# Patient Record
Sex: FEMALE | Race: WHITE | Employment: UNEMPLOYED | URBAN - NONMETROPOLITAN AREA
[De-identification: names, ages, dates, MRNs, and addresses within clinical notes are randomized per-mention and may not be internally consistent; named-entity substitution may affect disease eponyms.]

---

## 2017-09-07 ENCOUNTER — NURSE TRIAGE (OUTPATIENT)
Dept: ADMINISTRATIVE | Age: 37
End: 2017-09-07

## 2018-09-21 ENCOUNTER — HOSPITAL ENCOUNTER (EMERGENCY)
Age: 38
Discharge: HOME | End: 2018-09-21
Payer: MEDICARE

## 2018-09-21 VITALS
TEMPERATURE: 98.4 F | SYSTOLIC BLOOD PRESSURE: 140 MMHG | BODY MASS INDEX: 23.9 KG/M2 | OXYGEN SATURATION: 96 % | HEIGHT: 64 IN | RESPIRATION RATE: 18 BRPM | WEIGHT: 140 LBS | HEART RATE: 73 BPM | DIASTOLIC BLOOD PRESSURE: 86 MMHG

## 2018-09-21 DIAGNOSIS — B96.89 BACTERIAL VAGINOSIS: Primary | ICD-10-CM

## 2018-09-21 DIAGNOSIS — R63.4 UNINTENTIONAL WEIGHT LOSS: ICD-10-CM

## 2018-09-21 DIAGNOSIS — R53.1 GENERALIZED WEAKNESS: ICD-10-CM

## 2018-09-21 DIAGNOSIS — N76.0 BACTERIAL VAGINOSIS: Primary | ICD-10-CM

## 2018-09-21 DIAGNOSIS — G47.00 INSOMNIA, UNSPECIFIED TYPE: ICD-10-CM

## 2018-09-21 LAB
CHLAMYDIA TRACHOMATIS BY RT-PCR: NOT DETECTED
CT/NG SOURCE: NORMAL
NEISSERIA GONORRHOEAE BY RT-PCR: NOT DETECTED
TRICHOMONAS PREP: NEGATIVE

## 2018-09-21 PROCEDURE — 87491 CHLMYD TRACH DNA AMP PROBE: CPT

## 2018-09-21 PROCEDURE — 99205 OFFICE O/P NEW HI 60 MIN: CPT

## 2018-09-21 PROCEDURE — 87591 N.GONORRHOEAE DNA AMP PROB: CPT

## 2018-09-21 PROCEDURE — 99213 OFFICE O/P EST LOW 20 MIN: CPT | Performed by: NURSE PRACTITIONER

## 2018-09-21 PROCEDURE — 87070 CULTURE OTHR SPECIMN AEROBIC: CPT

## 2018-09-21 PROCEDURE — 87205 SMEAR GRAM STAIN: CPT

## 2018-09-21 PROCEDURE — 87808 TRICHOMONAS ASSAY W/OPTIC: CPT

## 2018-09-21 RX ORDER — METRONIDAZOLE 500 MG/1
500 TABLET ORAL 3 TIMES DAILY
Qty: 21 TABLET | Refills: 0 | Status: SHIPPED | OUTPATIENT
Start: 2018-09-21 | End: 2018-09-28

## 2018-09-21 RX ORDER — M-VIT,TX,IRON,MINS/CALC/FOLIC 27MG-0.4MG
1 TABLET ORAL DAILY
COMMUNITY
End: 2018-09-29

## 2018-09-21 RX ORDER — CRANBERRY CONC/C/BACILL COAG 250-30-15
TABLET ORAL
COMMUNITY
End: 2018-09-29

## 2018-09-21 ASSESSMENT — ENCOUNTER SYMPTOMS
SINUS PRESSURE: 0
NAUSEA: 1
VOMITING: 0
CHEST TIGHTNESS: 0
DIARRHEA: 0
COUGH: 0
SHORTNESS OF BREATH: 0
SORE THROAT: 0

## 2018-09-21 NOTE — ED PROVIDER NOTES
(Thin, creamy; fishy odor). Negative for dysuria, frequency, urgency, vaginal bleeding and vaginal pain. Neurological: Positive for dizziness (Occasional) and weakness. Negative for syncope and headaches. Psychiatric/Behavioral: The patient is nervous/anxious. PAST MEDICAL HISTORY         Diagnosis Date    Back pain     Colitis     Depression     Fibromyalgia     HPV (human papilloma virus) anogenital infection     OCD (obsessive compulsive disorder)        SURGICAL HISTORY     Patient  has a past surgical history that includes Cholecystectomy and Inner ear surgery. CURRENT MEDICATIONS       Discharge Medication List as of 9/21/2018  1:47 PM      CONTINUE these medications which have NOT CHANGED    Details   Multiple Vitamins-Minerals (THERAPEUTIC MULTIVITAMIN-MINERALS) tablet Take 1 tablet by mouth dailyHistorical Med      Cranberry-Vitamin C-Probiotic (AZO CRANBERRY) 250-30 MG TABS Take by mouthHistorical Med             ALLERGIES     Patient is has No Known Allergies. Patients   There is no immunization history on file for this patient. FAMILY HISTORY     Patient's family history includes Cancer in her paternal aunt; Diabetes in her father and mother; Heart Disease in her father and mother. SOCIAL HISTORY     Patient  reports that she has quit smoking. Her smoking use included Cigarettes. She smoked 0.50 packs per day. She has never used smokeless tobacco. She reports that she uses drugs, including Marijuana and Methamphetamines. She reports that she does not drink alcohol. PHYSICAL EXAM     ED TRIAGE VITALS  BP: (!) 140/86, Temp: 98.4 °F (36.9 °C), Pulse: 73, Resp: 18, SpO2: 96 %,Estimated body mass index is 24.41 kg/m² as calculated from the following:    Height as of this encounter: 5' 3.5\" (1.613 m). Weight as of this encounter: 140 lb (63.5 kg). ,Patient's last menstrual period was 08/28/2018. Physical Exam   Constitutional: She is oriented to person, place, and time.  She appears well-developed and well-nourished. She is cooperative. HENT:   Head: Normocephalic and atraumatic. Right Ear: Hearing, tympanic membrane, external ear and ear canal normal.   Left Ear: Hearing, tympanic membrane, external ear and ear canal normal.   Nose: Nose normal.   Mouth/Throat: Uvula is midline. Neck: Neck supple. Cardiovascular: Normal rate, regular rhythm, S1 normal, S2 normal and normal heart sounds. Pulmonary/Chest: Effort normal and breath sounds normal. No respiratory distress. She has no wheezes. Abdominal: Soft. Normal appearance and bowel sounds are normal. She exhibits no distension. There is no tenderness. Musculoskeletal:   Strength 5/5 to all 4 extremities   Lymphadenopathy:        Head (right side): No submental, no submandibular, no tonsillar, no preauricular, no posterior auricular and no occipital adenopathy present. Head (left side): No submental, no submandibular, no tonsillar, no preauricular, no posterior auricular and no occipital adenopathy present. She has no cervical adenopathy. Neurological: She is alert and oriented to person, place, and time. Skin: Skin is warm and dry. Psychiatric: She has a normal mood and affect. Her speech is normal and behavior is normal.   Nursing note and vitals reviewed. DIAGNOSTIC RESULTS     Labs:No results found for this visit on 09/21/18. IMAGING:    No orders to display         URGENT CARE COURSE:     Vitals:    09/21/18 1303   BP: (!) 140/86   Pulse: 73   Resp: 18   Temp: 98.4 °F (36.9 °C)   TempSrc: Temporal   SpO2: 96%   Weight: 140 lb (63.5 kg)   Height: 5' 3.5\" (1.613 m)       Medications - No data to display         PROCEDURES:  None    FINAL IMPRESSION      1. Bacterial vaginosis    2. Generalized weakness    3. Insomnia, unspecified type    4. Unintentional weight loss          DISPOSITION/PLAN     Take antibiotic as prescribed until gone. Do not stop taking even if you are feeling better.   Do not CNP  09/21/18 135

## 2018-09-21 NOTE — ED NOTES
Patient walked to room 5 for bilateral hand weakness onset approx 2 months ago, anxious and has had a hard time sleeping at night, she recently moved away from her significant other 2 months ago, and moved back into [place]. She states she was using drugs meth and marijuana, but hs been clean of them for a couple months. Patient was treated for anxiety and depression, denies any self harm or harm to other while here in the Urgent Care. Patient also states that she has had a vaginal odor with clear discharge she wants to get looked at. No other needs.       Jocy Ayala, PETTY  09/21/18 4481

## 2018-09-21 NOTE — ED NOTES
Patient discharge instructions given to pt and pt verbalized understanding, px given, no other needs at this time, and pt left in stable condition.      Tonny Villagran RN  09/21/18 0838

## 2018-09-24 LAB
GENITAL CULTURE, ROUTINE: NORMAL
GRAM STAIN RESULT: NORMAL

## 2018-09-29 ENCOUNTER — HOSPITAL ENCOUNTER (EMERGENCY)
Age: 38
Discharge: HOME | End: 2018-09-29
Payer: MEDICARE

## 2018-09-29 VITALS
OXYGEN SATURATION: 97 % | WEIGHT: 150 LBS | BODY MASS INDEX: 26.15 KG/M2 | TEMPERATURE: 98.2 F | HEART RATE: 94 BPM | DIASTOLIC BLOOD PRESSURE: 96 MMHG | RESPIRATION RATE: 18 BRPM | SYSTOLIC BLOOD PRESSURE: 129 MMHG

## 2018-09-29 DIAGNOSIS — F43.12 CHRONIC POST-TRAUMATIC STRESS DISORDER (PTSD): ICD-10-CM

## 2018-09-29 DIAGNOSIS — F41.9 ANXIETY AND DEPRESSION: Primary | ICD-10-CM

## 2018-09-29 DIAGNOSIS — F32.A ANXIETY AND DEPRESSION: Primary | ICD-10-CM

## 2018-09-29 LAB
AMPHETAMINE+METHAMPHETAMINE URINE SCREEN: NEGATIVE
BARBITURATE QUANTITATIVE URINE: NEGATIVE
BENZODIAZEPINE QUANTITATIVE URINE: NEGATIVE
CANNABINOID QUANTITATIVE URINE: NEGATIVE
COCAINE METABOLITE QUANTITATIVE URINE: NEGATIVE
OPIATES, URINE: NEGATIVE
OXYCODONE: NEGATIVE
PHENCYCLIDINE QUANTITATIVE URINE: NEGATIVE
PREGNANCY, URINE: NEGATIVE

## 2018-09-29 PROCEDURE — 99284 EMERGENCY DEPT VISIT MOD MDM: CPT

## 2018-09-29 PROCEDURE — 80307 DRUG TEST PRSMV CHEM ANLYZR: CPT

## 2018-09-29 PROCEDURE — 81025 URINE PREGNANCY TEST: CPT

## 2018-09-29 ASSESSMENT — ENCOUNTER SYMPTOMS
WHEEZING: 0
EYE DISCHARGE: 0
BACK PAIN: 0
SHORTNESS OF BREATH: 0
VOMITING: 0
COUGH: 0
ABDOMINAL PAIN: 0
DIARRHEA: 0
NAUSEA: 0
SORE THROAT: 0
EYE PAIN: 0
RHINORRHEA: 0

## 2018-09-29 ASSESSMENT — SLEEP AND FATIGUE QUESTIONNAIRES
DIFFICULTY ARISING: NO
AVERAGE NUMBER OF SLEEP HOURS: 4
DIFFICULTY STAYING ASLEEP: YES
SLEEP PATTERN: DIFFICULTY FALLING ASLEEP;DISTURBED/INTERRUPTED SLEEP
DO YOU HAVE DIFFICULTY SLEEPING: YES
DIFFICULTY FALLING ASLEEP: YES
DO YOU USE A SLEEP AID: NO
RESTFUL SLEEP: NO

## 2018-09-29 ASSESSMENT — LIFESTYLE VARIABLES: HISTORY_ALCOHOL_USE: YES

## 2018-09-29 ASSESSMENT — PATIENT HEALTH QUESTIONNAIRE - PHQ9: SUM OF ALL RESPONSES TO PHQ QUESTIONS 1-9: 14

## 2018-09-29 NOTE — ED PROVIDER NOTES
cardiologist.  None     RADIOLOGY: non-plain film images(s) such as CT, Ultrasound and MRI are read by the radiologist.  Plain radiographic images are visualized and preliminarily interpreted by the emergency physician unless otherwise stated below. No orders to display       LABS:   [address], URINE       EMERGENCY DEPARTMENT COURSE:   Vitals:    Vitals:    09/29/18 1140 09/29/18 1142 09/29/18 1401   BP: (!) 129/96     Pulse: 107  94   Resp: 20  18   Temp:  98.2 °F (36.8 °C)    TempSrc:  Oral    SpO2: 97%     Weight:   150 lb (68 kg)     6:24 PM: I spoke with Isabel from [facility] who stated that she spoke with Dr. Braden Soto (psychiatry)  who suggested IOT program for patient. Patient declined IOT and any other resources at this time. She told Isabel that she has an appointment with her PCP on Monday and that she is comfortable following up with him then. The patient was seen and evaluated within the ED today for the evaluation of increased frequency of panic attacks. The patient presented in no acute distress. Physical exam was benign from a medical standpoint. Patient was crying upon exam, depressed, and anxious. Pregnancy test and drug test both negative. I observed the patient's condition to remain stable during the duration of her stay. The patient wanted to go home after speaking with Isabel from [facility] and denied any resources or help at this time. Therefore, patient is discharged home in stable condition. I discussed return precautions and she verbalized understanding. I recommended that the patient f/u with Dr. Heather Harris (pcp) as scheduled for further evaluation and work up if their symptoms do not improve. Contact information for the intensive outpatient treatment program was given to the patient should she change her mind. All questions and concerns were addressed.  I have given the patient strict written and verbal instructions about care at home, follow-up, and signs and symptoms of worsening of condition and they did verbalize understanding. CRITICAL CARE:   None    CONSULTS:  Isabel ([facility])     PROCEDURES:  None    FINAL IMPRESSION      1. Anxiety and depression    2. Chronic post-traumatic stress disorder (PTSD)          DISPOSITION/PLAN     1. Anxiety and depression    2. Chronic post-traumatic stress disorder (PTSD)        PATIENT REFERRED TO:  Jessica Hassan MD  [address] [facility]  [phone]      as scheduled on Monday    [address] [address] [phone]  Schedule an appointment as soon as possible for a visit   if you would like intensive outpatient treatment      DISCHARGE MEDICATIONS:  Discharge Medication List as of 9/29/2018  1:40 PM          (Please note that portions of this note were completed with a voice recognition program.  Efforts were made to edit the dictations but occasionally words are mis-transcribed.)    Scribe: Dre Guevara 9/29/18 12:15 PM Scribing for and in the presence of Beverly Bilberry, Ellsworth Fothergill. Signed by: Ashok Morrissey, 09/29/18 6:24 PM    Provider:  I personally performed the services described in the documentation, reviewed and edited the documentation which was dictated to the scribe in my presence, and it accurately records my words and actions.     Judy Knight PA-C 09/29/18 6:24 PM    Beverly Bilberry, PA-C Beverly Bilberry, Ellsworth Fothergill  09/29/18 0067
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
## 2018-10-01 ENCOUNTER — OFFICE VISIT (OUTPATIENT)
Dept: FAMILY MEDICINE CLINIC | Age: 38
End: 2018-10-01
Payer: MEDICARE

## 2018-10-01 VITALS
DIASTOLIC BLOOD PRESSURE: 82 MMHG | SYSTOLIC BLOOD PRESSURE: 134 MMHG | HEART RATE: 88 BPM | BODY MASS INDEX: 25.92 KG/M2 | HEIGHT: 64 IN | WEIGHT: 151.8 LBS

## 2018-10-01 DIAGNOSIS — Z13.1 DIABETES MELLITUS SCREENING: ICD-10-CM

## 2018-10-01 DIAGNOSIS — Z13.220 SCREENING CHOLESTEROL LEVEL: ICD-10-CM

## 2018-10-01 DIAGNOSIS — F33.1 MODERATE EPISODE OF RECURRENT MAJOR DEPRESSIVE DISORDER (HCC): Primary | ICD-10-CM

## 2018-10-01 PROBLEM — K22.70 BARRETT'S ESOPHAGUS WITHOUT DYSPLASIA: Status: ACTIVE | Noted: 2018-10-01

## 2018-10-01 PROCEDURE — 99213 OFFICE O/P EST LOW 20 MIN: CPT | Performed by: FAMILY MEDICINE

## 2018-10-01 PROCEDURE — G8427 DOCREV CUR MEDS BY ELIG CLIN: HCPCS | Performed by: FAMILY MEDICINE

## 2018-10-01 PROCEDURE — G8419 CALC BMI OUT NRM PARAM NOF/U: HCPCS | Performed by: FAMILY MEDICINE

## 2018-10-01 PROCEDURE — G8484 FLU IMMUNIZE NO ADMIN: HCPCS | Performed by: FAMILY MEDICINE

## 2018-10-01 PROCEDURE — 1036F TOBACCO NON-USER: CPT | Performed by: FAMILY MEDICINE

## 2018-10-01 RX ORDER — FLUOXETINE HYDROCHLORIDE 20 MG/1
20 CAPSULE ORAL DAILY
Qty: 30 CAPSULE | Refills: 0 | Status: ON HOLD | OUTPATIENT
Start: 2018-10-01 | End: 2019-04-23 | Stop reason: ALTCHOICE

## 2018-10-01 ASSESSMENT — ENCOUNTER SYMPTOMS
SHORTNESS OF BREATH: 0
WHEEZING: 0

## 2018-10-02 ENCOUNTER — HOSPITAL ENCOUNTER (OUTPATIENT)
Age: 38
Discharge: HOME | End: 2018-10-02
Payer: MEDICARE

## 2018-10-02 DIAGNOSIS — Z13.1 DIABETES MELLITUS SCREENING: ICD-10-CM

## 2018-10-02 DIAGNOSIS — Z13.220 SCREENING CHOLESTEROL LEVEL: ICD-10-CM

## 2018-10-02 LAB
CHOLESTEROL, TOTAL: 112 MG/DL (ref 100–199)
GLUCOSE FASTING: 102 MG/DL (ref 70–108)
HDLC SERPL-MCNC: 68 MG/DL
LDL CHOLESTEROL CALCULATED: 34 MG/DL
TRIGL SERPL-MCNC: 52 MG/DL (ref 0–199)

## 2018-10-02 PROCEDURE — 36415 COLL VENOUS BLD VENIPUNCTURE: CPT

## 2018-10-02 PROCEDURE — 80061 LIPID PANEL: CPT

## 2018-10-02 PROCEDURE — 82947 ASSAY GLUCOSE BLOOD QUANT: CPT

## 2018-10-22 ENCOUNTER — TELEPHONE (OUTPATIENT)
Dept: FAMILY MEDICINE CLINIC | Age: 38
End: 2018-10-22

## 2018-11-03 ENCOUNTER — HOSPITAL ENCOUNTER (EMERGENCY)
Age: 38
Discharge: HOME | End: 2018-11-03
Payer: MEDICARE

## 2018-11-03 VITALS
HEART RATE: 92 BPM | SYSTOLIC BLOOD PRESSURE: 97 MMHG | WEIGHT: 151 LBS | DIASTOLIC BLOOD PRESSURE: 59 MMHG | OXYGEN SATURATION: 98 % | BODY MASS INDEX: 25.78 KG/M2 | RESPIRATION RATE: 16 BRPM | HEIGHT: 64 IN | TEMPERATURE: 98 F

## 2018-11-03 DIAGNOSIS — F19.10 POLYSUBSTANCE ABUSE (HCC): ICD-10-CM

## 2018-11-03 DIAGNOSIS — N30.00 ACUTE CYSTITIS WITHOUT HEMATURIA: Primary | ICD-10-CM

## 2018-11-03 LAB
ACETAMINOPHEN LEVEL: < 5 UG/ML (ref 0–20)
ALBUMIN SERPL-MCNC: 3.7 G/DL (ref 3.5–5.1)
ALP BLD-CCNC: 57 U/L (ref 38–126)
ALT SERPL-CCNC: 36 U/L (ref 11–66)
AMPHETAMINE+METHAMPHETAMINE URINE SCREEN: POSITIVE
ANION GAP SERPL CALCULATED.3IONS-SCNC: 13 MEQ/L (ref 8–16)
AST SERPL-CCNC: 72 U/L (ref 5–40)
BACTERIA: ABNORMAL /HPF
BARBITURATE QUANTITATIVE URINE: NEGATIVE
BASOPHILS # BLD: 0.7 %
BASOPHILS ABSOLUTE: 0.1 THOU/MM3 (ref 0–0.1)
BENZODIAZEPINE QUANTITATIVE URINE: NEGATIVE
BILIRUB SERPL-MCNC: 1.8 MG/DL (ref 0.3–1.2)
BILIRUBIN DIRECT: 0.4 MG/DL (ref 0–0.3)
BILIRUBIN URINE: ABNORMAL
BLOOD, URINE: ABNORMAL
BUN BLDV-MCNC: 10 MG/DL (ref 7–22)
CALCIUM SERPL-MCNC: 8.5 MG/DL (ref 8.5–10.5)
CANNABINOID QUANTITATIVE URINE: POSITIVE
CASTS 2: PRESENT /LPF
CASTS UA: ABNORMAL /LPF
CHARACTER, URINE: ABNORMAL
CHLORIDE BLD-SCNC: 103 MEQ/L (ref 98–111)
CO2: 21 MEQ/L (ref 23–33)
COCAINE METABOLITE QUANTITATIVE URINE: NEGATIVE
COLOR: ABNORMAL
CREAT SERPL-MCNC: 0.6 MG/DL (ref 0.4–1.2)
CRYSTALS, UA: ABNORMAL
EOSINOPHIL # BLD: 3.9 %
EOSINOPHILS ABSOLUTE: 0.3 THOU/MM3 (ref 0–0.4)
EPITHELIAL CELLS, UA: ABNORMAL /HPF
ERYTHROCYTE [DISTWIDTH] IN BLOOD BY AUTOMATED COUNT: 12.7 % (ref 11.5–14.5)
ERYTHROCYTE [DISTWIDTH] IN BLOOD BY AUTOMATED COUNT: 42.5 FL (ref 35–45)
ETHYL ALCOHOL, SERUM: < 0.01 %
GFR SERPL CREATININE-BSD FRML MDRD: > 90 ML/MIN/1.73M2
GLUCOSE BLD-MCNC: 88 MG/DL (ref 70–108)
GLUCOSE URINE: NEGATIVE MG/DL
HCT VFR BLD CALC: 38.3 % (ref 37–47)
HEMOGLOBIN: 13.5 GM/DL (ref 12–16)
ICTOTEST: POSITIVE
IMMATURE GRANS (ABS): 0.01 THOU/MM3 (ref 0–0.07)
IMMATURE GRANULOCYTES: 0.1 %
KETONES, URINE: 15
LEUKOCYTE ESTERASE, URINE: ABNORMAL
LIPASE: 179.4 U/L (ref 5.6–51.3)
LYMPHOCYTES # BLD: 34.5 %
LYMPHOCYTES ABSOLUTE: 2.5 THOU/MM3 (ref 1–4.8)
MAGNESIUM: 1.9 MG/DL (ref 1.6–2.4)
MCH RBC QN AUTO: 32.6 PG (ref 26–33)
MCHC RBC AUTO-ENTMCNC: 35.2 GM/DL (ref 32.2–35.5)
MCV RBC AUTO: 92.5 FL (ref 81–99)
MISCELLANEOUS 2: ABNORMAL
MONOCYTES # BLD: 10 %
MONOCYTES ABSOLUTE: 0.7 THOU/MM3 (ref 0.4–1.3)
NITRITE, URINE: NEGATIVE
NUCLEATED RED BLOOD CELLS: 0 /100 WBC
OPIATES, URINE: NEGATIVE
OSMOLALITY CALCULATION: 272.3 MOSMOL/KG (ref 275–300)
OXYCODONE: NEGATIVE
PH UA: 6
PHENCYCLIDINE QUANTITATIVE URINE: NEGATIVE
PLATELET # BLD: 217 THOU/MM3 (ref 130–400)
PMV BLD AUTO: 9.1 FL (ref 9.4–12.4)
POTASSIUM SERPL-SCNC: 3.4 MEQ/L (ref 3.5–5.2)
PREGNANCY, SERUM: NEGATIVE
PROTEIN UA: ABNORMAL
RBC # BLD: 4.14 MILL/MM3 (ref 4.2–5.4)
RBC URINE: ABNORMAL /HPF
RENAL EPITHELIAL, UA: ABNORMAL
SALICYLATE, SERUM: < 0.3 MG/DL (ref 2–10)
SEG NEUTROPHILS: 50.8 %
SEGMENTED NEUTROPHILS ABSOLUTE COUNT: 3.7 THOU/MM3 (ref 1.8–7.7)
SODIUM BLD-SCNC: 137 MEQ/L (ref 135–145)
SPECIFIC GRAVITY, URINE: 1.03 (ref 1–1.03)
TOTAL PROTEIN: 6.2 G/DL (ref 6.1–8)
TSH SERPL DL<=0.05 MIU/L-ACNC: 2.11 UIU/ML (ref 0.4–4.2)
UROBILINOGEN, URINE: 4 EU/DL
WBC # BLD: 7.3 THOU/MM3 (ref 4.8–10.8)
WBC UA: ABNORMAL /HPF
YEAST: ABNORMAL

## 2018-11-03 PROCEDURE — 6370000000 HC RX 637 (ALT 250 FOR IP): Performed by: PHYSICIAN ASSISTANT

## 2018-11-03 PROCEDURE — 81001 URINALYSIS AUTO W/SCOPE: CPT

## 2018-11-03 PROCEDURE — 83735 ASSAY OF MAGNESIUM: CPT

## 2018-11-03 PROCEDURE — 6360000002 HC RX W HCPCS

## 2018-11-03 PROCEDURE — 87077 CULTURE AEROBIC IDENTIFY: CPT

## 2018-11-03 PROCEDURE — 87184 SC STD DISK METHOD PER PLATE: CPT

## 2018-11-03 PROCEDURE — 83690 ASSAY OF LIPASE: CPT

## 2018-11-03 PROCEDURE — 80307 DRUG TEST PRSMV CHEM ANLYZR: CPT

## 2018-11-03 PROCEDURE — 82248 BILIRUBIN DIRECT: CPT

## 2018-11-03 PROCEDURE — 85025 COMPLETE CBC W/AUTO DIFF WBC: CPT

## 2018-11-03 PROCEDURE — 87086 URINE CULTURE/COLONY COUNT: CPT

## 2018-11-03 PROCEDURE — G0480 DRUG TEST DEF 1-7 CLASSES: HCPCS

## 2018-11-03 PROCEDURE — 36415 COLL VENOUS BLD VENIPUNCTURE: CPT

## 2018-11-03 PROCEDURE — 84443 ASSAY THYROID STIM HORMONE: CPT

## 2018-11-03 PROCEDURE — 99285 EMERGENCY DEPT VISIT HI MDM: CPT

## 2018-11-03 PROCEDURE — 96372 THER/PROPH/DIAG INJ SC/IM: CPT

## 2018-11-03 PROCEDURE — 84703 CHORIONIC GONADOTROPIN ASSAY: CPT

## 2018-11-03 PROCEDURE — 80053 COMPREHEN METABOLIC PANEL: CPT

## 2018-11-03 PROCEDURE — 87186 SC STD MICRODIL/AGAR DIL: CPT

## 2018-11-03 RX ORDER — DIPHENHYDRAMINE HYDROCHLORIDE 50 MG/ML
INJECTION INTRAMUSCULAR; INTRAVENOUS
Status: COMPLETED
Start: 2018-11-03 | End: 2018-11-03

## 2018-11-03 RX ORDER — SULFAMETHOXAZOLE AND TRIMETHOPRIM 800; 160 MG/1; MG/1
1 TABLET ORAL 2 TIMES DAILY
Qty: 14 TABLET | Refills: 0 | Status: SHIPPED | OUTPATIENT
Start: 2018-11-03 | End: 2018-11-10

## 2018-11-03 RX ORDER — SULFAMETHOXAZOLE AND TRIMETHOPRIM 800; 160 MG/1; MG/1
1 TABLET ORAL ONCE
Status: COMPLETED | OUTPATIENT
Start: 2018-11-03 | End: 2018-11-03

## 2018-11-03 RX ORDER — DIPHENHYDRAMINE HYDROCHLORIDE 50 MG/ML
25 INJECTION INTRAMUSCULAR; INTRAVENOUS ONCE
Status: DISCONTINUED | OUTPATIENT
Start: 2018-11-03 | End: 2018-11-03

## 2018-11-03 RX ORDER — LORAZEPAM 2 MG/ML
INJECTION INTRAMUSCULAR
Status: COMPLETED
Start: 2018-11-03 | End: 2018-11-03

## 2018-11-03 RX ORDER — DIPHENHYDRAMINE HYDROCHLORIDE 50 MG/ML
50 INJECTION INTRAMUSCULAR; INTRAVENOUS ONCE
Status: COMPLETED | OUTPATIENT
Start: 2018-11-03 | End: 2018-11-03

## 2018-11-03 RX ORDER — LORAZEPAM 2 MG/ML
2 INJECTION INTRAMUSCULAR ONCE
Status: COMPLETED | OUTPATIENT
Start: 2018-11-03 | End: 2018-11-03

## 2018-11-03 RX ADMIN — SULFAMETHOXAZOLE AND TRIMETHOPRIM 1 TABLET: 800; 160 TABLET ORAL at 12:29

## 2018-11-03 RX ADMIN — LORAZEPAM 2 MG: 2 INJECTION INTRAMUSCULAR at 04:00

## 2018-11-03 RX ADMIN — DIPHENHYDRAMINE HYDROCHLORIDE 50 MG: 50 INJECTION, SOLUTION INTRAMUSCULAR; INTRAVENOUS at 04:00

## 2018-11-03 RX ADMIN — DIPHENHYDRAMINE HYDROCHLORIDE 50 MG: 50 INJECTION INTRAMUSCULAR; INTRAVENOUS at 04:00

## 2018-11-03 RX ADMIN — LORAZEPAM 2 MG: 2 INJECTION INTRAMUSCULAR; INTRAVENOUS at 04:00

## 2018-11-03 ASSESSMENT — ENCOUNTER SYMPTOMS
RHINORRHEA: 0
COUGH: 0
NAUSEA: 0
SORE THROAT: 0
VOMITING: 0
SHORTNESS OF BREATH: 0
DIARRHEA: 0
BACK PAIN: 0
ABDOMINAL PAIN: 0

## 2018-11-03 ASSESSMENT — SLEEP AND FATIGUE QUESTIONNAIRES
DO YOU HAVE DIFFICULTY SLEEPING: NO
DO YOU USE A SLEEP AID: NO
AVERAGE NUMBER OF SLEEP HOURS: 3

## 2018-11-03 ASSESSMENT — LIFESTYLE VARIABLES: HISTORY_ALCOHOL_USE: NO

## 2018-11-03 NOTE — PROGRESS NOTES
8:41am Pt is resting comfortably in her bed. Nurse attempted to arouse pt but she continued to sleep. 9:30am Pt is resting in her bed. 10:19am Pt continues to rest in her bed    11:15am Clinician helped nicole Aguayo change out bed and then clinician attempted assessment. 12:17pm Clinician made a phone call to [facility]. CSU staff is willing to accept pt back.      12:45pm Pt discharged

## 2018-11-03 NOTE — ED NOTES
Woke pt up to eat something. Pt sitting up on cart w/ Gatorade and cold pack meal. Pt states still feeling very sleepy from medications. Updated that she needs to stay up long enough to be assessed so that she can be d/c back to [facility].  Pt verbalized understanding     Jeanna Polanco RN  11/03/18 0179

## 2018-11-03 NOTE — ED NOTES
Pt medicated per order- updated on discharge planning, pt verbalized understanding. Pt given clothing to get dressed. [employer] Police to take pt back to [facility] [employer]. Attempting to call report back to facility but not getting an answer- left message for them to call us back.       Radha Canada RN  11/03/18 0281

## 2018-11-03 NOTE — ED NOTES
Pt resting in bed with eyes closed. No distress noted. Respires even and unlabored. [employer] police monitoring pt from [facility] viewing room.      Althea Antonio RN  11/03/18 2748

## 2018-11-03 NOTE — ED PROVIDER NOTES
Transfer of Care Note:   I have personally performed a face to face diagnostic evaluation on this patient. I have personally performed a face to face diagnostic evaluation on this patient. The patient's initial evaluation and plan have been discussed with the prior provider who initially evaluated the patient. Nursing Notes, Past Medical Hx, Past Surgical Hx, Social Hx, Allergies, and Family Hx were all reviewed. (Please note that portions of this note were completed with a voice recognition program.  Efforts were made to edit the dictations but occasionally words are mis-transcribed.)    8:24 AM: The patient was evaluated. Kaylie Hunt is a 45 y.o. female who presents to the Emergency Department from [facility] for the evaluation of altered mental status. Patient states that she \"feels like she lost her daughter\" and there's a \"chamberlain in her own head\". She states she did a line of meth and was reading the Bible. She denies suicidal or homicidal ideations. She also denies chest pain, shortness of breath and abdominal pain. Patient denies any other drug or alcohol use. She reports previously using meth for a year. Patient denies any further complaints at initial encounter. The patient was turned over to me by Amy Resendiz NP pending resolution of sedation. Apparently in the ER patient had become  Agitated and hysterical and had to be medicated. Exam: Physical Exam   Constitutional: She is oriented to person, place, and time. Vital signs are normal. She appears well-developed and well-nourished. Non-toxic appearance. No distress. HENT:   Head: Normocephalic and atraumatic. Right Ear: Hearing normal.   Left Ear: Hearing normal.   Nose: Nose normal. No rhinorrhea. Mouth/Throat: Uvula is midline, oropharynx is clear and moist and mucous membranes are normal. No oropharyngeal exudate. Eyes: Pupils are equal, round, and reactive to light.  Conjunctivae, EOM and lids are normal. No scleral

## 2018-11-05 LAB
ORGANISM: ABNORMAL
URINE CULTURE REFLEX: ABNORMAL

## 2019-04-23 ENCOUNTER — HOSPITAL ENCOUNTER (INPATIENT)
Age: 39
LOS: 6 days | Discharge: OTHER INSTITUTION | DRG: 885 | End: 2019-04-29
Attending: PSYCHIATRY & NEUROLOGY | Admitting: PSYCHIATRY & NEUROLOGY
Payer: MEDICARE

## 2019-04-23 PROBLEM — F31.9 BIPOLAR 1 DISORDER (HCC): Status: ACTIVE | Noted: 2019-04-23

## 2019-04-23 LAB — PREGNANCY, URINE: NEGATIVE

## 2019-04-23 PROCEDURE — 1240000000 HC EMOTIONAL WELLNESS R&B

## 2019-04-23 PROCEDURE — 81025 URINE PREGNANCY TEST: CPT

## 2019-04-23 PROCEDURE — 6370000000 HC RX 637 (ALT 250 FOR IP): Performed by: PSYCHIATRY & NEUROLOGY

## 2019-04-23 RX ORDER — NICOTINE 21 MG/24HR
1 PATCH, TRANSDERMAL 24 HOURS TRANSDERMAL DAILY
Status: DISCONTINUED | OUTPATIENT
Start: 2019-04-23 | End: 2019-04-29 | Stop reason: HOSPADM

## 2019-04-23 RX ORDER — LAMOTRIGINE 100 MG/1
100 TABLET ORAL DAILY
Status: DISCONTINUED | OUTPATIENT
Start: 2019-04-24 | End: 2019-04-29 | Stop reason: HOSPADM

## 2019-04-23 RX ORDER — MAGNESIUM HYDROXIDE/ALUMINUM HYDROXICE/SIMETHICONE 120; 1200; 1200 MG/30ML; MG/30ML; MG/30ML
30 SUSPENSION ORAL EVERY 6 HOURS PRN
Status: DISCONTINUED | OUTPATIENT
Start: 2019-04-23 | End: 2019-04-29 | Stop reason: HOSPADM

## 2019-04-23 RX ORDER — ACETAMINOPHEN 325 MG/1
650 TABLET ORAL EVERY 4 HOURS PRN
Status: DISCONTINUED | OUTPATIENT
Start: 2019-04-23 | End: 2019-04-29 | Stop reason: HOSPADM

## 2019-04-23 RX ORDER — DULOXETIN HYDROCHLORIDE 60 MG/1
60 CAPSULE, DELAYED RELEASE ORAL EVERY MORNING
Status: DISCONTINUED | OUTPATIENT
Start: 2019-04-24 | End: 2019-04-29 | Stop reason: HOSPADM

## 2019-04-23 RX ORDER — HYDROXYZINE HYDROCHLORIDE 25 MG/1
25 TABLET, FILM COATED ORAL 3 TIMES DAILY PRN
Status: DISCONTINUED | OUTPATIENT
Start: 2019-04-23 | End: 2019-04-28

## 2019-04-23 RX ORDER — HYDROXYZINE PAMOATE 25 MG/1
25 CAPSULE ORAL 3 TIMES DAILY PRN
Status: ON HOLD | COMMUNITY
End: 2019-04-29 | Stop reason: HOSPADM

## 2019-04-23 RX ORDER — DULOXETIN HYDROCHLORIDE 60 MG/1
60 CAPSULE, DELAYED RELEASE ORAL EVERY MORNING
Status: ON HOLD | COMMUNITY
End: 2019-04-29 | Stop reason: HOSPADM

## 2019-04-23 RX ORDER — TRAZODONE HYDROCHLORIDE 50 MG/1
50 TABLET ORAL NIGHTLY PRN
Status: DISCONTINUED | OUTPATIENT
Start: 2019-04-23 | End: 2019-04-29 | Stop reason: HOSPADM

## 2019-04-23 RX ORDER — LAMOTRIGINE 100 MG/1
100 TABLET ORAL DAILY
Status: ON HOLD | COMMUNITY
End: 2019-04-29 | Stop reason: SDUPTHER

## 2019-04-23 RX ADMIN — HYDROXYZINE HYDROCHLORIDE 25 MG: 25 TABLET, FILM COATED ORAL at 21:16

## 2019-04-23 RX ADMIN — TRAZODONE HYDROCHLORIDE 50 MG: 50 TABLET ORAL at 21:16

## 2019-04-23 ASSESSMENT — SLEEP AND FATIGUE QUESTIONNAIRES
RESTFUL SLEEP: YES
SLEEP PATTERN: DIFFICULTY FALLING ASLEEP
DIFFICULTY STAYING ASLEEP: YES
DO YOU USE A SLEEP AID: YES
AVERAGE NUMBER OF SLEEP HOURS: 8
DIFFICULTY ARISING: NO
DO YOU HAVE DIFFICULTY SLEEPING: NO
DIFFICULTY FALLING ASLEEP: YES

## 2019-04-23 ASSESSMENT — LIFESTYLE VARIABLES: HISTORY_ALCOHOL_USE: NO

## 2019-04-23 ASSESSMENT — PAIN SCALES - GENERAL: PAINLEVEL_OUTOF10: 0

## 2019-04-24 PROCEDURE — APPSS60 APP SPLIT SHARED TIME 46-60 MINUTES: Performed by: NURSE PRACTITIONER

## 2019-04-24 PROCEDURE — 90792 PSYCH DIAG EVAL W/MED SRVCS: CPT | Performed by: PSYCHIATRY & NEUROLOGY

## 2019-04-24 PROCEDURE — 6370000000 HC RX 637 (ALT 250 FOR IP): Performed by: PSYCHIATRY & NEUROLOGY

## 2019-04-24 PROCEDURE — 1240000000 HC EMOTIONAL WELLNESS R&B

## 2019-04-24 RX ORDER — QUETIAPINE FUMARATE 100 MG/1
100 TABLET, FILM COATED ORAL 2 TIMES DAILY
Status: DISCONTINUED | OUTPATIENT
Start: 2019-04-24 | End: 2019-04-25

## 2019-04-24 RX ORDER — LORAZEPAM 1 MG/1
1 TABLET ORAL ONCE
Status: COMPLETED | OUTPATIENT
Start: 2019-04-24 | End: 2019-04-25

## 2019-04-24 RX ADMIN — QUETIAPINE FUMARATE 100 MG: 100 TABLET ORAL at 09:58

## 2019-04-24 RX ADMIN — HYDROXYZINE HYDROCHLORIDE 25 MG: 25 TABLET, FILM COATED ORAL at 07:56

## 2019-04-24 RX ADMIN — HYDROXYZINE HYDROCHLORIDE 25 MG: 25 TABLET, FILM COATED ORAL at 20:36

## 2019-04-24 RX ADMIN — TRAZODONE HYDROCHLORIDE 50 MG: 50 TABLET ORAL at 20:36

## 2019-04-24 RX ADMIN — DULOXETINE HYDROCHLORIDE 60 MG: 60 CAPSULE, DELAYED RELEASE ORAL at 07:55

## 2019-04-24 RX ADMIN — HYDROXYZINE HYDROCHLORIDE 25 MG: 25 TABLET, FILM COATED ORAL at 14:24

## 2019-04-24 RX ADMIN — LAMOTRIGINE 100 MG: 100 TABLET ORAL at 07:56

## 2019-04-24 ASSESSMENT — PAIN DESCRIPTION - DIRECTION: RADIATING_TOWARDS: BILATERAL ARMS

## 2019-04-24 ASSESSMENT — SLEEP AND FATIGUE QUESTIONNAIRES
RESTFUL SLEEP: YES
AVERAGE NUMBER OF SLEEP HOURS: 8
DO YOU HAVE DIFFICULTY SLEEPING: NO
DIFFICULTY FALLING ASLEEP: YES
DIFFICULTY STAYING ASLEEP: YES
SLEEP PATTERN: DIFFICULTY FALLING ASLEEP
DIFFICULTY ARISING: NO
DO YOU USE A SLEEP AID: YES

## 2019-04-24 ASSESSMENT — PAIN SCALES - GENERAL
PAINLEVEL_OUTOF10: 0
PAINLEVEL_OUTOF10: 6

## 2019-04-24 ASSESSMENT — PAIN - FUNCTIONAL ASSESSMENT: PAIN_FUNCTIONAL_ASSESSMENT: ACTIVITIES ARE NOT PREVENTED

## 2019-04-24 ASSESSMENT — LIFESTYLE VARIABLES: HISTORY_ALCOHOL_USE: NO

## 2019-04-24 ASSESSMENT — PAIN DESCRIPTION - PAIN TYPE: TYPE: CHRONIC PAIN

## 2019-04-24 ASSESSMENT — PAIN DESCRIPTION - PROGRESSION: CLINICAL_PROGRESSION: NOT CHANGED

## 2019-04-24 ASSESSMENT — PAIN DESCRIPTION - FREQUENCY: FREQUENCY: INTERMITTENT

## 2019-04-24 ASSESSMENT — PAIN DESCRIPTION - ONSET: ONSET: ON-GOING

## 2019-04-24 ASSESSMENT — PAIN DESCRIPTION - LOCATION: LOCATION: BACK

## 2019-04-24 ASSESSMENT — PAIN DESCRIPTION - ORIENTATION: ORIENTATION: LOWER;MID;UPPER

## 2019-04-24 NOTE — PROGRESS NOTES
Nutrition Assessment    Type and Reason for Visit: Initial, Consult(pt parinoid and not eating, not eating meat, asking for Ensure)    Nutrition Recommendations:   Continue current diet. ONS initiated, Ensure Enlive TID. Will provide non-meat protein extras on meal tray as well (hard boiled eggs, yogurt). Would recommend consider a multivitamin. Nutrition Assessment:   Pt. nutritionally compromised AEB report of poor oral intake in the last few days due to poor appetite and intermittent nausea. At risk for further nutrition compromise r/t psychological status (staff reports paranoid at present), continued poor appetite, and need for nutrition support. Will provide Ensure Enlive TID and non-meat high protein extras with meals. Encouraged oral intake. Will recommend consider a multivitamin. Malnutrition Assessment:  · Malnutrition Status: At risk for malnutrition  · Context: Acute illness or injury    Nutrition Risk Level: Moderate    Nutrient Needs:  · Estimated Daily Total Kcal: 7044-2569 kcals (20-25 kcals/kg/day based on 66 kg)  · Estimated Daily Protein (g): 86 grams or more (1.3 grams/kg/day based on 66 kg)    Nutrition Diagnosis:   · Problem: Inadequate oral intake  · Etiology: related to Psychological cause/life stress, Nausea, Insufficient energy/nutrient consumption     Signs and symptoms:  as evidenced by Diet history of poor intake    Objective Information:  · Nutrition-Focused Physical Findings: Parinoid per staff, she feels her hair is falling out. Can't eat warm meat as the smell makes her nauseated. Intermittent nausea from anxiety. History of meth use, not recently per pt. Recent marijuana use.   · Wound Type: None  · Current Nutrition Therapies:  · Oral Diet Orders: General   · Oral Diet intake: 1-25%  · Oral Nutrition Supplement (ONS) Orders: Standard High Calorie Oral Supplement(Ensure Enlive, 2 hard boiled eggs, yogurt with each meal)  · ONS intake: Unable to

## 2019-04-24 NOTE — GROUP NOTE
Group Therapy Note    Group Start Time: 1630  Group End Time: 5644  Group Topic: Healthy Living/Wellness    Attendees: 6    Notes:  Patient did not attend group. Handout given:  Mother Heladio Eugene" and Emotions worksheet    Discipline Responsible: Licensed Practical Nurse    Signature:  Lilo Feldman LPN

## 2019-04-24 NOTE — PROGRESS NOTES
leave the hospital?  - TBD, potentially with friends.   6. What is a phone # we may contact you at?  - [phone]      GOALS UPDATE:   Time frame for Short-Term Goals: Daily    GABRIELE Martinez

## 2019-04-24 NOTE — BH NOTE
`[facility] [place]  Admission Note     Admission Type:   Admission Type:  Involuntary    Reason for admission:  Reason for Admission: bipolar    PATIENT STRENGTHS:  Strengths: Communication, Medication Compliance, Connection to output provider    Patient Strengths and Limitations:  Limitations: Difficult relationships / poor social skills, General negative or hopeless attitude about future/recovery, Difficulty problem solving/relies on others to help solve problems    Addictive Behavior:   Addictive Behavior  In the past 3 months, have you felt or has someone told you that you have a problem with:  : None  Do you have a history of Chemical Use?: No  Do you have a history of Alcohol Use?: No  Do you have a history of Street Drug Abuse?: Yes  Histroy of Prescripton Drug Abuse?: No    Medical Problems:   Past Medical History:   Diagnosis Date    Anxiety     Back pain     Colitis     Depression     Fibromyalgia     HPV (human papilloma virus) anogenital infection     OCD (obsessive compulsive disorder)        Status EXAM:  Status and Exam  Normal: Yes  Facial Expression: Flat, Brightened  Affect: Appropriate  Level of Consciousness: Alert  Mood:Normal: No  Mood: Depressed, Anxious  Motor Activity:Normal: Yes  Interview Behavior: Cooperative  Preception: [place] to Person, [place] to Time, [place] to Place, [place] to Situation  Attention:Normal: Yes  Thought Processes: Circumstantial  Thought Content:Normal: No  Hallucinations: None  Delusions: No  Memory:Normal: No  Memory: Poor Recent  Insight and Judgment: No  Insight and Judgment: Poor Judgment, Poor Insight  Present Suicidal Ideation: No  Present Homicidal Ideation: No    Tobacco Screening:  Practical Counseling, on admission, saurav X, if applicable and completed (first 3 are required if patient doesn't refuse):            (x)  Recognizing danger situations (included triggers and roadblocks)                    ( x)  Coping skills (new ways to manage voices or delusions.  Is calm and cooperative                   Mj Maya RN

## 2019-04-24 NOTE — PROGRESS NOTES
[facility] Biopsychosocial Assessment    Current Level of Psychosocial Functioning     Independent XXX  Dependent    Minimal Assist     Comments:      Psychosocial High Risk Factors (check all that apply)    Unable to obtain meds   Chronic illness/pain    Substance abuse XXX  Lack of Family Support   Financial stress  Isolation XXX  Inadequate Community Resources   Suicide attempt(s)   Not taking medications   Victim of crime   Developmental Delay  Unable to manage personal needs    Age 72 or older   Homeless  No transportation   Readmission within 30 days  Unemployment  Traumatic Event    Comments:   Sexual Orientation:  Heterosexual     Patient Strengths: Communication, Outpatient Provider    Patient Barriers: Poor Coping Skills, Paranoia, Difficulty Problem Solving    Plan of Care   Medication management, group/individual therapies, family meetings, psycho -education, treatment team meetings to assist with stabilization    Initial Discharge Plan:  Patient reports that she is currently homeless. Patient is currently linked with [facility]. Clinical Summary: This is a 45year old, female who is admitted to  for increased paranoia and suicidal ideation. Patient reports that she is currently homeless. Patient reports increased tension between her and her sister as she recently found out that  Patient states that she is receiving services from [facility] in [facility], sees Campos Trujillo for medication management. It is reported that patient was to start taking Rex[place]i but stopped taking it because did not like the way it made her feel. Provider wanted her to start on [place] however patient declined this due to expense. Patient is currently taking Cymbalta 60 MG Daily, Lamictal 100 MG HS, Vistaril 50 MG TID, PRN. Patient is also linked with Danette Manriquez for individual counseling, last appointment 1/18/19. VANITA will continue to discharge plan.      GABRIELE Hernandez

## 2019-04-24 NOTE — BH NOTE
INPATIENT RECREATIONAL THERAPY  ADULT BEHAVIORAL SERVICES  EVALUATION    REFERRING PHYSICIAN:   Dr. Juan Diego Cook  DIAGNOSIS:    Bipolar 1 Disorder  PRECAUTIONS:   Suicide precautions    HISTORY OF PRESENT ILLNESS/INJURY:   Patient was admitted to the unit due to suicidal ideation and paranoia. Patient stated that she has been having crying spells. Patient has relationship stress with her sister. Patient reported that she found out that her sister is going to  her ex-boyfriend whom patient reported that she is still in love with. Patient was cooperative but irritable and tearful at times. Patient stated that she has been compliant with her medications. Patient likes to be called, \"Carla. \"    PMH:  Please see medical chart for prior medical history, allergies, and medication    HISTORY OF PSYCHIATRIC TREATMENT:  IP: [address] - years ago  OP: Ryland Seller:   [birth year]  GENDER:   female  MARITAL STATUS:    EMPLOYMENT STATUS:  Disability    LIVING SITUATION/SUPPORT: Patient reported that she is homeless but was living with her ex-boyfriend prior to admission. EDUCATIONAL LEVEL:   GED    MEDICATION/DRUG USE:  History of substance abuse. Meth/cocaine. Noncompliance with medications. Marijuana use. LEISURE INTERESTS:  activities with friends, spiritual activities OK, coloring and other arts/crafts, listening to music  ACTIVITY PREFERENCE:  Small group or 1:1  ACTIVITY TYPES:   Passive. Active. Indoor. Outdoor. COGNITION:  A&Ox3    COPING:   poor  ATTENTION:  poor  RELAXATION:  Patient reported poor sleep and nightmares. Patient also appeared anxious and paranoid. SELF-ESTEEM:  poor  MOTIVATION:   poor    SOCIAL SKILLS:    Poor   FRUSTRATION TOLERANCE:   Poor frustration tolerance at this time. Patient is irritable and appeared to be easily agitated. ATTENTION SEEKING:   Patient is attention-seeking. Patient appeared to have multiple somatic complaints.   COOPERATION:  Cooperative but anxious and irritable  AFFECT:  labile  APPEARANCE: appropriate    HEARING:   No problems noted  VISION:   Corrected with glasses   VERBAL COMMUNICATION:     No problems noted  WRITTEN COMMUNICATION:   No problems noted    COORDINATION:  No problems noted  MOBILITY:  Ambulates independently   GOALS:    Increase socialization by attending groups on the unit daily.

## 2019-04-24 NOTE — GROUP NOTE
Group Therapy Note    Date: April 24    Group Start Time: 0900  Group End Time: 0930  Group Topic: [employer] Adult Psych 4E    [place]             Patient's Goal:   To feel better. To go somewhere where they are going to accept me.      Notes:   Progressing to goal    Status After Intervention:  Decompensated    Participation Level: Interactive    Participation Quality: Sharing      Speech:  normal      Thought Process/Content:  Guarded/delusional      Affective Functioning:  labile      Mood: anxious and depressed      Level of consciousness:  Preoccupied and Inattentive      Response to Learning: Able to change behavior      Endings: None Reported    Modes of Intervention: Support, Socialization and Activity      Discipline Responsible: Psychoeducational Specialist      Signature:  [place]

## 2019-04-24 NOTE — PLAN OF CARE
Patient has attended some of the groups today and has also been out of her room for social interaction with others so she is working toward her socialization goal for this shift.

## 2019-04-24 NOTE — H&P
Marijuana        Family History:       Problem Relation Age of Onset    Diabetes Mother     Heart Disease Mother     Diabetes Father     Heart Disease Father     Cancer Paternal [de-identified]        Psychiatric Family History  Mom with paranoid Schizophrenia. Dad with depression. Aunts and uncles with mental health illness      PHYSICAL EXAM:  Vitals:  /64   Pulse 75   Temp 97.9 °F (36.6 °C) (Oral)   Resp 16   Ht 5' 2\" (1.575 m)   Wt 146 lb (66.2 kg)   LMP 03/19/2019   SpO2 97%   Breastfeeding? No   BMI 26.70 kg/m²     Medical Review of Systems:      Constitutional: Negative for appetite change, diaphoresis, fatigue and fever. HENT: Negative for congestion, sore throat and tinnitus.    Eyes: Negative for visual disturbance. Respiratory: Negative for cough, shortness of breath and wheezing.    Cardiovascular: Negative for chest pain and leg swelling. Gastrointestinal: Negative for nausea, vomiting, diarrhea. Negative for abdominal pain. Genitourinary: Negative for frequency. Musculoskeletal: Negative for arthralgias, myalgias and neck stiffness. Skin: Negative for puritis. Neurological: Negative for dizziness, weakness and headaches. All other systems reviewed and are negative.        Mental Status Examination:  Level of consciousness:  within normal limits  Appearance:  well-appearing, hospital attire, in chair, fair grooming and fair hygiene  Behavior/Motor:  no abnormalities noted  Attitude toward examiner:  cooperative, attentive and good eye contact  Speech:  normal rate, normal volume and well articulated  Mood: \"okay\"  Affect:  reactive  Thought processes:  goal directed and coherent  Thought content:  denies homicidal ideations  Suicidal Ideation:  denies suicidal ideation  Delusions:  no evidence of delusions  Perceptual Disturbance:  denies any perceptual disturbance  Cognition:  oriented to person, place, and time  Concentration succeeded  Memory intact   Insight :  Good as evidenced by patient's ability to appreciate the nature and degree of her mental illness and need for treatment  Judgment:Poor    DSM-5 Diagnosis  Bipolar 1 disorder, MRE Depressed  Polysubstance use disorder--Alcohol, Meth in Partial REM  PTSD    Past Medical History:   Diagnosis Date    Anxiety     Back pain     Colitis     Depression     Fibromyalgia     HPV (human papilloma virus) anogenital infection     OCD (obsessive compulsive disorder)         TREATMENT PLAN    Risk Management:  close watch per standard protocol      Psychotherapy:  participation in milieu and group and individual sessions with Attending Physician,  and Physician Assistant/CNP      Estimated length of stay:  2-14 days      GENERAL PATIENT/FAMILY EDUCATION  Patient will understand basic signs and symptoms, Patient will understand benefits/risks and potential side effects from proposed meds and Patient will understand their role in recovery. Family is  active in patient's care. Patient assets that may be helpful during treatment include: Intent to participate and engage in treatment, sufficient fund of knowledge and intellect to understand and utilize treatments. Goals:    Resumed all home medications  Encourage groups with interactions  Will continue to follow with Dr Clarissa Elizabeth at [facility] Certification     Admission Day 1  I certify that this patient's inpatient psychiatric hospital admission is medically necessary for:    x (1) treatment which could reasonably be expected to improve this patient's condition, or    x (2) diagnostic study or its equivalent. Time Spent: 50 minutes     Physicians Signature:  Electronically signed by Patti Camejo DNP on 4/24/2019 at 1:45 PM                                    Psychiatry Attending Attestation     I assessed this patient and reviewed the case and plan of care with Ashish No CNP.   I have reviewed the above documentation and I agree with the findings and treatment plan with the following updates. Patient is a 27-year-old single  female with history of polysubstance abuse and bipolar disorder presented to the emergency department from [facility] professional services for worsening of paranoia and labile affect. Patient was very labile this morning at times crying out very loud on the unit today. Patient is very paranoid that [facility] Day is trying to conspire against her to place her in [facility]. [address] from [facility] mentioned that patient was paranoid that people are out there trying to get her. She was also very worried and preoccupied with going to [facility] because she tested positive for marijuana. Patient was seen actively responding to internal stimuli here on the unit. She has very poor attention and concentration. Patient reports having trouble falling asleep and staying asleep. She reports that her mind is racing today. She was very irritable and hostile during the interview today. Assessment and Plan:  Schizoaffective disorder bipolar type  rule out substance-induced psychotic disorder  we will resume her home medications and add 100 mg of Seroquel twice daily. Patient understood and agreed to the plan. Will obtain more collateral information from [facility]. Patient encouraged to participate in groups. Case discussed with staff in the treatment team this morning. It might take more than 2 mid nights to stabilize her mood and titrate medications to effect. Electronically signed by Jp Gaston MD on 4/24/19 at 3:02 PM    **This report has been created using voice recognition software. It may contain minor errors which are inherent in voice recognition technology. **

## 2019-04-24 NOTE — GROUP NOTE
Group Therapy Note    Date: April 24    Group Start Time: 9365  Group End Time: 9539  Group Topic: Recreational    [facility] Adult Psych 4E    Ilana Wallace             Patient's Goal:   Increase socialization and concentration. Notes:  Social at times. Poor concentration.     Status After Intervention:  Decompensated    Participation Level: Minimal    Participation Quality: Resistant      Speech:  hesitant      Thought Process/Content: Delusional      Affective Functioning: Blunted      Mood: anxious and depressed      Level of consciousness:  Inattentive      Response to Learning: Resistant      Endings: None Reported    Modes of Intervention: Socialization and Activity      Discipline Responsible: Psychoeducational Specialist      Signature:  Ilana Wallace

## 2019-04-24 NOTE — PLAN OF CARE
Problem: Depressive Behavior With or Without Suicide Precautions:  Goal: Ability to disclose and discuss suicidal ideas will improve  Description  Ability to disclose and discuss suicidal ideas will improve  Outcome: Met This Shift  Note:   Patient denies suicidal ideations, no plan or intent to harm self. Patient remains on suicidal precautions 15 checks for safety. Instructed to seek staff as needed for thoughts of self harm. Goal: Absence of self-harm  Description  Absence of self-harm  Outcome: Met This Shift  Note:   No self harm behaviors were observed or reported so far this shift. Remains on every 15 minutes precautions for safety. Goal: Participates in care planning  Description  Participates in care planning  Outcome: Met This Shift  Note:   This patient participates in care planning      Problem: KNOWLEDGE DEFICIT  Goal: Knowledge of discharge plan  Outcome: Ongoing  Note:   Maintained in safe and secure environment. Patient did not fill out safety plan this shift. Problem: Depressive Behavior With or Without Suicide Precautions:  Goal: Able to verbalize support systems  Description  Able to verbalize support systems  Outcome: Ongoing  Note:   Patient reports no one as their support system. Goal: Patient specific goal  Description  Patient specific goal  Outcome: Ongoing  Note:   \"to feel better/ to go somewhere they are going to accept me\" ongoing     Problem: Discharge Planning:  Goal: Discharged to appropriate level of care  Description  Discharged to appropriate level of care  Outcome: Not Met This Shift  Note:   Discharge planners working with patient to achieve optimal discharge plan, specific to the needs of this patient.       Problem: Depressive Behavior With or Without Suicide Precautions:  Goal: Able to verbalize acceptance of life and situations over which he or she has no control  Description  Able to verbalize acceptance of life and situations over which he or she has no control  Outcome: Not Met This Shift  Note:   Patient verbalizes no acceptance of situations over which she has no control. Encouraged patient to attend group therapy. Goal: Able to verbalize and/or display a decrease in depressive symptoms  Description  Able to verbalize and/or display a decrease in depressive symptoms  Outcome: Not Met This Shift  Note:   Patient reports mood as less than normal. Has sad and worried affect. Speech clear. good eye contact. Reports no hope for future and identifies no one as their support system. Problem: Anxiety:  Goal: Level of anxiety will decrease  Description  Level of anxiety will decrease  Outcome: Not Met This Shift  Note:   Patient reports high anxiety. Dr. Seven Loera prescribed new medication. Will start today   Care plan reviewed with patient.   Patient does not verbalize understanding of the plan of care and does contribute to goal setting

## 2019-04-25 PROCEDURE — 6370000000 HC RX 637 (ALT 250 FOR IP): Performed by: NURSE PRACTITIONER

## 2019-04-25 PROCEDURE — APPSS15 APP SPLIT SHARED TIME 0-15 MINUTES: Performed by: NURSE PRACTITIONER

## 2019-04-25 PROCEDURE — 1240000000 HC EMOTIONAL WELLNESS R&B

## 2019-04-25 PROCEDURE — 6370000000 HC RX 637 (ALT 250 FOR IP): Performed by: PSYCHIATRY & NEUROLOGY

## 2019-04-25 PROCEDURE — 90833 PSYTX W PT W E/M 30 MIN: CPT | Performed by: PSYCHIATRY & NEUROLOGY

## 2019-04-25 PROCEDURE — 99232 SBSQ HOSP IP/OBS MODERATE 35: CPT | Performed by: PSYCHIATRY & NEUROLOGY

## 2019-04-25 RX ORDER — RISPERIDONE 0.25 MG/1
0.5 TABLET, FILM COATED ORAL 2 TIMES DAILY
Status: DISCONTINUED | OUTPATIENT
Start: 2019-04-25 | End: 2019-04-26

## 2019-04-25 RX ADMIN — RISPERIDONE 0.5 MG: 1 TABLET ORAL at 14:26

## 2019-04-25 RX ADMIN — DULOXETINE HYDROCHLORIDE 60 MG: 60 CAPSULE, DELAYED RELEASE ORAL at 08:06

## 2019-04-25 RX ADMIN — ACETAMINOPHEN 650 MG: 325 TABLET ORAL at 11:18

## 2019-04-25 RX ADMIN — LORAZEPAM 1 MG: 1 TABLET ORAL at 11:18

## 2019-04-25 RX ADMIN — RISPERIDONE 0.5 MG: 1 TABLET ORAL at 20:16

## 2019-04-25 RX ADMIN — TRAZODONE HYDROCHLORIDE 50 MG: 50 TABLET ORAL at 20:16

## 2019-04-25 RX ADMIN — LAMOTRIGINE 100 MG: 100 TABLET ORAL at 08:06

## 2019-04-25 RX ADMIN — HYDROXYZINE HYDROCHLORIDE 25 MG: 25 TABLET, FILM COATED ORAL at 20:16

## 2019-04-25 ASSESSMENT — PAIN SCALES - GENERAL
PAINLEVEL_OUTOF10: 3
PAINLEVEL_OUTOF10: 0
PAINLEVEL_OUTOF10: 0

## 2019-04-25 NOTE — GROUP NOTE
Group Therapy Note    Date: April 25    Group Start Time: 0900  Group End Time: 0930  Group Topic: [employer]    [facility] Adult Psych 4E    Rani Arroyo             Patient's Goal:   To stop feeling dizzy/nauseous. Talk to a  today.     Notes:   Progressing to goal    Status After Intervention:  Unchanged    Participation Level: Interactive    Participation Quality: Sharing      Speech:  normal      Thought Process/Content:  guarded      Affective Functioning: Flat      Mood: anxious and depressed      Level of consciousness:  Alert and Preoccupied      Response to Learning: Progressing to goal      Endings: None Reported    Modes of Intervention: Support, Socialization and Activity      Discipline Responsible: Psychoeducational Specialist      Signature:  Rani Arroyo

## 2019-04-25 NOTE — PROGRESS NOTES
Patient to follow up with [facility] upon discharge. Emiliano Mcdermott states that [place] at [facility] was working on placement for patient, will speak with her about progress that has been made in finding a place.

## 2019-04-25 NOTE — PROGRESS NOTES
Group Therapy Note    Date: 4/24/2019  Start Time: 2000  End Time:  2020    Type of Group: Wrap-Up/relaxation    Patient's Goal:  \"feel better\"    Notes:  Met goal    Status After Intervention:  Unchanged    Participation Level: Minimal    Participation Quality: Inappropriate      Speech:  normal      Thought Process/Content: Delusional  Flight of ideas    Affective Functioning: Labile      Mood: anxious, depressed and fearful      Level of consciousness:  Alert and Preoccupied      Response to Learning: Able to verbalize current knowledge/experience and Able to change behavior      Endings: None Reported    Modes of Intervention: Education and Support      Discipline Responsible: Registered Nurse      Signature:  Eugene Hong RN

## 2019-04-25 NOTE — PROGRESS NOTES
Department of Psychiatry   Progress Note - Adult  Chief Complaint: Crying erratically at [address]:  Patient seen and reports that she is doing well, but appears paranoid. She denies any suicidal thoughts or harm to others. She denies any hallucinations. States that she eats and sleeps well. Verified 8 last night. She is taking her medications and denies any side effects. OBJECTIVE    Physical  /65   Pulse 77   Temp 99 °F (37.2 °C) (Tympanic)   Resp 18   Ht 5' 2\" (1.575 m)   Wt 146 lb (66.2 kg)   LMP 03/19/2019   SpO2 97%   Breastfeeding?  No   BMI 26.70 kg/m²     Mental Status Examination:  Level of consciousness:  within normal limits  Appearance:  well-appearing, hospital attire, in chair, fair grooming and fair hygiene  Behavior/Motor:  no abnormalities noted  Attitude toward examiner:  cooperative, attentive and good eye contact  Speech:  normal rate, normal volume and well articulated  Mood:  \"okay\"  Affect:  Reactive  Thought processes:  coherent  Thought content:  Homocidal ideation denies  Suicidal Ideation:  denies suicidal ideation  Delusions:  paranoid  Perceptual Disturbance:  denies any perceptual disturbance  Cognition:  oriented to person, place, and time  Concentration succeeded  Memory intact  IJudgment/Insight: fair     Medications  Current Facility-Administered Medications: QUEtiapine (SEROQUEL) tablet 100 mg, 100 mg, Oral, BID  DULoxetine (CYMBALTA) extended release capsule 60 mg, 60 mg, Oral, QAM  hydrOXYzine (ATARAX) tablet 25 mg, 25 mg, Oral, TID PRN  lamoTRIgine (LAMICTAL) tablet 100 mg, 100 mg, Oral, Daily  acetaminophen (TYLENOL) tablet 650 mg, 650 mg, Oral, Q4H PRN  traZODone (DESYREL) tablet 50 mg, 50 mg, Oral, Nightly PRN  magnesium hydroxide (MILK OF MAGNESIA) 400 MG/5ML suspension 30 mL, 30 mL, Oral, Daily PRN  aluminum & magnesium hydroxide-simethicone (MAALOX) 200-200-20 MG/5ML suspension 30 mL, 30 mL, Oral, Q6H PRN  nicotine (NICODERM CQ) 14 MG/24HR 1 patch, 1 patch, Transdermal, Daily    ASSESSMENT  Schizoaffective disorder bipolar type  rule out substance-induced psychotic disorder    PLAN  D/C Seroquel for now, per patient's choice  Start Risperdal 0.5 mg BID  Continue with other medications as ordered  Encourage groups with interactions    Physicians Signature:  Electronically signed by Luis Carlosmadison Zaidi on 4/25/2019 at 1:21 PM                                   Psychiatry Attending Attestation     I assessed this patient and reviewed the case and plan of care with Courtnye Liao CNP. I have reviewed the above documentation and I agree with the findings and treatment plan with the following updates. Lesly refused to take her Seroquel yesterday. Staff reports that she mentioned to them that Seroquel was giving her cancer and white spots all over the body. Staff reports that there were no white spots anywhere on the body. She continues to be very paranoid and labile here on the unit. Staff reports that she was crying uncontrollably yesterday evening but they were able to redirect her. She continues to be paranoid this morning that Darwin Ocasio is trying to send her to custodial. She has very poor insight and judgment at this point. Discussed with her about switching Seroquel to Risperdal today. Patient understood and agreed to the plan. Case discussed with staff and the treatment team this morning. Social work to look into outpatient follow-up options with [employer]. More than 16 mins of the session was spent doing Supportive psychotherapy. Session started at 11:30am and ended at 12:00pm.     Electronically signed by Tomeka Hernandez MD on 4/25/19 at 3:26 PM    **This report has been created using voice recognition software. It may contain minor errors which are inherent in voice recognition technology. **

## 2019-04-25 NOTE — PLAN OF CARE
Patient has attended at least one group today and has been out of her room at times for social interaction with others so she is working toward her socialization goal for this shift.

## 2019-04-25 NOTE — PLAN OF CARE
Problem: Depressive Behavior With or Without Suicide Precautions:  Goal: Participates in care planning  Description  Participates in care planning  4/25/2019 1038 by Christine Portillo RN  Outcome: Met This Shift  Note:   This patient participates in her care planning   4/25/2019 0235 by Silverio Rojas RN  Outcome: Ongoing  Note:   Patient participated this shift. Problem: Pain:  Goal: Pain level will decrease  Description  Pain level will decrease  4/25/2019 1038 by Christine Portillo RN  Outcome: Met This Shift  Note:   Pt had no complaints of pain at time of assessment  4/25/2019 0235 by Silverio Rojas RN  Outcome: Ongoing  Note:   Patient reports chronic back pain, refer to flow-sheet. Problem: KNOWLEDGE DEFICIT  Goal: Knowledge of discharge plan  4/25/2019 1038 by Christine Portillo RN  Outcome: Ongoing  Note:   Maintained in safe and secure environment. Patient did not fill out safety plan this shift. 4/25/2019 0235 by Silverio Rojas RN  Outcome: Ongoing  Note:   Safety plan not completed this shift. Problem: Depressive Behavior With or Without Suicide Precautions:  Goal: Able to verbalize acceptance of life and situations over which he or she has no control  Description  Able to verbalize acceptance of life and situations over which he or she has no control  4/25/2019 1038 by Christine Portillo RN  Outcome: Ongoing  Note:   Patient verbalizes very little acceptance of situations over which she has no control. Encouraged patient to attend group therapy. 4/25/2019 0235 by Silverio Rojas RN  Outcome: Ongoing  Note:   Patient does not verbalize acceptance. Goal: Able to verbalize and/or display a decrease in depressive symptoms  Description  Able to verbalize and/or display a decrease in depressive symptoms  4/25/2019 1038 by Christine Portillo RN  Outcome: Ongoing  Note:   Patient reports mood is labile. Has sad flat and tired affect. Speech clear. fair eye contact.  Reports variable hope for future and identifies no one as their support system. 4/25/2019 0235 by Asad Batista RN  Outcome: Ongoing  Note:   Patient reports depression and rates mood at a \"3/10. \"  Goal: Ability to disclose and discuss suicidal ideas will improve  Description  Ability to disclose and discuss suicidal ideas will improve  4/25/2019 1038 by Mayra Marin RN  Outcome: Ongoing  Note:   Patient denies suicidal ideations, no plan or intent to harm self. Patient remains on suicidal precautions 15 checks for safety. Instructed to seek staff as needed for thoughts of self harm. 4/25/2019 0235 by Asad Batista RN  Outcome: Ongoing  Note:   Patient denies suicidal thoughts. Goal: Absence of self-harm  Description  Absence of self-harm  4/25/2019 1038 by Mayra Marin RN  Outcome: Ongoing  4/25/2019 0235 by Asad Batista RN  Outcome: Ongoing  Note:   Patient remains safe and free from harm. Goal: Patient specific goal  Description  Patient specific goal  4/25/2019 1038 by Mayra Marin RN  Outcome: Ongoing  Note:   To stop feeling dizzy/ nausea, talk to a SW today - ongoing  4/25/2019 0235 by Asad Batista RN  Outcome: Ongoing  Note:   Goal of \"feel better\" met per patient. Problem: Anxiety:  Goal: Level of anxiety will decrease  Description  Level of anxiety will decrease  4/25/2019 1038 by Mayra Marin RN  Outcome: Ongoing  Note:   Patient reports some anxiety. Pt taking nothing prn. States that she is allergic to vistaril which is not true  4/25/2019 0235 by Asad Batista RN  Outcome: Ongoing  Note:   Patient reports anxiety, labile affect noted. Problem: Nutrition  Goal: Optimal nutrition therapy  4/25/2019 1038 by Mayra Marin RN  Outcome: Ongoing  Note:   Patient eating up to 100% of meals today. Encouraged patient to drink and eat supplements for increase calorie intake. 4/25/2019 0235 by Asad Batista RN  Outcome: Ongoing  Note:   Patient ate a snack this shift, adequate intake reported.       Problem: Coping:  Goal: Ability to identify problematic behaviors that deter socialization will improve  Description  Ability to identify problematic behaviors that deter socialization will improve  4/25/2019 1038 by Estella Garrido RN  Outcome: Ongoing  Note:   Pt in bed often today, encouraged to attend group treatment  4/25/2019 0235 by Arielle George RN  Outcome: Ongoing     Problem: Role Relationship:  Goal: Ability to demonstrate positive changes in social behaviors and relationships will improve  Description  Ability to demonstrate positive changes in social behaviors and relationships will improve  4/25/2019 1038 by Estella Garrido RN  Outcome: Ongoing  Note:   Pt in bed often today, encouraged to attend group treatment  4/25/2019 0235 by Arielle George RN  Outcome: Ongoing     Problem: Discharge Planning:  Goal: Discharged to appropriate level of care  Description  Discharged to appropriate level of care  4/25/2019 1038 by Estella Garrido RN  Outcome: Not Met This Shift  Note:   Discharge planners working with patient to achieve optimal discharge plan, specific to the needs of this patient. 4/25/2019 0235 by Arielle George RN  Outcome: Ongoing  Note:   Discharge planning is in progress. Problem: Depressive Behavior With or Without Suicide Precautions:  Goal: Able to verbalize support systems  Description  Able to verbalize support systems  4/25/2019 1038 by Estella Garrido RN  Outcome: Not Met This Shift  Note:   identifies no one as their support system. 4/25/2019 0235 by Arielle George RN  Outcome: Ongoing  Note:   Patient denies a support system.       Problem: Altered Mood, Psychotic Behavior:  Goal: Able to demonstrate trust by eating, participating in treatment and following staff's direction  Description  Able to demonstrate trust by eating, participating in treatment and following staff's direction  Outcome: Not Met This Shift  Note:   Pt refusing seroquel and nicotine patch, states that she may not want to talk to the Dr today  Goal: Able to verbalize reality based thinking  Description  Able to verbalize reality based thinking  Outcome: Not Met This Shift  Note:   Pt is labile, unable to verbalize reality based thinking  Goal: Compliance with prescribed medication regimen will improve  Description  Compliance with prescribed medication regimen will improve  Outcome: Not Met This Shift  Note:   Pt takes only some medication, please see MAR     Problem: Pain:  Goal: Control of acute pain  Description  Control of acute pain  4/25/2019 0235 by Dominga Sinha RN  Outcome: Completed  Goal: Control of chronic pain  Description  Control of chronic pain  4/25/2019 0235 by Dominga Sinha RN  Outcome: Completed   Care plan reviewed with patient.   Patient does not verbalize understanding of the plan of care and does contribute to goal setting

## 2019-04-25 NOTE — BH NOTE
Group Therapy Note    Date: 4/25/2019  Start Time: 1630  End Time:  1700  Number of Participants: 10    Type of Group: Healthy Living/Wellness    Status After Intervention:  Improved    Participation Level:  Active Listener and Interactive    Participation Quality: Appropriate and Attentive      Speech:  normal      Thought Process/Content: Logical      Affective Functioning: Congruent      Mood: euthymic      Level of consciousness:  Alert and Attentive      Response to Learning: Progressing to goal      Endings: None Reported    Modes of Intervention: Education, Support, Exploration, Clarifying and Problem-solving      Discipline Responsible: Registered Nurse      Signature:  Lola Whitman RN

## 2019-04-25 NOTE — PLAN OF CARE
[address] [place] [address]  Day 3 Interdisciplinary Treatment Plan NOTE    Review Date & Time: 4/25/2019 10:12    Patient was in treatment team    Admission Type:   Admission Type:  Involuntary    Reason for admission:  Reason for Admission: bipolar  Estimated Length of Stay Update:  3 - 5 Days   Estimated Discharge Date Update: 3 - 5 Days     PATIENT STRENGTHS:  Patient Strengths Strengths: Communication, Connection to output provider, Medication Compliance  Patient Strengths and Limitations:Limitations: Difficult relationships / poor social skills  Addictive Behavior:Addictive Behavior  In the past 3 months, have you felt or has someone told you that you have a problem with:  : None  Do you have a history of Chemical Use?: No  Do you have a history of Alcohol Use?: No  Do you have a history of Street Drug Abuse?: Yes  Histroy of Prescripton Drug Abuse?: No  Medical Problems:  Past Medical History:   Diagnosis Date    Anxiety     Back pain     Colitis     Depression     Fibromyalgia     HPV (human papilloma virus) anogenital infection     OCD (obsessive compulsive disorder)        Risk:  Fall RiskTotal: 77  Long Scale Long Scale Score: 21  BVC Total: 0      Status EXAM:   Status and Exam  Normal: No  Facial Expression: Sad, Worried  Affect: Unstable  Level of Consciousness: Alert  Mood:Normal: No  Mood: Depressed, Anxious, Labile, Suspicious  Motor Activity:Normal: Yes  Motor Activity: Increased  Interview Behavior: Cooperative, Impulsive  Preception: [place] to Person, [place] to Time, [place] to Place  Attention:Normal: No  Attention: Unable to Concentrate  Thought Processes: Flt.of Ideas  Thought Content:Normal: No  Thought Content: Delusions, Preoccupations, Paranoia, Phobias  Hallucinations: None  Delusions: Yes  Delusions: Persecution, Other(See Comment)(pt is somatic)  Memory:Normal: No  Memory: Poor Recent  Insight and Judgment: No  Insight and Judgment: Poor Judgment, Poor Insight, Unrealistic  Present Suicidal Ideation: No  Present Homicidal Ideation: No    Daily Assessment Last Entry:   Daily Sleep (WDL): Within Defined Limits         Patient Currently in Pain: No  Daily Nutrition (WDL): Within Defined Limits    Patient Monitoring:  Frequency of Checks: 4 times per hour, close    Psychiatric Symptoms:   Depression Symptoms  Depression Symptoms: Change in energy level, Feelings of hopelessess, Feelings of helplessness, Isolative, Loss of interest, Impaired concentration  Anxiety Symptoms  Anxiety Symptoms: Generalized  Leann Symptoms  Leann Symptoms: Labile     Psychosis Symptoms  Delusion Type: Paranoid, Somatic    Suicide Risk CSSR-S:  1) Within the past month, have you wished you were dead or wished you could go to sleep and not wake up? : No  2) Have you actually had any thoughts of killing yourself? : No  6) Have you ever done anything, started to do anything, or prepared to do anything to end your life?: No        EDUCATION:   Learner Progress Toward Treatment Goals: Reviewed results and recommendations of this team, Reviewed group plan and strategies, Reviewed signs, symptoms and risk of self harm and violent behavior and Reviewed goals and plan of care    Method: Small group    Outcome: Verbalized understanding and Demonstrated Understanding    PATIENT GOALS: Attend groups, improve coping skills, improve self-esteem, control anger     PLAN/TREATMENT RECOMMENDATIONS UPDATE:  1. How are you progressing toward meeting your main treatment goal? Patient reports that she is noticing improvement and feeling \"okay. \"   2. Are there discharge barriers/lingering problems that need to be addressed? Patient reports that she is homeless and wanting to seek services for housing       3. Do you have the ability to pay for your medications? Medicare       4. How is your group participation?   Not attending    GOALS UPDATE:   Time frame for Short-Term Goals: Daily       Faby Hanson

## 2019-04-26 PROCEDURE — 6370000000 HC RX 637 (ALT 250 FOR IP): Performed by: PSYCHIATRY & NEUROLOGY

## 2019-04-26 PROCEDURE — 99232 SBSQ HOSP IP/OBS MODERATE 35: CPT | Performed by: PSYCHIATRY & NEUROLOGY

## 2019-04-26 PROCEDURE — 6370000000 HC RX 637 (ALT 250 FOR IP): Performed by: NURSE PRACTITIONER

## 2019-04-26 PROCEDURE — 1240000000 HC EMOTIONAL WELLNESS R&B

## 2019-04-26 PROCEDURE — 90833 PSYTX W PT W E/M 30 MIN: CPT | Performed by: PSYCHIATRY & NEUROLOGY

## 2019-04-26 RX ORDER — RISPERIDONE 1 MG/1
1 TABLET, FILM COATED ORAL NIGHTLY
Status: DISCONTINUED | OUTPATIENT
Start: 2019-04-27 | End: 2019-04-29 | Stop reason: HOSPADM

## 2019-04-26 RX ADMIN — LAMOTRIGINE 100 MG: 100 TABLET ORAL at 08:24

## 2019-04-26 RX ADMIN — RISPERIDONE 0.5 MG: 1 TABLET ORAL at 08:23

## 2019-04-26 RX ADMIN — ACETAMINOPHEN 650 MG: 325 TABLET ORAL at 08:23

## 2019-04-26 RX ADMIN — TRAZODONE HYDROCHLORIDE 50 MG: 50 TABLET ORAL at 20:49

## 2019-04-26 RX ADMIN — DULOXETINE HYDROCHLORIDE 60 MG: 60 CAPSULE, DELAYED RELEASE ORAL at 08:24

## 2019-04-26 RX ADMIN — RISPERIDONE 0.5 MG: 1 TABLET ORAL at 20:49

## 2019-04-26 RX ADMIN — HYDROXYZINE HYDROCHLORIDE 25 MG: 25 TABLET, FILM COATED ORAL at 08:34

## 2019-04-26 RX ADMIN — ACETAMINOPHEN 650 MG: 325 TABLET ORAL at 20:48

## 2019-04-26 RX ADMIN — HYDROXYZINE HYDROCHLORIDE 25 MG: 25 TABLET, FILM COATED ORAL at 18:18

## 2019-04-26 ASSESSMENT — PAIN DESCRIPTION - LOCATION
LOCATION: BACK
LOCATION: NECK
LOCATION: BACK

## 2019-04-26 ASSESSMENT — PAIN DESCRIPTION - PROGRESSION
CLINICAL_PROGRESSION: NOT CHANGED
CLINICAL_PROGRESSION: NOT CHANGED

## 2019-04-26 ASSESSMENT — PAIN SCALES - GENERAL
PAINLEVEL_OUTOF10: 1
PAINLEVEL_OUTOF10: 3
PAINLEVEL_OUTOF10: 6
PAINLEVEL_OUTOF10: 7
PAINLEVEL_OUTOF10: 6
PAINLEVEL_OUTOF10: 2

## 2019-04-26 ASSESSMENT — PAIN - FUNCTIONAL ASSESSMENT
PAIN_FUNCTIONAL_ASSESSMENT: ACTIVITIES ARE NOT PREVENTED
PAIN_FUNCTIONAL_ASSESSMENT: ACTIVITIES ARE NOT PREVENTED

## 2019-04-26 ASSESSMENT — PAIN DESCRIPTION - ONSET
ONSET: ON-GOING
ONSET: ON-GOING

## 2019-04-26 ASSESSMENT — PAIN DESCRIPTION - PAIN TYPE
TYPE: CHRONIC PAIN

## 2019-04-26 ASSESSMENT — PAIN DESCRIPTION - DESCRIPTORS
DESCRIPTORS: ACHING
DESCRIPTORS: ACHING

## 2019-04-26 ASSESSMENT — PAIN DESCRIPTION - FREQUENCY
FREQUENCY: INTERMITTENT
FREQUENCY: INTERMITTENT

## 2019-04-26 NOTE — PROGRESS NOTES
Spoke with Kristin Officer from [address] [place] [address] Ignacio Montgomery will come to unit on Monday 4/29 for housing assessment. If patient is unable to transition to housing upon discharge, she will be considered for CSU placement.      Dena Rodriguez LPC

## 2019-04-26 NOTE — GROUP NOTE
Group Therapy Note    Date: April 26    Group Start Time: 1000  Group End Time: 1050  Group Topic: Recreational    [facility] 4E    Lia Norwood               Patient's Goal:   Increase socialization and physical activity. Notes:  Patient was social at times. Patient observed others playing with the therapy dogs. Status After Intervention:  Unchanged    Participation Level:  Active Listener and Minimal    Participation Quality: Attentive      Speech:  hesitant      Thought Process/Content:  guarded      Affective Functioning: Flat      Mood: anxious      Level of consciousness:  Preoccupied      Response to Learning: Progressing to goal      Endings: None Reported    Modes of Intervention: Socialization, Activity and Movement      Discipline Responsible: Psychoeducational Specialist      Signature:  Lia Norwood

## 2019-04-26 NOTE — PLAN OF CARE
Problem: KNOWLEDGE DEFICIT  Goal: Knowledge of discharge plan  Outcome: Ongoing  Note:   Maintained in safe and secure environment. Patient did not fill out safety plan this shift. Problem: Discharge Planning:  Goal: Discharged to appropriate level of care  Description  Discharged to appropriate level of care  Outcome: Ongoing  Note:   Patient voices she needs to find a place to live before discharge. Patient working with Emiliano Chand from [facility] for [facility] at [facility] or [facility]. Discharge planner working with patient to achieve optimal discharge plans, specific to individual needs. Problem: Depressive Behavior With or Without Suicide Precautions:  Goal: Able to verbalize acceptance of life and situations over which he or she has no control  Description  Able to verbalize acceptance of life and situations over which he or she has no control  Outcome: Ongoing  Note:   Patient verbalizes she is working towards acceptance of situations over which she has no control. Encouraged patient to attend group therapy. Problem: Depressive Behavior With or Without Suicide Precautions:  Goal: Able to verbalize and/or display a decrease in depressive symptoms  Description  Able to verbalize and/or display a decrease in depressive symptoms  Outcome: Ongoing  Note:   Patient reports mood 5/10 with 10 being normal. Has flat affect, brightens at times. Speech clear. Good eye contact. Reports \"I have to\" regarding hope for future and identifies X- and [facility] as her support system. Problem: Depressive Behavior With or Without Suicide Precautions:  Goal: Ability to disclose and discuss suicidal ideas will improve  Description  Ability to disclose and discuss suicidal ideas will improve  Outcome: Ongoing  Note:   Patient denies suicidal ideations, no plan or intent to harm self. Patient remains on suicidal precautions 15 checks for safety. Instructed to seek staff as needed for thoughts of self harm. Problem: Depressive Behavior With or Without Suicide Precautions:  Goal: Able to verbalize support systems  Description  Able to verbalize support systems  Outcome: Ongoing  Note:   Patient reports X- and [facility] as her support system. Problem: Depressive Behavior With or Without Suicide Precautions:  Goal: Absence of self-harm  Description  Absence of self-harm  Outcome: Ongoing  Note:   No self harm behaviors were observed or reported so far this shift. Remains on every 15 minutes precautions for safety. Problem: Depressive Behavior With or Without Suicide Precautions:  Goal: Patient specific goal  Description  Patient specific goal  Outcome: Ongoing  Note:   Patient reports goal for today is to \"talk to the doctor and \". This was met. Problem: Depressive Behavior With or Without Suicide Precautions:  Goal: Participates in care planning  Description  Participates in care planning  Outcome: Ongoing  Note:   Patient discussed treatment plan with physician/medical staff, attending group, and compliant with medications. Problem: Anxiety:  Goal: Level of anxiety will decrease  Description  Level of anxiety will decrease  Outcome: Ongoing  Note:   Patient reports anxiety. Pt taking vistaril prn. Verbalized medication was effective. Problem: Nutrition  Goal: Optimal nutrition therapy  Outcome: Ongoing  Note:   Patient eating meals.      Problem: Coping:  Goal: Ability to identify problematic behaviors that deter socialization will improve  Description  Ability to identify problematic behaviors that deter socialization will improve  Outcome: Ongoing     Problem: Role Relationship:  Goal: Ability to demonstrate positive changes in social behaviors and relationships will improve  Description  Ability to demonstrate positive changes in social behaviors and relationships will improve  Outcome: Ongoing     Problem: Pain:  Goal: Pain level will decrease  Description  Pain level will decrease  Outcome: Ongoing  Note:   Patient reports back pain 3 out of 10. Patient taking tylenol for pain. Patient reports decreased pain after taking pain medications. Problem: Altered Mood, Psychotic Behavior:  Goal: Able to demonstrate trust by eating, participating in treatment and following staff's direction  Description  Able to demonstrate trust by eating, participating in treatment and following staff's direction  Outcome: Ongoing  Note:   Patient eating meals and following treatment plan. Problem: Altered Mood, Psychotic Behavior:  Goal: Able to verbalize reality based thinking  Description  Able to verbalize reality based thinking  Outcome: Ongoing  Note:   Patient alert and oriented x 3 not situation. Problem: Altered Mood, Psychotic Behavior:  Goal: Compliance with prescribed medication regimen will improve  Description  Compliance with prescribed medication regimen will improve  Outcome: Ongoing  Note:   Patient taking medications as prescribed. Care plan reviewed with patient. Patient verbalize understanding of the plan of care and contribute to goal setting.

## 2019-04-26 NOTE — PLAN OF CARE
Problem: Depressive Behavior With or Without Suicide Precautions:  Goal: Ability to disclose and discuss suicidal ideas will improve  Description  Ability to disclose and discuss suicidal ideas will improve  Outcome: Met This Shift  Note:   Denies suicidal thoughts or plans   Goal: Absence of self-harm  Description  Absence of self-harm  Outcome: Met This Shift  Note:   No self harm thoughts, plans, or behaviors this shift. Goal: Participates in care planning  Description  Participates in care planning  Outcome: Met This Shift  Note:   Care plan reviewed with patient. Patient verbalize understanding of the plan of care and contribute to goal setting. Problem: Pain:  Goal: Pain level will decrease  Description  Pain level will decrease  Outcome: Met This Shift  Note:   Denies pain this shift     Problem: Altered Mood, Psychotic Behavior:  Goal: Able to demonstrate trust by eating, participating in treatment and following staff's direction  Description  Able to demonstrate trust by eating, participating in treatment and following staff's direction  Outcome: Met This Shift  Note:   Took HS meds well, ate snack. Goal: Compliance with prescribed medication regimen will improve  Description  Compliance with prescribed medication regimen will improve  Outcome: Met This Shift  Note:   Took HS meds this shift     Problem: Discharge Planning:  Goal: Discharged to appropriate level of care  Description  Discharged to appropriate level of care  Outcome: Ongoing  Note:   Works with an interdisciplinary team towards meeting discharge needs. Problem: Depressive Behavior With or Without Suicide Precautions:  Goal: Able to verbalize acceptance of life and situations over which he or she has no control  Description  Able to verbalize acceptance of life and situations over which he or she has no control  Outcome: Ongoing  Note:   Does not verbalize acceptance of life situations over which she has no control.    Goal: Patient specific goal  Description  Patient specific goal  Outcome: Ongoing  Note:   Encouraged to set a daily goal     Problem: Anxiety:  Goal: Level of anxiety will decrease  Description  Level of anxiety will decrease  Outcome: Ongoing  Note:   Took atarax for anxiety      Problem: Nutrition  Goal: Optimal nutrition therapy  Outcome: Ongoing  Note:   Ate snack this shift. Problem: Coping:  Goal: Ability to identify problematic behaviors that deter socialization will improve  Description  Ability to identify problematic behaviors that deter socialization will improve  4/26/2019 0230 by Chencho Bonner RN  Outcome: Ongoing  4/25/2019 1459 by Bertrand Melendez  Outcome: Ongoing     Problem: Role Relationship:  Goal: Ability to demonstrate positive changes in social behaviors and relationships will improve  Description  Ability to demonstrate positive changes in social behaviors and relationships will improve  4/26/2019 0230 by Chnecho Bonner RN  Outcome: Ongoing  4/25/2019 1459 by Bertrand Melendez  Outcome: Ongoing     Problem: KNOWLEDGE DEFICIT  Goal: Knowledge of discharge plan  Outcome: Not Met This Shift  Note:   Did not complete a safety plan this shift.       Problem: Depressive Behavior With or Without Suicide Precautions:  Goal: Able to verbalize and/or display a decrease in depressive symptoms  Description  Able to verbalize and/or display a decrease in depressive symptoms  Outcome: Not Met This Shift  Note:   Isolates to room and bed   Goal: Able to verbalize support systems  Description  Able to verbalize support systems  Outcome: Not Met This Shift  Note:   States she has no support     Problem: Altered Mood, Psychotic Behavior:  Goal: Able to verbalize reality based thinking  Description  Able to verbalize reality based thinking  Outcome: Not Met This Shift  Note:   Remains paranoid

## 2019-04-27 PROCEDURE — 90833 PSYTX W PT W E/M 30 MIN: CPT | Performed by: PSYCHIATRY & NEUROLOGY

## 2019-04-27 PROCEDURE — 6370000000 HC RX 637 (ALT 250 FOR IP): Performed by: PSYCHIATRY & NEUROLOGY

## 2019-04-27 PROCEDURE — 99231 SBSQ HOSP IP/OBS SF/LOW 25: CPT | Performed by: PSYCHIATRY & NEUROLOGY

## 2019-04-27 PROCEDURE — 1240000000 HC EMOTIONAL WELLNESS R&B

## 2019-04-27 PROCEDURE — APPSS15 APP SPLIT SHARED TIME 0-15 MINUTES: Performed by: NURSE PRACTITIONER

## 2019-04-27 RX ADMIN — HYDROXYZINE HYDROCHLORIDE 25 MG: 25 TABLET, FILM COATED ORAL at 21:14

## 2019-04-27 RX ADMIN — HYDROXYZINE HYDROCHLORIDE 25 MG: 25 TABLET, FILM COATED ORAL at 07:42

## 2019-04-27 RX ADMIN — HYDROXYZINE HYDROCHLORIDE 25 MG: 25 TABLET, FILM COATED ORAL at 14:48

## 2019-04-27 RX ADMIN — RISPERIDONE 1 MG: 1 TABLET ORAL at 21:14

## 2019-04-27 RX ADMIN — LAMOTRIGINE 100 MG: 100 TABLET ORAL at 07:42

## 2019-04-27 RX ADMIN — TRAZODONE HYDROCHLORIDE 50 MG: 50 TABLET ORAL at 21:14

## 2019-04-27 RX ADMIN — ACETAMINOPHEN 650 MG: 325 TABLET ORAL at 14:48

## 2019-04-27 RX ADMIN — DULOXETINE HYDROCHLORIDE 60 MG: 60 CAPSULE, DELAYED RELEASE ORAL at 07:42

## 2019-04-27 RX ADMIN — ACETAMINOPHEN 650 MG: 325 TABLET ORAL at 10:41

## 2019-04-27 ASSESSMENT — PAIN SCALES - GENERAL
PAINLEVEL_OUTOF10: 3
PAINLEVEL_OUTOF10: 3
PAINLEVEL_OUTOF10: 0

## 2019-04-27 NOTE — PLAN OF CARE
Problem: Pain:  Goal: Pain level will decrease  Description  Pain level will decrease  4/27/2019 1451 by Jitendra Oliver RN  Outcome: Ongoing  Note:   Patient reports abdominal pain 3 out of 10. Patient taking tylenol for pain. Patient reports same pain after taking pain medications. 4/27/2019 0947 by Jitendra Oliver RN  Outcome: Ongoing  Note:   No C/O of pain at time of assessment  4/27/2019 0421 by [id number] Ira ROD, LPN  Outcome: Not Met This Shift  Note:   Patient reports pain of 6/10 in her neck.

## 2019-04-27 NOTE — BH NOTE
Group Therapy Note    Date: 4/27/2019  Start Time: 7420  End Time:  3830  Number of Participants: 8    Type of Group: Psychoeducation      Goal of Group:  Increase insight into self esteem, where it comes from, who/what affects it, what is self talk, and increase positive affirmations. We made list of self esteem boosters \"I am worthy\" self talk versus busters \"I am unworthy\" self talk. Educated on QTIP (Quit Taking It Personal) hurt people hurt others and can't please others. Educated on self esteem affects how they treat self, others, setting goals, reaching goals, and recovery and resiliency. Notes:  Each patient was asked to give one positive trait to the group and to rate their self esteem today on scale of 1-10 with 10 the most self esteem. Status After Intervention:  Improved    Participation Level: Active Listener and Interactive     Yuliet Pierson  was attentive: says she \"is good at helping others\" and rates self esteem today #5. She accepts praise and support.     Participation Quality: Appropriate, Attentive, Sharing and Supportive      Speech:  normal      Thought Process/Content: Logical      Affective Functioning: Congruent, Blunted, Flat and Constricted/Restricted      Mood: depressed      Level of consciousness:  Alert, Oriented x4 and Attentive      Response to Learning: Able to verbalize current knowledge/experience, Able to verbalize/acknowledge new learning, Able to retain information, Capable of insight and Able to change behavior      Endings: None Reported    Modes of Intervention: Education, Support, Socialization and Activity      Discipline Responsible: Registered Nurse      Signature:  RINA Camejo RN MSN

## 2019-04-27 NOTE — PLAN OF CARE
care  Description  Discharged to appropriate level of care  4/27/2019 0947 by Karely Brooks RN  Outcome: Not Met This Shift  Note:   Discharge planners working with patient to achieve optimal discharge plan, specific to the needs of this patient. 4/27/2019 0421 by Daina ROD LPN  Outcome: Ongoing  Note:   Patient is unsure of discharged. Working with  for placement. Problem: Depressive Behavior With or Without Suicide Precautions:  Goal: Able to verbalize support systems  Description  Able to verbalize support systems  4/27/2019 0947 by Karely Brooks RN  Outcome: Not Met This Shift  Note:   identifies no one as their support system. 4/27/2019 0421 by Daina ROD LPN  Outcome: Not Met This Shift  Note:   Patient reports that she does not have a support system. Care plan reviewed with patient.   Patient does verbalize understanding of the plan of care and does contribute to goal setting

## 2019-04-27 NOTE — PROGRESS NOTES
Department of Psychiatry   Progress Note - Adult  Chief Complaint: Crying erratically at [address] is doing better today. Her thought process is much linear today. She continues to endorse some paranoia regarding her medication. She is able to rationalize her thoughts today. She is hopeful about her recovery and is working actively towards discharge plan. She reports reaching out to family and friends and also [employer] [facility] to figure out her housing situation. She denies any side effects from the medications. Staff reports that she was labile yesterday at times crying out loud. She continues to endorse feelings of helplessness and hopelessness. She reports good sleep. Patient reports feeling very drowsy this morning. Discussed with her about changing her Risperdal and bedtime. She understood and agreed to the plan. OBJECTIVE    Physical  BP 94/60   Pulse 81   Temp 97.6 °F (36.4 °C) (Oral)   Resp 16   Ht 5' 2\" (1.575 m)   Wt 146 lb (66.2 kg)   LMP 03/19/2019   SpO2 96%   Breastfeeding?  No   BMI 26.70 kg/m²     Mental Status Examination:  Level of consciousness:  within normal limits  Appearance:  well-appearing, hospital attire, in chair, fair grooming and fair hygiene  Behavior/Motor:  no abnormalities noted  Attitude toward examiner:  cooperative, attentive and good eye contact  Speech:  normal rate, normal volume and well articulated  Mood: depressed  Affect:  Reactive  Thought processes:  coherent  Thought content:  Homocidal ideation denies  Suicidal Ideation:  denies suicidal ideation  Delusions:  paranoid  Perceptual Disturbance:  denies any perceptual disturbance  Cognition:  oriented to person, place, and time  Concentration succeeded  Memory intact  IJudgment/Insight: fair     Medications  Current Facility-Administered Medications: risperiDONE (RISPERDAL) tablet 0.5 mg, 0.5 mg, Oral, BID  DULoxetine (CYMBALTA) extended release capsule 60 mg, 60 mg, Oral, QAM  hydrOXYzine (ATARAX) tablet 25 mg, 25 mg, Oral, TID PRN  lamoTRIgine (LAMICTAL) tablet 100 mg, 100 mg, Oral, Daily  acetaminophen (TYLENOL) tablet 650 mg, 650 mg, Oral, Q4H PRN  traZODone (DESYREL) tablet 50 mg, 50 mg, Oral, Nightly PRN  magnesium hydroxide (MILK OF MAGNESIA) 400 MG/5ML suspension 30 mL, 30 mL, Oral, Daily PRN  aluminum & magnesium hydroxide-simethicone (MAALOX) 200-200-20 MG/5ML suspension 30 mL, 30 mL, Oral, Q6H PRN  nicotine (NICODERM CQ) 14 MG/24HR 1 patch, 1 patch, Transdermal, Daily    ASSESSMENT  Schizoaffective disorder bipolar type  rule out substance-induced psychotic disorder    PLAN  we will change her Risperdal to bedtime. Continue with other medications as ordered  Encourage groups with interactions    More than 16 mins of the session was spent doing supportive psychotherapy. Session started at 1 PM and ended at 1:30 PM today. Physicians Signature: Electronically signed by Davin Carlin MD on 4/26/19 at 8:58 PM           **This report has been created using voice recognition software. It may contain minor errors which are inherent in voice recognition technology. **

## 2019-04-27 NOTE — PLAN OF CARE
Music, LPN  Outcome: Ongoing  Note:   Patient denies any suicidal ideations. 4/26/2019 1825 by Steven Moralez RN  Outcome: Ongoing  Note:   Patient denies suicidal ideations, no plan or intent to harm self. Patient remains on suicidal precautions 15 checks for safety. Instructed to seek staff as needed for thoughts of self harm. Goal: Able to verbalize support systems  Description  Able to verbalize support systems  4/27/2019 0421 by Daina ROD LPN  Outcome: Not Met This Shift  Note:   Patient reports that she does not have a support system. 4/26/2019 1825 by Steven Moralez RN  Outcome: Ongoing  Note:   Patient reports X- and [facility] as her support system. Goal: Absence of self-harm  Description  Absence of self-harm  4/27/2019 0421 by Daina ROD LPN  Outcome: Ongoing  Note:   Patient was free of self harm. 4/26/2019 1825 by Steven Moralez RN  Outcome: Ongoing  Note:   No self harm behaviors were observed or reported so far this shift. Remains on every 15 minutes precautions for safety. Goal: Patient specific goal  Description  Patient specific goal  4/27/2019 0421 by Daina ROD LPN  Outcome: Met This Shift  Note:   Patient the goal to talk with the doctor and . 4/26/2019 1825 by Steven Moralez RN  Outcome: Ongoing  Note:   Patient reports goal for today is to [facility] to the doctor and \". This was met. Goal: Participates in care planning  Description  Participates in care planning  4/27/2019 0421 by Daina ROD LPN  Outcome: Met This Shift  Note:   Patient participated in care planning with nursing staff.   4/26/2019 1825 by Steven Moralez RN  Outcome: Ongoing  Note:   Patient discussed treatment plan with physician/medical staff, attending group, and compliant with medications.        Problem: Altered Mood, Psychotic Behavior:  Goal: Able to demonstrate trust by eating, participating in treatment and following staff's Josué Camara RN  Outcome: Ongoing     Problem: Role Relationship:  Goal: Ability to demonstrate positive changes in social behaviors and relationships will improve  Description  Ability to demonstrate positive changes in social behaviors and relationships will improve  4/27/2019 0421 by Daina ROD LPN  Outcome: Ongoing  4/26/2019 1825 by Josué Camara RN  Outcome: Ongoing     Problem: Pain:  Goal: Pain level will decrease  Description  Pain level will decrease  4/27/2019 0421 by [id number] Ira ROD LPN  Outcome: Not Met This Shift  Note:   Patient reports pain of 6/10 in her neck. 4/26/2019 1825 by Josué Camara RN  Outcome: Ongoing  Note:   Patient reports back pain 3 out of 10. Patient taking tylenol for pain. Patient reports decreased pain after taking pain medications. Problem: KNOWLEDGE DEFICIT  Goal: Knowledge of discharge plan  4/27/2019 0421 by Daina ROD LPN  Outcome: Ongoing  Note:   Patient encouraged to develop a safety plan prior to being discharged. 4/26/2019 1825 by Josué Camara RN  Outcome: Ongoing  Note:   Maintained in safe and secure environment. Patient did not fill out safety plan this shift. Care plan reviewed with patient. Patient does verbalize understanding of the plan of care and does contribute to goal setting.
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
## 2019-04-27 NOTE — BH NOTE
Group Therapy Note    Date: 4/27/2019  Start Time: 0800  End Time:  0830  Number of Participants: 14    Type of Group: Community Meeting      Patient's Goal:  Not to get upset, stay positive    Status After Intervention:  Improved    Participation Level: Interactive    Participation Quality: Appropriate, Attentive and Sharing      Speech:  normal      Thought Process/Content: Logical      Affective Functioning: Congruent      Mood: depressed      Level of consciousness:  Alert and Attentive      Response to Learning: Able to verbalize current knowledge/experience, Able to verbalize/acknowledge new learning and Able to change behavior      Endings: None Reported    Modes of Intervention: Education, Support, Socialization, Exploration, Clarifying and Problem-solving      Discipline Responsible: Registered Nurse      Signature:  Toy Osler, RN

## 2019-04-28 PROCEDURE — 90833 PSYTX W PT W E/M 30 MIN: CPT | Performed by: PSYCHIATRY & NEUROLOGY

## 2019-04-28 PROCEDURE — 6370000000 HC RX 637 (ALT 250 FOR IP): Performed by: NURSE PRACTITIONER

## 2019-04-28 PROCEDURE — APPSS15 APP SPLIT SHARED TIME 0-15 MINUTES: Performed by: NURSE PRACTITIONER

## 2019-04-28 PROCEDURE — 99232 SBSQ HOSP IP/OBS MODERATE 35: CPT | Performed by: PSYCHIATRY & NEUROLOGY

## 2019-04-28 PROCEDURE — 1240000000 HC EMOTIONAL WELLNESS R&B

## 2019-04-28 PROCEDURE — 6370000000 HC RX 637 (ALT 250 FOR IP): Performed by: PSYCHIATRY & NEUROLOGY

## 2019-04-28 RX ORDER — HYDROXYZINE HYDROCHLORIDE 25 MG/1
50 TABLET, FILM COATED ORAL 3 TIMES DAILY PRN
Status: DISCONTINUED | OUTPATIENT
Start: 2019-04-28 | End: 2019-04-29 | Stop reason: HOSPADM

## 2019-04-28 RX ORDER — HYDROXYZINE HYDROCHLORIDE 25 MG/1
50 TABLET, FILM COATED ORAL 3 TIMES DAILY PRN
Status: DISCONTINUED | OUTPATIENT
Start: 2019-04-28 | End: 2019-04-28

## 2019-04-28 RX ORDER — IBUPROFEN 600 MG/1
600 TABLET ORAL EVERY 8 HOURS PRN
Status: DISCONTINUED | OUTPATIENT
Start: 2019-04-28 | End: 2019-04-29 | Stop reason: HOSPADM

## 2019-04-28 RX ADMIN — IBUPROFEN 600 MG: 600 TABLET, FILM COATED ORAL at 09:35

## 2019-04-28 RX ADMIN — LAMOTRIGINE 100 MG: 100 TABLET ORAL at 07:49

## 2019-04-28 RX ADMIN — IBUPROFEN 600 MG: 600 TABLET, FILM COATED ORAL at 20:07

## 2019-04-28 RX ADMIN — DULOXETINE HYDROCHLORIDE 60 MG: 60 CAPSULE, DELAYED RELEASE ORAL at 07:49

## 2019-04-28 RX ADMIN — ACETAMINOPHEN 650 MG: 325 TABLET ORAL at 14:30

## 2019-04-28 RX ADMIN — HYDROXYZINE HYDROCHLORIDE 25 MG: 25 TABLET, FILM COATED ORAL at 07:50

## 2019-04-28 RX ADMIN — TRAZODONE HYDROCHLORIDE 50 MG: 50 TABLET ORAL at 20:07

## 2019-04-28 RX ADMIN — HYDROXYZINE HYDROCHLORIDE 25 MG: 25 TABLET, FILM COATED ORAL at 14:30

## 2019-04-28 RX ADMIN — RISPERIDONE 1 MG: 1 TABLET ORAL at 20:07

## 2019-04-28 RX ADMIN — HYDROXYZINE HYDROCHLORIDE 50 MG: 25 TABLET, FILM COATED ORAL at 20:07

## 2019-04-28 ASSESSMENT — PAIN DESCRIPTION - PROGRESSION
CLINICAL_PROGRESSION: GRADUALLY IMPROVING
CLINICAL_PROGRESSION: NOT CHANGED

## 2019-04-28 ASSESSMENT — PAIN SCALES - GENERAL
PAINLEVEL_OUTOF10: 6
PAINLEVEL_OUTOF10: 4
PAINLEVEL_OUTOF10: 6
PAINLEVEL_OUTOF10: 9
PAINLEVEL_OUTOF10: 6

## 2019-04-28 ASSESSMENT — PAIN DESCRIPTION - LOCATION
LOCATION: ABDOMEN
LOCATION: BACK
LOCATION: ABDOMEN

## 2019-04-28 ASSESSMENT — PAIN DESCRIPTION - DESCRIPTORS
DESCRIPTORS: CRAMPING
DESCRIPTORS: ACHING
DESCRIPTORS: CRAMPING

## 2019-04-28 ASSESSMENT — PAIN DESCRIPTION - FREQUENCY
FREQUENCY: INTERMITTENT
FREQUENCY: CONTINUOUS
FREQUENCY: CONTINUOUS

## 2019-04-28 ASSESSMENT — PAIN DESCRIPTION - PAIN TYPE
TYPE: ACUTE PAIN
TYPE: ACUTE PAIN

## 2019-04-28 ASSESSMENT — PAIN DESCRIPTION - ORIENTATION
ORIENTATION: LOWER

## 2019-04-28 ASSESSMENT — PAIN DESCRIPTION - ONSET
ONSET: ON-GOING
ONSET: GRADUAL

## 2019-04-28 NOTE — GROUP NOTE
Group Therapy Note    Date: April 28    Group Start Time: 1630  Group End Time: 1700  Group Topic: Healthy Living/Wellness    [facility] Adult Psych 4E    Norrine Primrose, RN                 Status After Intervention:  Unchanged    Participation Level:  Active Listener    Participation Quality: Appropriate      Speech:  normal      Thought Process/Content: Logical      Affective Functioning: Congruent          Level of consciousness:  Alert      Response to Learning: Able to verbalize current knowledge/experience          Modes of Intervention: Education, Support and Socialization      Discipline Responsible: Registered Nurse

## 2019-04-28 NOTE — GROUP NOTE
Group Therapy Note    Date: April 28    Group Start Time: 1000  Group End Time: 1030  Group Topic: [facility] 47. Adult Psych 4E    Polina Islas RN           Patient declined to attend group.

## 2019-04-28 NOTE — PLAN OF CARE
Problem: Depressive Behavior With or Without Suicide Precautions:  Goal: Ability to disclose and discuss suicidal ideas will improve  Description  Ability to disclose and discuss suicidal ideas will improve  4/28/2019 1038 by Lola Whitman RN  Outcome: Met This Shift  Note:   Patient denies suicidal ideations, no plan or intent to harm self. Patient remains on suicidal precautions 15 checks for safety. Instructed to seek staff as needed for thoughts of self harm. 4/27/2019 2227 by 60Eliot ROD LPN  Outcome: Met This Shift  Note:   Patient denies any suicidal ideations. Goal: Able to verbalize support systems  Description  Able to verbalize support systems  4/28/2019 1038 by Lola Whitman RN  Outcome: Met This Shift  Note:   Pt states that she is going to call her daughter today  4/27/2019 2227 by 60Eliot ROD LPN  Outcome: Not Met This Shift  Note:   Patient reports she does not have a support system. Goal: Absence of self-harm  Description  Absence of self-harm  4/28/2019 1038 by Lola Whitman RN  Outcome: Met This Shift  Note:   No self harm behaviors were observed or reported so far this shift. Remains on every 15 minutes precautions for safety. 4/27/2019 2227 by [id number] Ira ROD LPN  Outcome: Met This Shift  Note:   Patient was free of self harm this shift. Goal: Participates in care planning  Description  Participates in care planning  4/28/2019 1038 by Lola Whitman RN  Outcome: Met This Shift  Note:   This patient participates in care planning   4/27/2019 2227 by 60Eliot ROD LPN  Outcome: Met This Shift  Note:   Patient participated in care planning with nursing staff. Problem: Nutrition  Goal: Optimal nutrition therapy  4/28/2019 1038 by Lola Whitman RN  Outcome: Met This Shift  Note:   Patient eating up to 100% of meals today. Encouraged patient to drink supplements for increase calorie intake.    4/27/2019 2227 by 60Eliot ROD LPN  Outcome: Met This Shift  Note:   Patient is eating 100% of meals. Problem: Altered Mood, Psychotic Behavior:  Goal: Compliance with prescribed medication regimen will improve  Description  Compliance with prescribed medication regimen will improve  4/28/2019 1038 by Adalberto Lowery RN  Outcome: Met This Shift  Note:   Pt took all medication as prescribed, did not use nicotine patch  4/27/2019 2227 by 60Eliot ROD LPN  Outcome: Met This Shift  Note:   Patient was compliant with medications this shift. Problem: KNOWLEDGE DEFICIT  Goal: Knowledge of discharge plan  4/28/2019 1038 by Adalberto Lowery RN  Outcome: Ongoing  Note:   Maintained in safe and secure environment. Patient did not fill out safety plan this shift. 4/27/2019 2227 by 60Eliot ROD LPN  Outcome: Ongoing  Note:   Patient encouraged to develop a safety plan prior to being discharged. Problem: Depressive Behavior With or Without Suicide Precautions:  Goal: Able to verbalize acceptance of life and situations over which he or she has no control  Description  Able to verbalize acceptance of life and situations over which he or she has no control  4/28/2019 1038 by Adalberto Lowery RN  Outcome: Ongoing  Note:   Patient verbalizes some acceptance of situations over which she has no control. Encouraged patient to attend group therapy. 4/27/2019 2227 by 60Eliot ROD LPN  Outcome: Not Met This Shift  Note:   Patient not able to verbalize acceptance over life and situations which she has no control over. Goal: Able to verbalize and/or display a decrease in depressive symptoms  Description  Able to verbalize and/or display a decrease in depressive symptoms  4/28/2019 1038 by Adalberto Lowery RN  Outcome: Ongoing  Note:   Patient reports mood as less than normal. Has flat and sad affect. Speech clear. Poor/good eye contact. Reports hope for future and identifies daughter as their support system. 4/27/2019 2227 by 60Eliot ROD LPN  Outcome: Not Met This Shift  Note:   Patient reports depression. Goal: Patient specific goal  Description  Patient specific goal  4/28/2019 1038 by Amaury Ledesma RN  Outcome: Ongoing  Note:   To start feeling better - ongoing  4/27/2019 2227 by Caden Herrera LPN  Outcome: Met This Shift  Note:   Patient made the goal to not get upset and stay positive. Problem: Anxiety:  Goal: Level of anxiety will decrease  Description  Level of anxiety will decrease  4/28/2019 1038 by Amaury Ledesma RN  Outcome: Ongoing  Note:   Patient reports anxiety. Pt taking vistaril prn. Verbalized medication was effective. 4/27/2019 2227 by Caden Herrera LPN  Outcome: Not Met This Shift  Note:   Patient reports having anxiety. Problem: Coping:  Goal: Ability to identify problematic behaviors that deter socialization will improve  Description  Ability to identify problematic behaviors that deter socialization will improve  4/28/2019 1038 by Amaury Ledesma RN  Outcome: Ongoing  Note:   Pt in bed often today, states that she does not feel well  4/27/2019 2227 by Caden Herrera LPN  Outcome: Ongoing     Problem: Role Relationship:  Goal: Ability to demonstrate positive changes in social behaviors and relationships will improve  Description  Ability to demonstrate positive changes in social behaviors and relationships will improve  4/28/2019 1038 by Amaury Ledesma RN  Outcome: Ongoing  Note:   Pt in bed often today, states that she does not feel well  4/27/2019 2227 by Caden Herrera LPN  Outcome: Ongoing     Problem: Pain:  Goal: Pain level will decrease  Description  Pain level will decrease  4/28/2019 1038 by Amaury Ledesma RN  Outcome: Ongoing  Note:   Patient reports back pain 6 to 9 out of 10. Patient taking ibuprofen for pain. Patient reports lessend pain after taking pain medications. Pt started her period yesterday  4/27/2019 2227 by Caden Herrera LPN  Outcome: Not Met This Shift  Note:   Patient reports having pain of 6/10 lower back.       Problem: Altered Mood, Psychotic Behavior:  Goal:
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
## 2019-04-28 NOTE — PLAN OF CARE
Problem: Discharge Planning:  Goal: Discharged to appropriate level of care  Description  Discharged to appropriate level of care  4/27/2019 2227 by Daina ROD LPN  Outcome: Ongoing  Note:   Discharge planning in process [facility]. 4/27/2019 0947 by Alisson Gonzalez RN  Outcome: Not Met This Shift  Note:   Discharge planners working with patient to achieve optimal discharge plan, specific to the needs of this patient. Problem: Depressive Behavior With or Without Suicide Precautions:  Goal: Able to verbalize acceptance of life and situations over which he or she has no control  Description  Able to verbalize acceptance of life and situations over which he or she has no control  4/27/2019 2227 by Daina ROD LPN  Outcome: Not Met This Shift  Note:   Patient not able to verbalize acceptance over life and situations which she has no control over. 4/27/2019 0947 by Alisson Gonzalez RN  Outcome: Ongoing  Note:   Patient able to verbalize some acceptance of life and situations over which she has no control. Goal: Able to verbalize and/or display a decrease in depressive symptoms  Description  Able to verbalize and/or display a decrease in depressive symptoms  4/27/2019 2227 by Daina ROD LPN  Outcome: Not Met This Shift  Note:   Patient reports depression. 4/27/2019 0947 by Alisson Gonzalez RN  Outcome: Ongoing  Note:   Patient reports mood as less than normal. Has flat affect. Speech clear. good eye contact. Reports some hope for future and identifies no one as their support system. Goal: Ability to disclose and discuss suicidal ideas will improve  Description  Ability to disclose and discuss suicidal ideas will improve  4/27/2019 2227 by Daina ROD LPN  Outcome: Met This Shift  Note:   Patient denies any suicidal ideations. 4/27/2019 0947 by Alisson Gonzalez RN  Outcome: Met This Shift  Note:   Patient denies suicidal ideations, no plan or intent to harm self.  Patient remains on suicidal precautions 15 checks for safety. Instructed to seek staff as needed for thoughts of self harm. Goal: Able to verbalize support systems  Description  Able to verbalize support systems  4/27/2019 2227 by Daina ROD LPN  Outcome: Not Met This Shift  Note:   Patient reports she does not have a support system. 4/27/2019 0947 by Logan Walls RN  Outcome: Not Met This Shift  Note:   identifies no one as their support system. Goal: Absence of self-harm  Description  Absence of self-harm  4/27/2019 2227 by Daina ROD LPN  Outcome: Met This Shift  Note:   Patient was free of self harm this shift. 4/27/2019 0947 by Logan Walls RN  Outcome: Met This Shift  Note:   No self harm behaviors were observed or reported so far this shift. Remains on every 15 minutes precautions for safety. Goal: Patient specific goal  Description  Patient specific goal  4/27/2019 2227 by Daina ROD LPN  Outcome: Met This Shift  Note:   Patient made the goal to not get upset and stay positive. 4/27/2019 0947 by Logan Walls RN  Outcome: Ongoing  Note:   Not to get upset, stay positive  Goal: Participates in care planning  Description  Participates in care planning  4/27/2019 2227 by Daina ROD LPN  Outcome: Met This Shift  Note:   Patient participated in care planning with nursing staff.   4/27/2019 0947 by Logan Walls RN  Outcome: Met This Shift  Note:   This patient participates in care planning      Problem: Altered Mood, Psychotic Behavior:  Goal: Able to demonstrate trust by eating, participating in treatment and following staff's direction  Description  Able to demonstrate trust by eating, participating in treatment and following staff's direction  4/27/2019 2227 by Daina ROD LPN  Outcome: Met This Shift  Note:   Patient demonstrates trust by eating and following staffs directions.    4/27/2019 0947 by Logan Walls RN  Outcome: Met This Shift  Note:   Pt taking medication, following staff

## 2019-04-28 NOTE — PROGRESS NOTES
Group Therapy Note    Date: 4/27/2019  Start Time: 2000  End Time:  2030  Number of Participants:     Type of Group: Relaxation    Wellness Binder Information  Module Name:    Session Number:      Patient's Goal:  To not get upset, stay positive. Notes:  Patient reports that she did meet her goal for the day. Status After Intervention:  Unchanged    Participation Level:  Active Listener    Participation Quality: Attentive      Speech:  normal      Thought Process/Content: Logical      Affective Functioning: Incongruent      Mood: anxious      Level of consciousness:  Alert      Response to Learning: Able to verbalize current knowledge/experience      Endings: None Reported    Modes of Intervention: Support      Discipline Responsible: Licensed Practical Nurse      Signature:  Christo Lombardo LPN

## 2019-04-29 VITALS
HEART RATE: 74 BPM | HEIGHT: 62 IN | TEMPERATURE: 97.6 F | RESPIRATION RATE: 16 BRPM | OXYGEN SATURATION: 100 % | SYSTOLIC BLOOD PRESSURE: 114 MMHG | WEIGHT: 146 LBS | DIASTOLIC BLOOD PRESSURE: 74 MMHG | BODY MASS INDEX: 26.87 KG/M2

## 2019-04-29 PROCEDURE — 6370000000 HC RX 637 (ALT 250 FOR IP): Performed by: PSYCHIATRY & NEUROLOGY

## 2019-04-29 PROCEDURE — 5130000000 HC BRIDGE APPOINTMENT

## 2019-04-29 PROCEDURE — 99239 HOSP IP/OBS DSCHRG MGMT >30: CPT | Performed by: PSYCHIATRY & NEUROLOGY

## 2019-04-29 PROCEDURE — 6370000000 HC RX 637 (ALT 250 FOR IP): Performed by: NURSE PRACTITIONER

## 2019-04-29 RX ORDER — DULOXETIN HYDROCHLORIDE 60 MG/1
60 CAPSULE, DELAYED RELEASE ORAL EVERY MORNING
Qty: 30 CAPSULE | Refills: 0 | Status: SHIPPED | OUTPATIENT
Start: 2019-04-30 | End: 2019-04-29

## 2019-04-29 RX ORDER — RISPERIDONE 1 MG/1
1 TABLET, FILM COATED ORAL NIGHTLY
Qty: 30 TABLET | Refills: 0 | Status: SHIPPED | OUTPATIENT
Start: 2019-04-29 | End: 2019-04-29

## 2019-04-29 RX ORDER — TRAZODONE HYDROCHLORIDE 50 MG/1
50 TABLET ORAL NIGHTLY PRN
Qty: 30 TABLET | Refills: 0 | Status: ON HOLD | OUTPATIENT
Start: 2019-04-29 | End: 2019-12-09 | Stop reason: HOSPADM

## 2019-04-29 RX ORDER — DULOXETIN HYDROCHLORIDE 60 MG/1
60 CAPSULE, DELAYED RELEASE ORAL EVERY MORNING
Qty: 30 CAPSULE | Refills: 0 | Status: ON HOLD | OUTPATIENT
Start: 2019-04-30 | End: 2019-12-09 | Stop reason: HOSPADM

## 2019-04-29 RX ORDER — LAMOTRIGINE 100 MG/1
100 TABLET ORAL DAILY
Qty: 15 TABLET | Refills: 0 | Status: ON HOLD | OUTPATIENT
Start: 2019-04-29 | End: 2019-12-09 | Stop reason: HOSPADM

## 2019-04-29 RX ORDER — LAMOTRIGINE 100 MG/1
100 TABLET ORAL DAILY
Qty: 15 TABLET | Refills: 0 | Status: SHIPPED | OUTPATIENT
Start: 2019-04-29 | End: 2019-04-29 | Stop reason: SDUPTHER

## 2019-04-29 RX ORDER — HYDROXYZINE 50 MG/1
50 TABLET, FILM COATED ORAL 3 TIMES DAILY PRN
Qty: 45 TABLET | Refills: 0 | Status: SHIPPED | OUTPATIENT
Start: 2019-04-29 | End: 2019-05-14

## 2019-04-29 RX ORDER — RISPERIDONE 1 MG/1
1 TABLET, FILM COATED ORAL NIGHTLY
Qty: 30 TABLET | Refills: 0 | Status: ON HOLD | OUTPATIENT
Start: 2019-04-29 | End: 2019-12-09 | Stop reason: HOSPADM

## 2019-04-29 RX ORDER — TRAZODONE HYDROCHLORIDE 50 MG/1
50 TABLET ORAL NIGHTLY PRN
Qty: 30 TABLET | Refills: 0 | Status: SHIPPED | OUTPATIENT
Start: 2019-04-29 | End: 2019-04-29

## 2019-04-29 RX ORDER — HYDROXYZINE 50 MG/1
50 TABLET, FILM COATED ORAL 3 TIMES DAILY PRN
Qty: 45 TABLET | Refills: 0 | Status: SHIPPED | OUTPATIENT
Start: 2019-04-29 | End: 2019-04-29

## 2019-04-29 RX ADMIN — HYDROXYZINE HYDROCHLORIDE 50 MG: 25 TABLET, FILM COATED ORAL at 08:34

## 2019-04-29 RX ADMIN — LAMOTRIGINE 100 MG: 100 TABLET ORAL at 08:31

## 2019-04-29 RX ADMIN — IBUPROFEN 600 MG: 600 TABLET, FILM COATED ORAL at 16:08

## 2019-04-29 RX ADMIN — DULOXETINE HYDROCHLORIDE 60 MG: 60 CAPSULE, DELAYED RELEASE ORAL at 08:31

## 2019-04-29 RX ADMIN — HYDROXYZINE HYDROCHLORIDE 50 MG: 25 TABLET, FILM COATED ORAL at 16:08

## 2019-04-29 ASSESSMENT — PAIN SCALES - GENERAL: PAINLEVEL_OUTOF10: 6

## 2019-04-29 NOTE — PLAN OF CARE
Problem: KNOWLEDGE DEFICIT  Goal: Knowledge of discharge plan  4/29/2019 1057 by Sarajane Mcardle, RN  Outcome: Met This Shift  Note: 1. What are the warning signs when you begin thinking suicide or when you feel very distressed? Negative thinking and feeling hopeless  2. What can you do by yourself to take your mind off of the problem? Color, talk with someone who is supportive  3. If you are unable to deal with your distressed mood alone, contact trusted family members or friends. List several people in case your first choices are not available. [facility] staff/[facility] staff  4. Contact local professionals or emergency services if you continue to have suicidal thoughts or serious distress.   [phone]  Pt is working with treatment team for discharge plans with [place] [address] PHONE NUMBERS:        [phone]        [phone]        [phone] (for deaf or hearing impaired)    4/29/2019 0001 by Jorge Matthews RN  Outcome: Ongoing  Note:   Pt did not work on safety plan this evening     Problem: Depressive Behavior With or Without Suicide Precautions:  Goal: Participates in care planning  Description  Participates in care planning  4/29/2019 0001 by Jorge Matthews RN  Outcome: Met This Shift  Note:   Pt participates in her plan of care     Problem: Altered Mood, Psychotic Behavior:  Goal: Able to demonstrate trust by eating, participating in treatment and following staff's direction  Description  Able to demonstrate trust by eating, participating in treatment and following staff's direction  4/29/2019 0001 by Jorge Matthews RN  Outcome: Met This Shift  Note:   Pi eating, taking her meds, attended group, follows direction  Goal: Able to verbalize reality based thinking  Description  Able to verbalize reality based thinking  4/29/2019 0001 by Jorge Matthews RN  Outcome: Met This Shift  Note:   No delusions voiced this evening

## 2019-04-29 NOTE — PROGRESS NOTES
Behavioral Health   Discharge Note    Pt discharged with followings belongings:   Dentures: None  Vision - Corrective Lenses: Glasses  Hearing Aid: None  Jewelry: Ring, Earrings  Body Piercings Removed: Yes(nose)  Clothing: Footwear, Jacket / coat, Pants, Shirt, Socks, Undergarments (Comment)  Were All Patient Medications Collected?: Yes  Other Valuables: Cell phone   Valuables sent home with patient. Valuables retrieved from safe, Security envelope number:  [id number] and medications retrieved from safe , security envelope number: [id number] returned to patient. Patient left department with Departure Mode: By self via Mobility at Departure: Ambulatory, discharged to Discharged to: Group Home. \"An Important Message from [employer] About Your Rights\" form reviewed, signed yes. Patient education on aftercare instructions: yes  Bridge Appointment completed: Reviewed Discharge Instructions with patient. Patient verbalizes understanding and agreement with the discharge plan using the teachback method. Information faxed to trevor by nurse Patient verbalize understanding of AVS:  yes. Status EXAM upon discharge:  Status and Exam  Normal: No  Facial Expression: Worried(related to housing/discharge plans)  Affect: Blunt  Level of Consciousness: Alert  Mood:Normal: No  Mood: Depressed, Anxious  Motor Activity:Normal: Yes  Motor Activity: (appropriate)  Interview Behavior: Cooperative  Preception: [place] to Person, [place] to Time, [place] to Place, [place] to Situation  Attention:Normal: Yes  Attention: Distractible  Thought Processes: Circumstantial  Thought Content:Normal: No  Thought Content: Preoccupations(related to discharge plans/housing)  Hallucinations: None  Delusions: No  Delusions:  Other(See Comment)(paranoid)  Memory:Normal: No  Memory: Poor Recent, Poor Remote  Insight and Judgment: No  Insight and Judgment: Poor Judgment, Poor Insight  Present Suicidal Ideation: No  Present Homicidal Ideation: No    Jessica Wiggins

## 2019-04-29 NOTE — PROGRESS NOTES
Called reports to  Mary at [employer].  Pat requested pt be discharged by 6 pm to enter the [facility] program. Informed pt's medications will be provided by the hospital-medications reviewed

## 2019-04-29 NOTE — GROUP NOTE
Group Therapy Note    Date: April 29    Group Start Time: 1100  Group End Time: 1200  Group Topic: Psychotherapy    [facility] 4E    GABRIELE Denis           Notes:  Patient present in group for short time. While in group patient did not contribute to group discussion but was able to appropriately listen to other peers. Patient left group, did not return. Status After Intervention:  Unchanged    Participation Level:  Active Listener    Participation Quality: Appropriate      Speech:  normal      Thought Process/Content: Logical  Linear      Affective Functioning: Flat      Mood: depressed      Level of consciousness:  Alert and Oriented x4      Response to Learning: Progressing to goal      Endings: None Reported    Modes of Intervention: Education, Support, Socialization, Exploration, Clarifying and Problem-solving      Discipline Responsible: /Counselor      Signature:  GABRIELE Denis

## 2019-04-29 NOTE — PROGRESS NOTES
Pt called [facility] for placement. Reports needing social security number-pt reports needs the social security card which she lacks.

## 2019-04-29 NOTE — PLAN OF CARE
Problem: Depressive Behavior With or Without Suicide Precautions:  Goal: Participates in care planning  Description  Participates in care planning  4/29/2019 0001 by Fabien Sandy RN  Outcome: Met This Shift  Note:   Pt participates in her plan of care  4/28/2019 1038 by Mehul Narvaez RN  Outcome: Met This Shift  Note:   This patient participates in care planning      Problem: Altered Mood, Psychotic Behavior:  Goal: Able to demonstrate trust by eating, participating in treatment and following staff's direction  Description  Able to demonstrate trust by eating, participating in treatment and following staff's direction  4/29/2019 0001 by Fabien Sandy RN  Outcome: Met This Shift  Note:   Pi eating, taking her meds, attended group, follows direction  4/28/2019 1038 by Mehul Narvaez RN  Outcome: Ongoing  Note:   Pt takes her medication, eats her food but does not attend all groups, states that she does not feel well  Goal: Able to verbalize reality based thinking  Description  Able to verbalize reality based thinking  4/29/2019 0001 by Fabien Sandy RN  Outcome: Met This Shift  Note:   No delusions voiced this evening  4/28/2019 1038 by Mehul Narvaez RN  Outcome: Ongoing  Note:   Pt has not verbalized any non-reality based thinking at this time     Problem: KNOWLEDGE DEFICIT  Goal: Knowledge of discharge plan  4/29/2019 0001 by Fabien Sandy RN  Outcome: Ongoing  Note:   Pt did not work on safety plan this evening  4/28/2019 1038 by Mehul Narvaez RN  Outcome: Ongoing  Note:   Maintained in safe and secure environment. Patient did not fill out safety plan this shift.       Problem: Discharge Planning:  Goal: Discharged to appropriate level of care  Description  Discharged to appropriate level of care  4/29/2019 0001 by Fabien Sandy RN  Outcome: Ongoing  Note:   Pt unsure of where she will go at d/c, maybe [facility] or [facility], will follow up at [facility]  4/28/2019 1038 by Mehul Narvaez RN  Outcome: Not Met This Shift  Note:   Discharge planners working with patient to achieve optimal discharge plan, specific to the needs of this patient. Problem: Depressive Behavior With or Without Suicide Precautions:  Goal: Able to verbalize acceptance of life and situations over which he or she has no control  Description  Able to verbalize acceptance of life and situations over which he or she has no control  4/29/2019 0001 by Iam Hunter RN  Outcome: Ongoing  Note:   Pt is working towards acceptance  4/28/2019 1038 by Karely Brooks RN  Outcome: Ongoing  Note:   Patient verbalizes some acceptance of situations over which she has no control. Encouraged patient to attend group therapy. Goal: Able to verbalize and/or display a decrease in depressive symptoms  Description  Able to verbalize and/or display a decrease in depressive symptoms  4/29/2019 0001 by Iam Hunter RN  Outcome: Ongoing  Note:   Rates mood ok, blunt affect, poor to fair eye contact, anxious and depressed, is hopeful  4/28/2019 1038 by Karely Brooks RN  Outcome: Ongoing  Note:   Patient reports mood as less than normal. Has flat and sad affect. Speech clear. Poor/good eye contact. Reports hope for future and identifies daughter as their support system. Problem: Anxiety:  Goal: Level of anxiety will decrease  Description  Level of anxiety will decrease  4/29/2019 0001 by Iam Hunter RN  Outcome: Ongoing  Note:   Pt had atarax for c/o anxiety  4/28/2019 1038 by Karely Brooks RN  Outcome: Ongoing  Note:   Patient reports anxiety. Pt taking vistaril prn. Verbalized medication was effective. Problem: Nutrition  Goal: Optimal nutrition therapy  4/29/2019 0001 by Iam Hunter RN  Outcome: Ongoing  Note:   Pt ate a snack this evening  4/28/2019 1038 by Karely Brooks RN  Outcome: Met This Shift  Note:   Patient eating up to 100% of meals today. Encouraged patient to drink supplements for increase calorie intake.       Problem: improve  4/29/2019 0001 by Altagracia Wilkins RN  Outcome: Completed  Note:   Pt denies suicidal thoughts  4/28/2019 1038 by Juan Francisco Arreola RN  Outcome: Met This Shift  Note:   Patient denies suicidal ideations, no plan or intent to harm self. Patient remains on suicidal precautions 15 checks for safety. Instructed to seek staff as needed for thoughts of self harm. Goal: Able to verbalize support systems  Description  Able to verbalize support systems  4/29/2019 0001 by Altagracia Wilkins RN  Outcome: Completed  Note:   Pt reports that Familia Silvestre is her support  4/28/2019 1038 by Juan Francisco Arreola RN  Outcome: Met This Shift  Note:   Pt states that she is going to call her daughter today  Goal: Absence of self-harm  Description  Absence of self-harm  4/29/2019 0001 by Altagracia Wilkins RN  Outcome: Completed  Note:   No self harm  4/28/2019 1038 by Juan Francisco Arreola RN  Outcome: Met This Shift  Note:   No self harm behaviors were observed or reported so far this shift. Remains on every 15 minutes precautions for safety. Problem: Altered Mood, Psychotic Behavior:  Goal: Compliance with prescribed medication regimen will improve  Description  Compliance with prescribed medication regimen will improve  4/29/2019 0001 by Altagracia Wilkins RN  Outcome: Completed  Note:   Pt is taking meds as ordered  4/28/2019 1038 by Juan Francisco Arreola RN  Outcome: Met This Shift  Note:   Pt took all medication as prescribed, did not use nicotine patch     Care plan reviewed with patient.   Patient does verbalize understanding of the plan of care and does contribute to goal setting
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
## 2019-04-29 NOTE — PROGRESS NOTES
Discharge [address]  is scheduled for a follow up appointment with tony Santacruz RNmomaura on 04/30/19 at 2:00 PM at [facility]. Lesly will reside at [facility].

## 2019-04-29 NOTE — PROGRESS NOTES
Group Therapy Note    Date: 4/29/2019  Start Time: 1630  End Time:  1700  Number of Participants: 9    Type of Group: Healthy Living/Wellness    Wellness Binder Information  Module Name:  Music Therapy      Patient's Goal:  Attended      Discipline Responsible: Licensed Practical Nurse      Signature:  Tu Babb LPN

## 2019-04-30 ENCOUNTER — TELEPHONE (OUTPATIENT)
Dept: ADMINISTRATIVE | Age: 39
End: 2019-04-30

## 2019-05-02 ENCOUNTER — HOSPITAL ENCOUNTER (EMERGENCY)
Age: 39
Discharge: HOME | End: 2019-05-03
Attending: EMERGENCY MEDICINE
Payer: MEDICARE

## 2019-05-02 VITALS
DIASTOLIC BLOOD PRESSURE: 72 MMHG | WEIGHT: 140 LBS | OXYGEN SATURATION: 94 % | HEART RATE: 88 BPM | SYSTOLIC BLOOD PRESSURE: 121 MMHG | TEMPERATURE: 98.8 F | BODY MASS INDEX: 25.61 KG/M2 | RESPIRATION RATE: 18 BRPM

## 2019-05-02 DIAGNOSIS — F32.A DEPRESSIVE DISORDER: Primary | ICD-10-CM

## 2019-05-02 LAB
ACETAMINOPHEN LEVEL: 6.8 UG/ML (ref 0–20)
ALBUMIN SERPL-MCNC: 4 G/DL (ref 3.5–5.1)
ALP BLD-CCNC: 53 U/L (ref 38–126)
ALT SERPL-CCNC: 34 U/L (ref 11–66)
AMPHETAMINE+METHAMPHETAMINE URINE SCREEN: NEGATIVE
ANION GAP SERPL CALCULATED.3IONS-SCNC: 11 MEQ/L (ref 8–16)
AST SERPL-CCNC: 30 U/L (ref 5–40)
BARBITURATE QUANTITATIVE URINE: NEGATIVE
BASOPHILS # BLD: 0.9 %
BASOPHILS ABSOLUTE: 0.1 THOU/MM3 (ref 0–0.1)
BENZODIAZEPINE QUANTITATIVE URINE: NEGATIVE
BILIRUB SERPL-MCNC: 0.6 MG/DL (ref 0.3–1.2)
BUN BLDV-MCNC: 18 MG/DL (ref 7–22)
CALCIUM SERPL-MCNC: 9 MG/DL (ref 8.5–10.5)
CANNABINOID QUANTITATIVE URINE: NEGATIVE
CHLORIDE BLD-SCNC: 101 MEQ/L (ref 98–111)
CO2: 23 MEQ/L (ref 23–33)
COCAINE METABOLITE QUANTITATIVE URINE: NEGATIVE
CREAT SERPL-MCNC: 0.8 MG/DL (ref 0.4–1.2)
EOSINOPHIL # BLD: 3.4 %
EOSINOPHILS ABSOLUTE: 0.2 THOU/MM3 (ref 0–0.4)
ERYTHROCYTE [DISTWIDTH] IN BLOOD BY AUTOMATED COUNT: 12 % (ref 11.5–14.5)
ERYTHROCYTE [DISTWIDTH] IN BLOOD BY AUTOMATED COUNT: 41 FL (ref 35–45)
ETHYL ALCOHOL, SERUM: < 0.01 %
GFR SERPL CREATININE-BSD FRML MDRD: 80 ML/MIN/1.73M2
GLUCOSE BLD-MCNC: 126 MG/DL (ref 70–108)
HCT VFR BLD CALC: 38.3 % (ref 37–47)
HEMOGLOBIN: 13.3 GM/DL (ref 12–16)
IMMATURE GRANS (ABS): 0.01 THOU/MM3 (ref 0–0.07)
IMMATURE GRANULOCYTES: 0.1 %
LYMPHOCYTES # BLD: 49.6 %
LYMPHOCYTES ABSOLUTE: 3.4 THOU/MM3 (ref 1–4.8)
MCH RBC QN AUTO: 32.3 PG (ref 26–33)
MCHC RBC AUTO-ENTMCNC: 34.7 GM/DL (ref 32.2–35.5)
MCV RBC AUTO: 93 FL (ref 81–99)
MONOCYTES # BLD: 9.3 %
MONOCYTES ABSOLUTE: 0.6 THOU/MM3 (ref 0.4–1.3)
NUCLEATED RED BLOOD CELLS: 0 /100 WBC
OPIATES, URINE: NEGATIVE
OSMOLALITY CALCULATION: 273.5 MOSMOL/KG (ref 275–300)
OXYCODONE: NEGATIVE
PHENCYCLIDINE QUANTITATIVE URINE: NEGATIVE
PLATELET # BLD: 207 THOU/MM3 (ref 130–400)
PMV BLD AUTO: 8.6 FL (ref 9.4–12.4)
POTASSIUM SERPL-SCNC: 3.7 MEQ/L (ref 3.5–5.2)
PREGNANCY, SERUM: NEGATIVE
RBC # BLD: 4.12 MILL/MM3 (ref 4.2–5.4)
SALICYLATE, SERUM: 0.3 MG/DL (ref 2–10)
SEG NEUTROPHILS: 36.7 %
SEGMENTED NEUTROPHILS ABSOLUTE COUNT: 2.5 THOU/MM3 (ref 1.8–7.7)
SODIUM BLD-SCNC: 135 MEQ/L (ref 135–145)
TOTAL PROTEIN: 6.9 G/DL (ref 6.1–8)
TSH SERPL DL<=0.05 MIU/L-ACNC: 2.09 UIU/ML (ref 0.4–4.2)
WBC # BLD: 6.8 THOU/MM3 (ref 4.8–10.8)

## 2019-05-02 PROCEDURE — 36415 COLL VENOUS BLD VENIPUNCTURE: CPT

## 2019-05-02 PROCEDURE — 80307 DRUG TEST PRSMV CHEM ANLYZR: CPT

## 2019-05-02 PROCEDURE — G0480 DRUG TEST DEF 1-7 CLASSES: HCPCS

## 2019-05-02 PROCEDURE — 84443 ASSAY THYROID STIM HORMONE: CPT

## 2019-05-02 PROCEDURE — 99283 EMERGENCY DEPT VISIT LOW MDM: CPT

## 2019-05-02 PROCEDURE — 85025 COMPLETE CBC W/AUTO DIFF WBC: CPT

## 2019-05-02 PROCEDURE — 81001 URINALYSIS AUTO W/SCOPE: CPT

## 2019-05-02 PROCEDURE — 80053 COMPREHEN METABOLIC PANEL: CPT

## 2019-05-02 PROCEDURE — 84703 CHORIONIC GONADOTROPIN ASSAY: CPT

## 2019-05-02 ASSESSMENT — SLEEP AND FATIGUE QUESTIONNAIRES
RESTFUL SLEEP: NO
DO YOU HAVE DIFFICULTY SLEEPING: YES
DO YOU USE A SLEEP AID: YES
DIFFICULTY ARISING: YES
DIFFICULTY FALLING ASLEEP: NO
DIFFICULTY STAYING ASLEEP: YES
SLEEP PATTERN: NIGHTMARES/TERRORS

## 2019-05-02 ASSESSMENT — PATIENT HEALTH QUESTIONNAIRE - PHQ9: SUM OF ALL RESPONSES TO PHQ QUESTIONS 1-9: 19

## 2019-05-03 ENCOUNTER — HOSPITAL ENCOUNTER (EMERGENCY)
Age: 39
Discharge: HOME | End: 2019-05-04
Payer: MEDICARE

## 2019-05-03 VITALS
HEART RATE: 69 BPM | DIASTOLIC BLOOD PRESSURE: 83 MMHG | BODY MASS INDEX: 25.87 KG/M2 | HEIGHT: 63 IN | SYSTOLIC BLOOD PRESSURE: 127 MMHG | TEMPERATURE: 98.1 F | RESPIRATION RATE: 18 BRPM | OXYGEN SATURATION: 94 % | WEIGHT: 146 LBS

## 2019-05-03 DIAGNOSIS — F22 PARANOID DELUSION (HCC): ICD-10-CM

## 2019-05-03 DIAGNOSIS — F41.8 DEPRESSION WITH ANXIETY: Primary | ICD-10-CM

## 2019-05-03 LAB
BACTERIA: ABNORMAL /HPF
BILIRUBIN URINE: NEGATIVE
BLOOD, URINE: ABNORMAL
CASTS 2: ABNORMAL /LPF
CASTS UA: ABNORMAL /LPF
CHARACTER, URINE: ABNORMAL
COLOR: YELLOW
CRYSTALS, UA: ABNORMAL
EPITHELIAL CELLS, UA: ABNORMAL /HPF
GLUCOSE URINE: NEGATIVE MG/DL
KETONES, URINE: NEGATIVE
LEUKOCYTE ESTERASE, URINE: NEGATIVE
MISCELLANEOUS 2: ABNORMAL
NITRITE, URINE: NEGATIVE
PH UA: 6 (ref 5–9)
PROTEIN UA: NEGATIVE
RBC URINE: ABNORMAL /HPF
RENAL EPITHELIAL, UA: ABNORMAL
SPECIFIC GRAVITY, URINE: 1.02 (ref 1–1.03)
UROBILINOGEN, URINE: 0.2 EU/DL (ref 0–1)
WBC UA: ABNORMAL /HPF
YEAST: ABNORMAL

## 2019-05-03 PROCEDURE — 99283 EMERGENCY DEPT VISIT LOW MDM: CPT

## 2019-05-03 ASSESSMENT — ENCOUNTER SYMPTOMS
DIARRHEA: 0
EYE DISCHARGE: 0
CHEST TIGHTNESS: 1
COUGH: 0
NAUSEA: 1
RHINORRHEA: 0
ABDOMINAL PAIN: 0
SHORTNESS OF BREATH: 0
VOMITING: 0
WHEEZING: 0
SORE THROAT: 0
EYE PAIN: 0
BACK PAIN: 0

## 2019-05-03 ASSESSMENT — PATIENT HEALTH QUESTIONNAIRE - PHQ9
SUM OF ALL RESPONSES TO PHQ QUESTIONS 1-9: 18
SUM OF ALL RESPONSES TO PHQ QUESTIONS 1-9: 18

## 2019-05-03 ASSESSMENT — SLEEP AND FATIGUE QUESTIONNAIRES
DIFFICULTY STAYING ASLEEP: YES
DO YOU HAVE DIFFICULTY SLEEPING: YES
AVERAGE NUMBER OF SLEEP HOURS: 4
AVERAGE NUMBER OF SLEEP HOURS: 5
DIFFICULTY ARISING: YES
DIFFICULTY FALLING ASLEEP: NO
DO YOU USE A SLEEP AID: YES
SLEEP PATTERN: NIGHTMARES/TERRORS
RESTFUL SLEEP: NO

## 2019-05-03 ASSESSMENT — LIFESTYLE VARIABLES: HISTORY_ALCOHOL_USE: NO

## 2019-05-03 ASSESSMENT — PAIN SCALES - GENERAL: PAINLEVEL_OUTOF10: 7

## 2019-05-03 ASSESSMENT — PAIN DESCRIPTION - PAIN TYPE: TYPE: CHRONIC PAIN

## 2019-05-03 ASSESSMENT — PAIN DESCRIPTION - LOCATION: LOCATION: BACK

## 2019-05-03 NOTE — ED PROVIDER NOTES
[facility]     eMERGENCY dEPARTMENT eNCOUnter         Pt Name: Gabi Dominguez  MRN: [id number]  [place] 1980  Date of evaluation: 5/2/2019  Provider: Gretchen Bowen       Chief Complaint   Patient presents with   [facility]       Nurses Notes reviewed and I agree except as noted in the HPI. HISTORY OF PRESENT ILLNESS    Gabi Dominguez is a 45 y.o. female who presents to the Emergency Department  [facility] where she was being evaluated for psychological disturbances. Patient was being assessed by staff there and then tried to leave their facility and was detained by [employer] and transported to [address] [place] [address]. According to the [facility] that was placed by D the patient was stating that she wanted to die en route, but during initial assessment the patient denies any suicidal ideation or plan. Low Risk Level A paged by charge RN. Patient stating that she just cries all the time and stated that she wanted her daughter. Patient denies any alcohol use but states that for some reason her urine was positive for cocaine and meth the last time it was tested but that she denies any use other than marijuana. This HPI was provided by the patient. REVIEW OF SYSTEMS     Review of Systems   Unable to perform ROS: Psychiatric disorder         PAST MEDICAL HISTORY    has a past medical history of Anxiety, Back pain, Colitis, Depression, Fibromyalgia, HPV (human papilloma virus) anogenital infection, and OCD (obsessive compulsive disorder). SURGICAL HISTORY      has a past surgical history that includes Cholecystectomy and Inner ear surgery.     CURRENT MEDICATIONS       Previous Medications    DULOXETINE (CYMBALTA) 60 MG EXTENDED RELEASE CAPSULE    Take 1 capsule by mouth every morning    HYDROXYZINE (ATARAX) 50 MG TABLET    Take 1 tablet by mouth 3 times daily as needed for Itching or Anxiety    LAMOTRIGINE (LAMICTAL) 100 MG TABLET    Take 1 tablet by mouth daily for 15 days    RISPERIDONE (RISPERDAL) 1 MG TABLET    Take 1 tablet by mouth nightly    TRAZODONE (DESYREL) 50 MG TABLET    Take 1 tablet by mouth nightly as needed for Sleep       ALLERGIES     has No Known Allergies. FAMILY HISTORY     indicated that her mother is alive. She indicated that her father is alive. She indicated that the status of her paternal aunt is unknown.   family history includes Cancer in her paternal aunt; Diabetes in her father and mother; Heart Disease in her father and mother. SOCIAL HISTORY      reports that she has been smoking cigarettes. She has been smoking about 0.50 packs per day. She has never used smokeless tobacco. She reports that she has current or past drug history. Drug: Marijuana. She reports that she does not drink alcohol. PHYSICAL EXAM       ED Triage Vitals [05/02/19 2319]   BP Temp Temp Source Pulse Resp SpO2 Height Weight   121/72 98.8 °F (37.1 °C) Oral 88 18 94 % -- 140 lb (63.5 kg)      Physical Exam   Constitutional: She is oriented to person, place, and time. She appears well-developed and well-nourished. Non-toxic appearance. HENT:   Head: Normocephalic and atraumatic. Right Ear: Tympanic membrane and external ear normal.   Left Ear: Tympanic membrane and external ear normal.   Nose: Nose normal.   Mouth/Throat: Oropharynx is clear and moist and mucous membranes are normal. No oropharyngeal exudate, posterior oropharyngeal edema or posterior oropharyngeal erythema. Eyes: Conjunctivae and EOM are normal.   Neck: Normal range of motion. Neck supple. No JVD present. Cardiovascular: Normal rate, regular rhythm, normal heart sounds, intact distal pulses and normal pulses. Exam reveals no gallop and no friction rub. No murmur heard. Pulmonary/Chest: Effort normal and breath sounds normal. No respiratory distress. She has no decreased breath sounds. She has no wheezes. She has no rhonchi. She has no rales. Abdominal: Soft.  Bowel sounds are normal. She exhibits no distension. There is no tenderness. There is no rebound, no guarding and no CVA tenderness. Musculoskeletal: Normal range of motion. She exhibits no edema. Neurological: She is alert and oriented to person, place, and time. She exhibits normal muscle tone. Coordination normal.   Skin: Skin is warm and dry. No rash noted. She is not diaphoretic. Psychiatric: Her speech is normal. Her affect is blunt and inappropriate. She is slowed. She expresses inappropriate judgment. She expresses no suicidal ideation. She expresses no suicidal plans. Nursing note and vitals reviewed.     DIFFERENTIAL DIAGNOSIS:   Depression, anxiety, suicidal ideation, psychosis    DIAGNOSTIC RESULTS     LABS:   Results for orders placed or performed during the hospital encounter of 05/02/19   HCG Qualitative, Serum   Result Value Ref Range    Preg, Serum NEGATIVE NEGATIVE   Ethanol   Result Value Ref Range    ETHYL ALCOHOL, SERUM < 0.01 0.00 %   Urine Drug Screen   Result Value Ref Range    AMPHETAMINE+METHAMPHETAMINE URINE SCREEN Negative NEGATIVE    Barbiturate Quant, Ur Negative NEGATIVE    Benzodiazepine Quant, Ur Negative NEGATIVE    Cannabinoid Quant, Ur Negative NEGATIVE    Cocaine Metab Quant, Ur Negative NEGATIVE    Opiates, Urine Negative NEGATIVE    Oxycodone Negative NEGATIVE    PCP Quant, Ur Negative NEGATIVE   CBC Auto Differential   Result Value Ref Range    WBC 6.8 4.8 - 10.8 thou/mm3    RBC 4.12 (L) 4.20 - 5.40 mill/mm3    Hemoglobin 13.3 12.0 - 16.0 gm/dl    Hematocrit 38.3 37.0 - 47.0 %    MCV 93.0 81.0 - 99.0 fL    MCH 32.3 26.0 - 33.0 pg    MCHC 34.7 32.2 - 35.5 gm/dl    RDW-CV 12.0 11.5 - 14.5 %    RDW-SD 41.0 35.0 - 45.0 fL    Platelets 023 609 - 409 thou/mm3    MPV 8.6 (L) 9.4 - 12.4 fL    Seg Neutrophils 36.7 %    Lymphocytes 49.6 %    Monocytes 9.3 %    Eosinophils 3.4 %    Basophils 0.9 %    Immature Granulocytes 0.1 %    Segs Absolute 2.5 1.8 - 7.7 thou/mm3    Lymphocytes # 3.4 1.0 -

## 2019-05-03 NOTE — ED NOTES
Patient presents to the ED from [facility] where she was being evaluated for psychological disturbances. Patient was being assessed by staff there and then tried to leave their facility and was detained by [employer] and transported to [address] [place] [address]. According to the [facility] that was placed by [employer] the patient was stating that she wanted to die en route, but during initial assessment the patient denies any suicidal ideation or plan. Low Risk Level A paged by charge RN. Patient stating that she just cries all the time and stated that she wanted her daughter. Patient denies any alcohol use but states that for some reason her urine was positive for cocaine and meth the last time it was tested but that she denies any use other than marijuana. Patient placed in safe room that is ligature resistant with continuous monitoring in place. Provider notified, requested an assessment by behavioral health . Patient belongings secured in a locked lockers outside of the room. Explained suicide prevention precautions to the patient.          Gwen Buck RN  05/02/19 [id number]

## 2019-05-03 NOTE — ED NOTES
Patient to be transported to [facility] in the morning at 0830. Charge Nurse Jim made aware of situation.       Vira Thayer RN  05/03/19 8656
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
## 2019-05-04 NOTE — ED NOTES
Pt crying on cot and repeating \"I don't want to die\", \"I'm so mean\".       Cm Speaker, RN  05/03/19 8701

## 2019-05-04 NOTE — ED PROVIDER NOTES
[facility] Emergency Department      CHIEF COMPLAINT       Chief Complaint   Patient presents with    Depression       Nurses Notes reviewed and I agree except as noted in the HPI. HISTORY OF PRESENT ILLNESS    Sandi George is a 45 y.o. female who presents today with multiple complaints. The patient has a history of bipolar disorder, schizoaffective disorder, depression, anxiety, OCD, HPV, colitis, chronic back pain, and fibromyalgia. Patient was started on Risperdal 2 weeks ago and her Lamictal dosage was also increased at that time. Since then, she reports symptoms including headache, lightheadedness, and nausea. Patient fears she is going to die. She denies suicidal ideation. She thinks her medications are laced. Patient reports a \"copper\" taste in her mouth for the past 3 days. She reports intermittent chest tightness over the past few days. She denies pain, shortness of breath, or cough. Patient is concerned she has breast cancer stating she appreciated a lump in her breast this evening. She also reports concern of esophageal cancer with her reason being \"I smoke, I drink, then I smoke, then I lie, then I was a slut\". Patient was here at [facility] yesterday with concern she was going to die. Patient is tearful and anxious at this time. I appreciate triage note but in a detailed HPI patient denies hemoptysis, hematemesis, or blood in her mouth. REVIEW OF SYSTEMS     Review of Systems   Constitutional: Negative for appetite change, chills, fatigue and fever. HENT: Negative for congestion, ear pain, rhinorrhea and sore throat. Eyes: Negative for pain, discharge and visual disturbance. Respiratory: Positive for chest tightness. Negative for cough, shortness of breath and wheezing. Cardiovascular: Negative for chest pain, palpitations and leg swelling. Gastrointestinal: Positive for nausea. Negative for abdominal pain, diarrhea and vomiting.    Genitourinary: Negative for difficulty urinating, dysuria, hematuria and vaginal discharge. Musculoskeletal: Negative for arthralgias, back pain, joint swelling and neck pain. Skin: Negative for pallor and rash. Neurological: Positive for light-headedness and headaches. Negative for dizziness, syncope, weakness and numbness. Hematological: Negative for adenopathy. Psychiatric/Behavioral: Negative for confusion and suicidal ideas. The patient is nervous/anxious. PAST MEDICAL HISTORY    has a past medical history of Anxiety, Back pain, Colitis, Depression, Fibromyalgia, HPV (human papilloma virus) anogenital infection, and OCD (obsessive compulsive disorder). SURGICAL HISTORY      has a past surgical history that includes Cholecystectomy and Inner ear surgery. CURRENT MEDICATIONS       Discharge Medication List as of 5/4/2019 12:04 AM      CONTINUE these medications which have NOT CHANGED    Details   hydrOXYzine (ATARAX) 50 MG tablet Take 1 tablet by mouth 3 times daily as needed for Itching or Anxiety, Disp-45 tablet, R-0Normal      DULoxetine (CYMBALTA) 60 MG extended release capsule Take 1 capsule by mouth every morning, Disp-30 capsule, R-0Normal      traZODone (DESYREL) 50 MG tablet Take 1 tablet by mouth nightly as needed for Sleep, Disp-30 tablet, R-0Normal      risperiDONE (RISPERDAL) 1 MG tablet Take 1 tablet by mouth nightly, Disp-30 tablet, R-0Normal      lamoTRIgine (LAMICTAL) 100 MG tablet Take 1 tablet by mouth daily for 15 days, Disp-15 tablet, R-0Normal                  has No Known Allergies. FAMILY HISTORY     indicated that her mother is alive. She indicated that her father is alive. She indicated that the status of her paternal aunt is unknown.   family history includes Cancer in her paternal aunt; Diabetes in her father and mother; Heart Disease in her father and mother. SOCIAL HISTORY      reports that she has been smoking cigarettes. She has been smoking about 0.50 packs per day.  She has never used smokeless tobacco. She reports that she has current or past drug history. Drug: Marijuana. She reports that she does not drink alcohol. PHYSICAL EXAM     INITIAL VITALS:  height is 5' 3\" (1.6 m) and weight is 146 lb (66.2 kg). Her oral temperature is 98.1 °F (36.7 °C). Her blood pressure is 127/83 and her pulse is 69. Her respiration is 18 and oxygen saturation is 94%. Physical Exam   Constitutional: She is oriented to person, place, and time. Vital signs are normal. She appears well-developed and well-nourished. Non-toxic appearance. No distress. HENT:   Head: Normocephalic and atraumatic. Right Ear: Hearing normal.   Left Ear: Hearing normal.   Nose: Nose normal. No rhinorrhea. Mouth/Throat: Uvula is midline, oropharynx is clear and moist and mucous membranes are normal. No oropharyngeal exudate. Eyes: Pupils are equal, round, and reactive to light. Conjunctivae, EOM and lids are normal. No scleral icterus. Neck: Normal range of motion. Neck supple. No neck rigidity. No tracheal deviation present. Cardiovascular: Normal rate, regular rhythm and normal heart sounds. No murmur heard. Pulmonary/Chest: Effort normal and breath sounds normal. No stridor. No respiratory distress. She has no decreased breath sounds. She has no wheezes. Abdominal: Soft. She exhibits no distension. There is no tenderness. There is no rigidity. Musculoskeletal: Normal range of motion. She exhibits no edema. Movement normal as observed   Lymphadenopathy:     She has no cervical adenopathy. Neurological: She is alert and oriented to person, place, and time. She has normal strength. Gait normal.   No gross deficits observed   Skin: Skin is warm, dry and intact. No rash noted. She is not diaphoretic. No pallor. Psychiatric: Her mood appears anxious. She expresses no suicidal ideation. Nursing note and vitals reviewed.       DIFFERENTIAL DIAGNOSIS:   Including but not limited to Depression, Anxiety, Schizoaffective disorder    DIAGNOSTIC RESULTS     EKG: All EKG's are interpreted by the Emergency Department Physician who either signs or Co-signs this chart in the absence of a cardiologist.    RADIOLOGY: non-plain film images(s) such as CT, Ultrasound andMRI are read by the radiologist.  Plain radiographic images are visualized and preliminarily interpreted by the emergency physician unless otherwise stated below. No orders to display       LABS:   Labs Reviewed - No data to display    EMERGENCY DEPARTMENT COURSE:   Vitals:    Vitals:    05/03/19 2156 05/03/19 2220 05/03/19 2317   BP: 134/79 (!) 131/90 127/83   Pulse: 79 75 69   Resp: 16 15 18   Temp: 98.1 °F (36.7 °C)     TempSrc: Oral     SpO2: 100% 100% 94%   Weight: 146 lb (66.2 kg)     Height: 5' 3\" (1.6 m)       2350 I spoke with Allen with [facility] who consulted with Dr. Eden Lainez. Patient does not meet criteria for admission. Clinician is attempting to get in contact with patient's boyfriend and plan is for her to be discharged home     Patient presents today with extreme anxiety. Old records were reviewed. Patient has a psychiatric history. She reports with paranoia stating that her medications are laced and she thinks she is going to die. Patient was in here on 05/02 as well for the same thing. I spoke with [facility] and they consulted Dr. Eden Lainez who states patient does not meet criteria for admission and is to follow up with [facility]. [facility] clinician got in contact with patient's boyfriend and she will be discharged home with plan to report there. The patient has remained stable in the ER with good vital signs, neurovascularly intact, and no distress evident. Patient has demonstrated a steady, independent gait. I have given the patient strict written and verbal instructions about care at home, follow-up, and signs and symptoms of worsening of condition and they did verbalize understanding.     CRITICAL CARE: None    CONSULTS:  [facility]    PROCEDURES:  None    FINALIMPRESSION      1. Depression with anxiety    2. Paranoid delusion [facility])          DISPOSITION/PLAN   Discharge     PATIENT REFERRED TO:  [facility]  [address] [facility] [address]  [phone]    Schedule an appointment as soon as possible for a visit         DISCHARGE MEDICATIONS:  Discharge Medication List as of 5/4/2019 12:04 AM          (Please note that portions of this note were completed with a voice recognition program.  Efforts were made to edit the dictations but occasionally words are mis-transcribed.)    Scribe:by: Ashok Garg, 5/3/19 11:02 PM Scribing for and in the presence of Isaiah Sewell PA-C. Provider:  I personally performed the services described in the documentation, reviewed and edited the documentation which was dictated to the scribe in my presence, and it accurately records my words and actions.     Isaiah Sewell PA-C 5/3/19 2:26 PM        MALU Cabezas, [place]  05/09/19 8425

## 2019-05-04 NOTE — PROGRESS NOTES
Provisional Diagnosis:    depression    Risk, Psychosocial and Contextual Factors:  Homeless and recent medication changes     Current  Treatment:   [facility]     Present Suicidal Behavior:      Verbal:  Denies         Attempt: Denies     Access to Weapons:  Denies     Current Suicide Risk: Low, Moderate or High:    Low     Past Suicidal Behavior:       Verbal:denies     Attempt:yes     Self-Injurious/Self-Mutilation: hasn't cut since she was a teen     Traumatic Event Within Past 2 Weeks:   Being homeless    Current Abuse: denies     Legal: none     Violence: none     Protective Factors:  Has an ex boyfriend in [facility], linked to [address]:    Homeless     Clinical Summary:      Her mental health continues to decompensate. She is experiencing persecutory delusions. Stating that  [address] are poisoning her with medications that will give her a heart attack. She keeps stating, ''I want to die while sobbing and rolling on the floor. Patient reports she is emotionally overwhelmed with her ex boyfriend 'cheating' on her. Patient reports she has a [de-identified] year old daughter that is not in her custody. Patient had been residing at [address] [place] [address] prior to presenting to the [place] [address]. The [facility] is not willing to take her due to her using a lot of racial slurs while there and causing disruptions. Patient denies any current thought or plan to harm self or others. Patient denies any current hallucinations. Patient is feeling like the medications are making her sick. She has nightmares and thinks the medications is causing the nightmares. She was tearful one minute and calm the  Next. She has not self harmed since she was a teenager. She was orientated x4. Patient would like to go to [facility] to stay with her ex boyfriend. She gave clinician juan francisco his number for follow-up.   She is currently prescribed hydroxyzine, duloxetine, Lamictal, risperdone and trazadone      Level of Care Disposition:      Consulted with medical provider Farrah Ngo and  Patient is medically cleared   Consulted with Dr. Camilo Dejesus and patient can follow up with [facility]   00:05 spoke to boyfriend and patient can be discharged to his house.   Informed patient and she voiced understanding   Informed charge nurse and transportation will be set up

## 2019-05-04 NOTE — PROGRESS NOTES
Patient gave clinician juan francisco a phone number for [facility] [phone]. I attempted to call this number and it was busy.

## 2019-05-04 NOTE — ED TRIAGE NOTES
Pt presents ambulatory to ER with complaints of sadness/depression and feeling tired. Pt states red colored sputum over past several days/weeks. Pt states she was \"kicked out\" of [facility] today because she \"wouldn't stop crying. \" Pt denies suicidal ideations but states she has had wishes to go to sleep and not wake up. Pt denies plan.

## 2019-05-04 NOTE — ED NOTES
Patient states she does not feel comfortable at home and feels like people don't like her where she lives. She also states \"I feel like they have drugged me up\" and she is referring to the prescriber of the medications. ROBERTO Woo RN  05/03/19 1020

## 2019-12-03 ENCOUNTER — HOSPITAL ENCOUNTER (INPATIENT)
Age: 39
LOS: 6 days | Discharge: HOME | DRG: 885 | End: 2019-12-09
Attending: PSYCHIATRY & NEUROLOGY | Admitting: PSYCHIATRY & NEUROLOGY
Payer: MEDICARE

## 2019-12-03 DIAGNOSIS — N39.0 URINARY TRACT INFECTION WITH HEMATURIA, SITE UNSPECIFIED: ICD-10-CM

## 2019-12-03 DIAGNOSIS — R31.9 URINARY TRACT INFECTION WITH HEMATURIA, SITE UNSPECIFIED: ICD-10-CM

## 2019-12-03 DIAGNOSIS — F25.9 SCHIZOAFFECTIVE DISORDER, UNSPECIFIED TYPE (HCC): Primary | ICD-10-CM

## 2019-12-03 DIAGNOSIS — R45.851 DEPRESSION WITH SUICIDAL IDEATION: ICD-10-CM

## 2019-12-03 DIAGNOSIS — F32.A DEPRESSION WITH SUICIDAL IDEATION: ICD-10-CM

## 2019-12-03 LAB
ACETAMINOPHEN LEVEL: < 5 UG/ML (ref 0–20)
ALBUMIN SERPL-MCNC: 4.1 G/DL (ref 3.5–5.1)
ALP BLD-CCNC: 63 U/L (ref 38–126)
ALT SERPL-CCNC: 16 U/L (ref 11–66)
AMPHETAMINE+METHAMPHETAMINE URINE SCREEN: NEGATIVE
ANION GAP SERPL CALCULATED.3IONS-SCNC: 14 MEQ/L (ref 8–16)
AST SERPL-CCNC: 29 U/L (ref 5–40)
BACTERIA: ABNORMAL /HPF
BARBITURATE QUANTITATIVE URINE: NEGATIVE
BASOPHILS # BLD: 0.4 %
BASOPHILS ABSOLUTE: 0.1 THOU/MM3 (ref 0–0.1)
BENZODIAZEPINE QUANTITATIVE URINE: NEGATIVE
BILIRUB SERPL-MCNC: 0.8 MG/DL (ref 0.3–1.2)
BILIRUBIN URINE: NEGATIVE
BLOOD, URINE: ABNORMAL
BUN BLDV-MCNC: 12 MG/DL (ref 7–22)
CALCIUM SERPL-MCNC: 9.4 MG/DL (ref 8.5–10.5)
CANNABINOID QUANTITATIVE URINE: POSITIVE
CASTS 2: ABNORMAL /LPF
CASTS UA: ABNORMAL /LPF
CHARACTER, URINE: CLEAR
CHLORIDE BLD-SCNC: 103 MEQ/L (ref 98–111)
CO2: 20 MEQ/L (ref 23–33)
COCAINE METABOLITE QUANTITATIVE URINE: POSITIVE
COLOR: YELLOW
CREAT SERPL-MCNC: 0.8 MG/DL (ref 0.4–1.2)
CRYSTALS, UA: ABNORMAL
EKG ATRIAL RATE: 119 BPM
EKG P AXIS: 40 DEGREES
EKG P-R INTERVAL: 134 MS
EKG Q-T INTERVAL: 346 MS
EKG QRS DURATION: 82 MS
EKG QTC CALCULATION (BAZETT): 486 MS
EKG R AXIS: 69 DEGREES
EKG T AXIS: 48 DEGREES
EKG VENTRICULAR RATE: 119 BPM
EOSINOPHIL # BLD: 2.2 %
EOSINOPHILS ABSOLUTE: 0.3 THOU/MM3 (ref 0–0.4)
EPITHELIAL CELLS, UA: ABNORMAL /HPF
ERYTHROCYTE [DISTWIDTH] IN BLOOD BY AUTOMATED COUNT: 12.5 % (ref 11.5–14.5)
ERYTHROCYTE [DISTWIDTH] IN BLOOD BY AUTOMATED COUNT: 44.4 FL (ref 35–45)
ETHYL ALCOHOL, SERUM: < 0.01 %
GFR SERPL CREATININE-BSD FRML MDRD: 80 ML/MIN/1.73M2
GLUCOSE BLD-MCNC: 86 MG/DL (ref 70–108)
GLUCOSE URINE: NEGATIVE MG/DL
HCT VFR BLD CALC: 45.7 % (ref 37–47)
HEMOGLOBIN: 15.3 GM/DL (ref 12–16)
IMMATURE GRANS (ABS): 0.06 THOU/MM3 (ref 0–0.07)
IMMATURE GRANULOCYTES: 0.4 %
KETONES, URINE: ABNORMAL
LEUKOCYTE ESTERASE, URINE: ABNORMAL
LYMPHOCYTES # BLD: 14.9 %
LYMPHOCYTES ABSOLUTE: 2.4 THOU/MM3 (ref 1–4.8)
MCH RBC QN AUTO: 32.4 PG (ref 26–33)
MCHC RBC AUTO-ENTMCNC: 33.5 GM/DL (ref 32.2–35.5)
MCV RBC AUTO: 96.8 FL (ref 81–99)
MISCELLANEOUS 2: ABNORMAL
MONOCYTES # BLD: 6.1 %
MONOCYTES ABSOLUTE: 1 THOU/MM3 (ref 0.4–1.3)
NITRITE, URINE: NEGATIVE
NUCLEATED RED BLOOD CELLS: 0 /100 WBC
OPIATES, URINE: NEGATIVE
OSMOLALITY CALCULATION: 272.9 MOSMOL/KG (ref 275–300)
OXYCODONE: NEGATIVE
PH UA: 6 (ref 5–9)
PHENCYCLIDINE QUANTITATIVE URINE: NEGATIVE
PLATELET # BLD: 238 THOU/MM3 (ref 130–400)
PMV BLD AUTO: 9.2 FL (ref 9.4–12.4)
POTASSIUM SERPL-SCNC: 3.6 MEQ/L (ref 3.5–5.2)
PREGNANCY, SERUM: NEGATIVE
PROTEIN UA: NEGATIVE
RBC # BLD: 4.72 MILL/MM3 (ref 4.2–5.4)
RBC URINE: ABNORMAL /HPF
RENAL EPITHELIAL, UA: ABNORMAL
SALICYLATE, SERUM: 2.6 MG/DL (ref 2–10)
SEG NEUTROPHILS: 76 %
SEGMENTED NEUTROPHILS ABSOLUTE COUNT: 12 THOU/MM3 (ref 1.8–7.7)
SODIUM BLD-SCNC: 137 MEQ/L (ref 135–145)
SPECIFIC GRAVITY, URINE: 1.02 (ref 1–1.03)
TOTAL PROTEIN: 7.5 G/DL (ref 6.1–8)
TSH SERPL DL<=0.05 MIU/L-ACNC: 3.83 UIU/ML (ref 0.4–4.2)
UROBILINOGEN, URINE: 1 EU/DL (ref 0–1)
WBC # BLD: 15.8 THOU/MM3 (ref 4.8–10.8)
WBC UA: ABNORMAL /HPF
YEAST: ABNORMAL

## 2019-12-03 PROCEDURE — 81001 URINALYSIS AUTO W/SCOPE: CPT

## 2019-12-03 PROCEDURE — 36415 COLL VENOUS BLD VENIPUNCTURE: CPT

## 2019-12-03 PROCEDURE — 1240000000 HC EMOTIONAL WELLNESS R&B

## 2019-12-03 PROCEDURE — 6370000000 HC RX 637 (ALT 250 FOR IP): Performed by: EMERGENCY MEDICINE

## 2019-12-03 PROCEDURE — 84703 CHORIONIC GONADOTROPIN ASSAY: CPT

## 2019-12-03 PROCEDURE — 93005 ELECTROCARDIOGRAM TRACING: CPT | Performed by: EMERGENCY MEDICINE

## 2019-12-03 PROCEDURE — 80307 DRUG TEST PRSMV CHEM ANLYZR: CPT

## 2019-12-03 PROCEDURE — G0480 DRUG TEST DEF 1-7 CLASSES: HCPCS

## 2019-12-03 PROCEDURE — 87086 URINE CULTURE/COLONY COUNT: CPT

## 2019-12-03 PROCEDURE — 84443 ASSAY THYROID STIM HORMONE: CPT

## 2019-12-03 PROCEDURE — 99285 EMERGENCY DEPT VISIT HI MDM: CPT

## 2019-12-03 PROCEDURE — 80053 COMPREHEN METABOLIC PANEL: CPT

## 2019-12-03 PROCEDURE — 85025 COMPLETE CBC W/AUTO DIFF WBC: CPT

## 2019-12-03 RX ORDER — LORAZEPAM 1 MG/1
1 TABLET ORAL ONCE
Status: COMPLETED | OUTPATIENT
Start: 2019-12-03 | End: 2019-12-03

## 2019-12-03 RX ADMIN — LORAZEPAM 1 MG: 1 TABLET ORAL at 22:48

## 2019-12-03 ASSESSMENT — LIFESTYLE VARIABLES: HISTORY_ALCOHOL_USE: YES

## 2019-12-04 PROBLEM — F10.20 ALCOHOL USE DISORDER, SEVERE, DEPENDENCE (HCC): Chronic | Status: ACTIVE | Noted: 2019-12-04

## 2019-12-04 PROBLEM — F31.9 BIPOLAR I DISORDER WITH MIXED FEATURES (HCC): Status: ACTIVE | Noted: 2019-12-04

## 2019-12-04 PROBLEM — F15.21: Status: ACTIVE | Noted: 2019-12-04

## 2019-12-04 PROBLEM — F31.63 SEVERE MIXED BIPOLAR 1 DISORDER WITHOUT PSYCHOSIS (HCC): Chronic | Status: ACTIVE | Noted: 2019-12-04

## 2019-12-04 LAB
ORGANISM: ABNORMAL
URINE CULTURE REFLEX: ABNORMAL

## 2019-12-04 PROCEDURE — 93010 ELECTROCARDIOGRAM REPORT: CPT | Performed by: INTERNAL MEDICINE

## 2019-12-04 PROCEDURE — 6370000000 HC RX 637 (ALT 250 FOR IP): Performed by: PSYCHIATRY & NEUROLOGY

## 2019-12-04 PROCEDURE — 6370000000 HC RX 637 (ALT 250 FOR IP): Performed by: EMERGENCY MEDICINE

## 2019-12-04 PROCEDURE — 1240000000 HC EMOTIONAL WELLNESS R&B

## 2019-12-04 RX ORDER — ACETAMINOPHEN 325 MG/1
650 TABLET ORAL EVERY 4 HOURS PRN
Status: DISCONTINUED | OUTPATIENT
Start: 2019-12-04 | End: 2019-12-09 | Stop reason: HOSPADM

## 2019-12-04 RX ORDER — TRAZODONE HYDROCHLORIDE 50 MG/1
50 TABLET ORAL NIGHTLY PRN
Status: DISCONTINUED | OUTPATIENT
Start: 2019-12-04 | End: 2019-12-08

## 2019-12-04 RX ORDER — NICOTINE 21 MG/24HR
1 PATCH, TRANSDERMAL 24 HOURS TRANSDERMAL DAILY
Status: DISCONTINUED | OUTPATIENT
Start: 2019-12-04 | End: 2019-12-04

## 2019-12-04 RX ORDER — IBUPROFEN 400 MG/1
400 TABLET ORAL EVERY 6 HOURS PRN
Status: DISCONTINUED | OUTPATIENT
Start: 2019-12-04 | End: 2019-12-09 | Stop reason: HOSPADM

## 2019-12-04 RX ORDER — MAGNESIUM HYDROXIDE/ALUMINUM HYDROXICE/SIMETHICONE 120; 1200; 1200 MG/30ML; MG/30ML; MG/30ML
30 SUSPENSION ORAL EVERY 6 HOURS PRN
Status: DISCONTINUED | OUTPATIENT
Start: 2019-12-04 | End: 2019-12-09 | Stop reason: HOSPADM

## 2019-12-04 RX ORDER — NITROFURANTOIN 25; 75 MG/1; MG/1
100 CAPSULE ORAL EVERY 12 HOURS SCHEDULED
Status: COMPLETED | OUTPATIENT
Start: 2019-12-04 | End: 2019-12-05

## 2019-12-04 RX ORDER — FLUOXETINE HYDROCHLORIDE 20 MG/1
40 CAPSULE ORAL DAILY
Status: ON HOLD | COMMUNITY
End: 2019-12-09 | Stop reason: HOSPADM

## 2019-12-04 RX ORDER — ARIPIPRAZOLE 2 MG/1
2 TABLET ORAL NIGHTLY
Status: ON HOLD | COMMUNITY
End: 2019-12-09 | Stop reason: HOSPADM

## 2019-12-04 RX ORDER — IBUPROFEN 400 MG/1
400 TABLET ORAL EVERY 6 HOURS PRN
Status: DISCONTINUED | OUTPATIENT
Start: 2019-12-04 | End: 2019-12-04

## 2019-12-04 RX ORDER — HYDROXYZINE HYDROCHLORIDE 25 MG/1
50 TABLET, FILM COATED ORAL 3 TIMES DAILY PRN
Status: DISCONTINUED | OUTPATIENT
Start: 2019-12-04 | End: 2019-12-04 | Stop reason: CLARIF

## 2019-12-04 RX ORDER — DULOXETIN HYDROCHLORIDE 60 MG/1
60 CAPSULE, DELAYED RELEASE ORAL EVERY MORNING
Status: DISCONTINUED | OUTPATIENT
Start: 2019-12-04 | End: 2019-12-09 | Stop reason: HOSPADM

## 2019-12-04 RX ORDER — HYDROXYZINE HYDROCHLORIDE 25 MG/1
50 TABLET, FILM COATED ORAL 3 TIMES DAILY PRN
Status: DISCONTINUED | OUTPATIENT
Start: 2019-12-04 | End: 2019-12-09 | Stop reason: HOSPADM

## 2019-12-04 RX ORDER — ZIPRASIDONE HYDROCHLORIDE 20 MG/1
20 CAPSULE ORAL 2 TIMES DAILY WITH MEALS
Status: DISCONTINUED | OUTPATIENT
Start: 2019-12-04 | End: 2019-12-09 | Stop reason: HOSPADM

## 2019-12-04 RX ORDER — HYDROXYZINE HYDROCHLORIDE 25 MG/1
50 TABLET, FILM COATED ORAL 3 TIMES DAILY PRN
Status: DISCONTINUED | OUTPATIENT
Start: 2019-12-04 | End: 2019-12-04

## 2019-12-04 RX ADMIN — NITROFURANTOIN MONOHYDRATE/MACROCRYSTALLINE 100 MG: 25; 75 CAPSULE ORAL at 21:50

## 2019-12-04 RX ADMIN — TRAZODONE HYDROCHLORIDE 50 MG: 50 TABLET ORAL at 01:21

## 2019-12-04 RX ADMIN — ZIPRASIDONE HCL 20 MG: 20 CAPSULE ORAL at 10:24

## 2019-12-04 RX ADMIN — IBUPROFEN 400 MG: 400 TABLET, FILM COATED ORAL at 01:21

## 2019-12-04 RX ADMIN — NITROFURANTOIN MONOHYDRATE/MACROCRYSTALLINE 100 MG: 25; 75 CAPSULE ORAL at 13:00

## 2019-12-04 RX ADMIN — DULOXETINE HYDROCHLORIDE 60 MG: 60 CAPSULE, DELAYED RELEASE ORAL at 10:24

## 2019-12-04 RX ADMIN — ZIPRASIDONE HCL 20 MG: 20 CAPSULE ORAL at 17:27

## 2019-12-04 ASSESSMENT — SLEEP AND FATIGUE QUESTIONNAIRES
DO YOU HAVE DIFFICULTY SLEEPING: YES
AVERAGE NUMBER OF SLEEP HOURS: 4
DIFFICULTY STAYING ASLEEP: YES
RESTFUL SLEEP: NO
SLEEP PATTERN: DISTURBED/INTERRUPTED SLEEP
DO YOU USE A SLEEP AID: NO
DO YOU HAVE DIFFICULTY SLEEPING: YES
SLEEP PATTERN: DISTURBED/INTERRUPTED SLEEP
AVERAGE NUMBER OF SLEEP HOURS: 4
DIFFICULTY ARISING: NO
DIFFICULTY ARISING: NO
DO YOU USE A SLEEP AID: NO
DIFFICULTY FALLING ASLEEP: NO
RESTFUL SLEEP: NO
DIFFICULTY STAYING ASLEEP: YES
DIFFICULTY FALLING ASLEEP: NO

## 2019-12-04 ASSESSMENT — PAIN DESCRIPTION - DIRECTION: RADIATING_TOWARDS: BILATERAL ARMS

## 2019-12-04 ASSESSMENT — PAIN DESCRIPTION - ONSET
ONSET: ON-GOING
ONSET_2: ON-GOING

## 2019-12-04 ASSESSMENT — ENCOUNTER SYMPTOMS
SHORTNESS OF BREATH: 0
ABDOMINAL PAIN: 0
COUGH: 0
ABDOMINAL DISTENTION: 0
NAUSEA: 0
BACK PAIN: 0
VOMITING: 0

## 2019-12-04 ASSESSMENT — PAIN DESCRIPTION - LOCATION
LOCATION_2: BACK
LOCATION: SHOULDER

## 2019-12-04 ASSESSMENT — PAIN SCALES - GENERAL
PAINLEVEL_OUTOF10: 7
PAINLEVEL_OUTOF10: 0

## 2019-12-04 ASSESSMENT — PAIN DESCRIPTION - DESCRIPTORS
DESCRIPTORS: ACHING;DISCOMFORT
DESCRIPTORS_2: ACHING;DISCOMFORT

## 2019-12-04 ASSESSMENT — PAIN DESCRIPTION - PROGRESSION
CLINICAL_PROGRESSION: NOT CHANGED
CLINICAL_PROGRESSION_2: NOT CHANGED

## 2019-12-04 ASSESSMENT — PAIN DESCRIPTION - ORIENTATION
ORIENTATION_2: LOWER
ORIENTATION: RIGHT;LEFT

## 2019-12-04 ASSESSMENT — PAIN DESCRIPTION - DURATION: DURATION_2: CONTINUOUS

## 2019-12-04 ASSESSMENT — LIFESTYLE VARIABLES: HISTORY_ALCOHOL_USE: YES

## 2019-12-04 ASSESSMENT — PAIN DESCRIPTION - INTENSITY
RATING_2: 0
RATING_2: 7

## 2019-12-04 ASSESSMENT — PATIENT HEALTH QUESTIONNAIRE - PHQ9: SUM OF ALL RESPONSES TO PHQ QUESTIONS 1-9: 27

## 2019-12-04 ASSESSMENT — PAIN DESCRIPTION - PAIN TYPE
TYPE_2: CHRONIC PAIN
TYPE: ACUTE PAIN

## 2019-12-04 ASSESSMENT — PAIN - FUNCTIONAL ASSESSMENT: PAIN_FUNCTIONAL_ASSESSMENT: ACTIVITIES ARE NOT PREVENTED

## 2019-12-04 ASSESSMENT — PAIN DESCRIPTION - FREQUENCY: FREQUENCY: CONTINUOUS

## 2019-12-05 PROCEDURE — 1240000000 HC EMOTIONAL WELLNESS R&B

## 2019-12-05 PROCEDURE — 6370000000 HC RX 637 (ALT 250 FOR IP): Performed by: PSYCHIATRY & NEUROLOGY

## 2019-12-05 PROCEDURE — 6370000000 HC RX 637 (ALT 250 FOR IP): Performed by: EMERGENCY MEDICINE

## 2019-12-05 RX ADMIN — IBUPROFEN 400 MG: 400 TABLET, FILM COATED ORAL at 09:09

## 2019-12-05 RX ADMIN — NITROFURANTOIN MONOHYDRATE/MACROCRYSTALLINE 100 MG: 25; 75 CAPSULE ORAL at 09:09

## 2019-12-05 RX ADMIN — DULOXETINE HYDROCHLORIDE 60 MG: 60 CAPSULE, DELAYED RELEASE ORAL at 09:09

## 2019-12-05 RX ADMIN — ZIPRASIDONE HCL 20 MG: 20 CAPSULE ORAL at 17:19

## 2019-12-05 RX ADMIN — ZIPRASIDONE HCL 20 MG: 20 CAPSULE ORAL at 09:09

## 2019-12-05 RX ADMIN — HYDROXYZINE HYDROCHLORIDE 50 MG: 25 TABLET ORAL at 18:37

## 2019-12-05 ASSESSMENT — PAIN SCALES - GENERAL
PAINLEVEL_OUTOF10: 0
PAINLEVEL_OUTOF10: 7

## 2019-12-06 PROCEDURE — 6370000000 HC RX 637 (ALT 250 FOR IP): Performed by: PSYCHIATRY & NEUROLOGY

## 2019-12-06 PROCEDURE — 1240000000 HC EMOTIONAL WELLNESS R&B

## 2019-12-06 RX ADMIN — HYDROXYZINE HYDROCHLORIDE 50 MG: 25 TABLET ORAL at 10:48

## 2019-12-06 RX ADMIN — TRAZODONE HYDROCHLORIDE 50 MG: 50 TABLET ORAL at 20:29

## 2019-12-06 RX ADMIN — DULOXETINE HYDROCHLORIDE 60 MG: 60 CAPSULE, DELAYED RELEASE ORAL at 09:11

## 2019-12-06 RX ADMIN — HYDROXYZINE HYDROCHLORIDE 50 MG: 25 TABLET ORAL at 15:14

## 2019-12-06 RX ADMIN — ZIPRASIDONE HCL 20 MG: 20 CAPSULE ORAL at 18:21

## 2019-12-06 RX ADMIN — IBUPROFEN 400 MG: 400 TABLET, FILM COATED ORAL at 20:29

## 2019-12-06 RX ADMIN — ZIPRASIDONE HCL 20 MG: 20 CAPSULE ORAL at 09:10

## 2019-12-06 ASSESSMENT — PAIN DESCRIPTION - PROGRESSION: CLINICAL_PROGRESSION: NOT CHANGED

## 2019-12-06 ASSESSMENT — PAIN SCALES - GENERAL
PAINLEVEL_OUTOF10: 6
PAINLEVEL_OUTOF10: 6

## 2019-12-06 ASSESSMENT — PAIN DESCRIPTION - DESCRIPTORS: DESCRIPTORS: ACHING

## 2019-12-06 ASSESSMENT — PAIN DESCRIPTION - ONSET: ONSET: ON-GOING

## 2019-12-06 ASSESSMENT — PAIN DESCRIPTION - FREQUENCY: FREQUENCY: CONTINUOUS

## 2019-12-06 ASSESSMENT — PAIN DESCRIPTION - PAIN TYPE: TYPE: CHRONIC PAIN

## 2019-12-06 ASSESSMENT — PAIN DESCRIPTION - ORIENTATION: ORIENTATION: LOWER

## 2019-12-06 ASSESSMENT — PAIN DESCRIPTION - LOCATION: LOCATION: BACK

## 2019-12-07 PROCEDURE — 6370000000 HC RX 637 (ALT 250 FOR IP): Performed by: PSYCHIATRY & NEUROLOGY

## 2019-12-07 PROCEDURE — 1240000000 HC EMOTIONAL WELLNESS R&B

## 2019-12-07 RX ORDER — BUSPIRONE HYDROCHLORIDE 10 MG/1
10 TABLET ORAL 2 TIMES DAILY
Status: DISCONTINUED | OUTPATIENT
Start: 2019-12-07 | End: 2019-12-09 | Stop reason: HOSPADM

## 2019-12-07 RX ADMIN — ZIPRASIDONE HCL 20 MG: 20 CAPSULE ORAL at 17:20

## 2019-12-07 RX ADMIN — BUSPIRONE HYDROCHLORIDE 10 MG: 10 TABLET ORAL at 20:03

## 2019-12-07 RX ADMIN — ZIPRASIDONE HCL 20 MG: 20 CAPSULE ORAL at 07:47

## 2019-12-07 RX ADMIN — BUSPIRONE HYDROCHLORIDE 10 MG: 10 TABLET ORAL at 12:51

## 2019-12-07 RX ADMIN — DULOXETINE HYDROCHLORIDE 60 MG: 60 CAPSULE, DELAYED RELEASE ORAL at 07:47

## 2019-12-07 RX ADMIN — IBUPROFEN 400 MG: 400 TABLET, FILM COATED ORAL at 15:17

## 2019-12-07 RX ADMIN — TRAZODONE HYDROCHLORIDE 50 MG: 50 TABLET ORAL at 20:03

## 2019-12-07 ASSESSMENT — PAIN SCALES - GENERAL
PAINLEVEL_OUTOF10: 0
PAINLEVEL_OUTOF10: 7

## 2019-12-08 PROCEDURE — 1240000000 HC EMOTIONAL WELLNESS R&B

## 2019-12-08 PROCEDURE — 6370000000 HC RX 637 (ALT 250 FOR IP): Performed by: PSYCHIATRY & NEUROLOGY

## 2019-12-08 RX ORDER — DOXEPIN HYDROCHLORIDE 25 MG/1
25 CAPSULE ORAL NIGHTLY PRN
Status: DISCONTINUED | OUTPATIENT
Start: 2019-12-08 | End: 2019-12-09 | Stop reason: HOSPADM

## 2019-12-08 RX ADMIN — BUSPIRONE HYDROCHLORIDE 10 MG: 10 TABLET ORAL at 07:40

## 2019-12-08 RX ADMIN — HYDROXYZINE HYDROCHLORIDE 50 MG: 25 TABLET ORAL at 11:05

## 2019-12-08 RX ADMIN — DULOXETINE HYDROCHLORIDE 60 MG: 60 CAPSULE, DELAYED RELEASE ORAL at 07:40

## 2019-12-08 RX ADMIN — ZIPRASIDONE HCL 20 MG: 20 CAPSULE ORAL at 16:54

## 2019-12-08 RX ADMIN — BUSPIRONE HYDROCHLORIDE 10 MG: 10 TABLET ORAL at 20:45

## 2019-12-08 RX ADMIN — ZIPRASIDONE HCL 20 MG: 20 CAPSULE ORAL at 07:41

## 2019-12-08 ASSESSMENT — PAIN DESCRIPTION - PROGRESSION: CLINICAL_PROGRESSION: NOT CHANGED

## 2019-12-08 ASSESSMENT — PAIN SCALES - GENERAL: PAINLEVEL_OUTOF10: 0

## 2019-12-09 VITALS
DIASTOLIC BLOOD PRESSURE: 64 MMHG | SYSTOLIC BLOOD PRESSURE: 102 MMHG | RESPIRATION RATE: 16 BRPM | TEMPERATURE: 97.2 F | BODY MASS INDEX: 28.52 KG/M2 | WEIGHT: 155 LBS | HEART RATE: 88 BPM | OXYGEN SATURATION: 99 % | HEIGHT: 62 IN

## 2019-12-09 PROCEDURE — 6370000000 HC RX 637 (ALT 250 FOR IP): Performed by: PSYCHIATRY & NEUROLOGY

## 2019-12-09 RX ORDER — HYDROXYZINE 50 MG/1
50 TABLET, FILM COATED ORAL 3 TIMES DAILY PRN
Qty: 90 TABLET | Refills: 0 | Status: SHIPPED | OUTPATIENT
Start: 2019-12-09 | End: 2019-12-19

## 2019-12-09 RX ORDER — DULOXETIN HYDROCHLORIDE 60 MG/1
60 CAPSULE, DELAYED RELEASE ORAL EVERY MORNING
Qty: 30 CAPSULE | Refills: 0 | Status: SHIPPED | OUTPATIENT
Start: 2019-12-09 | End: 2020-01-27

## 2019-12-09 RX ORDER — DOXEPIN HYDROCHLORIDE 25 MG/1
25 CAPSULE ORAL NIGHTLY PRN
Qty: 30 CAPSULE | Refills: 0 | Status: SHIPPED | OUTPATIENT
Start: 2019-12-09 | End: 2021-07-23 | Stop reason: ALTCHOICE

## 2019-12-09 RX ORDER — ZIPRASIDONE HYDROCHLORIDE 20 MG/1
20 CAPSULE ORAL 2 TIMES DAILY WITH MEALS
Qty: 60 CAPSULE | Refills: 0 | Status: SHIPPED | OUTPATIENT
Start: 2019-12-09 | End: 2020-01-27

## 2019-12-09 RX ORDER — BUSPIRONE HYDROCHLORIDE 10 MG/1
10 TABLET ORAL 2 TIMES DAILY
Qty: 60 TABLET | Refills: 0 | Status: SHIPPED | OUTPATIENT
Start: 2019-12-09 | End: 2020-04-11

## 2019-12-09 RX ADMIN — ACETAMINOPHEN 650 MG: 325 TABLET ORAL at 03:24

## 2019-12-09 RX ADMIN — ZIPRASIDONE HCL 20 MG: 20 CAPSULE ORAL at 09:19

## 2019-12-09 RX ADMIN — BUSPIRONE HYDROCHLORIDE 10 MG: 10 TABLET ORAL at 09:19

## 2019-12-09 RX ADMIN — DULOXETINE HYDROCHLORIDE 60 MG: 60 CAPSULE, DELAYED RELEASE ORAL at 09:18

## 2019-12-09 ASSESSMENT — PAIN SCALES - GENERAL
PAINLEVEL_OUTOF10: 3
PAINLEVEL_OUTOF10: 7

## 2019-12-09 ASSESSMENT — PAIN DESCRIPTION - LOCATION: LOCATION: BACK

## 2019-12-09 ASSESSMENT — PAIN DESCRIPTION - ORIENTATION: ORIENTATION: LOWER

## 2019-12-09 ASSESSMENT — PAIN DESCRIPTION - FREQUENCY: FREQUENCY: INTERMITTENT

## 2019-12-09 ASSESSMENT — PAIN DESCRIPTION - PAIN TYPE: TYPE: CHRONIC PAIN

## 2019-12-10 ENCOUNTER — TELEPHONE (OUTPATIENT)
Dept: PSYCHIATRY | Age: 39
End: 2019-12-10

## 2020-01-15 ENCOUNTER — HOSPITAL ENCOUNTER (EMERGENCY)
Age: 40
Discharge: LEFT BEFORE DISCHARGE | End: 2020-01-15
Payer: MEDICARE

## 2020-01-15 VITALS
HEART RATE: 87 BPM | SYSTOLIC BLOOD PRESSURE: 125 MMHG | TEMPERATURE: 98.3 F | RESPIRATION RATE: 17 BRPM | OXYGEN SATURATION: 98 % | DIASTOLIC BLOOD PRESSURE: 89 MMHG

## 2020-01-15 PROCEDURE — 99282 EMERGENCY DEPT VISIT SF MDM: CPT

## 2020-01-15 ASSESSMENT — ENCOUNTER SYMPTOMS
RHINORRHEA: 0
DIARRHEA: 0
SORE THROAT: 0
VOMITING: 0
BACK PAIN: 1
SHORTNESS OF BREATH: 0
ABDOMINAL PAIN: 0
EYE PAIN: 0
NAUSEA: 1
COUGH: 0
EYE DISCHARGE: 0
WHEEZING: 0

## 2020-01-15 NOTE — ED PROVIDER NOTES
has a past medical history of Anxiety, Back pain, Colitis, Depression, Fibromyalgia, HPV (human papilloma virus) anogenital infection, and OCD (obsessive compulsive disorder). SURGICAL HISTORY      has a past surgical history that includes Cholecystectomy and Inner ear surgery. CURRENT MEDICATIONS       Discharge Medication List as of 1/15/2020  1:54 PM      CONTINUE these medications which have NOT CHANGED    Details   busPIRone (BUSPAR) 10 MG tablet Take 1 tablet by mouth 2 times daily, Disp-60 tablet, R-0Normal      doxepin (SINEQUAN) 25 MG capsule Take 1 capsule by mouth nightly as needed (Insomnia), Disp-30 capsule, R-0Normal      DULoxetine (CYMBALTA) 60 MG extended release capsule Take 1 capsule by mouth every morning, Disp-30 capsule, R-0Normal      ziprasidone (GEODON) 20 MG capsule Take 1 capsule by mouth 2 times daily (with meals), Disp-60 capsule, R-0Normal             ALLERGIES     has No Known Allergies. FAMILY HISTORY     She indicated that her mother is alive. She indicated that her father is alive. She indicated that the status of her paternal aunt is unknown.  family history includes Cancer in her paternal aunt; Diabetes in her father and mother; Heart Disease in her father and mother. SOCIAL HISTORY      reports that she has been smoking cigarettes. She has been smoking about 0.50 packs per day. She has never used smokeless tobacco. She reports current drug use. Drugs: Marijuana and Cocaine. She reports that she does not drink alcohol. PHYSICAL EXAM     INITIAL VITALS:  temperature is 98.3 °F (36.8 °C). Her blood pressure is 125/89 and her pulse is 87. Her respiration is 17 and oxygen saturation is 98%. Physical Exam  Vitals signs and nursing note reviewed. Constitutional:       Appearance: She is well-developed. She is not toxic-appearing or diaphoretic. HENT:      Head: Normocephalic and atraumatic.       Right Ear: External ear normal.      Left Ear: External ear normal. Nose: Nose normal.      Mouth/Throat:      Pharynx: No oropharyngeal exudate or posterior oropharyngeal erythema. Eyes:      Conjunctiva/sclera: Conjunctivae normal.   Neck:      Musculoskeletal: Normal range of motion and neck supple. Vascular: No JVD. Cardiovascular:      Rate and Rhythm: Normal rate and regular rhythm. Pulses: Normal pulses. Heart sounds: Normal heart sounds. No murmur. No friction rub. No gallop. Pulmonary:      Effort: Pulmonary effort is normal. No respiratory distress. Breath sounds: Normal breath sounds. No decreased breath sounds, wheezing, rhonchi or rales. Abdominal:      General: Bowel sounds are normal. There is no distension. Palpations: Abdomen is soft. Tenderness: There is no tenderness. There is no guarding or rebound. Musculoskeletal: Normal range of motion. Comments: Positive right sided straight leg raise. Skin:     General: Skin is warm and dry. Findings: No rash. Neurological:      Mental Status: She is alert and oriented to person, place, and time. Motor: No abnormal muscle tone. Coordination: Coordination normal.         DIFFERENTIAL DIAGNOSIS:   Arthritis, musculoskeletal pain, fibroids, menstrual cycle    DIAGNOSTIC RESULTS     EKG: All EKG's are interpreted by the Emergency Department Physician who either signs or Co-signs this chart in theabsence of a cardiologist.    None     RADIOLOGY:non-plain film images(s) such as CT, Ultrasound and MRI are read by the radiologist.    No orders to display         LABS:     Labs Reviewed - No data to display    EMERGENCY DEPARTMENT COURSE:   Vitals:    Vitals:    01/15/20 1329 01/15/20 1330   BP: 125/89    Pulse: 87    Resp: 17    Temp:  98.3 °F (36.8 °C)   SpO2: 98%        1:26 PM: The patient was seen and evaluated.       MDM:  initial work-up was ordered however the patient left shortly after that work-up was initiated without telling in the staff or

## 2020-01-27 ENCOUNTER — HOSPITAL ENCOUNTER (EMERGENCY)
Age: 40
Discharge: HOME | End: 2020-01-27
Payer: MEDICARE

## 2020-01-27 ENCOUNTER — APPOINTMENT (OUTPATIENT)
Dept: ULTRASOUND IMAGING | Age: 40
End: 2020-01-27
Payer: MEDICARE

## 2020-01-27 ENCOUNTER — APPOINTMENT (OUTPATIENT)
Dept: GENERAL RADIOLOGY | Age: 40
End: 2020-01-27
Payer: MEDICARE

## 2020-01-27 VITALS
DIASTOLIC BLOOD PRESSURE: 99 MMHG | RESPIRATION RATE: 16 BRPM | HEIGHT: 63 IN | BODY MASS INDEX: 29.23 KG/M2 | WEIGHT: 165 LBS | OXYGEN SATURATION: 97 % | TEMPERATURE: 98.4 F | HEART RATE: 75 BPM | SYSTOLIC BLOOD PRESSURE: 138 MMHG

## 2020-01-27 LAB
ALBUMIN SERPL-MCNC: 3.6 G/DL (ref 3.5–5.1)
ALP BLD-CCNC: 55 U/L (ref 38–126)
ALT SERPL-CCNC: 14 U/L (ref 11–66)
ANION GAP SERPL CALCULATED.3IONS-SCNC: 11 MEQ/L (ref 8–16)
AST SERPL-CCNC: 26 U/L (ref 5–40)
BACTERIA: ABNORMAL /HPF
BASOPHILS # BLD: 0.9 %
BASOPHILS ABSOLUTE: 0.1 THOU/MM3 (ref 0–0.1)
BILIRUB SERPL-MCNC: 0.4 MG/DL (ref 0.3–1.2)
BILIRUBIN URINE: NEGATIVE
BLOOD, URINE: NEGATIVE
BUN BLDV-MCNC: 7 MG/DL (ref 7–22)
CALCIUM SERPL-MCNC: 8.9 MG/DL (ref 8.5–10.5)
CASTS 2: ABNORMAL /LPF
CASTS UA: ABNORMAL /LPF
CHARACTER, URINE: ABNORMAL
CHLAMYDIA TRACHOMATIS BY RT-PCR: NOT DETECTED
CHLORIDE BLD-SCNC: 104 MEQ/L (ref 98–111)
CO2: 23 MEQ/L (ref 23–33)
COLOR: YELLOW
CREAT SERPL-MCNC: 0.7 MG/DL (ref 0.4–1.2)
CRYSTALS, UA: ABNORMAL
CT/NG SOURCE: NORMAL
EOSINOPHIL # BLD: 5.7 %
EOSINOPHILS ABSOLUTE: 0.5 THOU/MM3 (ref 0–0.4)
EPITHELIAL CELLS, UA: ABNORMAL /HPF
ERYTHROCYTE [DISTWIDTH] IN BLOOD BY AUTOMATED COUNT: 12 % (ref 11.5–14.5)
ERYTHROCYTE [DISTWIDTH] IN BLOOD BY AUTOMATED COUNT: 41.5 FL (ref 35–45)
GFR SERPL CREATININE-BSD FRML MDRD: > 90 ML/MIN/1.73M2
GLUCOSE BLD-MCNC: 107 MG/DL (ref 70–108)
GLUCOSE URINE: NEGATIVE MG/DL
HCG,BETA SUBUNIT,QUAL,SERUM: 0.2 MIU/ML (ref 0–5)
HCT VFR BLD CALC: 40.5 % (ref 37–47)
HEMOGLOBIN: 13.5 GM/DL (ref 12–16)
IMMATURE GRANS (ABS): 0.02 THOU/MM3 (ref 0–0.07)
IMMATURE GRANULOCYTES: 0.3 %
KETONES, URINE: NEGATIVE
KOH PREP: NORMAL
LEUKOCYTE ESTERASE, URINE: ABNORMAL
LIPASE: 60.7 U/L (ref 5.6–51.3)
LYMPHOCYTES # BLD: 37.6 %
LYMPHOCYTES ABSOLUTE: 3 THOU/MM3 (ref 1–4.8)
MCH RBC QN AUTO: 31.7 PG (ref 26–33)
MCHC RBC AUTO-ENTMCNC: 33.3 GM/DL (ref 32.2–35.5)
MCV RBC AUTO: 95.1 FL (ref 81–99)
MISCELLANEOUS 2: ABNORMAL
MONOCYTES # BLD: 8.7 %
MONOCYTES ABSOLUTE: 0.7 THOU/MM3 (ref 0.4–1.3)
NEISSERIA GONORRHOEAE BY RT-PCR: NOT DETECTED
NITRITE, URINE: NEGATIVE
NUCLEATED RED BLOOD CELLS: 0 /100 WBC
OSMOLALITY CALCULATION: 274.1 MOSMOL/KG (ref 275–300)
PH UA: 8.5 (ref 5–9)
PLATELET # BLD: 228 THOU/MM3 (ref 130–400)
PMV BLD AUTO: 8.9 FL (ref 9.4–12.4)
POTASSIUM SERPL-SCNC: 4.2 MEQ/L (ref 3.5–5.2)
PROTEIN UA: NEGATIVE
RBC # BLD: 4.26 MILL/MM3 (ref 4.2–5.4)
RBC URINE: ABNORMAL /HPF
RENAL EPITHELIAL, UA: ABNORMAL
SEG NEUTROPHILS: 46.8 %
SEGMENTED NEUTROPHILS ABSOLUTE COUNT: 3.7 THOU/MM3 (ref 1.8–7.7)
SODIUM BLD-SCNC: 138 MEQ/L (ref 135–145)
SPECIFIC GRAVITY, URINE: 1.02 (ref 1–1.03)
TOTAL PROTEIN: 6.3 G/DL (ref 6.1–8)
TRICHOMONAS PREP: NORMAL
UROBILINOGEN, URINE: 1 EU/DL (ref 0–1)
WBC # BLD: 7.9 THOU/MM3 (ref 4.8–10.8)
WBC UA: ABNORMAL /HPF
YEAST: ABNORMAL

## 2020-01-27 PROCEDURE — 6360000002 HC RX W HCPCS

## 2020-01-27 PROCEDURE — 2709999900 HC NON-CHARGEABLE SUPPLY

## 2020-01-27 PROCEDURE — 84702 CHORIONIC GONADOTROPIN TEST: CPT

## 2020-01-27 PROCEDURE — 6370000000 HC RX 637 (ALT 250 FOR IP): Performed by: PHYSICIAN ASSISTANT

## 2020-01-27 PROCEDURE — 99284 EMERGENCY DEPT VISIT MOD MDM: CPT

## 2020-01-27 PROCEDURE — 81001 URINALYSIS AUTO W/SCOPE: CPT

## 2020-01-27 PROCEDURE — 87220 TISSUE EXAM FOR FUNGI: CPT

## 2020-01-27 PROCEDURE — 85025 COMPLETE CBC W/AUTO DIFF WBC: CPT

## 2020-01-27 PROCEDURE — 72100 X-RAY EXAM L-S SPINE 2/3 VWS: CPT

## 2020-01-27 PROCEDURE — 87186 SC STD MICRODIL/AGAR DIL: CPT

## 2020-01-27 PROCEDURE — 87210 SMEAR WET MOUNT SALINE/INK: CPT

## 2020-01-27 PROCEDURE — 36415 COLL VENOUS BLD VENIPUNCTURE: CPT

## 2020-01-27 PROCEDURE — 87491 CHLMYD TRACH DNA AMP PROBE: CPT

## 2020-01-27 PROCEDURE — 87086 URINE CULTURE/COLONY COUNT: CPT

## 2020-01-27 PROCEDURE — 87077 CULTURE AEROBIC IDENTIFY: CPT

## 2020-01-27 PROCEDURE — 80053 COMPREHEN METABOLIC PANEL: CPT

## 2020-01-27 PROCEDURE — 96374 THER/PROPH/DIAG INJ IV PUSH: CPT

## 2020-01-27 PROCEDURE — 87070 CULTURE OTHR SPECIMN AEROBIC: CPT

## 2020-01-27 PROCEDURE — 87591 N.GONORRHOEAE DNA AMP PROB: CPT

## 2020-01-27 PROCEDURE — 87205 SMEAR GRAM STAIN: CPT

## 2020-01-27 PROCEDURE — 83690 ASSAY OF LIPASE: CPT

## 2020-01-27 RX ORDER — SULFAMETHOXAZOLE AND TRIMETHOPRIM 800; 160 MG/1; MG/1
1 TABLET ORAL 2 TIMES DAILY
Qty: 6 TABLET | Refills: 0 | Status: SHIPPED | OUTPATIENT
Start: 2020-01-27 | End: 2020-01-30

## 2020-01-27 RX ORDER — KETOROLAC TROMETHAMINE 30 MG/ML
30 INJECTION, SOLUTION INTRAMUSCULAR; INTRAVENOUS ONCE
Status: COMPLETED | OUTPATIENT
Start: 2020-01-27 | End: 2020-01-27

## 2020-01-27 RX ORDER — CYCLOBENZAPRINE HCL 10 MG
10 TABLET ORAL 3 TIMES DAILY PRN
Qty: 12 TABLET | Refills: 0 | Status: SHIPPED | OUTPATIENT
Start: 2020-01-27 | End: 2020-04-11

## 2020-01-27 RX ORDER — CYCLOBENZAPRINE HCL 10 MG
10 TABLET ORAL ONCE
Status: COMPLETED | OUTPATIENT
Start: 2020-01-27 | End: 2020-01-27

## 2020-01-27 RX ORDER — TRAMADOL HYDROCHLORIDE 50 MG/1
50 TABLET ORAL EVERY 6 HOURS PRN
Qty: 10 TABLET | Refills: 0 | Status: SHIPPED | OUTPATIENT
Start: 2020-01-27 | End: 2020-01-30

## 2020-01-27 RX ORDER — ONDANSETRON 4 MG/1
4 TABLET, ORALLY DISINTEGRATING ORAL ONCE
Status: COMPLETED | OUTPATIENT
Start: 2020-01-27 | End: 2020-01-27

## 2020-01-27 RX ORDER — KETOROLAC TROMETHAMINE 30 MG/ML
INJECTION, SOLUTION INTRAMUSCULAR; INTRAVENOUS
Status: COMPLETED
Start: 2020-01-27 | End: 2020-01-27

## 2020-01-27 RX ORDER — FLUOXETINE HYDROCHLORIDE 20 MG/1
40 CAPSULE ORAL 2 TIMES DAILY
Status: ON HOLD | COMMUNITY
End: 2021-09-27 | Stop reason: HOSPADM

## 2020-01-27 RX ORDER — HYDROCODONE BITARTRATE AND ACETAMINOPHEN 5; 325 MG/1; MG/1
1 TABLET ORAL ONCE
Status: COMPLETED | OUTPATIENT
Start: 2020-01-27 | End: 2020-01-27

## 2020-01-27 RX ADMIN — KETOROLAC TROMETHAMINE 30 MG: 30 INJECTION, SOLUTION INTRAMUSCULAR at 20:43

## 2020-01-27 RX ADMIN — ONDANSETRON 4 MG: 4 TABLET, ORALLY DISINTEGRATING ORAL at 21:59

## 2020-01-27 RX ADMIN — KETOROLAC TROMETHAMINE 30 MG: 30 INJECTION, SOLUTION INTRAMUSCULAR; INTRAVENOUS at 20:43

## 2020-01-27 RX ADMIN — HYDROCODONE BITARTRATE AND ACETAMINOPHEN 1 TABLET: 5; 325 TABLET ORAL at 21:59

## 2020-01-27 RX ADMIN — CYCLOBENZAPRINE 10 MG: 10 TABLET, FILM COATED ORAL at 20:43

## 2020-01-27 ASSESSMENT — ENCOUNTER SYMPTOMS
RHINORRHEA: 0
BACK PAIN: 1
VOMITING: 0
DIARRHEA: 0
NAUSEA: 1
COUGH: 0
PHOTOPHOBIA: 0
SHORTNESS OF BREATH: 0
ABDOMINAL PAIN: 0

## 2020-01-27 ASSESSMENT — PAIN DESCRIPTION - ORIENTATION
ORIENTATION: LOWER
ORIENTATION: LOWER

## 2020-01-27 ASSESSMENT — PAIN SCALES - GENERAL
PAINLEVEL_OUTOF10: 8
PAINLEVEL_OUTOF10: 6
PAINLEVEL_OUTOF10: 8
PAINLEVEL_OUTOF10: 6
PAINLEVEL_OUTOF10: 6

## 2020-01-27 ASSESSMENT — PAIN DESCRIPTION - PAIN TYPE
TYPE: ACUTE PAIN
TYPE: ACUTE PAIN

## 2020-01-27 ASSESSMENT — PAIN DESCRIPTION - LOCATION
LOCATION: BACK
LOCATION: BACK

## 2020-01-27 ASSESSMENT — PAIN DESCRIPTION - FREQUENCY
FREQUENCY: CONTINUOUS
FREQUENCY: CONTINUOUS

## 2020-01-27 NOTE — ED TRIAGE NOTES
Pt presents to the ED for lower back pain and vaginal discharge x6 months. Pt states she had a miscarriage 6 months ago and believes she may not have passed everything due to not having a DNC. Pt states she received tylenol last night for the pain and it did not provide relief.

## 2020-01-28 NOTE — ED PROVIDER NOTES
been smoking cigarettes. She has been smoking about 0.50 packs per day. She has never used smokeless tobacco. She reports current drug use. Drugs: Marijuana and Cocaine. She reports that she does not drink alcohol. PHYSICAL EXAM     INITIAL VITALS:  height is 5' 3\" (1.6 m) and weight is 165 lb (74.8 kg). Her oral temperature is 98.4 °F (36.9 °C). Her blood pressure is 138/99 (abnormal) and her pulse is 75. Her respiration is 16 and oxygen saturation is 97%. Physical Exam  Vitals signs and nursing note reviewed. Exam conducted with a chaperone present. Constitutional:       General: She is not in acute distress. Appearance: Normal appearance. She is well-developed. She is not toxic-appearing or diaphoretic. HENT:      Head: Normocephalic and atraumatic. Right Ear: Hearing normal.      Left Ear: Hearing normal.      Nose: Nose normal. No rhinorrhea. Mouth/Throat:      Lips: Pink. Mouth: Mucous membranes are moist.      Pharynx: Uvula midline. Eyes:      General: Lids are normal. No scleral icterus. Extraocular Movements: Extraocular movements intact. Conjunctiva/sclera: Conjunctivae normal.      Pupils: Pupils are equal, round, and reactive to light. Neck:      Musculoskeletal: No neck rigidity. Vascular: No JVD. Trachea: Trachea normal. No tracheal deviation. Cardiovascular:      Rate and Rhythm: Normal rate and regular rhythm. Pulses:           Dorsalis pedis pulses are 2+ on the right side and 2+ on the left side. Posterior tibial pulses are 2+ on the right side and 2+ on the left side. Heart sounds: Normal heart sounds. Pulmonary:      Effort: Pulmonary effort is normal. No respiratory distress. Breath sounds: Normal breath sounds. No decreased breath sounds or wheezing. Abdominal:      General: Bowel sounds are normal. There is no distension. Palpations: Abdomen is soft. Abdomen is not rigid. There is no pulsatile mass. RESULTS     EKG: All EKG's are interpreted by [facility] who either signs or Co-signs this chart in the absence of a cardiologist.  None    RADIOLOGY: non-plain film images(s) such as CT,Ultrasound and MRI are read by the radiologist.  Plain radiographic images are visualized and preliminarily interpreted by the emergency physician unless otherwise stated below. XR LUMBAR SPINE (2-3 VIEWS)   Final Result    4 mm anterolisthesis of L5 on S1, new compared to the prior study and likely degenerative in nature. Mild L4-5 degenerative disc disease and mid and lower lumbar facet joint DJD, new compared to the prior study. **This report has been created using voice recognition software. It may contain minor errors which are inherent in voice recognition technology. **      Final report electronically signed by Dr. Gabriel Schmid on 1/27/2020 9:03 PM          LABS:   Labs Reviewed   CULTURE, REFLEXED, URINE - Abnormal; Notable for the following components:       Result Value    Organism Escherichia coli (*)     All other components within normal limits    Narrative:     Source: urine, clean catch       Site: clean void          Current Antibiotics: none   CBC WITH AUTO DIFFERENTIAL - Abnormal; Notable for the following components:    MPV 8.9 (*)     Eosinophils Absolute 0.5 (*)     All other components within normal limits   LIPASE - Abnormal; Notable for the following components:    Lipase 60.7 (*)     All other components within normal limits   URINE WITH REFLEXED MICRO - Abnormal; Notable for the following components:    Leukocyte Esterase, Urine SMALL (*)     Character, Urine CLOUDY (*)     All other components within normal limits   OSMOLALITY - Abnormal; Notable for the following components:    Osmolality Calc 274.1 (*)     All other components within normal limits   GENITAL CULTURE    Narrative:     Source: vaginal non-OB patient       Site:           Current Antibiotics: not stated discharge. The patient was comfortable plan of care. I have given the patient strict written and verbal instructions about care at home, follow-up, and signs and symptoms of worsening of condition and they did verbalize understanding. CRITICAL CARE:   None    CONSULTS:  None    PROCEDURES:  None    FINAL IMPRESSION      1. Chronic bilateral low back pain without sciatica    2. Menorrhagia with regular cycle    3. Multiple somatic complaints    4. DDD (degenerative disc disease), lumbar    5. Acute cystitis without hematuria          DISPOSITION/PLAN     1. Chronic bilateral low back pain without sciatica    2. Menorrhagia with regular cycle    3. Multiple somatic complaints    4. DDD (degenerative disc disease), lumbar    5. Acute cystitis without hematuria        PATIENT REFERRED TO:  [address] [facility] [address] [facility]. [address] [facility] [place] [address]   Schedule an appointment as soon as possible for a visit   To get established with a PCP    Karen Salinas 92  [facility] [address] [facility] [address] [facility]  Schedule an appointment as soon as possible for a visit   For further evaluation of back pain    Ronnie Valles MD  [facility] 58  [address] [facility]  [phone]    Schedule an appointment as soon as possible for a visit   For evaluation of heavy menstrual periods      DISCHARGE MEDICATIONS:  Discharge Medication List as of 1/27/2020  9:39 PM      START taking these medications    Details   sulfamethoxazole-trimethoprim (BACTRIM DS) 800-160 MG per tablet Take 1 tablet by mouth 2 times daily for 3 days, Disp-6 tablet, R-0Print      cyclobenzaprine (FLEXERIL) 10 MG tablet Take 1 tablet by mouth 3 times daily as needed for Muscle spasms, Disp-12 tablet, R-0Print      traMADol (ULTRAM) 50 MG tablet Take 1 tablet by mouth every 6 hours as needed for Pain for up to 3 days. , Disp-10 tablet, R-0Print             (Please note that portions of this note were completed with a voice recognition program.  Efforts were made to edit the dictations but occasionally words are mis-transcribed.)  Provider:  I personally performed the services described in the documentation, reviewed and edited the documentation which was dictated to the scribe in my presence, and it accurately records my words and actions.     Benjamin Mcleod PA-C 01/29/20 11:37 AM    MALU Watson PA-C  01/29/20 1141

## 2020-01-29 ENCOUNTER — TELEPHONE (OUTPATIENT)
Dept: FAMILY MEDICINE CLINIC | Age: 40
End: 2020-01-29

## 2020-01-29 LAB
ORGANISM: ABNORMAL
URINE CULTURE REFLEX: ABNORMAL

## 2020-01-29 RX ORDER — CEPHALEXIN 500 MG/1
500 CAPSULE ORAL 3 TIMES DAILY
Qty: 21 CAPSULE | Refills: 0 | Status: SHIPPED | OUTPATIENT
Start: 2020-01-29 | End: 2020-02-05

## 2020-01-30 LAB
GENITAL CULTURE, ROUTINE: NORMAL
GRAM STAIN RESULT: NORMAL

## 2020-02-03 ENCOUNTER — TELEPHONE (OUTPATIENT)
Dept: FAMILY MEDICINE CLINIC | Age: 40
End: 2020-02-03

## 2020-04-11 ENCOUNTER — HOSPITAL ENCOUNTER (EMERGENCY)
Age: 40
Discharge: HOME | End: 2020-04-11
Payer: MEDICARE

## 2020-04-11 VITALS
TEMPERATURE: 98.5 F | SYSTOLIC BLOOD PRESSURE: 112 MMHG | DIASTOLIC BLOOD PRESSURE: 66 MMHG | BODY MASS INDEX: 27.31 KG/M2 | HEART RATE: 110 BPM | WEIGHT: 160 LBS | RESPIRATION RATE: 18 BRPM | HEIGHT: 64 IN | OXYGEN SATURATION: 95 %

## 2020-04-11 PROCEDURE — 99212 OFFICE O/P EST SF 10 MIN: CPT

## 2020-04-11 PROCEDURE — 6370000000 HC RX 637 (ALT 250 FOR IP): Performed by: NURSE PRACTITIONER

## 2020-04-11 PROCEDURE — 6360000002 HC RX W HCPCS: Performed by: NURSE PRACTITIONER

## 2020-04-11 PROCEDURE — 99213 OFFICE O/P EST LOW 20 MIN: CPT | Performed by: NURSE PRACTITIONER

## 2020-04-11 PROCEDURE — 96372 THER/PROPH/DIAG INJ SC/IM: CPT

## 2020-04-11 RX ORDER — ONDANSETRON 4 MG/1
4 TABLET, ORALLY DISINTEGRATING ORAL EVERY 8 HOURS PRN
Qty: 15 TABLET | Refills: 0 | Status: SHIPPED | OUTPATIENT
Start: 2020-04-11 | End: 2020-08-06

## 2020-04-11 RX ORDER — ACETAMINOPHEN 325 MG/1
650 TABLET ORAL EVERY 6 HOURS PRN
Qty: 120 TABLET | Refills: 0 | COMMUNITY
Start: 2020-04-11 | End: 2020-08-18 | Stop reason: ALTCHOICE

## 2020-04-11 RX ORDER — ACETAMINOPHEN 325 MG/1
650 TABLET ORAL EVERY 6 HOURS PRN
COMMUNITY
End: 2020-04-11 | Stop reason: SDUPTHER

## 2020-04-11 RX ORDER — MENTHOL AND METHYL SALICYLATE 10; 30 G/100G; G/100G
CREAM TOPICAL
Refills: 0 | COMMUNITY
Start: 2020-04-11 | End: 2020-08-06

## 2020-04-11 RX ORDER — KETOROLAC TROMETHAMINE 30 MG/ML
60 INJECTION, SOLUTION INTRAMUSCULAR; INTRAVENOUS ONCE
Status: COMPLETED | OUTPATIENT
Start: 2020-04-11 | End: 2020-04-11

## 2020-04-11 RX ORDER — TIZANIDINE 4 MG/1
4 TABLET ORAL EVERY 6 HOURS PRN
Qty: 30 TABLET | Refills: 0 | Status: SHIPPED | OUTPATIENT
Start: 2020-04-11 | End: 2020-04-21

## 2020-04-11 RX ORDER — ONDANSETRON 4 MG/1
4 TABLET, ORALLY DISINTEGRATING ORAL ONCE
Status: COMPLETED | OUTPATIENT
Start: 2020-04-11 | End: 2020-04-11

## 2020-04-11 RX ORDER — ORPHENADRINE CITRATE 30 MG/ML
60 INJECTION INTRAMUSCULAR; INTRAVENOUS ONCE
Status: COMPLETED | OUTPATIENT
Start: 2020-04-11 | End: 2020-04-11

## 2020-04-11 RX ADMIN — KETOROLAC TROMETHAMINE 60 MG: 30 INJECTION, SOLUTION INTRAMUSCULAR at 16:54

## 2020-04-11 RX ADMIN — ONDANSETRON 4 MG: 4 TABLET, ORALLY DISINTEGRATING ORAL at 16:54

## 2020-04-11 RX ADMIN — ORPHENADRINE CITRATE 60 MG: 30 INJECTION INTRAMUSCULAR; INTRAVENOUS at 16:58

## 2020-04-11 ASSESSMENT — PAIN - FUNCTIONAL ASSESSMENT: PAIN_FUNCTIONAL_ASSESSMENT: PREVENTS OR INTERFERES SOME ACTIVE ACTIVITIES AND ADLS

## 2020-04-11 ASSESSMENT — PAIN DESCRIPTION - ONSET: ONSET: PROGRESSIVE

## 2020-04-11 ASSESSMENT — PAIN SCALES - GENERAL
PAINLEVEL_OUTOF10: 10
PAINLEVEL_OUTOF10: 10

## 2020-04-11 ASSESSMENT — PAIN DESCRIPTION - DESCRIPTORS: DESCRIPTORS: ACHING

## 2020-04-11 ASSESSMENT — PAIN DESCRIPTION - PAIN TYPE: TYPE: ACUTE PAIN

## 2020-04-11 ASSESSMENT — PAIN DESCRIPTION - FREQUENCY: FREQUENCY: CONTINUOUS

## 2020-04-11 ASSESSMENT — PAIN DESCRIPTION - PROGRESSION: CLINICAL_PROGRESSION: GRADUALLY WORSENING

## 2020-04-11 NOTE — ED PROVIDER NOTES
(TORADOL) injection 60 mg (60 mg Intramuscular Given 4/11/20 1654)   orphenadrine (NORFLEX) injection 60 mg (60 mg Intramuscular Given 4/11/20 1658)   ondansetron (ZOFRAN-ODT) disintegrating tablet 4 mg (4 mg Oral Given 4/11/20 1654)     PROCEDURES:  None  FINAL IMPRESSION      1. Spasm of muscle    2. Strain of left shoulder, initial encounter    3. Strain of left ankle, initial encounter        DISPOSITION/PLAN   Discharge - Pending Orders Complete      Ice, elevation 15-20 minutes 3 times a day for the first 24-48 hours followed with heat 15-20 minutes 3 times a day thereafter. Activity as tolerated. Take medication as directed  Return for worsening symptoms, otherwise if symptoms persist follow up with PCP 1-2 weeks. PATIENT REFERRED TO:  Chuck Denver, MD  [address] [facility]  [phone]    Schedule an appointment as soon as possible for a visit in 1 week      [facility], [address]  [phone]    Call in 2 days      DISCHARGE MEDICATIONS:  New Prescriptions    MAGNESIUM SULFATE, LAXATIVE, (EPSOM SALT) GRAN    Epsom Salt warm water soak    MENTHOL-METHYL SALICYLATE (ICY HOT) 58-62 % EXTERNAL CREAM    Follow package directions for topical use    ONDANSETRON (ZOFRAN ODT) 4 MG DISINTEGRATING TABLET    Take 1 tablet by mouth every 8 hours as needed for Nausea or Vomiting    TIZANIDINE (ZANAFLEX) 4 MG TABLET    Take 1 tablet by mouth every 6 hours as needed (muscle spasm)     Current Discharge Medication List      CONTINUE these medications which have CHANGED    Details   acetaminophen (TYLENOL) 325 MG tablet Take 2 tablets by mouth every 6 hours as needed for Pain  Qty: 120 tablet, Refills: 0             Oneda Grice, APRN - CNP          Oneda Grice, APRN - CNP  04/11/20 1794

## 2020-04-13 ENCOUNTER — TELEPHONE (OUTPATIENT)
Dept: FAMILY MEDICINE CLINIC | Age: 40
End: 2020-04-13

## 2020-07-15 ENCOUNTER — HOSPITAL ENCOUNTER (EMERGENCY)
Age: 40
Discharge: HOME | End: 2020-07-16
Payer: MEDICARE

## 2020-07-15 LAB
ACETAMINOPHEN LEVEL: < 5 UG/ML (ref 0–20)
ALBUMIN SERPL-MCNC: 4 G/DL (ref 3.5–5.1)
ALP BLD-CCNC: 75 U/L (ref 38–126)
ALT SERPL-CCNC: 15 U/L (ref 11–66)
ANION GAP SERPL CALCULATED.3IONS-SCNC: 10 MEQ/L (ref 8–16)
AST SERPL-CCNC: 29 U/L (ref 5–40)
BASOPHILS # BLD: 0.6 %
BASOPHILS ABSOLUTE: 0.1 THOU/MM3 (ref 0–0.1)
BILIRUB SERPL-MCNC: 0.5 MG/DL (ref 0.3–1.2)
BILIRUBIN DIRECT: < 0.2 MG/DL (ref 0–0.3)
BUN BLDV-MCNC: 8 MG/DL (ref 7–22)
CALCIUM SERPL-MCNC: 8.8 MG/DL (ref 8.5–10.5)
CHLORIDE BLD-SCNC: 106 MEQ/L (ref 98–111)
CO2: 23 MEQ/L (ref 23–33)
CREAT SERPL-MCNC: 0.6 MG/DL (ref 0.4–1.2)
EOSINOPHIL # BLD: 6.3 %
EOSINOPHILS ABSOLUTE: 0.6 THOU/MM3 (ref 0–0.4)
ERYTHROCYTE [DISTWIDTH] IN BLOOD BY AUTOMATED COUNT: 13.2 % (ref 11.5–14.5)
ERYTHROCYTE [DISTWIDTH] IN BLOOD BY AUTOMATED COUNT: 44.9 FL (ref 35–45)
ETHYL ALCOHOL, SERUM: 0.09 %
GFR SERPL CREATININE-BSD FRML MDRD: > 90 ML/MIN/1.73M2
GLUCOSE BLD-MCNC: 92 MG/DL (ref 70–108)
HCT VFR BLD CALC: 42 % (ref 37–47)
HEMOGLOBIN: 14.2 GM/DL (ref 12–16)
IMMATURE GRANS (ABS): 0.02 THOU/MM3 (ref 0–0.07)
IMMATURE GRANULOCYTES: 0.2 %
LYMPHOCYTES # BLD: 40.4 %
LYMPHOCYTES ABSOLUTE: 3.8 THOU/MM3 (ref 1–4.8)
MCH RBC QN AUTO: 31.5 PG (ref 26–33)
MCHC RBC AUTO-ENTMCNC: 33.8 GM/DL (ref 32.2–35.5)
MCV RBC AUTO: 93.1 FL (ref 81–99)
MONOCYTES # BLD: 9 %
MONOCYTES ABSOLUTE: 0.8 THOU/MM3 (ref 0.4–1.3)
NUCLEATED RED BLOOD CELLS: 0 /100 WBC
OSMOLALITY CALCULATION: 275.5 MOSMOL/KG (ref 275–300)
PLATELET # BLD: 218 THOU/MM3 (ref 130–400)
PMV BLD AUTO: 9 FL (ref 9.4–12.4)
POTASSIUM SERPL-SCNC: 4.2 MEQ/L (ref 3.5–5.2)
PREGNANCY, SERUM: NEGATIVE
RBC # BLD: 4.51 MILL/MM3 (ref 4.2–5.4)
SALICYLATE, SERUM: < 0.3 MG/DL (ref 2–10)
SEG NEUTROPHILS: 43.5 %
SEGMENTED NEUTROPHILS ABSOLUTE COUNT: 4.1 THOU/MM3 (ref 1.8–7.7)
SODIUM BLD-SCNC: 139 MEQ/L (ref 135–145)
TOTAL PROTEIN: 6.7 G/DL (ref 6.1–8)
WBC # BLD: 9.4 THOU/MM3 (ref 4.8–10.8)

## 2020-07-15 PROCEDURE — 87086 URINE CULTURE/COLONY COUNT: CPT

## 2020-07-15 PROCEDURE — 82248 BILIRUBIN DIRECT: CPT

## 2020-07-15 PROCEDURE — 81001 URINALYSIS AUTO W/SCOPE: CPT

## 2020-07-15 PROCEDURE — 80307 DRUG TEST PRSMV CHEM ANLYZR: CPT

## 2020-07-15 PROCEDURE — 80053 COMPREHEN METABOLIC PANEL: CPT

## 2020-07-15 PROCEDURE — 96372 THER/PROPH/DIAG INJ SC/IM: CPT

## 2020-07-15 PROCEDURE — 99284 EMERGENCY DEPT VISIT MOD MDM: CPT

## 2020-07-15 PROCEDURE — 84703 CHORIONIC GONADOTROPIN ASSAY: CPT

## 2020-07-15 PROCEDURE — G0480 DRUG TEST DEF 1-7 CLASSES: HCPCS

## 2020-07-15 PROCEDURE — 85025 COMPLETE CBC W/AUTO DIFF WBC: CPT

## 2020-07-15 PROCEDURE — 84443 ASSAY THYROID STIM HORMONE: CPT

## 2020-07-15 PROCEDURE — 36415 COLL VENOUS BLD VENIPUNCTURE: CPT

## 2020-07-15 ASSESSMENT — SLEEP AND FATIGUE QUESTIONNAIRES
DIFFICULTY FALLING ASLEEP: YES
SLEEP PATTERN: NIGHTMARES/TERRORS
DIFFICULTY STAYING ASLEEP: YES
DO YOU HAVE DIFFICULTY SLEEPING: YES
DIFFICULTY ARISING: YES
DO YOU USE A SLEEP AID: YES
RESTFUL SLEEP: NO
AVERAGE NUMBER OF SLEEP HOURS: 4

## 2020-07-15 ASSESSMENT — PAIN SCALES - GENERAL: PAINLEVEL_OUTOF10: 7

## 2020-07-16 VITALS
HEIGHT: 63 IN | BODY MASS INDEX: 40.75 KG/M2 | OXYGEN SATURATION: 98 % | HEART RATE: 91 BPM | SYSTOLIC BLOOD PRESSURE: 137 MMHG | WEIGHT: 230 LBS | RESPIRATION RATE: 20 BRPM | DIASTOLIC BLOOD PRESSURE: 91 MMHG | TEMPERATURE: 98.2 F

## 2020-07-16 LAB
AMPHETAMINE+METHAMPHETAMINE URINE SCREEN: NEGATIVE
BACTERIA: ABNORMAL /HPF
BARBITURATE QUANTITATIVE URINE: NEGATIVE
BENZODIAZEPINE QUANTITATIVE URINE: NEGATIVE
BILIRUBIN URINE: NEGATIVE
BLOOD, URINE: NEGATIVE
CANNABINOID QUANTITATIVE URINE: NEGATIVE
CASTS 2: ABNORMAL /LPF
CASTS UA: ABNORMAL /LPF
CHARACTER, URINE: ABNORMAL
COCAINE METABOLITE QUANTITATIVE URINE: NEGATIVE
COLOR: YELLOW
CRYSTALS, UA: ABNORMAL
EPITHELIAL CELLS, UA: ABNORMAL /HPF
GLUCOSE URINE: NEGATIVE MG/DL
KETONES, URINE: NEGATIVE
LEUKOCYTE ESTERASE, URINE: ABNORMAL
MISCELLANEOUS 2: ABNORMAL
NITRITE, URINE: NEGATIVE
OPIATES, URINE: POSITIVE
OXYCODONE: NEGATIVE
PH UA: 5.5 (ref 5–9)
PHENCYCLIDINE QUANTITATIVE URINE: NEGATIVE
PROTEIN UA: NEGATIVE
RBC URINE: ABNORMAL /HPF
RENAL EPITHELIAL, UA: ABNORMAL
SPECIFIC GRAVITY, URINE: 1.01 (ref 1–1.03)
TSH SERPL DL<=0.05 MIU/L-ACNC: 2.65 UIU/ML (ref 0.4–4.2)
UROBILINOGEN, URINE: 0.2 EU/DL (ref 0–1)
WBC UA: ABNORMAL /HPF
YEAST: ABNORMAL

## 2020-07-16 PROCEDURE — 6360000002 HC RX W HCPCS: Performed by: NURSE PRACTITIONER

## 2020-07-16 RX ORDER — LORAZEPAM 2 MG/ML
1 INJECTION INTRAMUSCULAR ONCE
Status: COMPLETED | OUTPATIENT
Start: 2020-07-16 | End: 2020-07-16

## 2020-07-16 RX ADMIN — LORAZEPAM 1 MG: 2 INJECTION INTRAMUSCULAR; INTRAVENOUS at 00:34

## 2020-07-16 ASSESSMENT — PATIENT HEALTH QUESTIONNAIRE - PHQ9: SUM OF ALL RESPONSES TO PHQ QUESTIONS 1-9: 13

## 2020-07-16 NOTE — ED NOTES
Bed: 017A  Expected date:   Expected time:   Means of arrival: [facility]  Comments:     Brian Fields RN  07/15/20 5658

## 2020-07-16 NOTE — ED NOTES
Pt had breakdown in room. Pt threw off bp cuff and pulse ox. Pt asked to calm down and be cooperative pt agreed. Pt ambulated to restroom. Urine obtained.      Tommie Bermudez RN  07/15/20 6148
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
## 2020-07-16 NOTE — PROGRESS NOTES
and oriented x4. Pt appears anxious and is pacing around room, fidgeting with her hands. Pt can be distracted and needed redirected at times. Tearful at times and apologetic. States she is a good mom and God will get her through this. Per EMS pt made statements that she felt suicidal. Pt denies saying this. Level of Care Disposition:      9023: [id number] with medical provider. Patient is medically cleared. 65: Consulted with Dr. Diane Kim. Patient to follow OP tomorrow at [facility].

## 2020-07-16 NOTE — ED NOTES
Pt resting in bed at this time. Continuous monitoring in place. Medicated per MAR. Call light in reach. Will continue to monitor.      Tommie Bermudez RN  07/16/20 7122

## 2020-07-17 LAB
ORGANISM: ABNORMAL
URINE CULTURE REFLEX: ABNORMAL

## 2020-07-18 ASSESSMENT — ENCOUNTER SYMPTOMS
CHEST TIGHTNESS: 0
VOMITING: 0
NAUSEA: 0
RHINORRHEA: 0
BACK PAIN: 0
COUGH: 0
ABDOMINAL PAIN: 0

## 2020-08-02 ENCOUNTER — HOSPITAL ENCOUNTER (EMERGENCY)
Age: 40
Discharge: HOME | End: 2020-08-02
Payer: MEDICARE

## 2020-08-02 VITALS
HEART RATE: 87 BPM | TEMPERATURE: 98.9 F | OXYGEN SATURATION: 98 % | DIASTOLIC BLOOD PRESSURE: 85 MMHG | BODY MASS INDEX: 29.23 KG/M2 | RESPIRATION RATE: 16 BRPM | SYSTOLIC BLOOD PRESSURE: 130 MMHG | HEIGHT: 63 IN | WEIGHT: 165 LBS

## 2020-08-02 PROCEDURE — 99213 OFFICE O/P EST LOW 20 MIN: CPT | Performed by: NURSE PRACTITIONER

## 2020-08-02 PROCEDURE — 99212 OFFICE O/P EST SF 10 MIN: CPT

## 2020-08-02 RX ORDER — PENICILLIN V POTASSIUM 500 MG/1
500 TABLET ORAL 4 TIMES DAILY
Qty: 40 TABLET | Refills: 0 | Status: SHIPPED | OUTPATIENT
Start: 2020-08-02 | End: 2020-08-12

## 2020-08-02 RX ORDER — IBUPROFEN 800 MG/1
800 TABLET ORAL EVERY 8 HOURS PRN
Qty: 30 TABLET | Refills: 0 | Status: SHIPPED | OUTPATIENT
Start: 2020-08-02 | End: 2021-10-26

## 2020-08-02 ASSESSMENT — PAIN SCALES - GENERAL: PAINLEVEL_OUTOF10: 9

## 2020-08-02 ASSESSMENT — PAIN DESCRIPTION - FREQUENCY: FREQUENCY: CONTINUOUS

## 2020-08-02 ASSESSMENT — PAIN DESCRIPTION - LOCATION: LOCATION: TEETH

## 2020-08-02 ASSESSMENT — PAIN DESCRIPTION - DESCRIPTORS: DESCRIPTORS: ACHING

## 2020-08-02 ASSESSMENT — PAIN DESCRIPTION - PAIN TYPE: TYPE: ACUTE PAIN

## 2020-08-02 NOTE — ED NOTES
Discharge instructions and prescriptions reviewed with pt. Pt verbalized understanding. Pt ambulated out in stable condition. No change in pain noted upon discharge.      Jerome Cutler RN  08/02/20 0425

## 2020-08-02 NOTE — ED PROVIDER NOTES
[place] [address]  Urgent Care Encounter     CHIEF COMPLAINT    Chief Complaint   Patient presents with    Dental Pain     with nausea       Nursing Notes, Past Medical Hx, Past Surgical Hx, Social Hx, Allergies, and Family Hx were all reviewed and agreed with, or any disagreements were addressed in the HPI. HPI    Nkechi Hathaway is a 36 y.o. female who presents with complaints of bilateral-sided tooth pain. The pain has been going on for 1 week. The pain is worsened by chewing and temperature sensitive. The patient states that she has a history of dental caries and hopes to get dentures to the upper jaw. Patient states that she has been using topical over-the-counter products and taking Tylenol for pain. The patient states that she does live in a group home and has a history of substance dependency. Patient states that she is also had \"a foul taste in her mouth\" the patient currently rates her pain 9 on a 10 scale. REVIEW OF SYSTEMS    Constitutional:  Denies fever, chills, or weakness   Eyes:  Denies photophobia or visual changes  HENT:  Denies sore throat    Respiratory:  Denies cough or shortness of breath   Lymphatic:  Denies swollen glands   All other systems negative except as marked. PAST MEDICAL HISTORY         Diagnosis Date    Anxiety     Back pain     Colitis     Depression     Fibromyalgia     HPV (human papilloma virus) anogenital infection     OCD (obsessive compulsive disorder)        SURGICAL HISTORY     Patient  has a past surgical history that includes Cholecystectomy and Inner ear surgery.     CURRENT MEDICATIONS       Discharge Medication List as of 8/2/2020  3:00 PM      CONTINUE these medications which have NOT CHANGED    Details   Magnesium Sulfate, Laxative, (EPSOM SALT) GRAN Epsom Salt warm water soak, R-0, OTC      menthol-methyl salicylate (ICY HOT) 89-66 % external cream Follow package directions for topical use, R-0, OTC acetaminophen (TYLENOL) 325 MG tablet Take 2 tablets by mouth every 6 hours as needed for Pain, Disp-120 tablet, R-0OTC      ondansetron (ZOFRAN ODT) 4 MG disintegrating tablet Take 1 tablet by mouth every 8 hours as needed for Nausea or Vomiting, Disp-15 tablet, R-0Normal      FLUoxetine (PROZAC) 20 MG capsule Take 40 mg by mouth dailyHistorical Med      doxepin (SINEQUAN) 25 MG capsule Take 1 capsule by mouth nightly as needed (Insomnia), Disp-30 capsule, R-0Normal             ALLERGIES     Patient is has No Known Allergies. FAMILY HISTORY     Patient's family history includes Cancer in her paternal aunt; Diabetes in her father and mother; Heart Disease in her father and mother. SOCIAL HISTORY     Patient  reports that she has been smoking cigarettes. She has been smoking about 0.50 packs per day. She has never used smokeless tobacco. She reports current alcohol use. She reports current drug use. Drugs: Marijuana and Cocaine. PHYSICAL EXAM    VITAL SIGNS: /85   Pulse 87   Temp 98.9 °F (37.2 °C) (Temporal)   Resp 16   Ht 5' 3\" (1.6 m)   Wt 165 lb (74.8 kg)   LMP 07/15/2020   SpO2 98%   BMI 29.23 kg/m²    Constitutional:  Well developed, Well nourished, No acute distress, Non-toxic appearance. HENT:  Normocephalic, Atraumatic, Oropharynx moist, Teeth -teeth 5 through 11. Nose normal. Neck- Normal range of motion, No tenderness, Supple, No stridor. EACs normal.  Eyes:  PERRL, EOMI, Conjunctiva normal, No discharge. Respiratory:  Normal breath sounds, No respiratory distress, No wheezing, No chest tenderness. Integument:  Warm, Dry, No erythema, No rash. Lymphatic:  No lymphadenopathy noted. Neurologic:  Alert & oriented x 3  Psychiatric:  Affect normal, Judgment normal, Mood normal.     Periodontal Exam         Patient has multiple dental caries noted across the incisor teeth lingual's along teeth 5 through 11      FINAL IMPRESSION    1. Odontalgia  2.  Dental Caries    DISPOSITION/PLAN      This patient's condition is not warranting any type of surgical intervention at this time. She can be treated in an outpatient setting with pain medication and antibiotics. The patient was also advised to eat a soft diet, chew on the opposite side of pain, Use a soft bristle toothbrush and warm saltwater lavage after eating. The patient was advised to take the medication as directed. The patient was also advised to follow-up with a local dentist ASAP. We also discussed returning to the Emergency Department immediately if new or worsening symptoms occur. We have discussed the symptoms which are most concerning that necessitate immediate return.       PATIENT REFERRED TO:  [address]  [address] [facility] [address]  [phone]  Schedule an appointment as soon as possible for a visit in 1 week  1, For recheck and further evaluation and management    DISCHARGE MEDICATIONS:  Discharge Medication List as of 8/2/2020  3:00 PM      START taking these medications    Details   penicillin v potassium (VEETID) 500 MG tablet Take 1 tablet by mouth 4 times daily for 10 days, Disp-40 tablet,R-0Normal      ibuprofen (ADVIL;MOTRIN) 800 MG tablet Take 1 tablet by mouth every 8 hours as needed for Pain, Disp-30 tablet,R-0Normal           Discharge Medication List as of 8/2/2020  3:00 PM          Pinky Gottlieb, [address] [place] [address], APRANIL - [facility]  08/02/20 5443

## 2020-08-06 ENCOUNTER — HOSPITAL ENCOUNTER (EMERGENCY)
Age: 40
Discharge: HOME | End: 2020-08-06
Payer: MEDICARE

## 2020-08-06 VITALS
SYSTOLIC BLOOD PRESSURE: 134 MMHG | WEIGHT: 160 LBS | TEMPERATURE: 99 F | DIASTOLIC BLOOD PRESSURE: 83 MMHG | OXYGEN SATURATION: 98 % | RESPIRATION RATE: 16 BRPM | HEIGHT: 63 IN | HEART RATE: 81 BPM | BODY MASS INDEX: 28.35 KG/M2

## 2020-08-06 PROCEDURE — 99212 OFFICE O/P EST SF 10 MIN: CPT

## 2020-08-06 RX ORDER — LIDOCAINE HYDROCHLORIDE 20 MG/ML
5 SOLUTION OROPHARYNGEAL PRN
Qty: 100 ML | Refills: 0 | Status: SHIPPED | OUTPATIENT
Start: 2020-08-06 | End: 2020-08-18 | Stop reason: ALTCHOICE

## 2020-08-06 ASSESSMENT — ENCOUNTER SYMPTOMS
COUGH: 0
SORE THROAT: 0
NAUSEA: 0
SHORTNESS OF BREATH: 0
VOMITING: 0

## 2020-08-06 ASSESSMENT — PAIN DESCRIPTION - DESCRIPTORS: DESCRIPTORS: ACHING

## 2020-08-06 ASSESSMENT — PAIN DESCRIPTION - LOCATION: LOCATION: TEETH

## 2020-08-06 ASSESSMENT — PAIN SCALES - GENERAL: PAINLEVEL_OUTOF10: 9

## 2020-08-06 ASSESSMENT — PAIN DESCRIPTION - PAIN TYPE: TYPE: ACUTE PAIN

## 2020-08-06 NOTE — ED TRIAGE NOTES
Pt complains she was here on 8/2  diagnosed with dental caries and given pcn and ibuprofen. States the medication isnt helping for pain and she is unable to sleep at night.

## 2020-08-06 NOTE — ED PROVIDER NOTES
(SINEQUAN) 25 MG CAPSULE    Take 1 capsule by mouth nightly as needed (Insomnia)    FLUOXETINE (PROZAC) 20 MG CAPSULE    Take 40 mg by mouth daily    IBUPROFEN (ADVIL;MOTRIN) 800 MG TABLET    Take 1 tablet by mouth every 8 hours as needed for Pain    PENICILLIN V POTASSIUM (VEETID) 500 MG TABLET    Take 1 tablet by mouth 4 times daily for 10 days       ALLERGIES     Patient is has No Known Allergies. Patients   There is no immunization history on file for this patient. FAMILY HISTORY     Patient's family history includes Cancer in her paternal aunt; Diabetes in her father and mother; Heart Disease in her father and mother. SOCIAL HISTORY     Patient  reports that she has been smoking cigarettes. She has been smoking about 0.50 packs per day. She has never used smokeless tobacco. She reports current alcohol use. She reports current drug use. Drugs: Marijuana and Cocaine. PHYSICAL EXAM     ED TRIAGE VITALS  BP: 134/83, Temp: 99 °F (37.2 °C), Pulse: 81, Resp: 16, SpO2: 98 %,Estimated body mass index is 28.34 kg/m² as calculated from the following:    Height as of this encounter: 5' 3\" (1.6 m). Weight as of this encounter: 160 lb (72.6 kg). ,Patient's last menstrual period was 07/15/2020. Physical Exam  Vitals signs and nursing note reviewed. Constitutional:       General: She is not in acute distress. Appearance: She is well-developed. She is not diaphoretic. HENT:      Mouth/Throat:      Mouth: Mucous membranes are moist.      Dentition: Abnormal dentition. Dental tenderness and dental caries present. No dental abscesses. Pharynx: Oropharynx is clear. Tonsils: 0 on the right. 0 on the left. Comments: Multiple areas of dental decay noted, especially to the teeth circled on the diagram.  Eyes:      Conjunctiva/sclera:      Right eye: Right conjunctiva is not injected. Left eye: Left conjunctiva is not injected.       Pupils: Pupils are equal.   Neck:      Musculoskeletal: Normal range of motion. Cardiovascular:      Rate and Rhythm: Normal rate and regular rhythm. Heart sounds: No murmur. Pulmonary:      Effort: Pulmonary effort is normal. No respiratory distress. Breath sounds: Normal breath sounds. Musculoskeletal:      Right knee: She exhibits normal range of motion. Left knee: She exhibits normal range of motion. Skin:     General: Skin is warm. Findings: No rash. Neurological:      Mental Status: She is alert and oriented to person, place, and time. Psychiatric:         Behavior: Behavior normal.         DIAGNOSTIC RESULTS     Labs:No results found for this visit on 08/06/20. IMAGING:    No orders to display         EKG:  none    URGENT CARE COURSE:     Vitals:    08/06/20 1543   BP: 134/83   Pulse: 81   Resp: 16   Temp: 99 °F (37.2 °C)   SpO2: 98%   Weight: 160 lb (72.6 kg)   Height: 5' 3\" (1.6 m)       Medications - No data to display         PROCEDURES:  None    FINAL IMPRESSION      1. Pain due to dental caries          DISPOSITION/ PLAN       Exam is consistent with multiple areas of dental decay that is likely leading to the pain. Discussed with the patient that she absolutely must follow-up with a dentist and that she should continue attempting to create an appointment. She is advised to continue antibiotics as well as Tylenol and Motrin 800. She is given a prescription for viscous lidocaine for pain management and advised to present to the ER for any worsening symptoms. She is agreeable to plan as discussed. PATIENT REFERRED TO:  Starr Felix MD  [address] [place] [address] / [facility] [id number]      DISCHARGE MEDICATIONS:  New Prescriptions    LIDOCAINE VISCOUS HCL (XYLOCAINE) 2 % SOLN SOLUTION    Take 5 mLs by mouth as needed for Dental Pain       Discontinued Medications    MAGNESIUM SULFATE, LAXATIVE, (EPSOM SALT) GRAN    Epsom Salt warm water soak    MENTHOL-METHYL SALICYLATE (ICY HOT) 02-59 % EXTERNAL CREAM    Follow package directions for topical use    ONDANSETRON (ZOFRAN ODT) 4 MG DISINTEGRATING TABLET    Take 1 tablet by mouth every 8 hours as needed for Nausea or Vomiting       Current Discharge Medication List          RAMÓN Mcdonough CNP    (Please note that portions of this note were completed with a voice recognition program. Efforts were made to edit the dictations but occasionally words are mis-transcribed.)          RAMÓN Mcdonough CNP  08/06/20 1841

## 2020-08-18 ENCOUNTER — HOSPITAL ENCOUNTER (EMERGENCY)
Age: 40
Discharge: HOME | End: 2020-08-18
Attending: EMERGENCY MEDICINE
Payer: MEDICARE

## 2020-08-18 VITALS
HEIGHT: 63 IN | WEIGHT: 165 LBS | HEART RATE: 92 BPM | OXYGEN SATURATION: 97 % | DIASTOLIC BLOOD PRESSURE: 92 MMHG | BODY MASS INDEX: 29.23 KG/M2 | SYSTOLIC BLOOD PRESSURE: 132 MMHG | RESPIRATION RATE: 16 BRPM | TEMPERATURE: 98.1 F

## 2020-08-18 PROCEDURE — 99284 EMERGENCY DEPT VISIT MOD MDM: CPT

## 2020-08-18 PROCEDURE — 99285 EMERGENCY DEPT VISIT HI MDM: CPT

## 2020-08-18 PROCEDURE — 6370000000 HC RX 637 (ALT 250 FOR IP): Performed by: EMERGENCY MEDICINE

## 2020-08-18 RX ORDER — ACETAMINOPHEN 500 MG
500 TABLET ORAL EVERY 6 HOURS PRN
Qty: 28 TABLET | Refills: 0 | Status: SHIPPED | OUTPATIENT
Start: 2020-08-18 | End: 2021-05-25 | Stop reason: ALTCHOICE

## 2020-08-18 RX ORDER — LIDOCAINE HYDROCHLORIDE 20 MG/ML
JELLY TOPICAL PRN
Status: DISCONTINUED | OUTPATIENT
Start: 2020-08-18 | End: 2020-08-18

## 2020-08-18 RX ORDER — AMOXICILLIN AND CLAVULANATE POTASSIUM 875; 125 MG/1; MG/1
1 TABLET, FILM COATED ORAL 2 TIMES DAILY
Qty: 14 TABLET | Refills: 0 | Status: SHIPPED | OUTPATIENT
Start: 2020-08-18 | End: 2020-08-25

## 2020-08-18 RX ORDER — LIDOCAINE HYDROCHLORIDE 40 MG/ML
4 SOLUTION TOPICAL ONCE
Status: DISCONTINUED | OUTPATIENT
Start: 2020-08-18 | End: 2020-08-18

## 2020-08-18 RX ORDER — LIDOCAINE HYDROCHLORIDE 40 MG/ML
SOLUTION TOPICAL ONCE
Status: COMPLETED | OUTPATIENT
Start: 2020-08-18 | End: 2020-08-18

## 2020-08-18 RX ADMIN — LIDOCAINE HYDROCHLORIDE: 40 SOLUTION TOPICAL at 18:38

## 2020-08-18 ASSESSMENT — PAIN SCALES - GENERAL: PAINLEVEL_OUTOF10: 10

## 2020-08-18 ASSESSMENT — ENCOUNTER SYMPTOMS
COUGH: 0
RHINORRHEA: 0
SHORTNESS OF BREATH: 0
SINUS PRESSURE: 1

## 2020-08-18 NOTE — ED NOTES
Medicated per orders. Jn toledo needs. Pt states ready to go and would like a cab ride home.       Gi Cancino RN  08/18/20 8149

## 2020-08-18 NOTE — ED NOTES
Pt resting in bed. Resp regular. Denies needs. Call light in reach.       Awa Lucero RN  08/18/20 3976

## 2020-08-18 NOTE — ED PROVIDER NOTES
(XYLOCAINE) 4 % solution 4 mL, 4 mL, Mouth/Throat, Once, Saunders Broom, DO    lidocaine (XYLOCAINE) 2 % jelly, , Topical, PRN, Saunders Broom, DO    Current Outpatient Medications:     lidocaine viscous hcl (XYLOCAINE) 2 % SOLN solution, Take 5 mLs by mouth as needed for Dental Pain, Disp: 100 mL, Rfl: 0    ibuprofen (ADVIL;MOTRIN) 800 MG tablet, Take 1 tablet by mouth every 8 hours as needed for Pain, Disp: 30 tablet, Rfl: 0    acetaminophen (TYLENOL) 325 MG tablet, Take 2 tablets by mouth every 6 hours as needed for Pain, Disp: 120 tablet, Rfl: 0    FLUoxetine (PROZAC) 20 MG capsule, Take 40 mg by mouth daily, Disp: , Rfl:     doxepin (SINEQUAN) 25 MG capsule, Take 1 capsule by mouth nightly as needed (Insomnia), Disp: 30 capsule, Rfl: 0      SOCIAL HISTORY     Social History     Social History Narrative    Not on file     Social History     Tobacco Use    Smoking status: Current Every Day Smoker     Packs/day: 0.50     Types: Cigarettes    Smokeless tobacco: Never Used   Substance Use Topics    Alcohol use: Yes    Drug use: Yes     Types: Marijuana, Cocaine     Comment: \"have not used for several years 4/11/2020\"         ALLERGIES   No Known Allergies      FAMILY HISTORY     Family History   Problem Relation Age of Onset    Diabetes Mother     Heart Disease Mother     Diabetes Father     Heart Disease Father     Cancer Paternal [facility]     ED Triage Vitals [08/18/20 1709]   BP Temp Temp Source Pulse Resp SpO2 Height Weight   (!) 132/92 98.1 °F (36.7 °C) Oral 92 16 97 % 5' 3\" (1.6 m) 165 lb (74.8 kg)     Initial vital signs and nursing assessment reviewed and normal. Pulsoximetry is normal per my interpretation. Additional Vital Signs:  Vitals:    08/18/20 1709   BP: (!) 132/92   Pulse: 92   Resp: 16   Temp: 98.1 °F (36.7 °C)   SpO2: 97%       Physical Exam  Vitals signs reviewed. Constitutional:       Appearance: Normal appearance.    HENT:      Head: Normocephalic and atraumatic. Right Ear: Tympanic membrane normal.      Left Ear: Tympanic membrane normal.      Nose: No congestion or rhinorrhea. Mouth/Throat:      Mouth: Mucous membranes are moist.      Dentition: Abnormal dentition. Dental tenderness and dental caries present. No gingival swelling, dental abscesses or gum lesions. Tongue: No lesions. Palate: No mass and lesions. Pharynx: Oropharynx is clear. Uvula midline. No pharyngeal swelling or uvula swelling. Comments: Multiple missing and fractured teeth along the maxilla, dental caries, with drainage, gumline tenderness, no focal abscess or fluctuance. No soft palate swelling. Eyes:      Extraocular Movements: Extraocular movements intact. Pupils: Pupils are equal, round, and reactive to light. Neck:      Musculoskeletal: Neck supple. No neck rigidity or muscular tenderness. Cardiovascular:      Rate and Rhythm: Normal rate and regular rhythm. Pulses: Normal pulses. Heart sounds: Normal heart sounds. Pulmonary:      Effort: Pulmonary effort is normal.      Breath sounds: Normal breath sounds. Abdominal:      General: Abdomen is flat. Palpations: Abdomen is soft. Tenderness: There is no abdominal tenderness. Lymphadenopathy:      Cervical: No cervical adenopathy. Neurological:      Mental Status: She is alert. MEDICAL DECISION MAKING   Initial Assessment: Patient presented for evaluation of dental pain. The vitals signs and physical exam were notable for poor dentition, multiple chronic dental caries and fractures. No abscess. Given these findings the emergent condition that needed addressed/further defined were dental caries pulpitis, likely Alfredo angina facial cellulitis or sinusitis.    Given the patient well-appearing with normal vital signs chronic changes on exam. I though the risk of cellulitis facial cellulitis, dental related sinusitis, Ludewig's angina was exceedingly low therefore appointment as soon as possible for a visit in 2 days          This transcription was electronically signed. Parts of this transcriptions may have been dictated by use of voice recognition software and electronically transcribed, and parts may have been transcribed with the assistance of an ED scribe. The transcription may contain errors not detected in proofreading. Please refer to my supervising physician's documentation if my documentation differs.     Electronically Signed: Elizabeth Ortega, 08/18/20, 6:28 PM      Elizabeth Ortega,   08/18/20 500 Leslee Beasley,   08/19/20 0780

## 2020-08-18 NOTE — ED NOTES
Bed: 009A  Expected date:   Expected time:   Means of arrival:   Comments:      Diego Humphries RN  08/18/20 5914

## 2021-04-09 ENCOUNTER — HOSPITAL ENCOUNTER (EMERGENCY)
Age: 41
Discharge: HOME | End: 2021-04-10
Payer: MEDICARE

## 2021-04-09 VITALS
OXYGEN SATURATION: 100 % | BODY MASS INDEX: 29.23 KG/M2 | DIASTOLIC BLOOD PRESSURE: 85 MMHG | SYSTOLIC BLOOD PRESSURE: 134 MMHG | HEART RATE: 100 BPM | RESPIRATION RATE: 15 BRPM | TEMPERATURE: 98.3 F | WEIGHT: 165 LBS

## 2021-04-09 DIAGNOSIS — F31.63 SEVERE MIXED BIPOLAR 1 DISORDER WITHOUT PSYCHOSIS (HCC): Primary | Chronic | ICD-10-CM

## 2021-04-09 LAB
ACETAMINOPHEN LEVEL: < 5 UG/ML (ref 0–20)
ALBUMIN SERPL-MCNC: 4 G/DL (ref 3.5–5.1)
ALP BLD-CCNC: 91 U/L (ref 38–126)
ALT SERPL-CCNC: 32 U/L (ref 11–66)
ANION GAP SERPL CALCULATED.3IONS-SCNC: 11 MEQ/L (ref 8–16)
AST SERPL-CCNC: 37 U/L (ref 5–40)
BASOPHILS # BLD: 1 %
BASOPHILS ABSOLUTE: 0.1 THOU/MM3 (ref 0–0.1)
BILIRUB SERPL-MCNC: 0.4 MG/DL (ref 0.3–1.2)
BILIRUBIN DIRECT: < 0.2 MG/DL (ref 0–0.3)
BUN BLDV-MCNC: 11 MG/DL (ref 7–22)
CALCIUM SERPL-MCNC: 9.5 MG/DL (ref 8.5–10.5)
CHLORIDE BLD-SCNC: 104 MEQ/L (ref 98–111)
CO2: 24 MEQ/L (ref 23–33)
CREAT SERPL-MCNC: 0.9 MG/DL (ref 0.4–1.2)
EOSINOPHIL # BLD: 5.4 %
EOSINOPHILS ABSOLUTE: 0.6 THOU/MM3 (ref 0–0.4)
ERYTHROCYTE [DISTWIDTH] IN BLOOD BY AUTOMATED COUNT: 12.7 % (ref 11.5–14.5)
ERYTHROCYTE [DISTWIDTH] IN BLOOD BY AUTOMATED COUNT: 41.7 FL (ref 35–45)
ETHYL ALCOHOL, SERUM: < 0.01 %
GFR SERPL CREATININE-BSD FRML MDRD: 69 ML/MIN/1.73M2
GLUCOSE BLD-MCNC: 120 MG/DL (ref 70–108)
HCT VFR BLD CALC: 47.9 % (ref 37–47)
HEMOGLOBIN: 15.8 GM/DL (ref 12–16)
IMMATURE GRANS (ABS): 0.02 THOU/MM3 (ref 0–0.07)
IMMATURE GRANULOCYTES: 0.2 %
LYMPHOCYTES # BLD: 40.9 %
LYMPHOCYTES ABSOLUTE: 4.2 THOU/MM3 (ref 1–4.8)
MCH RBC QN AUTO: 30 PG (ref 26–33)
MCHC RBC AUTO-ENTMCNC: 33 GM/DL (ref 32.2–35.5)
MCV RBC AUTO: 91.1 FL (ref 81–99)
MONOCYTES # BLD: 7.4 %
MONOCYTES ABSOLUTE: 0.8 THOU/MM3 (ref 0.4–1.3)
NUCLEATED RED BLOOD CELLS: 0 /100 WBC
OSMOLALITY CALCULATION: 278.1 MOSMOL/KG (ref 275–300)
PLATELET # BLD: 255 THOU/MM3 (ref 130–400)
PMV BLD AUTO: 8.4 FL (ref 9.4–12.4)
POTASSIUM SERPL-SCNC: 4.3 MEQ/L (ref 3.5–5.2)
PREGNANCY, SERUM: NEGATIVE
RBC # BLD: 5.26 MILL/MM3 (ref 4.2–5.4)
SALICYLATE, SERUM: < 0.3 MG/DL (ref 2–10)
SEG NEUTROPHILS: 45.1 %
SEGMENTED NEUTROPHILS ABSOLUTE COUNT: 4.6 THOU/MM3 (ref 1.8–7.7)
SODIUM BLD-SCNC: 139 MEQ/L (ref 135–145)
TOTAL PROTEIN: 7.5 G/DL (ref 6.1–8)
TSH SERPL DL<=0.05 MIU/L-ACNC: 5.19 UIU/ML (ref 0.4–4.2)
WBC # BLD: 10.2 THOU/MM3 (ref 4.8–10.8)

## 2021-04-09 PROCEDURE — 82248 BILIRUBIN DIRECT: CPT

## 2021-04-09 PROCEDURE — 99283 EMERGENCY DEPT VISIT LOW MDM: CPT

## 2021-04-09 PROCEDURE — 80179 DRUG ASSAY SALICYLATE: CPT

## 2021-04-09 PROCEDURE — 84443 ASSAY THYROID STIM HORMONE: CPT

## 2021-04-09 PROCEDURE — 85025 COMPLETE CBC W/AUTO DIFF WBC: CPT

## 2021-04-09 PROCEDURE — 36415 COLL VENOUS BLD VENIPUNCTURE: CPT

## 2021-04-09 PROCEDURE — 80053 COMPREHEN METABOLIC PANEL: CPT

## 2021-04-09 PROCEDURE — 84703 CHORIONIC GONADOTROPIN ASSAY: CPT

## 2021-04-09 PROCEDURE — 80143 DRUG ASSAY ACETAMINOPHEN: CPT

## 2021-04-09 PROCEDURE — 82077 ASSAY SPEC XCP UR&BREATH IA: CPT

## 2021-04-09 ASSESSMENT — SLEEP AND FATIGUE QUESTIONNAIRES
DIFFICULTY FALLING ASLEEP: NO
RESTFUL SLEEP: NO

## 2021-04-09 NOTE — ED NOTES
ED nurse-to-nurse bedside report    Chief Complaint   Patient presents with    Other      LOC: alert and orientated to name, place, date  Vital signs   Vitals:    04/09/21 1838 04/09/21 1842   BP: 134/85    Pulse: 100    Resp: 15    Temp: 98.3 °F (36.8 °C)    TempSrc: Oral    SpO2: 100%    Weight:  165 lb (74.8 kg)      Pain:    Pain Interventions: n/a  Pain Goal: tolerable  Oxygen: No    Current needs required room air WNL   Telemetry: No  LDAs:    Continuous Infusions:   Mobility: Independent  Webber Fall Risk Score: No flowsheet data found.   Fall Interventions: patient in safe room with 1:1 observations  Report given to: PETTY Vigil RN  04/09/21 0136
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
## 2021-04-09 NOTE — ED NOTES
Patient placed in safe room that is ligature resistant with continuous monitoring in place. Provider notified, requested an assessment by behavioral health . Patient belongings secured in a locked lockers outside of the room. Explained suicide prevention precautions to the patient including constant observer.       Tyron Woo RN  04/09/21 2531

## 2021-04-10 ASSESSMENT — ENCOUNTER SYMPTOMS
RHINORRHEA: 0
NAUSEA: 0
CHEST TIGHTNESS: 0
SHORTNESS OF BREATH: 0
COUGH: 0
BACK PAIN: 0
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
## 2021-05-25 ENCOUNTER — OFFICE VISIT (OUTPATIENT)
Dept: FAMILY MEDICINE CLINIC | Age: 41
End: 2021-05-25
Payer: MEDICARE

## 2021-05-25 VITALS
BODY MASS INDEX: 34.3 KG/M2 | HEIGHT: 63 IN | OXYGEN SATURATION: 98 % | HEART RATE: 91 BPM | RESPIRATION RATE: 16 BRPM | TEMPERATURE: 98 F | DIASTOLIC BLOOD PRESSURE: 76 MMHG | SYSTOLIC BLOOD PRESSURE: 112 MMHG | WEIGHT: 193.6 LBS

## 2021-05-25 DIAGNOSIS — R63.5 WEIGHT GAIN: Primary | ICD-10-CM

## 2021-05-25 DIAGNOSIS — F15.21: ICD-10-CM

## 2021-05-25 DIAGNOSIS — F25.1 SCHIZOAFFECTIVE DISORDER, DEPRESSIVE TYPE (HCC): ICD-10-CM

## 2021-05-25 DIAGNOSIS — Z91.89 ENCOUNTER FOR HEPATITIS C VIRUS SCREENING TEST FOR HIGH RISK PATIENT: ICD-10-CM

## 2021-05-25 DIAGNOSIS — R73.01 ELEVATED FASTING GLUCOSE: ICD-10-CM

## 2021-05-25 DIAGNOSIS — Z11.59 ENCOUNTER FOR HEPATITIS C VIRUS SCREENING TEST FOR HIGH RISK PATIENT: ICD-10-CM

## 2021-05-25 DIAGNOSIS — Z11.4 SCREENING FOR HIV (HUMAN IMMUNODEFICIENCY VIRUS): ICD-10-CM

## 2021-05-25 DIAGNOSIS — E66.9 CLASS 1 OBESITY WITH BODY MASS INDEX (BMI) OF 34.0 TO 34.9 IN ADULT, UNSPECIFIED OBESITY TYPE, UNSPECIFIED WHETHER SERIOUS COMORBIDITY PRESENT: ICD-10-CM

## 2021-05-25 PROBLEM — E66.811 CLASS 1 OBESITY IN ADULT: Status: ACTIVE | Noted: 2021-05-25

## 2021-05-25 PROCEDURE — G8427 DOCREV CUR MEDS BY ELIG CLIN: HCPCS | Performed by: STUDENT IN AN ORGANIZED HEALTH CARE EDUCATION/TRAINING PROGRAM

## 2021-05-25 PROCEDURE — 4004F PT TOBACCO SCREEN RCVD TLK: CPT | Performed by: STUDENT IN AN ORGANIZED HEALTH CARE EDUCATION/TRAINING PROGRAM

## 2021-05-25 PROCEDURE — G8417 CALC BMI ABV UP PARAM F/U: HCPCS | Performed by: STUDENT IN AN ORGANIZED HEALTH CARE EDUCATION/TRAINING PROGRAM

## 2021-05-25 PROCEDURE — 99213 OFFICE O/P EST LOW 20 MIN: CPT | Performed by: STUDENT IN AN ORGANIZED HEALTH CARE EDUCATION/TRAINING PROGRAM

## 2021-05-25 RX ORDER — HYDROXYZINE PAMOATE 50 MG/1
CAPSULE ORAL DAILY
COMMUNITY
Start: 2021-05-18 | End: 2021-07-23 | Stop reason: ALTCHOICE

## 2021-05-25 RX ORDER — LURASIDONE HYDROCHLORIDE 40 MG/1
40 TABLET, FILM COATED ORAL DAILY
COMMUNITY
Start: 2021-05-18 | End: 2021-09-08

## 2021-05-25 ASSESSMENT — ENCOUNTER SYMPTOMS
NAUSEA: 0
EYE PAIN: 0
ABDOMINAL PAIN: 0
BLOOD IN STOOL: 0
TROUBLE SWALLOWING: 0
COUGH: 0
SHORTNESS OF BREATH: 0
DIARRHEA: 0
CONSTIPATION: 0
VOMITING: 0

## 2021-05-25 ASSESSMENT — SOCIAL DETERMINANTS OF HEALTH (SDOH): HOW HARD IS IT FOR YOU TO PAY FOR THE VERY BASICS LIKE FOOD, HOUSING, MEDICAL CARE, AND HEATING?: SOMEWHAT HARD

## 2021-05-25 NOTE — PROGRESS NOTES
STEVE Odell is a 36 y. o.female    Chief Complaint   Patient presents with    New Patient     Establish care and concerned of weight gain last weighed 176 a couple months ago, depression, and lack of energy       Chief complaint, [place], and all pertinent details of the case reviewed with the resident. Please see resident's note for specific details discussed at today's visit. Patient Active Problem List   Diagnosis    Coker's esophagus without dysplasia    Moderate episode of recurrent major depressive disorder ([address]    Bipolar 1 disorder (HCC)    Schizoaffective disorder, bipolar type ([facility] [facility]    Schizoaffective disorder (HCC)    Severe mixed bipolar 1 disorder without psychosis ([facility] [address] [facility]    Alcohol use disorder, severe, dependence ([facility] [address] [facility]    Amphetamine substance use disorder, severe, in sustained remission ([facility] [address] [facility]    Bipolar I disorder with mixed features ([facility] [address]    Class 1 obesity in adult       Current Outpatient Medications   Medication Sig Dispense Refill    LATUDA 40 MG TABS tablet 40 mg daily       hydrOXYzine (VISTARIL) 50 MG capsule daily      ibuprofen (ADVIL;MOTRIN) 800 MG tablet Take 1 tablet by mouth every 8 hours as needed for Pain 30 tablet 0    FLUoxetine (PROZAC) 20 MG capsule Take 40 mg by mouth daily      doxepin (SINEQUAN) 25 MG capsule Take 1 capsule by mouth nightly as needed (Insomnia) 30 capsule 0     No current facility-administered medications for this visit.        Review of Systems per Dr. Dony العلي     /76 (Site: Left Upper Arm, Position: Sitting, Cuff Size: Medium Adult)   Pulse 91   Temp 98 °F (36.7 °C) (Oral)   Resp 16   Ht 5' 3\" (1.6 m)   Wt 193 lb 9.6 oz (87.8 kg)   SpO2 98%   BMI 34.29 kg/m²   BP Readings from Last 3 Encounters:   05/25/21 112/76   04/09/21 134/85   08/18/20 (!) 132/92       Wt Readings from Last 3 Encounters:   05/25/21 193 lb 9.6 oz (87.8 kg)   04/09/21 165 lb (74.8 kg)   08/18/20 165 lb (74.8 kg) Body mass index is 34.29 kg/m². Physical Exam per Dr. Sophy Sweeney    No results found for this visit on 05/25/21. No results found for: LABA1C    Lab Results   Component Value Date    CHOL 112 10/02/2018    TRIG 52 10/02/2018    HDL 68 10/02/2018    LDLCALC 34 10/02/2018       The ASCVD Risk score (Gilman Aase., et al., 2013) failed to calculate for the following reasons: The valid total cholesterol range is 130 to 320 mg/dL    Lab Results   Component Value Date     04/09/2021    K 4.3 04/09/2021     04/09/2021    CO2 24 04/09/2021    BUN 11 04/09/2021    CREATININE 0.9 04/09/2021    GLUCOSE 120 (H) 04/09/2021    CALCIUM 9.5 04/09/2021    PROT 7.5 04/09/2021    LABALBU 4.0 04/09/2021    BILITOT 0.4 04/09/2021    ALKPHOS 91 04/09/2021    AST 37 04/09/2021    ALT 32 04/09/2021    LABGLOM 69 (A) 04/09/2021       Lab Results   Component Value Date    TSH 5.190 (H) 04/09/2021    T4FREE 0.81 (L) 08/10/2011       Lab Results   Component Value Date    WBC 10.2 04/09/2021    HGB 15.8 04/09/2021    HCT 47.9 (H) 04/09/2021    MCV 91.1 04/09/2021     04/09/2021           There is no immunization history on file for this patient.     Health Maintenance   Topic Date Due    Hepatitis C screen  Never done    Varicella vaccine (1 of 2 - 2-dose childhood series) Never done    Pneumococcal 0-64 years Vaccine (1 of 2 - PPSV23) Never done    COVID-19 Vaccine (1) Never done    HIV screen  Never done    DTaP/Tdap/Td vaccine (1 - Tdap) Never done    Cervical cancer screen  Never done    Annual Wellness Visit (AWV)  Never done    Diabetes screen  07/10/2020    Flu vaccine (Season Ended) 09/01/2021    Lipid screen  10/02/2023    Hepatitis A vaccine  Aged Out    Hepatitis B vaccine  Aged Out    Hib vaccine  Aged Out    Meningococcal (ACWY) vaccine  Aged Out           Future Appointments   Date Time Provider Mark Armstrong   7/23/2021 10:00 AM Chi Pink DO [facility] ASSESSMENT       Diagnosis Orders   1. Weight gain  TSH    T4, Free    Lipid, Fasting   2. Elevated fasting glucose  Hemoglobin A1C    Lipid, Fasting   3. Schizoaffective disorder, depressive type ([facility] [address]     4. Amphetamine substance use disorder, severe, in sustained remission ([facility] [address]     5. Encounter for hepatitis C virus screening test for high risk patient  Hepatitis C Antibody   6. Screening for HIV (human immunodeficiency virus)  HIV-1 and HIV-2 Antibodies   7. Class 1 obesity with body mass index (BMI) of 34.0 to 34.9 in adult, unspecified obesity type, unspecified whether serious comorbidity present  Lipid, Fasting       PLAN      After discussion with Dr. Manuela Grullon, we agreed on plan as follows:    Continue current medicines per Dois Quails   Follow-up with [facility] as planned  Check free T4/TSH/HG A1c/FLP/HIV/hep C screening at this time  Continue to work on diet exercise and weight loss  Follow-up in 7-8 weeks, in anticipation of possibly starting thyroid medicine given previous abnormal thyroid studies. Attending Physician Statement  I have discussed the case, including pertinent history and exam findings with the resident. I agree with the documented assessment and plan as documented by the resident.   GE modifier added  to this encounter     Electronically signed by Janelle Garcia MD on 5/25/2021 at 5:28 PM

## 2021-05-25 NOTE — PROGRESS NOTES
papilloma virus) anogenital infection     OCD (obsessive compulsive disorder)       Past Surgical History:   Procedure Laterality Date    CHOLECYSTECTOMY      INNER EAR SURGERY       Family History   Problem Relation Age of Onset    Diabetes Mother     Heart Disease Mother     Diabetes Father     Heart Disease Father     Cancer Paternal Aunt      Social History     Tobacco Use    Smoking status: Current Every Day Smoker     Packs/day: 0.50     Types: Cigarettes    Smokeless tobacco: Never Used   Substance Use Topics    Alcohol use: Yes     Comment: every other weekend      Current Outpatient Medications   Medication Sig Dispense Refill    LATUDA 40 MG TABS tablet 40 mg daily       hydrOXYzine (VISTARIL) 50 MG capsule daily      ibuprofen (ADVIL;MOTRIN) 800 MG tablet Take 1 tablet by mouth every 8 hours as needed for Pain 30 tablet 0    FLUoxetine (PROZAC) 20 MG capsule Take 40 mg by mouth daily      doxepin (SINEQUAN) 25 MG capsule Take 1 capsule by mouth nightly as needed (Insomnia) 30 capsule 0     No current facility-administered medications for this visit. No Known Allergies    Health Maintenance   Topic Date Due    Hepatitis C screen  Never done    Varicella vaccine (1 of 2 - 2-dose childhood series) Never done    Pneumococcal 0-64 years Vaccine (1 of 2 - PPSV23) Never done    COVID-19 Vaccine (1) Never done    HIV screen  Never done    DTaP/Tdap/Td vaccine (1 - Tdap) Never done    Cervical cancer screen  Never done    Annual Wellness Visit (AWV)  Never done    Diabetes screen  07/10/2020    Flu vaccine (Season Ended) 09/01/2021    Lipid screen  10/02/2023    Hepatitis A vaccine  Aged Out    Hepatitis B vaccine  Aged Out    Hib vaccine  Aged Out    Meningococcal (ACWY) vaccine  Aged Out       Subjective:      Review of Systems   Constitutional: Positive for fatigue and unexpected weight change. Negative for chills and fever.    HENT: Negative for ear pain, postnasal drip and trouble swallowing. Eyes: Negative for pain and visual disturbance. Respiratory: Negative for cough and shortness of breath. Cardiovascular: Negative for chest pain and palpitations. Gastrointestinal: Negative for abdominal pain, blood in stool, constipation, diarrhea, nausea and vomiting. Genitourinary: Negative for dysuria and urgency. Skin: Negative for rash and wound. Neurological: Negative for dizziness and headaches. Psychiatric/Behavioral: Negative for dysphoric mood. The patient is not nervous/anxious. Objective:     Vitals:    05/25/21 1434   BP: 112/76   Site: Left Upper Arm   Position: Sitting   Cuff Size: Medium Adult   Pulse: 91   Resp: 16   Temp: 98 °F (36.7 °C)   TempSrc: Oral   SpO2: 98%   Weight: 193 lb 9.6 oz (87.8 kg)   Height: 5' 3\" (1.6 m)       Body mass index is 34.29 kg/m². Wt Readings from Last 3 Encounters:   05/25/21 193 lb 9.6 oz (87.8 kg)   04/09/21 165 lb (74.8 kg)   08/18/20 165 lb (74.8 kg)     BP Readings from Last 3 Encounters:   05/25/21 112/76   04/09/21 134/85   08/18/20 (!) 132/92       Physical Exam  Vitals and nursing note reviewed. Constitutional:       General: She is not in acute distress. Appearance: She is well-developed. She is not diaphoretic. HENT:      Head: Normocephalic and atraumatic. Right Ear: External ear normal.      Left Ear: External ear normal.      Nose: Nose normal.   Eyes:      General: No scleral icterus. Right eye: No discharge. Left eye: No discharge. Conjunctiva/sclera: Conjunctivae normal.   Cardiovascular:      Rate and Rhythm: Normal rate and regular rhythm. Heart sounds: Normal heart sounds. No murmur heard. Pulmonary:      Effort: Pulmonary effort is normal.      Breath sounds: Normal breath sounds. Abdominal:      General: There is no distension. Palpations: Abdomen is soft. Tenderness: There is no abdominal tenderness.    Musculoskeletal:      Cervical back: Normal hypothyroidism. Patient also with a history of elevated fasting glucose from her emergency room visit last month we will check a hemoglobin A1c as well as a fasting lipid panel. Encourage patient to continue her current medications per her psychiatrist and to follow-up with them as appropriate. Will have patient follow-up in 8 weeks and discussed with patient that we may consider starting medications if her thyroid levels remain off. Patient voiced understanding of the plan and was in agreement. Return in about 8 weeks (around 7/20/2021) for follow up thyroid. Medications Prescribed:  No orders of the defined types were placed in this encounter. Future Appointments   Date Time Provider Mark Armstrong   7/23/2021 10:00 AM Chi Pink  [address]       Patient given educational materials - see patient instructions. Discussed use, benefit, and sideeffects of prescribed medications. All patient questions answered. Pt voiced understanding. Reviewed health maintenance. Instructed to continue current medications, diet and exercise. Patient agreed with treatment plan. Follow up as directed.      Electronically signed by [id number] DO [place] on 5/25/2021 at 4:02 PM

## 2021-05-25 NOTE — PROGRESS NOTES
Health Maintenance Due   Topic Date Due    Hepatitis C screen  Never done Declined    Varicella vaccine (1 of 2 - 2-dose childhood series) Never done    Pneumococcal 0-64 years Vaccine (1 of 2 - PPSV23) Never done    COVID-19 Vaccine (1) Never done Declined    HIV screen  Never done Declined    DTaP/Tdap/Td vaccine (1 - Tdap) Never done    Cervical cancer screen  Never done    Annual Wellness Visit (AWV)  Never done    Diabetes screen  07/10/2020

## 2021-05-25 NOTE — PATIENT INSTRUCTIONS
pregnant women, or patients whose health plans or employers are members of the [address] [place] [address] behavior modification   http://[place]. [employer]uit[employer]. org   Online support program to help patients through each step of the quitting process. Available 24 hours a day 7 days a week. Provides up to date researched based tool, step-by-step guides, and motivational messages. Online behavior modification   www.lungusa.org/stop-smoking/how-to-quit   HelpLine: [facility] [place] (255-7255)   Email questions to:  Ebba@[employer]. org    Website offers resources to help tobacco users figure out their reasons for quitting and then take the big step of quitting for good. Hypnosis   [facility] [address]   Contact: Emi Gallegos, PhD at [phone]   Hypnosis for tobacco cessation   Cost $225 for the initial session and $175 for each session afterwards. Most patients require 6-8 sessions. There is the option to submit through the patients insurance. Hypnosis and behavior modification   Location: [facility] [address] [place] [facility], [place]   Contact: Xi Corado, PhD at [phone]  [facility] and hypnosis for nicotine addition   Cost: For uninsured patients:  Please call above phone number  Cost for insured patients depends on patients insurance plan. Behavior modification   Location: [facility] [address] [facility] [address] [facility], [place]   Contact: Annamaria Davenport include four one-on-one appointments between the patient and a respiratory therapist.  The four appointments span over three weeks. The respiratory therapist schedules one of the appointments to occur 48 hours after the patients quit date.  Cost $100 total for the four sessions.   Tobacco cessation products are not included in the cost and are not provided by [facility].

## 2021-06-10 ENCOUNTER — TELEPHONE (OUTPATIENT)
Dept: FAMILY MEDICINE CLINIC | Age: 41
End: 2021-06-10

## 2021-06-10 NOTE — TELEPHONE ENCOUNTER
Albaro Stanton called requesting lab results. I called [facility] at [phone] for lab results and they will be faxing over the results today.

## 2021-06-16 ENCOUNTER — TELEPHONE (OUTPATIENT)
Dept: FAMILY MEDICINE CLINIC | Age: 41
End: 2021-06-16

## 2021-06-16 NOTE — TELEPHONE ENCOUNTER
----- Message from Cathleen Wesley DO sent at 6/16/2021  7:29 AM EDT -----  Labs are nearly perfect. Your thyroid numbers are just slightly off. I would recommend that we recheck your thyroid in 6 weeks. Otherwise everything else was negative.

## 2021-07-23 ENCOUNTER — OFFICE VISIT (OUTPATIENT)
Dept: FAMILY MEDICINE CLINIC | Age: 41
End: 2021-07-23
Payer: MEDICARE

## 2021-07-23 VITALS
WEIGHT: 198 LBS | TEMPERATURE: 98 F | HEIGHT: 63 IN | RESPIRATION RATE: 16 BRPM | HEART RATE: 80 BPM | BODY MASS INDEX: 35.08 KG/M2 | SYSTOLIC BLOOD PRESSURE: 126 MMHG | DIASTOLIC BLOOD PRESSURE: 82 MMHG

## 2021-07-23 DIAGNOSIS — E03.8 SUBCLINICAL HYPOTHYROIDISM: Primary | ICD-10-CM

## 2021-07-23 PROCEDURE — G8427 DOCREV CUR MEDS BY ELIG CLIN: HCPCS | Performed by: STUDENT IN AN ORGANIZED HEALTH CARE EDUCATION/TRAINING PROGRAM

## 2021-07-23 PROCEDURE — G8417 CALC BMI ABV UP PARAM F/U: HCPCS | Performed by: STUDENT IN AN ORGANIZED HEALTH CARE EDUCATION/TRAINING PROGRAM

## 2021-07-23 PROCEDURE — 4004F PT TOBACCO SCREEN RCVD TLK: CPT | Performed by: STUDENT IN AN ORGANIZED HEALTH CARE EDUCATION/TRAINING PROGRAM

## 2021-07-23 PROCEDURE — 99213 OFFICE O/P EST LOW 20 MIN: CPT | Performed by: STUDENT IN AN ORGANIZED HEALTH CARE EDUCATION/TRAINING PROGRAM

## 2021-07-23 RX ORDER — LEVOTHYROXINE SODIUM 0.05 MG/1
50 TABLET ORAL DAILY
Qty: 30 TABLET | Refills: 1 | Status: SHIPPED | OUTPATIENT
Start: 2021-07-23 | End: 2021-08-09

## 2021-07-23 NOTE — PROGRESS NOTES
Health Maintenance Due   Topic Date Due    Varicella vaccine (1 of 2 - 2-dose childhood series) Never done    Pneumococcal 0-64 years Vaccine (1 of 2 - PPSV23) Never done    COVID-19 Vaccine (1) Never done    HIV screen  Never done    DTaP/Tdap/Td vaccine (1 - Tdap) Never done    Cervical cancer screen  Never done    Annual Wellness Visit (AWV)  Never done    Diabetes screen  07/10/2020

## 2021-07-23 NOTE — PROGRESS NOTES
S: 39 y.o. female with   Chief Complaint   Patient presents with    Follow-up     2 Month Follow-up Thyroid and weight gain       HPI: please see resident note for HPI and ROS. BP Readings from Last 3 Encounters:   07/23/21 126/82   05/25/21 112/76   04/09/21 134/85     Wt Readings from Last 3 Encounters:   07/23/21 198 lb (89.8 kg)   05/25/21 193 lb 9.6 oz (87.8 kg)   04/09/21 165 lb (74.8 kg)       O: VS:  height is 5' 3\" (1.6 m) and weight is 198 lb (89.8 kg). Her oral temperature is 98 °F (36.7 °C). Her blood pressure is 126/82 and her pulse is 80. Her respiration is 16. AAO/NAD, appropriate affect for mood       Diagnosis Orders   1. Subclinical hypothyroidism         Plan:  Start levothyroxine 50mcg daily and recheck thyroid labs in 6 wks. Health Maintenance Due   Topic Date Due    Varicella vaccine (1 of 2 - 2-dose childhood series) Never done    Pneumococcal 0-64 years Vaccine (1 of 2 - PPSV23) Never done    COVID-19 Vaccine (1) Never done    HIV screen  Never done    DTaP/Tdap/Td vaccine (1 - Tdap) Never done    Cervical cancer screen  Never done    Annual Wellness Visit (AWV)  Never done    Diabetes screen  07/10/2020       Attending Physician Statement  I have discussed the case, including pertinent history and exam findings with the resident. I agree with the documented assessment and plan as documented by the resident.         Jadon Terry,  7/23/2021 11:04 AM

## 2021-07-23 NOTE — PROGRESS NOTES
[id number] [facility] [address] [facility] [address]  Dept: [phone]  Dept Fax: [phone]  Loc: Irish Hebert is a 39 y.o. female who presents today for:  Chief Complaint   Patient presents with    Follow-up     2 Month Follow-up Thyroid and weight gain       Goals    None         HPI:     HPI  Patient presents for 2 month follow up. Complaining of weight gain today. She states that she has gained 30lbs over the past several months. She states that she has been trying to diet and exercise with no real benefit. She states that she is worried something is off with her metabolism. Lab work showed low t4 with normal TSH.  Has never taken thyroid medication in the past.     Past Medical History:   Diagnosis Date    Anxiety     Back pain     Colitis     Depression     Fibromyalgia     HPV (human papilloma virus) anogenital infection     OCD (obsessive compulsive disorder)       Past Surgical History:   Procedure Laterality Date    CHOLECYSTECTOMY      INNER EAR SURGERY       Family History   Problem Relation Age of Onset    Diabetes Mother     Heart Disease Mother     Diabetes Father     Heart Disease Father     Cancer Paternal Aunt      Social History     Tobacco Use    Smoking status: Current Every Day Smoker     Packs/day: 0.50     Types: Cigarettes    Smokeless tobacco: Never Used   Substance Use Topics    Alcohol use: Not Currently     Comment: last used 07/2021      Current Outpatient Medications   Medication Sig Dispense Refill    levothyroxine (SYNTHROID) 50 MCG tablet TAKE 1 TABLET BY MOUTH DAILY 30 tablet 1    LATUDA 40 MG TABS tablet 40 mg daily       ibuprofen (ADVIL;MOTRIN) 800 MG tablet Take 1 tablet by mouth every 8 hours as needed for Pain 30 tablet 0    FLUoxetine (PROZAC) 20 MG capsule Take 40 mg by mouth 2 times daily        No current facility-administered medications for this visit. No Known Allergies    Health Maintenance   Topic Date Due    Varicella vaccine (1 of 2 - 2-dose childhood series) Never done    Pneumococcal 0-64 years Vaccine (1 of 2 - PPSV23) Never done    COVID-19 Vaccine (1) Never done    HIV screen  Never done    DTaP/Tdap/Td vaccine (1 - Tdap) Never done    Cervical cancer screen  Never done    Annual Wellness Visit (AWV)  Never done    Diabetes screen  07/10/2020    Flu vaccine (1) 09/01/2021    Lipid screen  05/27/2026    Hepatitis C screen  Completed    Hepatitis A vaccine  Aged Out    Hepatitis B vaccine  Aged Out    Hib vaccine  Aged Out    Meningococcal (ACWY) vaccine  Aged Out       Subjective:      Review of Systems   Constitutional: Positive for unexpected weight change. Negative for activity change, appetite change, chills, fatigue and fever. HENT: Negative for ear pain, postnasal drip and trouble swallowing. Eyes: Negative for pain and visual disturbance. Respiratory: Negative for cough and shortness of breath. Cardiovascular: Negative for chest pain and palpitations. Gastrointestinal: Negative for abdominal pain, blood in stool, constipation, diarrhea, nausea and vomiting. Genitourinary: Negative for dysuria and urgency. Skin: Negative for rash and wound. Neurological: Negative for dizziness and headaches. Psychiatric/Behavioral: Negative for dysphoric mood. The patient is not nervous/anxious. Objective:     Vitals:    07/23/21 1027   BP: 126/82   Site: Right Upper Arm   Position: Sitting   Cuff Size: Medium Adult   Pulse: 80   Resp: 16   Temp: 98 °F (36.7 °C)   TempSrc: Oral   Weight: 198 lb (89.8 kg)   Height: 5' 3\" (1.6 m)       Body mass index is 35.07 kg/m².     Wt Readings from Last 3 Encounters:   07/23/21 198 lb (89.8 kg)   05/25/21 193 lb 9.6 oz (87.8 kg)   04/09/21 165 lb (74.8 kg)     BP Readings from Last 3 Encounters:   07/23/21 126/82   05/25/21 112/76   04/09/21 134/85       Physical Exam  Vitals and nursing note reviewed. Constitutional:       General: She is not in acute distress. Appearance: She is well-developed. She is not diaphoretic. HENT:      Head: Normocephalic and atraumatic. Right Ear: External ear normal.      Left Ear: External ear normal.      Nose: Nose normal.   Eyes:      General: No scleral icterus. Right eye: No discharge. Left eye: No discharge. Conjunctiva/sclera: Conjunctivae normal.   Cardiovascular:      Rate and Rhythm: Normal rate and regular rhythm. Heart sounds: Normal heart sounds. No murmur heard. Pulmonary:      Effort: Pulmonary effort is normal.      Breath sounds: Normal breath sounds. Musculoskeletal:      Cervical back: Normal range of motion. Skin:     General: Skin is warm and dry. Findings: No erythema or rash. Neurological:      Mental Status: She is alert and oriented to person, place, and time. Cranial Nerves: No cranial nerve deficit. Psychiatric:         Behavior: Behavior normal.         Thought Content: Thought content normal.         Judgment: Judgment normal.         Lab Results   Component Value Date    WBC 10.2 04/09/2021    HGB 15.8 04/09/2021    HCT 47.9 (H) 04/09/2021     04/09/2021    CHOL 112 10/02/2018    TRIG 52 10/02/2018    HDL 68 10/02/2018    LDLCALC 34 10/02/2018    AST 37 04/09/2021     04/09/2021    K 4.3 04/09/2021     04/09/2021    CREATININE 0.9 04/09/2021    BUN 11 04/09/2021    CO2 24 04/09/2021    TSH 5.190 (H) 04/09/2021    GLUF 102 10/02/2018    LABGLOM 69 (A) 04/09/2021    MG 1.9 11/03/2018    CALCIUM 9.5 04/09/2021       Imaging Results:    No results found. Assessment/Plan:     Nelida MORRIS was seen today for follow-up. Diagnoses and all orders for this visit:    Subclinical hypothyroidism  -     Discontinue: levothyroxine (SYNTHROID) 50 MCG tablet; Take 1 tablet by mouth daily  -     TSH;  Future  -     T4, Free; Future    Start synthroid 50mcg

## 2021-08-09 DIAGNOSIS — E03.8 SUBCLINICAL HYPOTHYROIDISM: ICD-10-CM

## 2021-08-09 RX ORDER — LEVOTHYROXINE SODIUM 0.05 MG/1
50 TABLET ORAL DAILY
Qty: 30 TABLET | Refills: 1 | Status: SHIPPED | OUTPATIENT
Start: 2021-08-09 | End: 2021-10-06

## 2021-08-09 NOTE — TELEPHONE ENCOUNTER
Patient's last appointment was : 7/23/2021  Patient's next appointment is : 9/3/2021  Last refilled:  7/23/2021

## 2021-08-23 ASSESSMENT — ENCOUNTER SYMPTOMS
CONSTIPATION: 0
ABDOMINAL PAIN: 0
EYE PAIN: 0
COUGH: 0
DIARRHEA: 0
NAUSEA: 0
TROUBLE SWALLOWING: 0
VOMITING: 0
SHORTNESS OF BREATH: 0
BLOOD IN STOOL: 0

## 2021-09-08 ENCOUNTER — OFFICE VISIT (OUTPATIENT)
Dept: FAMILY MEDICINE CLINIC | Age: 41
End: 2021-09-08
Payer: MEDICARE

## 2021-09-08 VITALS
RESPIRATION RATE: 16 BRPM | WEIGHT: 197.2 LBS | SYSTOLIC BLOOD PRESSURE: 130 MMHG | HEIGHT: 63 IN | OXYGEN SATURATION: 96 % | TEMPERATURE: 96.7 F | BODY MASS INDEX: 34.94 KG/M2 | HEART RATE: 114 BPM | DIASTOLIC BLOOD PRESSURE: 82 MMHG

## 2021-09-08 DIAGNOSIS — E03.8 SUBCLINICAL HYPOTHYROIDISM: Primary | ICD-10-CM

## 2021-09-08 DIAGNOSIS — F31.9 BIPOLAR I DISORDER WITH MIXED FEATURES (HCC): ICD-10-CM

## 2021-09-08 DIAGNOSIS — F41.9 ANXIETY: ICD-10-CM

## 2021-09-08 DIAGNOSIS — R63.5 WEIGHT GAIN: ICD-10-CM

## 2021-09-08 PROCEDURE — G8427 DOCREV CUR MEDS BY ELIG CLIN: HCPCS | Performed by: STUDENT IN AN ORGANIZED HEALTH CARE EDUCATION/TRAINING PROGRAM

## 2021-09-08 PROCEDURE — 4004F PT TOBACCO SCREEN RCVD TLK: CPT | Performed by: STUDENT IN AN ORGANIZED HEALTH CARE EDUCATION/TRAINING PROGRAM

## 2021-09-08 PROCEDURE — 99214 OFFICE O/P EST MOD 30 MIN: CPT | Performed by: STUDENT IN AN ORGANIZED HEALTH CARE EDUCATION/TRAINING PROGRAM

## 2021-09-08 PROCEDURE — G8417 CALC BMI ABV UP PARAM F/U: HCPCS | Performed by: STUDENT IN AN ORGANIZED HEALTH CARE EDUCATION/TRAINING PROGRAM

## 2021-09-08 RX ORDER — BUPROPION HYDROCHLORIDE 100 MG/1
TABLET ORAL
COMMUNITY
Start: 2021-08-29 | End: 2021-09-22

## 2021-09-08 RX ORDER — CYPROHEPTADINE HYDROCHLORIDE 4 MG/1
4 TABLET ORAL NIGHTLY PRN
Status: ON HOLD | COMMUNITY
Start: 2021-08-16 | End: 2021-09-23

## 2021-09-08 RX ORDER — HYDROXYZINE PAMOATE 50 MG/1
50 CAPSULE ORAL NIGHTLY PRN
Status: ON HOLD | COMMUNITY
Start: 2021-08-16 | End: 2021-09-23

## 2021-09-08 RX ORDER — RISPERIDONE 0.5 MG/1
0.5 TABLET, FILM COATED ORAL EVERY EVENING
Status: ON HOLD | COMMUNITY
Start: 2021-08-29 | End: 2021-09-27 | Stop reason: SDUPTHER

## 2021-09-08 RX ORDER — ATOMOXETINE 60 MG/1
CAPSULE ORAL
COMMUNITY
Start: 2021-08-29 | End: 2021-09-08

## 2021-09-08 ASSESSMENT — ENCOUNTER SYMPTOMS
EYE PAIN: 0
NAUSEA: 0
DIARRHEA: 0
SHORTNESS OF BREATH: 0
TROUBLE SWALLOWING: 0
COUGH: 0
CONSTIPATION: 0
ABDOMINAL PAIN: 0
VOMITING: 0
BLOOD IN STOOL: 0

## 2021-09-08 NOTE — PROGRESS NOTES
Attending attestation:  I personally performed and participated key or critical portions of the evaluation and management including personally performing the exam and medical decision making. I verify the accuracy of the documentation by the resident with the following addition or changes: 6 week follow-up for hypothyroidism. TSH normal but T4 low. Started on 50 mcg of synthroid. Labs not checked. Will do this. Tolerating medication well. Depression, anxiety, bipolar. Follows with Magaly Alcocer with . Josy Herrera prescribing. Wants our office to assume care of psychiatric medications.  here with patient. Sleeping all the time. Wellbutrin and Risperdal are new. Reports Strattera does not really work. Prozac okay, on this forever. Hydroxyzine is leading to sedation. Has had a significant recent weight gain. Still depressed. States tearful and yelling multiple times a day. Reportedly did well on Adderall in the past and wants to go on this. We do not have records related to this. She has failed multiple mood stabilizers including latuda, depakote, and lamictal and is not at al interested in lithium. Discussed options. Will change hydroxyzine to evening prn and stop Strattera. Close follow-up. Continue to monitor weight an try to eat healthy stacks. Exercise encouraged. Indications for prompt return and/or emergent presentation if worsening reviewed in detail.           Electronically signed by Duke Rocha MD on 9/8/2021 at 1:38 PM

## 2021-09-08 NOTE — PROGRESS NOTES
[id number] [facility] Ernesto Grayson [facility] [address]  Dept: [phone]  Dept Fax: [phone]  Loc: Enmanuel Smith is a 39 y.o. female who presents today for:  Chief Complaint   Patient presents with    Follow-up     6 week follow up    Discuss Medications     wants off medications/switched to Adderall       Goals    None         HPI:     HPI  Patient presents for 6-week follow-up for subclinical hypothyroidism. Patient was started on 50 mcg of Synthroid after her last appointment for significant weight gain with decreased T4 and normal TSH. She does have a history of elevated TSH in the past.  Patient states she has been taking the Synthroid as prescribed without any side effects of the medication currently. She states that she generally has not been feeling well since her last appointment due to mental health issues. Patient follows with [facility] for her history of bipolar 1 with mixed features. Patient has been on several different mood stabilizers in the past including Lamictal, Latuda. Patient states that she is currently taking Wellbutrin, Prozac, Vistaril, Risperdal, Strattera. States that her symptoms have not been well controlled and will frequently have outbursts of rage and depression throughout the day. She states that she wants to come off all of these medications other than Prozac as not these medications are helping her. She states that 15 years ago she was seen Dr. Keith Dalton and was prescribed Prozac and Adderall at that time. She states that her mood was in a much better place when she was taking that regimen. Denies any hallucinations, visual or auditory. Admits to depressed mood frequently. History of manic episode in the past.    Weight is stable and patient is down around 1/2 pounds since her last appointment.     Past Medical History:   Diagnosis Date    Anxiety      Meningococcal (ACWY) vaccine  Aged Out       Subjective:      Review of Systems   Constitutional: Positive for fatigue and unexpected weight change. Negative for chills and fever. HENT: Negative for ear pain, postnasal drip and trouble swallowing. Eyes: Negative for pain and visual disturbance. Respiratory: Negative for cough and shortness of breath. Cardiovascular: Negative for chest pain and palpitations. Gastrointestinal: Negative for abdominal pain, blood in stool, constipation, diarrhea, nausea and vomiting. Genitourinary: Negative for dysuria and urgency. Skin: Negative for rash and wound. Neurological: Negative for dizziness and headaches. Psychiatric/Behavioral: Positive for agitation and dysphoric mood. The patient is nervous/anxious. Objective:     Vitals:    09/08/21 1310   BP: 130/82   Site: Right Upper Arm   Position: Sitting   Cuff Size: Medium Adult   Pulse: 114   Resp: 16   Temp: 96.7 °F (35.9 °C)   TempSrc: Skin   SpO2: 96%   Weight: 197 lb 3.2 oz (89.4 kg)   Height: 5' 3\" (1.6 m)       Body mass index is 34.93 kg/m². Wt Readings from Last 3 Encounters:   09/08/21 197 lb 3.2 oz (89.4 kg)   07/23/21 198 lb (89.8 kg)   05/25/21 193 lb 9.6 oz (87.8 kg)     BP Readings from Last 3 Encounters:   09/08/21 130/82   07/23/21 126/82   05/25/21 112/76       Physical Exam  Vitals and nursing note reviewed. Constitutional:       General: She is not in acute distress. Appearance: She is well-developed. She is not diaphoretic. HENT:      Head: Normocephalic and atraumatic. Right Ear: External ear normal.      Left Ear: External ear normal.      Nose: Nose normal.   Eyes:      General: No scleral icterus. Right eye: No discharge. Left eye: No discharge. Conjunctiva/sclera: Conjunctivae normal.   Cardiovascular:      Rate and Rhythm: Normal rate and regular rhythm. Heart sounds: Normal heart sounds. No murmur heard.      Pulmonary:      Effort: Pulmonary effort is normal.      Breath sounds: Normal breath sounds. Musculoskeletal:      Cervical back: Normal range of motion. Skin:     General: Skin is warm and dry. Findings: No erythema or rash. Neurological:      Mental Status: She is alert and oriented to person, place, and time. Cranial Nerves: No cranial nerve deficit. Psychiatric:         Behavior: Behavior normal.         Thought Content: Thought content normal.         Judgment: Judgment normal.         Lab Results   Component Value Date    WBC 10.2 04/09/2021    HGB 15.8 04/09/2021    HCT 47.9 (H) 04/09/2021     04/09/2021    CHOL 112 10/02/2018    TRIG 52 10/02/2018    HDL 68 10/02/2018    LDLCALC 34 10/02/2018    AST 37 04/09/2021     04/09/2021    K 4.3 04/09/2021     04/09/2021    CREATININE 0.9 04/09/2021    BUN 11 04/09/2021    CO2 24 04/09/2021    TSH 5.190 (H) 04/09/2021    GLUF 102 10/02/2018    LABGLOM 69 (A) 04/09/2021    MG 1.9 11/03/2018    CALCIUM 9.5 04/09/2021       Imaging Results:    No results found. Assessment/Plan:     Eloise MORRIS was seen today for follow-up and discuss medications. Diagnoses and all orders for this visit:    Subclinical hypothyroidism    Weight gain    Anxiety    Bipolar I disorder with mixed features ([facility] [address]      Stop scheduled Vistaril and Strattera. Okay to resume Vistaril 50 mg nightly as needed for anxiety. Continue Synthroid at current dose while awaiting repeat lab work. Repeat TSH and free T4 ordered at last appointment. Continue Prozac, Wellbutrin, Risperdal at this time for patient's bipolar 1. Discussed with patient that this will be a slow process weaning off medications and will need close monitoring therefore will follow up on patient in 2 weeks. Patient voiced understanding of plan and was in agreement. Return in about 2 weeks (around 9/22/2021) for follow up .       Medications Prescribed:  No orders of the defined types were placed in this encounter. Future Appointments   Date Time Provider Mark Armstrong   9/22/2021  1:20 PM Chi Pink  [address]       Patient given educational materials - see patient instructions. Discussed use, benefit, and sideeffects of prescribed medications. All patient questions answered. Pt voiced understanding. Reviewed health maintenance. Instructed to continue current medications, diet and exercise. Patient agreed with treatment plan. Follow up as directed.      Electronically signed by Kendrick Portillo DO on 9/8/2021 at 3:18 PM

## 2021-09-08 NOTE — PROGRESS NOTES
Health Maintenance Due   Topic Date Due    Pneumococcal 0-64 years Vaccine (1 of 2 - PPSV23) Never done    HIV screen  Never done    DTaP/Tdap/Td vaccine (1 - Tdap) Never done    Varicella vaccine (1 of 2 - 2-dose childhood series) Never done    Cervical cancer screen  Never done    Annual Wellness Visit (AWV)  Never done    Diabetes screen  07/10/2020    Flu vaccine (1) 09/01/2021    COVID-19 Vaccine (2 - Moderna 2-dose series) 09/08/2021

## 2021-09-22 ENCOUNTER — OFFICE VISIT (OUTPATIENT)
Dept: FAMILY MEDICINE CLINIC | Age: 41
End: 2021-09-22
Payer: MEDICARE

## 2021-09-22 ENCOUNTER — HOSPITAL ENCOUNTER (INPATIENT)
Age: 41
LOS: 5 days | Discharge: HOME | DRG: 885 | End: 2021-09-27
Attending: EMERGENCY MEDICINE | Admitting: PSYCHIATRY & NEUROLOGY
Payer: MEDICARE

## 2021-09-22 VITALS
HEART RATE: 102 BPM | HEIGHT: 63 IN | OXYGEN SATURATION: 98 % | SYSTOLIC BLOOD PRESSURE: 122 MMHG | BODY MASS INDEX: 35.83 KG/M2 | WEIGHT: 202.2 LBS | RESPIRATION RATE: 16 BRPM | DIASTOLIC BLOOD PRESSURE: 76 MMHG | TEMPERATURE: 96.4 F

## 2021-09-22 DIAGNOSIS — R45.851 SUICIDAL THOUGHTS: ICD-10-CM

## 2021-09-22 DIAGNOSIS — F32.A DEPRESSION WITH SUICIDAL IDEATION: Primary | ICD-10-CM

## 2021-09-22 DIAGNOSIS — F31.9 BIPOLAR I DISORDER WITH MIXED FEATURES (HCC): ICD-10-CM

## 2021-09-22 DIAGNOSIS — R45.851 DEPRESSION WITH SUICIDAL IDEATION: Primary | ICD-10-CM

## 2021-09-22 DIAGNOSIS — F23 ACUTE PSYCHOSIS (HCC): Primary | ICD-10-CM

## 2021-09-22 LAB
ACETAMINOPHEN LEVEL: < 5 UG/ML (ref 0–20)
ALBUMIN SERPL-MCNC: 3.7 G/DL (ref 3.5–5.1)
ALP BLD-CCNC: 93 U/L (ref 38–126)
ALT SERPL-CCNC: 37 U/L (ref 11–66)
AMPHETAMINE+METHAMPHETAMINE URINE SCREEN: NEGATIVE
ANION GAP SERPL CALCULATED.3IONS-SCNC: 12 MEQ/L (ref 8–16)
AST SERPL-CCNC: 50 U/L (ref 5–40)
BACTERIA: ABNORMAL /HPF
BARBITURATE QUANTITATIVE URINE: NEGATIVE
BASOPHILS # BLD: 0.8 %
BASOPHILS ABSOLUTE: 0.1 THOU/MM3 (ref 0–0.1)
BENZODIAZEPINE QUANTITATIVE URINE: NEGATIVE
BILIRUB SERPL-MCNC: 0.4 MG/DL (ref 0.3–1.2)
BILIRUBIN DIRECT: < 0.2 MG/DL (ref 0–0.3)
BILIRUBIN URINE: NEGATIVE
BLOOD, URINE: NEGATIVE
BUN BLDV-MCNC: 9 MG/DL (ref 7–22)
CALCIUM SERPL-MCNC: 9.1 MG/DL (ref 8.5–10.5)
CANNABINOID QUANTITATIVE URINE: POSITIVE
CASTS 2: ABNORMAL /LPF
CASTS UA: ABNORMAL /LPF
CHARACTER, URINE: ABNORMAL
CHLORIDE BLD-SCNC: 105 MEQ/L (ref 98–111)
CO2: 21 MEQ/L (ref 23–33)
COCAINE METABOLITE QUANTITATIVE URINE: NEGATIVE
COLOR: YELLOW
CREAT SERPL-MCNC: 0.7 MG/DL (ref 0.4–1.2)
CRYSTALS, UA: ABNORMAL
EOSINOPHIL # BLD: 2.1 %
EOSINOPHILS ABSOLUTE: 0.2 THOU/MM3 (ref 0–0.4)
EPITHELIAL CELLS, UA: ABNORMAL /HPF
ERYTHROCYTE [DISTWIDTH] IN BLOOD BY AUTOMATED COUNT: 13.1 % (ref 11.5–14.5)
ERYTHROCYTE [DISTWIDTH] IN BLOOD BY AUTOMATED COUNT: 42.9 FL (ref 35–45)
ETHYL ALCOHOL, SERUM: < 0.01 %
GFR SERPL CREATININE-BSD FRML MDRD: > 90 ML/MIN/1.73M2
GLUCOSE BLD-MCNC: 104 MG/DL (ref 70–108)
GLUCOSE URINE: NEGATIVE MG/DL
HCT VFR BLD CALC: 44.5 % (ref 37–47)
HEMOGLOBIN: 15.6 GM/DL (ref 12–16)
IMMATURE GRANS (ABS): 0.04 THOU/MM3 (ref 0–0.07)
IMMATURE GRANULOCYTES: 0.4 %
KETONES, URINE: ABNORMAL
LEUKOCYTE ESTERASE, URINE: ABNORMAL
LIPASE: 27.6 U/L (ref 5.6–51.3)
LYMPHOCYTES # BLD: 17.2 %
LYMPHOCYTES ABSOLUTE: 1.8 THOU/MM3 (ref 1–4.8)
MCH RBC QN AUTO: 31.5 PG (ref 26–33)
MCHC RBC AUTO-ENTMCNC: 35.1 GM/DL (ref 32.2–35.5)
MCV RBC AUTO: 89.9 FL (ref 81–99)
MISCELLANEOUS 2: ABNORMAL
MONOCYTES # BLD: 10 %
MONOCYTES ABSOLUTE: 1.1 THOU/MM3 (ref 0.4–1.3)
MUCUS: ABNORMAL
NITRITE, URINE: NEGATIVE
NUCLEATED RED BLOOD CELLS: 0 /100 WBC
OPIATES, URINE: NEGATIVE
OSMOLALITY CALCULATION: 274.7 MOSMOL/KG (ref 275–300)
OXYCODONE: NEGATIVE
PH UA: 6.5 (ref 5–9)
PHENCYCLIDINE QUANTITATIVE URINE: NEGATIVE
PLATELET # BLD: 272 THOU/MM3 (ref 130–400)
PMV BLD AUTO: 8.6 FL (ref 9.4–12.4)
POTASSIUM SERPL-SCNC: 4.3 MEQ/L (ref 3.5–5.2)
PROTEIN UA: ABNORMAL
RBC # BLD: 4.95 MILL/MM3 (ref 4.2–5.4)
RBC URINE: ABNORMAL /HPF
RENAL EPITHELIAL, UA: ABNORMAL
SALICYLATE, SERUM: < 0.3 MG/DL (ref 2–10)
SEG NEUTROPHILS: 69.5 %
SEGMENTED NEUTROPHILS ABSOLUTE COUNT: 7.4 THOU/MM3 (ref 1.8–7.7)
SODIUM BLD-SCNC: 138 MEQ/L (ref 135–145)
SPECIFIC GRAVITY, URINE: 1.01 (ref 1–1.03)
TOTAL PROTEIN: 7.2 G/DL (ref 6.1–8)
TSH SERPL DL<=0.05 MIU/L-ACNC: 1.05 UIU/ML (ref 0.4–4.2)
UROBILINOGEN, URINE: 1 EU/DL (ref 0–1)
WBC # BLD: 10.6 THOU/MM3 (ref 4.8–10.8)
WBC UA: ABNORMAL /HPF
YEAST: ABNORMAL

## 2021-09-22 PROCEDURE — G8417 CALC BMI ABV UP PARAM F/U: HCPCS | Performed by: STUDENT IN AN ORGANIZED HEALTH CARE EDUCATION/TRAINING PROGRAM

## 2021-09-22 PROCEDURE — 83690 ASSAY OF LIPASE: CPT

## 2021-09-22 PROCEDURE — 80143 DRUG ASSAY ACETAMINOPHEN: CPT

## 2021-09-22 PROCEDURE — G8427 DOCREV CUR MEDS BY ELIG CLIN: HCPCS | Performed by: STUDENT IN AN ORGANIZED HEALTH CARE EDUCATION/TRAINING PROGRAM

## 2021-09-22 PROCEDURE — 87086 URINE CULTURE/COLONY COUNT: CPT

## 2021-09-22 PROCEDURE — 81001 URINALYSIS AUTO W/SCOPE: CPT

## 2021-09-22 PROCEDURE — 82248 BILIRUBIN DIRECT: CPT

## 2021-09-22 PROCEDURE — 80053 COMPREHEN METABOLIC PANEL: CPT

## 2021-09-22 PROCEDURE — 6370000000 HC RX 637 (ALT 250 FOR IP): Performed by: PSYCHIATRY & NEUROLOGY

## 2021-09-22 PROCEDURE — 80179 DRUG ASSAY SALICYLATE: CPT

## 2021-09-22 PROCEDURE — 85025 COMPLETE CBC W/AUTO DIFF WBC: CPT

## 2021-09-22 PROCEDURE — 99215 OFFICE O/P EST HI 40 MIN: CPT | Performed by: STUDENT IN AN ORGANIZED HEALTH CARE EDUCATION/TRAINING PROGRAM

## 2021-09-22 PROCEDURE — 4004F PT TOBACCO SCREEN RCVD TLK: CPT | Performed by: STUDENT IN AN ORGANIZED HEALTH CARE EDUCATION/TRAINING PROGRAM

## 2021-09-22 PROCEDURE — 82077 ASSAY SPEC XCP UR&BREATH IA: CPT

## 2021-09-22 PROCEDURE — 84443 ASSAY THYROID STIM HORMONE: CPT

## 2021-09-22 PROCEDURE — 1240000000 HC EMOTIONAL WELLNESS R&B

## 2021-09-22 PROCEDURE — 99284 EMERGENCY DEPT VISIT MOD MDM: CPT

## 2021-09-22 PROCEDURE — 80307 DRUG TEST PRSMV CHEM ANLYZR: CPT

## 2021-09-22 PROCEDURE — 36415 COLL VENOUS BLD VENIPUNCTURE: CPT

## 2021-09-22 RX ORDER — HALOPERIDOL 5 MG/ML
5 INJECTION INTRAMUSCULAR ONCE
Status: DISCONTINUED | OUTPATIENT
Start: 2021-09-22 | End: 2021-09-27 | Stop reason: HOSPADM

## 2021-09-22 RX ORDER — LORAZEPAM 2 MG/1
2 TABLET ORAL EVERY 6 HOURS PRN
Status: DISCONTINUED | OUTPATIENT
Start: 2021-09-22 | End: 2021-09-27 | Stop reason: HOSPADM

## 2021-09-22 RX ORDER — HALOPERIDOL 5 MG
5 TABLET ORAL EVERY 6 HOURS PRN
Status: DISCONTINUED | OUTPATIENT
Start: 2021-09-22 | End: 2021-09-27 | Stop reason: HOSPADM

## 2021-09-22 RX ORDER — IBUPROFEN 400 MG/1
400 TABLET ORAL EVERY 6 HOURS PRN
Status: DISCONTINUED | OUTPATIENT
Start: 2021-09-22 | End: 2021-09-27 | Stop reason: HOSPADM

## 2021-09-22 RX ORDER — TRAZODONE HYDROCHLORIDE 50 MG/1
50 TABLET ORAL NIGHTLY PRN
Status: DISCONTINUED | OUTPATIENT
Start: 2021-09-22 | End: 2021-09-27 | Stop reason: HOSPADM

## 2021-09-22 RX ORDER — ACETAMINOPHEN 325 MG/1
650 TABLET ORAL EVERY 4 HOURS PRN
Status: DISCONTINUED | OUTPATIENT
Start: 2021-09-22 | End: 2021-09-27 | Stop reason: HOSPADM

## 2021-09-22 RX ORDER — LORAZEPAM 2 MG/ML
2 INJECTION INTRAMUSCULAR ONCE
Status: DISCONTINUED | OUTPATIENT
Start: 2021-09-22 | End: 2021-09-27 | Stop reason: HOSPADM

## 2021-09-22 RX ORDER — MAGNESIUM HYDROXIDE/ALUMINUM HYDROXICE/SIMETHICONE 120; 1200; 1200 MG/30ML; MG/30ML; MG/30ML
30 SUSPENSION ORAL EVERY 6 HOURS PRN
Status: DISCONTINUED | OUTPATIENT
Start: 2021-09-22 | End: 2021-09-27 | Stop reason: HOSPADM

## 2021-09-22 RX ORDER — LORAZEPAM 2 MG/ML
2 INJECTION INTRAMUSCULAR EVERY 6 HOURS PRN
Status: DISCONTINUED | OUTPATIENT
Start: 2021-09-22 | End: 2021-09-27 | Stop reason: HOSPADM

## 2021-09-22 RX ORDER — HYDROXYZINE HYDROCHLORIDE 25 MG/1
50 TABLET, FILM COATED ORAL 3 TIMES DAILY PRN
Status: DISCONTINUED | OUTPATIENT
Start: 2021-09-22 | End: 2021-09-27 | Stop reason: HOSPADM

## 2021-09-22 RX ORDER — BUPROPION HYDROCHLORIDE 150 MG/1
150 TABLET ORAL EVERY MORNING
Status: ON HOLD | COMMUNITY
Start: 2021-09-10 | End: 2021-09-27 | Stop reason: HOSPADM

## 2021-09-22 RX ORDER — HALOPERIDOL 5 MG/ML
5 INJECTION INTRAMUSCULAR EVERY 6 HOURS PRN
Status: DISCONTINUED | OUTPATIENT
Start: 2021-09-22 | End: 2021-09-27 | Stop reason: HOSPADM

## 2021-09-22 RX ADMIN — IBUPROFEN 400 MG: 400 TABLET, FILM COATED ORAL at 21:36

## 2021-09-22 RX ADMIN — HYDROXYZINE HYDROCHLORIDE 50 MG: 25 TABLET ORAL at 21:36

## 2021-09-22 RX ADMIN — TRAZODONE HYDROCHLORIDE 50 MG: 50 TABLET ORAL at 21:36

## 2021-09-22 ASSESSMENT — ENCOUNTER SYMPTOMS
ABDOMINAL PAIN: 0
EYE PAIN: 0
SHORTNESS OF BREATH: 0
VOMITING: 0
BACK PAIN: 0
DIARRHEA: 0
TROUBLE SWALLOWING: 0
BLOOD IN STOOL: 0
NAUSEA: 0
RHINORRHEA: 0
SHORTNESS OF BREATH: 0
NAUSEA: 0
WHEEZING: 0
CONSTIPATION: 0
COUGH: 0
EYE PAIN: 0
CHEST TIGHTNESS: 0
ABDOMINAL PAIN: 0
ABDOMINAL DISTENTION: 0
COUGH: 0
STRIDOR: 0
EYE REDNESS: 0
CONSTIPATION: 0
EYE DISCHARGE: 0
VOMITING: 0
PHOTOPHOBIA: 0
EYE ITCHING: 0
DIARRHEA: 0
SORE THROAT: 0

## 2021-09-22 ASSESSMENT — PAIN DESCRIPTION - PAIN TYPE
TYPE: CHRONIC PAIN
TYPE: ACUTE PAIN

## 2021-09-22 ASSESSMENT — PAIN DESCRIPTION - DESCRIPTORS
DESCRIPTORS: ACHING
DESCRIPTORS: ACHING;CONSTANT

## 2021-09-22 ASSESSMENT — SLEEP AND FATIGUE QUESTIONNAIRES
AVERAGE NUMBER OF SLEEP HOURS: 5
DIFFICULTY STAYING ASLEEP: YES
DIFFICULTY FALLING ASLEEP: NO
RESTFUL SLEEP: NO
DO YOU USE A SLEEP AID: YES
DO YOU HAVE DIFFICULTY SLEEPING: YES
DO YOU HAVE DIFFICULTY SLEEPING: YES
SLEEP PATTERN: DISTURBED/INTERRUPTED SLEEP
DIFFICULTY ARISING: NO
SLEEP PATTERN: DISTURBED/INTERRUPTED SLEEP
RESTFUL SLEEP: NO
DIFFICULTY ARISING: NO
DIFFICULTY STAYING ASLEEP: YES
DO YOU USE A SLEEP AID: YES
DIFFICULTY FALLING ASLEEP: NO

## 2021-09-22 ASSESSMENT — PATIENT HEALTH QUESTIONNAIRE - PHQ9
SUM OF ALL RESPONSES TO PHQ QUESTIONS 1-9: 21
SUM OF ALL RESPONSES TO PHQ QUESTIONS 1-9: 14

## 2021-09-22 ASSESSMENT — LIFESTYLE VARIABLES: HISTORY_ALCOHOL_USE: YES

## 2021-09-22 ASSESSMENT — PAIN SCALES - GENERAL
PAINLEVEL_OUTOF10: 6
PAINLEVEL_OUTOF10: 7

## 2021-09-22 ASSESSMENT — PAIN - FUNCTIONAL ASSESSMENT: PAIN_FUNCTIONAL_ASSESSMENT: ACTIVITIES ARE NOT PREVENTED

## 2021-09-22 ASSESSMENT — PAIN DESCRIPTION - LOCATION: LOCATION: BACK

## 2021-09-22 ASSESSMENT — PAIN DESCRIPTION - FREQUENCY: FREQUENCY: INTERMITTENT

## 2021-09-22 ASSESSMENT — PAIN DESCRIPTION - ONSET: ONSET: GRADUAL

## 2021-09-22 ASSESSMENT — PAIN DESCRIPTION - PROGRESSION: CLINICAL_PROGRESSION: NOT CHANGED

## 2021-09-22 ASSESSMENT — PAIN DESCRIPTION - ORIENTATION: ORIENTATION: ANTERIOR

## 2021-09-22 NOTE — ED NOTES
Pt walking around in room. Pt states she does not want her meal tray, RN gave pt a lunch box with a turkey sand which upon pt request. RN gave pt a smaller size scrub top upon pt request. Divina Huber remains at bedside to ensure pt safety. Pt is tearful and states \"I just want to go home. \" RN re enforced [address] and explained to pt why she is unable to go home.        Chino Brooks RN  09/22/21 7330

## 2021-09-22 NOTE — ED NOTES
Pt remains calm and cooperative talking with sitter at bedside. Sitter remains at bedside to ensure pt safety. Will continue to monitor.       Edyta You RN  09/22/21 4769

## 2021-09-22 NOTE — ED NOTES
Patient sitting in bed, sitter at bedside.  Patient calm and cooperative      Senia Ryan RN  09/22/21 7081

## 2021-09-22 NOTE — ED PROVIDER NOTES
[facility]  EMERGENCY DEPARTMENT ENCOUNTER      PATIENT NAME: John Carlos  MRN: [id number]  : 1980  LEMUS: 2021  PROVIDER: Adam Doyle MD      CHIEF COMPLAINT       Chief Complaint   Patient presents with    Suicidal       Patient is seen and evaluated in a timely fashion. Nurses Notes are reviewed and I agree except as noted in the HPI. HISTORY OF PRESENT ILLNESS    John Carlos is a 39 y.o. female who presents to Emergency Department with Suicidal     42-year-old female with past medical history of anxiety, depression, and fibromyalgia presents to ED because of worsening depression in the last 4 month duration. Patient states that she is more depressed because she has gained 40 pounds in the last 4 months despite the fact that she has been more active. She has no active suicide plan. She has no physical complaints. She is under [facility]. This HPI was provided by patient. REVIEW OF SYSTEMS   Review of Systems   Constitutional: Positive for unexpected weight change. Negative for activity change, appetite change, chills, fatigue and fever. HENT: Negative for congestion, ear discharge, ear pain, hearing loss, nosebleeds, rhinorrhea and sore throat. Eyes: Negative for photophobia, pain, discharge, redness and itching. Respiratory: Negative for cough, chest tightness, shortness of breath, wheezing and stridor. Cardiovascular: Negative for chest pain, palpitations and leg swelling. Gastrointestinal: Negative for abdominal distention, abdominal pain, constipation, diarrhea, nausea and vomiting. Endocrine: Negative for cold intolerance, heat intolerance, polydipsia and polyphagia. Genitourinary: Negative for dysuria, flank pain, frequency and hematuria. Musculoskeletal: Negative for arthralgias, back pain, gait problem, myalgias, neck pain and neck stiffness. Skin: Negative for pallor, rash and wound.    Allergic/Immunologic: Negative for environmental allergies and food allergies. Neurological: Negative for dizziness, tremors, syncope, weakness and headaches. Psychiatric/Behavioral: Positive for decreased concentration and dysphoric mood. Negative for agitation, behavioral problems, confusion, self-injury, sleep disturbance and suicidal ideas. The patient is nervous/anxious. PAST MEDICAL HISTORY     Past Medical History:   Diagnosis Date    Anxiety     Back pain     Colitis     Depression     Fibromyalgia     HPV (human papilloma virus) anogenital infection     OCD (obsessive compulsive disorder)        SURGICAL HISTORY       Past Surgical History:   Procedure Laterality Date    CHOLECYSTECTOMY      INNER EAR SURGERY         CURRENT MEDICATIONS       Previous Medications    BUPROPION (WELLBUTRIN XL) 150 MG EXTENDED RELEASE TABLET    Take 150 mg by mouth every morning     CYPROHEPTADINE (PERIACTIN) 4 MG TABLET    Take 4 mg by mouth nightly as needed     FLUOXETINE (PROZAC) 20 MG CAPSULE    Take 20 mg by mouth 2 times daily     HYDROXYZINE (VISTARIL) 50 MG CAPSULE    Take 50 mg by mouth nightly as needed    IBUPROFEN (ADVIL;MOTRIN) 800 MG TABLET    Take 1 tablet by mouth every 8 hours as needed for Pain    LEVOTHYROXINE (SYNTHROID) 50 MCG TABLET    TAKE 1 TABLET BY MOUTH DAILY    RISPERIDONE (RISPERDAL) 0.5 MG TABLET    Take 0.5 mg by mouth every evening        ALLERGIES     Patient has no known allergies. FAMILY HISTORY     She indicated that her mother is alive. She indicated that her father is alive. She indicated that the status of her paternal aunt is unknown.   family history includes Cancer in her paternal aunt; Diabetes in her father and mother; Heart Disease in her father and mother. SOCIAL HISTORY      reports that she has been smoking cigarettes. She has been smoking about 0.10 packs per day. She has never used smokeless tobacco. She reports previous alcohol use. She reports current drug use.  Drugs: Marijuana and Cocaine. PHYSICAL EXAM      height is 5' 3\" (1.6 m) and weight is 200 lb (90.7 kg). Her temperature is 97.8 °F (36.6 °C). Her blood pressure is 130/87 and her pulse is 102. Her respiration is 18 and oxygen saturation is 99%. Physical Exam  Vitals and nursing note reviewed. Constitutional:       Appearance: She is well-developed. She is not diaphoretic. HENT:      Head: Normocephalic and atraumatic. Nose: Nose normal.   Eyes:      General: No scleral icterus. Right eye: No discharge. Left eye: No discharge. Conjunctiva/sclera: Conjunctivae normal.      Pupils: Pupils are equal, round, and reactive to light. Neck:      Vascular: No JVD. Trachea: No tracheal deviation. Cardiovascular:      Rate and Rhythm: Normal rate and regular rhythm. Heart sounds: Normal heart sounds. No murmur heard. No friction rub. No gallop. Pulmonary:      Effort: Pulmonary effort is normal. No respiratory distress. Breath sounds: Normal breath sounds. No stridor. No wheezing or rales. Chest:      Chest wall: No tenderness. Abdominal:      General: Bowel sounds are normal. There is no distension. Palpations: Abdomen is soft. There is no mass. Tenderness: There is no abdominal tenderness. There is no guarding or rebound. Hernia: No hernia is present. Musculoskeletal:         General: No tenderness or deformity. Cervical back: Normal range of motion and neck supple. Lymphadenopathy:      Cervical: No cervical adenopathy. Skin:     General: Skin is warm and dry. Capillary Refill: Capillary refill takes less than 2 seconds. Coloration: Skin is not pale. Findings: No erythema or rash. Neurological:      Mental Status: She is alert and oriented to person, place, and time. Cranial Nerves: No cranial nerve deficit. Sensory: No sensory deficit. Motor: No abnormal muscle tone.       Coordination: Coordination normal.      Deep Tendon Reflexes: Reflexes normal.   Psychiatric:      Comments: Anxious and depressed. Expresses suicide thoughts but no plan. DIFFERETIAL DIAGNOSIS   Anxiety, depression    ANCILLARY TEST RESULTS   EKG:    Interpreted by me  Not indicated    LAB RESULTS:  Results for orders placed or performed during the hospital encounter of 09/22/21   Urine Drug Screen   Result Value Ref Range    AMPHETAMINE+METHAMPHETAMINE URINE SCREEN Negative NEGATIVE    Barbiturate Quant, Ur Negative NEGATIVE    Benzodiazepine Quant, Ur Negative NEGATIVE    Cannabinoid Quant, Ur POSITIVE NEGATIVE    Cocaine Metab Quant, Ur Negative NEGATIVE    Opiates, Urine Negative NEGATIVE    Oxycodone Negative NEGATIVE    PCP Quant, Ur Negative NEGATIVE   Basic Metabolic Panel   Result Value Ref Range    Sodium 138 135 - 145 meq/L    Potassium 4.3 3.5 - 5.2 meq/L    Chloride 105 98 - 111 meq/L    CO2 21 (L) 23 - 33 meq/L    Glucose 104 70 - 108 mg/dL    BUN 9 7 - 22 mg/dL    CREATININE 0.7 0.4 - 1.2 mg/dL    Calcium 9.1 8.5 - 10.5 mg/dL   CBC auto differential   Result Value Ref Range    WBC 10.6 4.8 - 10.8 thou/mm3    RBC 4.95 4.20 - 5.40 mill/mm3    Hemoglobin 15.6 12.0 - 16.0 gm/dl    Hematocrit 44.5 37.0 - 47.0 %    MCV 89.9 81.0 - 99.0 fL    MCH 31.5 26.0 - 33.0 pg    MCHC 35.1 32.2 - 35.5 gm/dl    RDW-CV 13.1 11.5 - 14.5 %    RDW-SD 42.9 35.0 - 45.0 fL    Platelets 382 226 - 061 thou/mm3    MPV 8.6 (L) 9.4 - 12.4 fL    Seg Neutrophils 69.5 %    Lymphocytes 17.2 %    Monocytes 10.0 %    Eosinophils 2.1 %    Basophils 0.8 %    Immature Granulocytes 0.4 %    Segs Absolute 7.4 1 - 7 thou/mm3    Lymphocytes Absolute 1.8 1.0 - 4.8 thou/mm3    Monocytes Absolute 1.1 0.4 - 1.3 thou/mm3    Eosinophils Absolute 0.2 0.0 - 0.4 thou/mm3    Basophils Absolute 0.1 0.0 - 0.1 thou/mm3    Immature Grans (Abs) 0.04 0.00 - 0.07 thou/mm3    nRBC 0 /100 wbc   Hepatic function panel   Result Value Ref Range    Albumin 3.7 3.5 - 5.1 g/dL    Total Bilirubin 0.4 0.3 - 1.2 mg/dL    Bilirubin, Direct <0.2 0.0 - 0.3 mg/dL    Alkaline Phosphatase 93 38 - 126 U/L    AST 50 (H) 5 - 40 U/L    ALT 37 11 - 66 U/L    Total Protein 7.2 6.1 - 8.0 g/dL   Lipase   Result Value Ref Range    Lipase 27.6 5.6 - 51.3 U/L   TSH without Reflex   Result Value Ref Range    TSH 1.050 0.400 - 2.135 uIU/mL   Salicylate Level   Result Value Ref Range    Salicylate, Serum < 0.3 (L) 2.0 - 10.0 mg/dL   Acetaminophen level   Result Value Ref Range    Acetaminophen Level < 5.0 0.0 - 20.0 ug/mL   Ethanol   Result Value Ref Range    ETHYL ALCOHOL, SERUM < 0.01 0.00 %   Urine with Reflexed Micro   Result Value Ref Range    Glucose, Ur NEGATIVE NEGATIVE mg/dl    Bilirubin Urine NEGATIVE NEGATIVE    Ketones, Urine TRACE (A) NEGATIVE    Specific Gravity, Urine 1.015 1.002 - 1.030    Blood, Urine NEGATIVE NEGATIVE    pH, UA 6.5 5.0 - 9.0    Protein, UA TRACE (A) NEGATIVE    Urobilinogen, Urine 1.0 0.0 - 1.0 eu/dl    Nitrite, Urine NEGATIVE NEGATIVE    Leukocyte Esterase, Urine SMALL (A) NEGATIVE    Color, UA YELLOW STRAW-YELLOW    Character, Urine CLOUDY (A) CLEAR-SL CLOUD    RBC, UA 3-5 0-2/hpf /hpf    WBC, UA 15-25 0-4/hpf /hpf    Epithelial Cells, UA 10-15 3-5/hpf /hpf    Mucus, UA THREADS NONE SEEN/THREA    Bacteria, UA MODERATE FEW/NONE SEEN /hpf    Casts UA >15 HYALINE NONE SEEN /lpf    Crystals, UA NONE SEEN NONE SEEN    Renal Epithelial, UA NONE SEEN NONE SEEN    Yeast, UA NONE SEEN NONE SEEN    CASTS 2 NONE SEEN NONE SEEN /lpf    MISCELLANEOUS 2 NONE SEEN    Anion Gap   Result Value Ref Range    Anion Gap 12.0 8.0 - 16.0 meq/L   Glomerular Filtration Rate, Estimated   Result Value Ref Range    Est, Glom Filt Rate >90 ml/min/1.73m2   Osmolality   Result Value Ref Range    Osmolality Calc 274.7 (L) 275.0 - 300.0 mOsmol/kg       RADIOLOGY REPORTS  No orders to display       [address] ED COURSE     ED Vitals:  Vitals:    09/22/21 1507 09/22/21 1652   BP:  130/87   Pulse: 94 102   Resp: 18 18 Temp: 97.8 °F (36.6 °C)    SpO2: 99% 99%   Weight: 200 lb (90.7 kg)    Height: 5' 3\" (1.6 m)        Actions:   Time [facility] consultation    MDM:  Medically cleared. Admitted by  after MAURO did evaluation. CRITICAL CARE   None    CONSULTS   [facility]    PROCEDURES   None    FINAL IMPRESSION AND DISPOSITION      1. Depression with suicidal ideation        Admit    PATIENT REFERRED TO:  No follow-up provider specified.     DISCHARGE MEDICATIONS:  New Prescriptions    No medications on file       (Please note that portions of this note were completed with a voice recognition program.  Efforts were made to edit the dictations but occasionally words aremis-transcribed.)    MD Steff Lilly MD  09/22/21 5330       Steff Martin MD  09/22/21 9988

## 2021-09-22 NOTE — ED NOTES
Patient to ED under police custody, [facility]. Patient went to her PCP for a follow up for chronic back pain. Patient reports feeling suicidal over the past several weeks.  Patient was escorted to [facility] for further evaluation      Nohelia Cochran RN  09/22/21 1516

## 2021-09-22 NOTE — ED NOTES
RN spoke with physician about pt becoming agitated and walking up to glass, making faces at staff. Pt stomped on bag of chips. Physician ordered medications if needed. RN changed sitters in attempt to improve pt's agitation.       Jeanette Ruth RN  09/22/21 7537

## 2021-09-22 NOTE — PATIENT INSTRUCTIONS
Fax number Dr. Adam Thacker office- [phone]      Thank you   1. Thank you for trusting us with your healthcare needs. You may receive a survey regarding today's visit. It would help us out if you would take a few moments to provide your feedback. We value your input. 2. Please bring in ALL medications BOTTLES, including inhalers, herbal supplements, over the counter, prescribed & non-prescribed medicine. The office would like actual medication bottles and a list.   3. Please note our OFFICE POLICIES:   a. Prior to getting your labs drawn, please check with your insurance company for benefits and eligibility of lab services. Often, insurance companies cover certain tests for preventative visits only. It is patient's responsibility to see what is covered. b. We are unable to change a diagnosis after the test has been performed. c. Lab orders will not be re-printed. Please hold onto your original lab orders and take them to your lab to be completed. d. If you no show your scheduled appointment three times, you will be dismissed from this practice. e. Juan Diego Essex must be completed 24 hours prior to your schedule appointment. 4. If the list below has been completed, PLEASE FAX RECORDS TO OUR OFFICE @ [phone].  Once the records have been received we will update your records at our office:  Health Maintenance Due   Topic Date Due    Pneumococcal 0-64 years Vaccine (1 of 2 - PPSV23) Never done    HIV screen  Never done    DTaP/Tdap/Td vaccine (1 - Tdap) Never done    Varicella vaccine (1 of 2 - 2-dose childhood series) Never done    Cervical cancer screen  Never done    Annual Wellness Visit (AWV)  Never done    Diabetes screen  07/10/2020    Flu vaccine (1) 09/01/2021

## 2021-09-22 NOTE — PROGRESS NOTES
[address] [facility] LA O [address] [address]  Dept: [phone]  Dept Fax: [phone]  Loc: Chidi Sellers is a 39 y.o. female who presents today for:  Chief Complaint   Patient presents with    2 Week Follow-Up     Hypothyroidism, Depression, and Anxiety and upset due to weight gain       Goals    None         HPI:     HPI  Carla 55-year-old female who presents today with her  for 2-week follow-up for weight gain. She states she has since her last appointment states that she has not been feeling very well. She states that she was around 2 days ago, he has had fatigue since getting the vaccine. At patient's last appointment we discussed her depression, bipolar 1 and changed some medications at that time. Stopped Strattera and decrease Vistaril to 50 mg orally as needed instead of scheduled. Patient had follow-up with psychiatry at [facility] behavioral [facility] in the meantime and her Prozac had been decreased as well as an increase in her Wellbutrin. She states that she is currently taking Risperdal as well. Carla went to get blood work drawn today to recheck her thyroid hormones. Prior to the end of the examination Carla mentioned that she has been having suicidal thoughts since her last appointment. She states that she is planning on asphyxiating herself with her scarves. She states that her depression has been worsening and that she is fed up with the weight gain. As we were discussing her suicidal ideation Carla and  initially attempted a plan to go to the crisis center after today's visit for further evaluation and Carla was agreeable however then Carla believed that we had planned to call the police on her which made her agitated. .  She states that this is all set up that her mom or dad called [facility] in order to get her arrested and sent away to prison. Upon further discussed Carla was adamant about leaving our office and stated that she did not want to see any of us anymore. Upon further discussion it was in [address] best interest to be further evaluated at [facility] ED. Patient reluctant to present to the [facility] ED for further psychiatric evaluation. Past Medical History:   Diagnosis Date    Anxiety     Back pain     Colitis     Depression     Fibromyalgia     HPV (human papilloma virus) anogenital infection     OCD (obsessive compulsive disorder)       Past Surgical History:   Procedure Laterality Date    CHOLECYSTECTOMY      INNER EAR SURGERY       Family History   Problem Relation Age of Onset    Diabetes Mother     Heart Disease Mother     Diabetes Father     Heart Disease Father     Cancer Paternal Aunt      Social History     Tobacco Use    Smoking status: Current Some Day Smoker     Packs/day: 0.10     Types: Cigarettes    Smokeless tobacco: Never Used   Substance Use Topics    Alcohol use: Not Currently     Comment: last used 07/2021      Current Outpatient Medications   Medication Sig Dispense Refill    buPROPion (WELLBUTRIN XL) 150 MG extended release tablet Take 150 mg by mouth every morning       cyproheptadine (PERIACTIN) 4 MG tablet Take 4 mg by mouth nightly as needed       hydrOXYzine (VISTARIL) 50 MG capsule Take 50 mg by mouth nightly as needed      risperiDONE (RISPERDAL) 0.5 MG tablet Take 0.5 mg by mouth every evening       levothyroxine (SYNTHROID) 50 MCG tablet TAKE 1 TABLET BY MOUTH DAILY 30 tablet 1    ibuprofen (ADVIL;MOTRIN) 800 MG tablet Take 1 tablet by mouth every 8 hours as needed for Pain 30 tablet 0    FLUoxetine (PROZAC) 20 MG capsule Take 20 mg by mouth 2 times daily        No current facility-administered medications for this visit.      No Known Allergies    Health Maintenance   Topic Date Due    Pneumococcal 0-64 years Vaccine (1 of 2 - PPSV23) Never done    HIV screen Never done    DTaP/Tdap/Td vaccine (1 - Tdap) Never done    Varicella vaccine (1 of 2 - 2-dose childhood series) Never done    Cervical cancer screen  Never done    Annual Wellness Visit (AWV)  Never done    Diabetes screen  07/10/2020    Flu vaccine (1) 09/01/2021    TSH testing  05/27/2022    Lipid screen  05/27/2026    COVID-19 Vaccine  Completed    Hepatitis C screen  Completed    Hepatitis A vaccine  Aged Out    Hepatitis B vaccine  Aged Out    Hib vaccine  Aged Out    Meningococcal (ACWY) vaccine  Aged Out       Subjective:      Review of Systems   Constitutional: Positive for fatigue and unexpected weight change. Negative for chills and fever. HENT: Negative for ear pain, postnasal drip and trouble swallowing. Eyes: Negative for pain and visual disturbance. Respiratory: Negative for cough and shortness of breath. Cardiovascular: Negative for chest pain and palpitations. Gastrointestinal: Negative for abdominal pain, blood in stool, constipation, diarrhea, nausea and vomiting. Genitourinary: Negative for dysuria and urgency. Skin: Negative for rash and wound. Neurological: Negative for dizziness and headaches. Psychiatric/Behavioral: Positive for agitation and suicidal ideas. Negative for dysphoric mood. The patient is not nervous/anxious. Objective:     Vitals:    09/22/21 1315   BP: 122/76   Site: Right Upper Arm   Position: Sitting   Cuff Size: Large Adult   Pulse: 102   Resp: 16   Temp: 96.4 °F (35.8 °C)   TempSrc: Temporal   SpO2: 98%   Weight: 202 lb 3.2 oz (91.7 kg)   Height: 5' 3\" (1.6 m)       Body mass index is 35.82 kg/m². Wt Readings from Last 3 Encounters:   09/22/21 200 lb (90.7 kg)   09/22/21 202 lb 3.2 oz (91.7 kg)   09/08/21 197 lb 3.2 oz (89.4 kg)     BP Readings from Last 3 Encounters:   09/22/21 122/76   09/08/21 130/82   07/23/21 126/82       Physical Exam  Vitals and nursing note reviewed.    Constitutional:       General: She is not in acute distress. Appearance: She is well-developed. She is not diaphoretic. HENT:      Head: Normocephalic and atraumatic. Right Ear: External ear normal.      Left Ear: External ear normal.      Nose: Nose normal.   Eyes:      General: No scleral icterus. Right eye: No discharge. Left eye: No discharge. Conjunctiva/sclera: Conjunctivae normal.   Cardiovascular:      Rate and Rhythm: Normal rate and regular rhythm. Heart sounds: Normal heart sounds. No murmur heard. Pulmonary:      Effort: Pulmonary effort is normal.      Breath sounds: Normal breath sounds. Musculoskeletal:      Cervical back: Normal range of motion. Skin:     General: Skin is warm and dry. Findings: No erythema or rash. Neurological:      Mental Status: She is alert and oriented to person, place, and time. Cranial Nerves: No cranial nerve deficit. Psychiatric:         Mood and Affect: Affect is angry and tearful. Speech: Speech is tangential.         Behavior: Behavior is uncooperative and agitated. Thought Content: Thought content is delusional. Thought content includes suicidal ideation. Thought content includes suicidal plan. Judgment: Judgment is impulsive and inappropriate. Lab Results   Component Value Date    WBC 10.2 04/09/2021    HGB 15.8 04/09/2021    HCT 47.9 (H) 04/09/2021     04/09/2021    CHOL 112 10/02/2018    TRIG 52 10/02/2018    HDL 68 10/02/2018    LDLCALC 34 10/02/2018    AST 37 04/09/2021     04/09/2021    K 4.3 04/09/2021     04/09/2021    CREATININE 0.9 04/09/2021    BUN 11 04/09/2021    CO2 24 04/09/2021    TSH 5.190 (H) 04/09/2021    GLUF 102 10/02/2018    LABGLOM 69 (A) 04/09/2021    MG 1.9 11/03/2018    CALCIUM 9.5 04/09/2021       Imaging Results:    No results found. Assessment/Plan:     Andi MORRIS was seen today for 2 week follow-up.     Diagnoses and all orders for this visit:    Acute psychosis

## 2021-09-22 NOTE — ED NOTES
Pt standing up and walking around room. Pt respirations even and unlabored. Pt states she does not need anything at this time. Sitter remains at bedside to ensure pt safety. Will continue to monitor.      Heather Heredia RN  09/22/21 0102

## 2021-09-22 NOTE — PROGRESS NOTES
choice was to go voluntarily with security or to be taken in handcuffs. Security arrived. Patient agreed to walk with security to the ER. She did apologize on her way out of the room. Reasswured her that we care about her and her safety and want her to get better. \"    Spoke with patient who reports intermittent suicidal ideations. Feels like she 'should not exist'. Reports depression. Confirms plan of asphyxiation. Denies intent. Reports history of drug use and toxic relationships which has impacted her relationship negatively with her daughter. Pt states her daughter is 'angry' at her. Patient is a [address] client. Pt is staying at [facility]. Patient is given her medications by a technician at [facility]. Homicidal thoughts and/or plans denied. Hallucinations denied. AOD denied. Level of Care Disposition:    8404  Consulted with medical provider. Patient is medically cleared. [address] with [address]. Laurie Karimi. Patient to be admitted to 4E. Pt is under [facility] order. Call karina Rodriguez with [facility] [facility] on 4E. They will call this writer for report when they are available. 1810  Informed pt of admit and [facility] order. Pt tearful. 1924  SW report provided to [employer] on 4E. Bed to be 70A.

## 2021-09-22 NOTE — ED NOTES
ED to inpatient nurses report    Chief Complaint   Patient presents with    Suicidal      Present to ED from home  LOC: alert and orientated to name, place, date  Vital signs   Vitals:    09/22/21 1507 09/22/21 1652   BP:  130/87   Pulse: 94 102   Resp: 18 18   Temp: 97.8 °F (36.6 °C)    SpO2: 99% 99%   Weight: 200 lb (90.7 kg)    Height: 5' 3\" (1.6 m)       Oxygen Baseline room air     Current needs required room air    LDAs:    Mobility: Independent  Pending ED orders: complete  Present condition: stable      Electronically signed by Sanjiv Nunez RN on 9/22/2021 at 7:31 PM       Sanjiv Nunez [facility]  09/22/21 1931

## 2021-09-22 NOTE — PROGRESS NOTES
Attending Supervising [de-identified] Attestation Statement  The patient is a 39 y.o. female. I have performed a history and physical examination of the patient. I discussed the case with the resident physician. I reviewed the patient's Past Medical History, Past Surgical History, Medications, and Allergies. Physical Exam:  Vitals:    09/22/21 1315   BP: 122/76   Site: Right Upper Arm   Position: Sitting   Cuff Size: Large Adult   Pulse: 102   Resp: 16   Temp: 96.4 °F (35.8 °C)   TempSrc: Temporal   SpO2: 98%   Weight: 202 lb 3.2 oz (91.7 kg)   Height: 5' 3\" (1.6 m)       ***    Impression/Plan      I reviewed and agree with the findings and plan documented in his note .     Electronically signed by Sue Cannon MD on 9/22/21 at 2:21 PM EDT

## 2021-09-22 NOTE — ED NOTES
Pt calm and cooperative with sitter. Medications are not needed at this time.       Lizzie Olivas RN  09/22/21 4498

## 2021-09-22 NOTE — ED NOTES
Bed: 024A  Expected date:   Expected time:   Means of arrival:   Comments:     Andrew Deutsch RN  09/22/21 1506

## 2021-09-22 NOTE — PROGRESS NOTES
Health Maintenance Due   Topic Date Due    Pneumococcal 0-64 years Vaccine (1 of 2 - PPSV23) Never done    HIV screen  Never done    DTaP/Tdap/Td vaccine (1 - Tdap) Never done    Varicella vaccine (1 of 2 - 2-dose childhood series) Never done    Cervical cancer screen  Never done    Annual Wellness Visit (AWV)  Never done    Diabetes screen  07/10/2020    Flu vaccine (1) 09/01/2021

## 2021-09-23 PROBLEM — F31.5 BIPOLAR I DISORDER, CURRENT OR MOST RECENT EPISODE DEPRESSED, WITH PSYCHOTIC FEATURES (HCC): Status: ACTIVE | Noted: 2019-04-23

## 2021-09-23 PROCEDURE — 90792 PSYCH DIAG EVAL W/MED SRVCS: CPT | Performed by: PSYCHIATRY & NEUROLOGY

## 2021-09-23 PROCEDURE — 6370000000 HC RX 637 (ALT 250 FOR IP): Performed by: PSYCHIATRY & NEUROLOGY

## 2021-09-23 PROCEDURE — APPSS30 APP SPLIT SHARED TIME 16-30 MINUTES: Performed by: PHYSICIAN ASSISTANT

## 2021-09-23 PROCEDURE — 1240000000 HC EMOTIONAL WELLNESS R&B

## 2021-09-23 PROCEDURE — 6370000000 HC RX 637 (ALT 250 FOR IP): Performed by: PHYSICIAN ASSISTANT

## 2021-09-23 RX ORDER — FLUOXETINE HYDROCHLORIDE 20 MG/1
80 CAPSULE ORAL DAILY
Status: DISCONTINUED | OUTPATIENT
Start: 2021-09-23 | End: 2021-09-27 | Stop reason: HOSPADM

## 2021-09-23 RX ORDER — PRAZOSIN HYDROCHLORIDE 1 MG/1
1 CAPSULE ORAL NIGHTLY
Status: DISCONTINUED | OUTPATIENT
Start: 2021-09-23 | End: 2021-09-27 | Stop reason: HOSPADM

## 2021-09-23 RX ORDER — LEVOTHYROXINE SODIUM 0.05 MG/1
50 TABLET ORAL DAILY
Status: DISCONTINUED | OUTPATIENT
Start: 2021-09-23 | End: 2021-09-27 | Stop reason: HOSPADM

## 2021-09-23 RX ORDER — DOXEPIN HYDROCHLORIDE 25 MG/1
25 CAPSULE ORAL NIGHTLY
Status: ON HOLD | COMMUNITY
End: 2021-09-23

## 2021-09-23 RX ORDER — BUPROPION HYDROCHLORIDE 150 MG/1
150 TABLET ORAL EVERY MORNING
Status: DISCONTINUED | OUTPATIENT
Start: 2021-09-23 | End: 2021-09-23

## 2021-09-23 RX ORDER — ATOMOXETINE 60 MG/1
60 CAPSULE ORAL DAILY
Status: ON HOLD | COMMUNITY
End: 2021-09-27 | Stop reason: HOSPADM

## 2021-09-23 RX ORDER — RISPERIDONE 0.25 MG/1
0.5 TABLET, FILM COATED ORAL EVERY EVENING
Status: DISCONTINUED | OUTPATIENT
Start: 2021-09-23 | End: 2021-09-27 | Stop reason: HOSPADM

## 2021-09-23 RX ORDER — FLUOXETINE HYDROCHLORIDE 20 MG/1
40 CAPSULE ORAL DAILY
Status: DISCONTINUED | OUTPATIENT
Start: 2021-09-23 | End: 2021-09-23

## 2021-09-23 RX ORDER — DOXEPIN HYDROCHLORIDE 25 MG/1
25 CAPSULE ORAL NIGHTLY
Status: DISCONTINUED | OUTPATIENT
Start: 2021-09-23 | End: 2021-09-23

## 2021-09-23 RX ADMIN — IBUPROFEN 400 MG: 400 TABLET, FILM COATED ORAL at 11:00

## 2021-09-23 RX ADMIN — PRAZOSIN HYDROCHLORIDE 1 MG: 1 CAPSULE ORAL at 20:26

## 2021-09-23 RX ADMIN — RISPERIDONE 0.5 MG: 0.25 TABLET ORAL at 18:59

## 2021-09-23 RX ADMIN — FLUOXETINE HYDROCHLORIDE 80 MG: 20 CAPSULE ORAL at 13:04

## 2021-09-23 RX ADMIN — TRAZODONE HYDROCHLORIDE 50 MG: 50 TABLET ORAL at 20:26

## 2021-09-23 RX ADMIN — LEVOTHYROXINE SODIUM 50 MCG: 0.05 TABLET ORAL at 10:59

## 2021-09-23 RX ADMIN — IBUPROFEN 400 MG: 400 TABLET, FILM COATED ORAL at 19:42

## 2021-09-23 ASSESSMENT — PAIN SCALES - GENERAL
PAINLEVEL_OUTOF10: 0
PAINLEVEL_OUTOF10: 8

## 2021-09-23 ASSESSMENT — PAIN DESCRIPTION - LOCATION: LOCATION: ABDOMEN

## 2021-09-23 ASSESSMENT — PAIN DESCRIPTION - FREQUENCY: FREQUENCY: INTERMITTENT

## 2021-09-23 ASSESSMENT — PAIN DESCRIPTION - DESCRIPTORS: DESCRIPTORS: CRAMPING

## 2021-09-23 ASSESSMENT — PAIN DESCRIPTION - PROGRESSION
CLINICAL_PROGRESSION: GRADUALLY IMPROVING
CLINICAL_PROGRESSION: NOT CHANGED

## 2021-09-23 ASSESSMENT — PAIN DESCRIPTION - PAIN TYPE: TYPE: ACUTE PAIN

## 2021-09-23 ASSESSMENT — PAIN DESCRIPTION - ONSET: ONSET: ON-GOING

## 2021-09-23 ASSESSMENT — PAIN - FUNCTIONAL ASSESSMENT: PAIN_FUNCTIONAL_ASSESSMENT: ACTIVITIES ARE NOT PREVENTED

## 2021-09-23 ASSESSMENT — PAIN DESCRIPTION - ORIENTATION: ORIENTATION: ANTERIOR
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
## 2021-09-23 NOTE — PLAN OF CARE
Patient has not attended any of the groups today but did come out of her room at times for social interaction with others so she is working toward her socialization goal for this shift. Patient will be encouraged to attend all groups on the unit daily.

## 2021-09-23 NOTE — PROGRESS NOTES
This RN has reviewed and agrees with student nurse [address] [place] [address] and has collaborated with this student nurse  regarding his/her documentation.

## 2021-09-23 NOTE — BH NOTE
Behavioral Health   Admission Note     Admission Type:   Admission Type: Involuntary    Reason for admission:  Reason for Admission: bipolar    PATIENT STRENGTHS:  Strengths: Connection to output provider, Medication Compliance, Positive Support    Patient Strengths and Limitations:  Limitations: Difficulty problem solving/relies on others to help solve problems    Addictive Behavior:   Addictive Behavior  In the past 3 months, have you felt or has someone told you that you have a problem with:  : None  Do you have a history of Chemical Use?: No  Do you have a history of Alcohol Use?: Yes  Do you have a history of Street Drug Abuse?: Yes  Histroy of Prescripton Drug Abuse?: No    Medical Problems:   Past Medical History:   Diagnosis Date    Anxiety     Back pain     Colitis     Depression     Fibromyalgia     HPV (human papilloma virus) anogenital infection     OCD (obsessive compulsive disorder)        Status EXAM:  Status and Exam  Normal: No  Facial Expression: Sad, Flat, Worried  Affect: (S) Blunt  Level of Consciousness: Alert  Mood:Normal: No  Mood: Depressed, Anxious, Labile, Sad, Guilty  Motor Activity:Normal: Yes  Interview Behavior: Cooperative  Preception: [place] to Person, [place] to Time, [place] to Place, [place] to Situation  Attention:Normal: Yes  Thought Processes: Circumstantial  Thought Content:Normal: Yes  Hallucinations: None  Delusions: Yes  Delusions: Other(See Comment), Persecution (paranoid)  Memory:Normal: Yes  Insight and Judgment: No  Insight and Judgment: Poor Judgment, Poor Insight, Unrealistic  Present Suicidal Ideation: No  Present Homicidal Ideation: No    Pt admitted with followings belongings:  Dentures: None  Vision - Corrective Lenses: Glasses  Hearing Aid: None  Jewelry: Earrings, Bracelet  Body Piercings Removed: N/A  Clothing:  Footwear, Pants, Other (Comment)  Were All Patient Medications Collected?: Not Applicable  Other Valuables: Money (Comment), Nathalie Cali

## 2021-09-23 NOTE — PROGRESS NOTES
Behavioral Services  Medicare Certification Upon Admission    I certify that this patient's inpatient psychiatric hospital admission is medically necessary for:    [x] (1) Treatment which could reasonably be expected to improve this patient's condition,       [x] (2) Or for diagnostic study;     AND     [x](2) The inpatient psychiatric services are provided while the individual is under the care of a physician and are included in the individualized plan of care.     Estimated length of stay/service 3-5 days    Plan for post-hospital care [facility]    Electronically signed by Makayla Guillen MD on 9/23/2021 at 10:35 AM

## 2021-09-23 NOTE — PATIENT CARE CONFERENCE
[facility]  Initial Interdisciplinary Treatment Plan NOTE    Review Date & Time: 9/23/2021 1406    Patient was in treatment team.  See Multidisciplinary Treatment Team sheet for participants. Admission Type:   Admission Type: Involuntary    Reason for admission:  Reason for Admission: bipolar      Estimated Length of Stay Update:  3-5 days  Estimated Discharge Date Update: 3-5 days    PATIENT STRENGTHS:  Patient Strengths Strengths: Connection to output provider, Medication Compliance, Positive Support  Patient Strengths and Limitations:Limitations: Difficulty problem solving/relies on others to help solve problems  Addictive Behavior:Addictive Behavior  In the past 3 months, have you felt or has someone told you that you have a problem with:  : None  Do you have a history of Chemical Use?: No  Do you have a history of Alcohol Use?: Yes  Do you have a history of Street Drug Abuse?: Yes  Histroy of Prescripton Drug Abuse?: No  Medical Problems:  Past Medical History:   Diagnosis Date    Anxiety     Back pain     Colitis     Depression     Fibromyalgia     HPV (human papilloma virus) anogenital infection     OCD (obsessive compulsive disorder)        EDUCATION:   Learner Progress Toward Treatment Goals: Reviewed results and recommendations of this team and Reviewed goals and plan of care    Method: Individual    Outcome: Verbalized understanding and Needs reinforcement    PATIENT GOALS: Develop coping skills    PLAN/TREATMENT RECOMMENDATIONS UPDATE:   1. What is the most important thing we can help you with while you are here? Develop coping skills  2. Who is your support system? \"I have a few of them\"  3. Do you have follow-up providers? [facility]  4. Do you have the ability to pay for your medications? Medicare   5. Where will you be residing when you leave the hospital?   [facility]  6. Will need a return to work slip or FMLA paper completion?    No      GOALS UPDATE:   Time frame for Short-Term Goals: Daily    GABRIELE Conti
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
## 2021-09-23 NOTE — PLAN OF CARE
Problem: Anxiety:  Goal: Level of anxiety will decrease  Description: Level of anxiety will decrease  Outcome: Ongoing     Problem: Activity:  Goal: Sleeping patterns will improve  Description: Sleeping patterns will improve  Outcome: Ongoing  Note: Patient slept 7.5 hours broken last night. Problem: Discharge Planning:  Goal: Discharged to appropriate level of care  Description: Discharged to appropriate level of care  Outcome: Ongoing  Note: Discharge planning in process. Problem: Pain:  Goal: Pain level will decrease  Description: Pain level will decrease  Outcome: Ongoing  Note: Reports having pain in his back and headache. Rating the pain 7 out of 10 with 10 being the worse pain. Problem: Altered Mood, Manic Behavior:  Goal: Able to sleep  Description: Able to sleep  Outcome: Ongoing  Note: Patient slept 7.5 hours broken last night. Patient isolating to her room sleeping majority of shift so far. Problem: Altered Mood, Manic Behavior:  Goal: Able to verbalize decrease in frequency and intensity of racing thoughts  Description: Able to verbalize decrease in frequency and intensity of racing thoughts  Outcome: Ongoing  Note: Does not voice any racing thoughts. Problem: Altered Mood, Manic Behavior:  Goal: Ability to disclose and discuss suicidal ideas will improve  Description: Ability to disclose and discuss suicidal ideas will improve  Outcome: Ongoing  Note: Denies suicidal thoughts. Problem: Altered Mood, Manic Behavior:  Goal: Absence of self-harm  Description: Absence of self-harm  Outcome: Ongoing  Note: Free from any self harming behaviors so far this shift. Problem: Altered Mood, Manic Behavior:  Goal: Ability to achieve adequate nutritional intake will improve  Description: Ability to achieve adequate nutritional intake will improve  Outcome: Ongoing  Note: Patient eating and drinking well.      Problem: Altered Mood, Manic Behavior:  Goal: Ability to interact with others will improve  Description: Ability to interact with others will improve  Outcome: Ongoing  Note: Patient isolating to her room and bed. Patient irritated by certain patients on unit. Problem: Altered Mood, Manic Behavior:  Goal: Ability to demonstrate self-control will improve  Description: Ability to demonstrate self-control will improve  Outcome: Ongoing  Note: Patient able to control herself. Problem: Altered Mood, Manic Behavior:  Goal: Mood stable  Description: Mood stable  Outcome: Ongoing     Problem: Altered Mood, Manic Behavior:  Goal: Patient specific goal  Description: Patient specific goal  Outcome: Ongoing  Note: \"Stay away from certain other patients. \"     Problem: Activity:  Goal: Physical symptoms of sleep deprivation will improve  Description: Physical symptoms of sleep deprivation will improve  Outcome: Completed     Problem: Pain:  Goal: Control of acute pain  Description: Control of acute pain  Outcome: Completed     Problem: Suicide risk  Goal: Provide patient with safe environment  Description: Provide patient with safe environment  Outcome: Completed  Note: Patient provided with a safe environment. Care plan reviewed with patient.   Patient does verbalize understanding of the plan of care and does contribute to goal setting

## 2021-09-23 NOTE — PROGRESS NOTES
Pt is a 39year old female who was admitted to the unit on 09/22/2021. Pt had previous admissions on the unit with both Dr. Eloy Barillas and Dr. Jeromy Frazier. Pt was brought in via [facility] by the [place] police due to pt's behavior at a family providers office during a follow-up appointment. Pt was paranoid and believed everyone was against her and that her family wanted to put her in care home. Pt states that she wanted to asphyxiate herself with a scarf. Pt today is labile and flat. Pt denies suicidal ideation and has isolated to her room  except for meals. No recommendations for Pt at this time.

## 2021-09-24 LAB
BASOPHILS # BLD: 0.9 %
BASOPHILS ABSOLUTE: 0.1 THOU/MM3 (ref 0–0.1)
EOSINOPHIL # BLD: 5.5 %
EOSINOPHILS ABSOLUTE: 0.3 THOU/MM3 (ref 0–0.4)
ERYTHROCYTE [DISTWIDTH] IN BLOOD BY AUTOMATED COUNT: 12.8 % (ref 11.5–14.5)
ERYTHROCYTE [DISTWIDTH] IN BLOOD BY AUTOMATED COUNT: 43 FL (ref 35–45)
HCT VFR BLD CALC: 41.5 % (ref 37–47)
HEMOGLOBIN: 14.4 GM/DL (ref 12–16)
IMMATURE GRANS (ABS): 0.02 THOU/MM3 (ref 0–0.07)
IMMATURE GRANULOCYTES: 0.3 %
LYMPHOCYTES # BLD: 35.8 %
LYMPHOCYTES ABSOLUTE: 2.3 THOU/MM3 (ref 1–4.8)
MCH RBC QN AUTO: 31.9 PG (ref 26–33)
MCHC RBC AUTO-ENTMCNC: 34.7 GM/DL (ref 32.2–35.5)
MCV RBC AUTO: 91.8 FL (ref 81–99)
MONOCYTES # BLD: 12 %
MONOCYTES ABSOLUTE: 0.8 THOU/MM3 (ref 0.4–1.3)
NUCLEATED RED BLOOD CELLS: 0 /100 WBC
ORGANISM: ABNORMAL
PLATELET # BLD: 229 THOU/MM3 (ref 130–400)
PMV BLD AUTO: 8.4 FL (ref 9.4–12.4)
RBC # BLD: 4.52 MILL/MM3 (ref 4.2–5.4)
SEG NEUTROPHILS: 45.5 %
SEGMENTED NEUTROPHILS ABSOLUTE COUNT: 2.9 THOU/MM3 (ref 1.8–7.7)
URINE CULTURE REFLEX: ABNORMAL
WBC # BLD: 6.3 THOU/MM3 (ref 4.8–10.8)

## 2021-09-24 PROCEDURE — 85025 COMPLETE CBC W/AUTO DIFF WBC: CPT

## 2021-09-24 PROCEDURE — 36415 COLL VENOUS BLD VENIPUNCTURE: CPT

## 2021-09-24 PROCEDURE — 90833 PSYTX W PT W E/M 30 MIN: CPT | Performed by: PSYCHIATRY & NEUROLOGY

## 2021-09-24 PROCEDURE — 6370000000 HC RX 637 (ALT 250 FOR IP): Performed by: PHYSICIAN ASSISTANT

## 2021-09-24 PROCEDURE — 99232 SBSQ HOSP IP/OBS MODERATE 35: CPT | Performed by: PSYCHIATRY & NEUROLOGY

## 2021-09-24 PROCEDURE — 1240000000 HC EMOTIONAL WELLNESS R&B

## 2021-09-24 PROCEDURE — 6370000000 HC RX 637 (ALT 250 FOR IP): Performed by: PSYCHIATRY & NEUROLOGY

## 2021-09-24 PROCEDURE — APPSS30 APP SPLIT SHARED TIME 16-30 MINUTES: Performed by: PHYSICIAN ASSISTANT

## 2021-09-24 RX ORDER — ONDANSETRON 4 MG/1
4 TABLET, ORALLY DISINTEGRATING ORAL EVERY 8 HOURS PRN
Status: DISCONTINUED | OUTPATIENT
Start: 2021-09-24 | End: 2021-09-27 | Stop reason: HOSPADM

## 2021-09-24 RX ADMIN — RISPERIDONE 0.5 MG: 0.25 TABLET ORAL at 20:22

## 2021-09-24 RX ADMIN — IBUPROFEN 400 MG: 400 TABLET, FILM COATED ORAL at 16:25

## 2021-09-24 RX ADMIN — LEVOTHYROXINE SODIUM 50 MCG: 0.05 TABLET ORAL at 08:47

## 2021-09-24 RX ADMIN — ONDANSETRON 4 MG: 4 TABLET, ORALLY DISINTEGRATING ORAL at 11:21

## 2021-09-24 RX ADMIN — HYDROXYZINE HYDROCHLORIDE 50 MG: 25 TABLET ORAL at 08:50

## 2021-09-24 RX ADMIN — IBUPROFEN 400 MG: 400 TABLET, FILM COATED ORAL at 08:50

## 2021-09-24 RX ADMIN — TRAZODONE HYDROCHLORIDE 50 MG: 50 TABLET ORAL at 20:21

## 2021-09-24 RX ADMIN — FLUOXETINE HYDROCHLORIDE 80 MG: 20 CAPSULE ORAL at 08:47

## 2021-09-24 RX ADMIN — HYDROXYZINE HYDROCHLORIDE 50 MG: 25 TABLET ORAL at 20:21

## 2021-09-24 RX ADMIN — PRAZOSIN HYDROCHLORIDE 1 MG: 1 CAPSULE ORAL at 20:21

## 2021-09-24 RX ADMIN — ACETAMINOPHEN 650 MG: 325 TABLET ORAL at 20:22

## 2021-09-24 ASSESSMENT — PAIN DESCRIPTION - ORIENTATION: ORIENTATION: LOWER

## 2021-09-24 ASSESSMENT — PAIN SCALES - GENERAL
PAINLEVEL_OUTOF10: 9
PAINLEVEL_OUTOF10: 8
PAINLEVEL_OUTOF10: 0
PAINLEVEL_OUTOF10: 8
PAINLEVEL_OUTOF10: 6
PAINLEVEL_OUTOF10: 0

## 2021-09-24 ASSESSMENT — PAIN - FUNCTIONAL ASSESSMENT: PAIN_FUNCTIONAL_ASSESSMENT: ACTIVITIES ARE NOT PREVENTED

## 2021-09-24 ASSESSMENT — PAIN DESCRIPTION - PAIN TYPE
TYPE: ACUTE PAIN
TYPE: ACUTE PAIN

## 2021-09-24 ASSESSMENT — PAIN DESCRIPTION - ONSET: ONSET: ON-GOING

## 2021-09-24 ASSESSMENT — PAIN DESCRIPTION - LOCATION
LOCATION: ABDOMEN
LOCATION: ABDOMEN

## 2021-09-24 ASSESSMENT — PAIN DESCRIPTION - FREQUENCY: FREQUENCY: INTERMITTENT

## 2021-09-24 ASSESSMENT — PAIN DESCRIPTION - DESCRIPTORS: DESCRIPTORS: CRAMPING

## 2021-09-24 ASSESSMENT — PAIN DESCRIPTION - PROGRESSION: CLINICAL_PROGRESSION: GRADUALLY IMPROVING

## 2021-09-24 NOTE — PATIENT CARE CONFERENCE
[address] [place] [address]  Day 3 Interdisciplinary Treatment Plan NOTE    Review Date & Time: 9/24/2021 1983    Patient was in treatment team    Admission Type:   Admission Type: Involuntary    Reason for admission:  Reason for Admission: bipolar  Estimated Length of Stay Update:  3-5 days  Estimated Discharge Date Update: 3-5 days    PATIENT STRENGTHS:  Patient Strengths Strengths: Connection to output provider, Medication Compliance, Positive Support  Patient Strengths and Limitations:Limitations: Difficulty problem solving/relies on others to help solve problems  Addictive Behavior:Addictive Behavior  In the past 3 months, have you felt or has someone told you that you have a problem with:  : None  Do you have a history of Chemical Use?: No  Do you have a history of Alcohol Use?: Yes  Do you have a history of Street Drug Abuse?: Yes  Histroy of Prescripton Drug Abuse?: No  Medical Problems:  Past Medical History:   Diagnosis Date    Anxiety     Back pain     Colitis     Depression     Fibromyalgia     HPV (human papilloma virus) anogenital infection     OCD (obsessive compulsive disorder)        Risk:  Fall RiskTotal: 57  Long Scale Long Scale Score: 21  BVC Total: 0  Change in scores no. Changes to plan of Care no    Status EXAM:   Status and Exam  Normal: No  Facial Expression: Brightened, Flat  Affect: Blunt  Level of Consciousness: Alert  Mood:Normal: No  Mood: Depressed  Motor Activity:Normal: Yes  Interview Behavior: Cooperative  Preception: [place] to Person, Rosilyn Net to Time, [place] to Situation, [place] to Place  Attention:Normal: No  Attention: Unable to Concentrate  Thought Processes: Circumstantial  Thought Content:Normal: Yes  Hallucinations: None  Delusions: No  Delusions:  Other(See Comment), Persecution (paranoid)  Memory:Normal: Yes  Insight and Judgment: No  Insight and Judgment: Poor Judgment, Poor Insight  Present Suicidal Ideation: No  Present Homicidal Ideation: No    Daily Assessment Last Entry:   Daily Sleep (WDL): Within Defined Limits            Daily Nutrition (WDL): Within Defined Limits    Patient Monitoring:  Frequency of Checks: 4 times per hour, close    Psychiatric Symptoms:   Depression Symptoms  Depression Symptoms: Loss of interest, Isolative, Change in energy level, Impaired concentration, Feelings of helplessness, Feelings of hopelessess, Feelings of worthlessness  Anxiety Symptoms  Anxiety Symptoms: Generalized, No problems reported or observed. Leann Symptoms  Leann Symptoms: No problems reported or observed. Psychosis Symptoms  Delusion Type: No problems reported or observed. Suicide Risk CSSR-S:  1) Within the past month, have you wished you were dead or wished you could go to sleep and not wake up? : Yes  2) Have you actually had any thoughts of killing yourself? : Yes  3) Have you been thinking about how you might kill yourself? : Yes  5) Have you started to work out or worked out the details of how to kill yourself? Do you intend to carry out this plan? : Yes  6) Have you ever done anything, started to do anything, or prepared to do anything to end your life?: Yes  Change in Result no Change in Plan of care no      EDUCATION:   Learner Progress Toward Treatment Goals: Reviewed results and recommendations of this team and Reviewed goals and plan of care    Method: Individual    Outcome: Verbalized understanding and Needs reinforcement    PATIENT GOALS: Develop coping skills    PLAN/TREATMENT RECOMMENDATIONS UPDATE:  1. How are you progressing toward meeting your main treatment goal?   Patient has spent majority of time in bed resting as she has not been feeling well. 2.  Are there discharge barriers/lingering problems that need to be addressed? Denies      3. Do you have the ability to pay for your medications? Medicare      4. How is your group participation?      Patient has not attended or participated in groups    GOALS UPDATE:   Time frame for Short-Term Goals: Daily      Valentine Apley, LSW

## 2021-09-24 NOTE — GROUP NOTE
Group Therapy Note    Date: 9/24/2021    Group Start Time: 8382  Group End Time: 5805  Group Topic: Psychotherapy    [facility]    GABRIELE Gan        Group Therapy Note             Encouraged patient to attend group. Patient did not attend.     Signature:  GABRIELE Gan

## 2021-09-24 NOTE — PLAN OF CARE
Problem: Altered Mood, Manic Behavior:  Goal: Absence of self-harm  Description: Absence of self-harm  Outcome: Met This Shift  Note: Pt remains free of self harming behaviors     Problem: Anxiety:  Goal: Level of anxiety will decrease  Description: Level of anxiety will decrease  Outcome: Ongoing  Note: Pt shared feeling anxious this am and was willing to accept medication to assist decreasing her anxiety     Problem: Activity:  Goal: Sleeping patterns will improve  Description: Sleeping patterns will improve  Outcome: Ongoing  Note: Pt slept 8.5hrs continuous sleep     Problem: Discharge Planning:  Goal: Discharged to appropriate level of care  Description: Discharged to appropriate level of care  Outcome: Ongoing  Note: Pt working with treatment team for optimal discharge needs pt shared she follows up at [facility]     Problem: Pain:  Goal: Pain level will decrease  Description: Pain level will decrease  Outcome: Ongoing  Note: Pt medicated for menstrual cramps. See MAR     Problem: Altered Mood, Manic Behavior:  Goal: Able to verbalize decrease in frequency and intensity of racing thoughts  Description: Able to verbalize decrease in frequency and intensity of racing thoughts  Outcome: Ongoing  Note: Pt reports decreasing in frequency of racing thoughts.    Goal: Ability to disclose and discuss suicidal ideas will improve  Description: Ability to disclose and discuss suicidal ideas will improve  Outcome: Ongoing  Note: Pt denies suicidal thoughts   Goal: Ability to achieve adequate nutritional intake will improve  Description: Ability to achieve adequate nutritional intake will improve  Outcome: Ongoing  Goal: Ability to interact with others will improve  Description: Ability to interact with others will improve  Outcome: Ongoing  Note: Pt has remained in her room this shift  Goal: Ability to demonstrate self-control will improve  Description: Ability to demonstrate self-control will improve  Outcome: Ongoing  Goal: Mood stable  Description: Mood stable  Outcome: Ongoing  Note: Pt reports improved mood rates her mood 6-7 out of a scale of 1 to 10 with 10 being the best. Compliant with medications  Goal: Patient specific goal  Description: Patient specific goal  Outcome: Ongoing     Problem: Coping:  Goal: Ability to identify problematic behaviors that deter socialization will improve  Description: Ability to identify problematic behaviors that deter socialization will improve  Outcome: Ongoing  Note: Pt is encouraged to attend groups and increase her socialization   Care plan reviewed with patient.   Patient does verbalize understanding of the plan of care and does contribute to goal setting

## 2021-09-24 NOTE — PLAN OF CARE
interaction. Goal: Ability to demonstrate self-control will improve  Description: Ability to demonstrate self-control will improve  Outcome: Met This Shift  Goal: Mood stable  Description: Mood stable  Outcome: Met This Shift  Note: Rates her mood a  6 with 10 being the best.   Goal: Patient specific goal  Description: Patient specific goal  Outcome: Not Met This Shift  Note: Did not make goal this shift.      Problem: Coping:  Goal: Ability to identify problematic behaviors that deter socialization will improve  Description: Ability to identify problematic behaviors that deter socialization will improve  9/24/2021 0127 by Jennifer López RN  Outcome: Ongoing  9/23/2021 1437 by Manohar Dias  Outcome: Not Met This Shift

## 2021-09-24 NOTE — PROGRESS NOTES
Department of Psychiatry  Progress Note     Chief Complaint:  Bipolar I disorder, current or most recent episode depressed, with psychotic features ([address]     SUBJECTIVE:    PROGRESS:  Lesly is seen laying in bed. She is receptive to interaction. Lesly reports she is not feeling good today. She says she recently started her menstrual cycle and has not had one for 6 months. She says she is experiencing heavy bleeding and feels very sick. She tells this provider that she has been feeling dizzy today. I encouraged the patient to rest and maintain adequate hydration. She is having a lot of discomfort in her back and lower abdomen at this time. She has been complaining of nausea this morning but was able to eat a little bit of her breakfast.  She reports her mood is better today and rates her mood 6-7 out of 10 with 10 being the best mood. She says her depression is about the same today. She feels it has improved since admission and says she is not really feeling as down and sad today. She continues to have passive suicidal ideation without plan or intent. She denies any active suicidal ideation during the interview and is able to contract for safety on the unit. She is feeling less hopeless and helpless about her situation and mentions she is hopeful to go home. She denies feeling paranoid about her family trying to harm her and will put her in [facility] today. She says she misses her family and has been talking with her adoptive mother and father on the phone. She reports she slept pretty good last night. Denies having any nightmares. Staff reported patient slept 8.5 hours continuous last night. She reports her appetite has been pretty normal until this morning. She has been isolative to her room and has not been attending groups. She endorses good medication compliance and denies any side effects from her medications.     Suicidal ideations: passive without plan or intent  Compliance with medications: good   Medication side effects: absent  ROS: Patient has new complaints:  Dizziness, nausea, abdominal pain  Sleep quality: 8.5 hours  Continuous last night   Attending groups: no      OBJECTIVE      Medications  Current Facility-Administered Medications: ondansetron (ZOFRAN-ODT) disintegrating tablet 4 mg, 4 mg, Oral, Q8H PRN  risperiDONE (RISPERDAL) tablet 0.5 mg, 0.5 mg, Oral, QPM  levothyroxine (SYNTHROID) tablet 50 mcg, 50 mcg, Oral, Daily  FLUoxetine (PROZAC) capsule 80 mg, 80 mg, Oral, Daily  prazosin (MINIPRESS) capsule 1 mg, 1 mg, Oral, Nightly  haloperidol lactate (HALDOL) injection 5 mg, 5 mg, IntraMUSCular, Once  LORazepam (ATIVAN) injection 2 mg, 2 mg, IntraMUSCular, Once  acetaminophen (TYLENOL) tablet 650 mg, 650 mg, Oral, Q4H PRN  ibuprofen (ADVIL;MOTRIN) tablet 400 mg, 400 mg, Oral, Q6H PRN  magnesium hydroxide (MILK OF MAGNESIA) 400 MG/5ML suspension 30 mL, 30 mL, Oral, Daily PRN  aluminum & magnesium hydroxide-simethicone (MAALOX) 200-200-20 MG/5ML suspension 30 mL, 30 mL, Oral, Q6H PRN  hydrOXYzine (ATARAX) tablet 50 mg, 50 mg, Oral, TID PRN  haloperidol (HALDOL) tablet 5 mg, 5 mg, Oral, Q6H PRN **OR** haloperidol lactate (HALDOL) injection 5 mg, 5 mg, IntraMUSCular, Q6H PRN  traZODone (DESYREL) tablet 50 mg, 50 mg, Oral, Nightly PRN  LORazepam (ATIVAN) tablet 2 mg, 2 mg, Oral, Q6H PRN **OR** LORazepam (ATIVAN) injection 2 mg, 2 mg, IntraVENous, Q6H PRN     Physical     height is 5' 3\" (1.6 m) and weight is 200 lb (90.7 kg). Her tympanic temperature is 99.2 °F (37.3 °C). Her blood pressure is 115/84 and her pulse is 102. Her respiration is 18 and oxygen saturation is 96%.    Lab Results   Component Value Date    WBC 10.6 09/22/2021    HGB 15.6 09/22/2021    HCT 44.5 09/22/2021     09/22/2021    CHOL 112 10/02/2018    TRIG 52 10/02/2018    HDL 68 10/02/2018    ALT 37 09/22/2021    AST 50 (H) 09/22/2021     09/22/2021    K 4.3 09/22/2021     09/22/2021    CREATININE 0.7 09/22/2021    BUN 9 09/22/2021    CO2 21 (L) 09/22/2021    TSH 1.050 09/22/2021    GLUF 102 10/02/2018          Mental Status Exam:   Level of consciousness: awake  Appearance:  well-appearing, hospital attire, lying in bed, fair grooming and fair hygiene  Behavior/Motor:  psychomotor retardation  Attitude toward examiner:  cooperative, attentive and good eye contact  Speech:  spontaneous, normal rate and normal volume  Mood:  \"better\" per patient  Affect:  blunted  Thought processes:  linear, goal directed and coherent  Thought content:  Denies homicidal ideation  Suicidal Ideation:  passive  Delusions:  paranoia is improving  Perceptual Disturbance:  denies any perceptual disturbance  Cognition: Patient is oriented to person, place, time and situation  Concentration: clinically adequate  Memory: intact  Insight & Judgement: limited     ASSESSMENT     Bipolar I disorder, current or most recent episode depressed, with psychotic features ([facility] [address]   Alcohol use disorder, in early remission  PTSD  History of cannabis, cocaine and methamphetamine use      PLAN    Patient's symptoms show minimal improvement today   Will continue current medications as prescribed  We will order a CBC today as patient is complaining of dizziness and heavy vaginal bleeding. Patient's dizziness could be from the Prazosin as well. Will consider consulting hospitalist.   Attempt to develop insight, psycho-education and supportive therapy conducted. Probable discharge: TBD  Follow-up: [facility] outpatient, daily while on inpatient unit    Electronically signed by Mariana Bronson PA-C on 9/24/2021 at 3:36 PM Reviewed patient's current plan of care and vital signs with nursing staff. Psychiatry Attending Attestation     I assessed this patient and reviewed the case and plan of care with Mariana Bronson PA-C.   I have reviewed the above documentation and I agree with the findings and treatment plan with the following updates. Patient remains isolated to her room today. Reports feeling physically weak and sick. Reports heavy menstrual bleeding. We will consider consulting hospitalist and if her bleeding does not resolve. Also reports feeling dizzy at times. Will obtain orthostatic vitals today. Patient continues to report feeling sad down and low. Identifies primary stressors as poor support. Reports fleeting suicidal thoughts today. Mentions that the suicidal thoughts are getting better. Her thought process is more organized. Continues to have vague paranoia that people are talking about her however she is easily redirectable and mentions that she is able to ignore these people. Denies any active auditory hallucinations today. We will continue her current psychotropic medication and observe. More than 16 mins of the session was spent doing Supportive psychotherapy and coordinating patient's care. Session started at 10am and ended at 10:30am.     Electronically signed by Bisi Fletcher MD on 9/24/21 at 4:32 PM EDT    **This report has been created using voice recognition software. It may contain minor errors which are inherent in voice recognition technology. **

## 2021-09-25 PROCEDURE — 1240000000 HC EMOTIONAL WELLNESS R&B

## 2021-09-25 PROCEDURE — 99231 SBSQ HOSP IP/OBS SF/LOW 25: CPT | Performed by: NURSE PRACTITIONER

## 2021-09-25 PROCEDURE — 6370000000 HC RX 637 (ALT 250 FOR IP): Performed by: PSYCHIATRY & NEUROLOGY

## 2021-09-25 PROCEDURE — 6370000000 HC RX 637 (ALT 250 FOR IP): Performed by: PHYSICIAN ASSISTANT

## 2021-09-25 RX ADMIN — LEVOTHYROXINE SODIUM 50 MCG: 0.05 TABLET ORAL at 08:28

## 2021-09-25 RX ADMIN — FLUOXETINE HYDROCHLORIDE 80 MG: 20 CAPSULE ORAL at 08:28

## 2021-09-25 RX ADMIN — RISPERIDONE 0.5 MG: 0.25 TABLET ORAL at 17:23

## 2021-09-25 RX ADMIN — IBUPROFEN 400 MG: 400 TABLET, FILM COATED ORAL at 08:28

## 2021-09-25 RX ADMIN — ACETAMINOPHEN 650 MG: 325 TABLET ORAL at 20:39

## 2021-09-25 RX ADMIN — PRAZOSIN HYDROCHLORIDE 1 MG: 1 CAPSULE ORAL at 20:39

## 2021-09-25 RX ADMIN — HYDROXYZINE HYDROCHLORIDE 50 MG: 25 TABLET ORAL at 16:24

## 2021-09-25 RX ADMIN — TRAZODONE HYDROCHLORIDE 50 MG: 50 TABLET ORAL at 20:39

## 2021-09-25 RX ADMIN — IBUPROFEN 400 MG: 400 TABLET, FILM COATED ORAL at 16:24

## 2021-09-25 RX ADMIN — ONDANSETRON 4 MG: 4 TABLET, ORALLY DISINTEGRATING ORAL at 08:28

## 2021-09-25 ASSESSMENT — PAIN SCALES - GENERAL
PAINLEVEL_OUTOF10: 0
PAINLEVEL_OUTOF10: 4
PAINLEVEL_OUTOF10: 8
PAINLEVEL_OUTOF10: 5

## 2021-09-25 ASSESSMENT — PAIN - FUNCTIONAL ASSESSMENT: PAIN_FUNCTIONAL_ASSESSMENT: ACTIVITIES ARE NOT PREVENTED

## 2021-09-25 ASSESSMENT — PAIN DESCRIPTION - ORIENTATION: ORIENTATION: LOWER

## 2021-09-25 ASSESSMENT — PAIN DESCRIPTION - ONSET: ONSET: ON-GOING

## 2021-09-25 ASSESSMENT — PAIN DESCRIPTION - DESCRIPTORS
DESCRIPTORS: CRAMPING
DESCRIPTORS: CRAMPING

## 2021-09-25 ASSESSMENT — PAIN DESCRIPTION - PROGRESSION: CLINICAL_PROGRESSION: NOT CHANGED

## 2021-09-25 ASSESSMENT — PAIN DESCRIPTION - PAIN TYPE
TYPE: ACUTE PAIN
TYPE: ACUTE PAIN

## 2021-09-25 ASSESSMENT — PAIN DESCRIPTION - LOCATION
LOCATION: ABDOMEN
LOCATION: ABDOMEN

## 2021-09-25 ASSESSMENT — PAIN DESCRIPTION - FREQUENCY: FREQUENCY: INTERMITTENT

## 2021-09-25 NOTE — PLAN OF CARE
Problem: Role Relationship:  Goal: Ability to demonstrate positive changes in social behaviors and relationships will improve  Description: Ability to demonstrate positive changes in social behaviors and relationships will improve  Outcome: Ongoing  Note: Pt has not attended any of the groups that have been offered on the unit so far this shift. Pt has not been seen out on the unit interacting with peers. Pt will be encouraged to attend groups and socialize with peers. Pt progress towards socialization goal is ongoing.

## 2021-09-25 NOTE — PLAN OF CARE
Problem: Anxiety:  Goal: Level of anxiety will decrease  Description: Level of anxiety will decrease  9/25/2021 1300 by Rima Boone RN  Outcome: Ongoing  Note: Reports having anxiety. Feeling a little better than she did. Problem: Activity:  Goal: Sleeping patterns will improve  Description: Sleeping patterns will improve  9/25/2021 1300 by Rima Boone RN  Outcome: Ongoing  Note: Patient slept 8 hours continuously last night. Patient has slept throughout shift. Problem: Discharge Planning:  Goal: Discharged to appropriate level of care  Description: Discharged to appropriate level of care  9/25/2021 1300 by Rima Boone RN  Outcome: Ongoing  Note: Discharge planning in process. Problem: Pain:  Goal: Pain level will decrease  Description: Pain level will decrease  9/25/2021 1300 by Rima Boone RN  Outcome: Ongoing  Note: Reports having menstrual cramps, rating them 8 out of 10 with 10 being the worse pain. Problem: Altered Mood, Manic Behavior:  Goal: Able to verbalize decrease in frequency and intensity of racing thoughts  Description: Able to verbalize decrease in frequency and intensity of racing thoughts  9/25/2021 1300 by Rima Boone RN  Outcome: Ongoing  Note: Does not report any racing thoughts. Problem: Altered Mood, Manic Behavior:  Goal: Ability to disclose and discuss suicidal ideas will improve  Description: Ability to disclose and discuss suicidal ideas will improve  9/25/2021 1300 by Rima Boone RN  Outcome: Ongoing  Note: Denies suicidal thoughts. Problem: Altered Mood, Manic Behavior:  Goal: Absence of self-harm  Description: Absence of self-harm  9/25/2021 1300 by Rima Boone RN  Outcome: Ongoing  Note: Free from self harming behaviors.       Problem: Altered Mood, Manic Behavior:  Goal: Ability to achieve adequate nutritional intake will improve  Description: Ability to achieve adequate nutritional intake will improve  9/25/2021 1300 by Katelyn Barrett RN  Outcome: Ongoing  Note: Eating and drinking well. Problem: Altered Mood, Manic Behavior:  Goal: Ability to interact with others will improve  Description: Ability to interact with others will improve  9/25/2021 1300 by Katelyn Barrett RN  Outcome: Ongoing  Note: Isolates to room. Problem: Altered Mood, Manic Behavior:  Goal: Mood stable  Description: Mood stable  9/25/2021 1300 by Katelyn Barrett RN  Outcome: Ongoing     Problem: Altered Mood, Manic Behavior:  Goal: Patient specific goal  Description: Patient specific goal  9/25/2021 1300 by Katelyn Barrett RN  Outcome: Ongoing  Note: \"Take a walk. \"     Problem: Coping:  Goal: Ability to identify problematic behaviors that deter socialization will improve  Description: Ability to identify problematic behaviors that deter socialization will improve  9/25/2021 1300 by Katelyn Barrett RN  Outcome: Ongoing     Problem: Role Relationship:  Goal: Ability to demonstrate positive changes in social behaviors and relationships will improve  Description: Ability to demonstrate positive changes in social behaviors and relationships will improve  9/25/2021 1300 by Katelyn Barrett RN  Outcome: Ongoing     Problem: Altered Mood, Manic Behavior:  Goal: Ability to demonstrate self-control will improve  Description: Ability to demonstrate self-control will improve  9/25/2021 1300 by Katelyn Barrett RN  Outcome: Completed

## 2021-09-25 NOTE — BH NOTE
Patient asking to talk to a . Patient believes she is going to be arrested. Patient states she receives a \"lot of hate mail. \" Reports that she slept with a lot of men when she had HPV. While talking with this nurse patient had her eyes fixed on the television. Stating she can't be around people or even watch television.

## 2021-09-25 NOTE — GROUP NOTE
Group Therapy Note    Date: 9/25/2021    Group Start Time: 0915  Group End Time: 0945  Group Topic: Psychotherapy    [facility] 4[facility]    GABRIELE Guevara        Group Therapy Note    Attendees: 7         Encouraged patient to attend group. Patient did not attend.     Signature:  GABRIELE Guevara

## 2021-09-25 NOTE — PLAN OF CARE
Problem: Anxiety:  Goal: Level of anxiety will decrease  Description: Level of anxiety will decrease  9/25/2021 0339 by Callum Rodriges RN  Outcome: Met This Shift  Note: Took atarax for anxiety this shift   9/24/2021 1745 by Maria De Jesus Mitchell RN  Outcome: Ongoing  Note: Pt shared feeling anxious this am and was willing to accept medication to assist decreasing her anxiety     Problem: Activity:  Goal: Sleeping patterns will improve  Description: Sleeping patterns will improve  9/25/2021 0339 by Callum Rodriges RN  Outcome: Met This Shift  Note: Took trazodone for sleep   9/24/2021 1745 by Maria De Jesus Mitchell RN  Outcome: Ongoing  Note: Pt slept 8.5hrs continuous sleep     Problem: Discharge Planning:  Goal: Discharged to appropriate level of care  Description: Discharged to appropriate level of care  9/25/2021 0339 by Callum Rodriges RN  Outcome: Met This Shift  Note: Works with an interdisciplinary team towards meeting discharge needs   9/24/2021 1745 by Maria De Jesus Mitchell RN  Outcome: Ongoing  Note: Pt working with treatment team for optimal discharge needs pt shared she follows up at [facility]     Problem: Pain:  Goal: Pain level will decrease  Description: Pain level will decrease  9/25/2021 0339 by Callum Rodriges RN  Outcome: Met This Shift  Note: Took tylenol with relief   9/24/2021 1745 by Maria De Jesus Mitchell RN  Outcome: Ongoing  Note: Pt medicated for menstrual cramps. See MAR     Problem: Altered Mood, Manic Behavior:  Goal: Able to verbalize decrease in frequency and intensity of racing thoughts  Description: Able to verbalize decrease in frequency and intensity of racing thoughts  9/25/2021 0339 by Callum Rodriges RN  Outcome: Met This Shift  Note: Denies racing thoughts   9/24/2021 1745 by Maria De Jesus Mitchell RN  Outcome: Ongoing  Note: Pt reports decreasing in frequency of racing thoughts.    Goal: Ability to disclose and discuss suicidal ideas will improve  Description: Ability to disclose and discuss suicidal ideas will improve  9/25/2021 0339 by Ed Momin RN  Outcome: Met This Shift  Note: Denies suicidal thoughts or plans   9/24/2021 1745 by Laney Cruz RN  Outcome: Ongoing  Note: Pt denies suicidal thoughts   Goal: Absence of self-harm  Description: Absence of self-harm  9/25/2021 0339 by Ed Momin RN  Outcome: Met This Shift  Note: No self harm thoughts, plans, or behaviors this shift   9/24/2021 1745 by Laney Cruz RN  Outcome: Met This Shift  Note: Pt remains free of self harming behaviors  Goal: Ability to demonstrate self-control will improve  Description: Ability to demonstrate self-control will improve  9/25/2021 0339 by Ed Momin RN  Outcome: Met This Shift  Note: Good self control this shift   9/24/2021 1745 by Laney Cruz RN  Outcome: Ongoing  Goal: Mood stable  Description: Mood stable  9/25/2021 0339 by Ed Momin RN  Outcome: Met This Shift  Note: Mood stable this shift   9/24/2021 1745 by Laney Cruz RN  Outcome: Ongoing  Note: Pt reports improved mood rates her mood 6-7 out of a scale of 1 to 10 with 10 being the best. Compliant with medications     Problem: Altered Mood, Manic Behavior:  Goal: Ability to achieve adequate nutritional intake will improve  Description: Ability to achieve adequate nutritional intake will improve  9/25/2021 0339 by Ed Momin RN  Outcome: Ongoing  Note: Declined a snack this shift   9/24/2021 1745 by Laney Cruz RN  Outcome: Ongoing  Goal: Patient specific goal  Description: Patient specific goal  9/25/2021 7235 by Ed Momin RN  Outcome: Ongoing  Note: Encouraged to set a daily goal.   9/24/2021 1745 by Laney Cruz RN  Outcome: Ongoing     Problem: Coping:  Goal: Ability to identify problematic behaviors that deter socialization will improve  Description: Ability to identify problematic behaviors that deter

## 2021-09-25 NOTE — BH NOTE
Group Therapy Note    Date: 9/25/2021  Start Time: 1630  End Time:  0867  Number of Participants: 8    Type of Group: Healthy Living/Wellness    Status After Intervention:  Improved    Participation Level: Interactive    Participation Quality: Appropriate, Attentive and Sharing      Speech:  normal      Thought Process/Content: Logical      Affective Functioning: Congruent      Mood: depressed      Level of consciousness:  Alert and Attentive      Response to Learning: Able to verbalize current knowledge/experience, Able to verbalize/acknowledge new learning, Capable of insight and Progressing to goal      Endings: None Reported    Modes of Intervention: Exploration      Discipline Responsible: Registered Nurse      Signature:  Malathi Faustin RN

## 2021-09-26 PROCEDURE — 6370000000 HC RX 637 (ALT 250 FOR IP): Performed by: PSYCHIATRY & NEUROLOGY

## 2021-09-26 PROCEDURE — 1240000000 HC EMOTIONAL WELLNESS R&B

## 2021-09-26 PROCEDURE — 99231 SBSQ HOSP IP/OBS SF/LOW 25: CPT | Performed by: NURSE PRACTITIONER

## 2021-09-26 PROCEDURE — 6370000000 HC RX 637 (ALT 250 FOR IP): Performed by: PHYSICIAN ASSISTANT

## 2021-09-26 RX ADMIN — IBUPROFEN 400 MG: 400 TABLET, FILM COATED ORAL at 15:57

## 2021-09-26 RX ADMIN — RISPERIDONE 0.5 MG: 0.25 TABLET ORAL at 17:25

## 2021-09-26 RX ADMIN — LEVOTHYROXINE SODIUM 50 MCG: 0.05 TABLET ORAL at 08:28

## 2021-09-26 RX ADMIN — PRAZOSIN HYDROCHLORIDE 1 MG: 1 CAPSULE ORAL at 20:33

## 2021-09-26 RX ADMIN — IBUPROFEN 400 MG: 400 TABLET, FILM COATED ORAL at 08:28

## 2021-09-26 RX ADMIN — TRAZODONE HYDROCHLORIDE 50 MG: 50 TABLET ORAL at 20:33

## 2021-09-26 RX ADMIN — FLUOXETINE HYDROCHLORIDE 80 MG: 20 CAPSULE ORAL at 08:28

## 2021-09-26 RX ADMIN — HYDROXYZINE HYDROCHLORIDE 50 MG: 25 TABLET ORAL at 20:33

## 2021-09-26 RX ADMIN — ACETAMINOPHEN 650 MG: 325 TABLET ORAL at 20:33

## 2021-09-26 ASSESSMENT — PAIN DESCRIPTION - DESCRIPTORS
DESCRIPTORS: CRAMPING
DESCRIPTORS: CRAMPING

## 2021-09-26 ASSESSMENT — PAIN DESCRIPTION - ORIENTATION
ORIENTATION: LOWER
ORIENTATION: LOWER

## 2021-09-26 ASSESSMENT — PAIN DESCRIPTION - PROGRESSION
CLINICAL_PROGRESSION: GRADUALLY IMPROVING
CLINICAL_PROGRESSION: GRADUALLY IMPROVING

## 2021-09-26 ASSESSMENT — PAIN SCALES - GENERAL
PAINLEVEL_OUTOF10: 0
PAINLEVEL_OUTOF10: 6
PAINLEVEL_OUTOF10: 0
PAINLEVEL_OUTOF10: 2
PAINLEVEL_OUTOF10: 6

## 2021-09-26 ASSESSMENT — PAIN DESCRIPTION - LOCATION
LOCATION: ABDOMEN
LOCATION: ABDOMEN

## 2021-09-26 ASSESSMENT — PAIN DESCRIPTION - PAIN TYPE
TYPE: ACUTE PAIN
TYPE: ACUTE PAIN

## 2021-09-26 ASSESSMENT — PAIN DESCRIPTION - FREQUENCY: FREQUENCY: INTERMITTENT

## 2021-09-26 ASSESSMENT — PAIN DESCRIPTION - ONSET: ONSET: ON-GOING

## 2021-09-26 NOTE — GROUP NOTE
Group Therapy Note    Date: 9/26/2021    Group Start Time: 2267  Group End Time: 1100  Group Topic: Music Therapy    [facility]    LUIZA Valentine    Group Therapy Note    Attendees: 10         Patient's Goal:  Pt will improve socialization and knowledge of coping mechanisms through participation of music group.     Notes:  Pt was present during group. Pt displays active participation in group, choosing a song, and identifying the reasoning behind it. Pt was interactive with peers and attentive. Pt was given a \"mental health song challenge\", presenting a challenge of identifying 27 different songs and placing them into different categories/ questions asked. Status After Intervention:  Improved    Participation Level:  Active Listener and Interactive    Participation Quality: Appropriate, Attentive and Sharing      Speech:  normal      Thought Process/Content: Linear      Affective Functioning: Flat      Mood: euthymic      Level of consciousness:  Alert, Oriented x4 and Attentive      Response to Learning: Able to verbalize current knowledge/experience, Able to verbalize/acknowledge new learning, Able to retain information, Capable of insight, Able to change behavior and Progressing to goal      Endings: None Reported    Modes of Intervention: Education, Support, Socialization, Exploration, Clarifying, Activity and Media      Discipline Responsible: Psychoeducational Specialist      Signature:  LUIZA Wynn

## 2021-09-26 NOTE — GROUP NOTE
Group Therapy Note    Date: 9/26/2021    Group Start Time: 0930  Group End Time: 1000  Group Topic: [facility] 4E    [address], [facility]    Group Therapy Note    Attendees: 9         Patient's Goal:  \"Stay positive. \"    Notes:  Pt progress is ongoing. Status After Intervention:  Improved    Participation Level:  Active Listener and Interactive    Participation Quality: Appropriate, Attentive and Sharing      Speech:  normal      Thought Process/Content: Linear      Affective Functioning: Flat      Mood: euthymic      Level of consciousness:  Alert, Oriented x4 and Attentive      Response to Learning: Able to verbalize current knowledge/experience, Able to verbalize/acknowledge new learning, Able to retain information, Capable of insight, Able to change behavior and Progressing to goal      Endings: None Reported    Modes of Intervention: Education, Support, Socialization, Exploration, Clarifying, Activity, Limit-setting and Reality-testing      Discipline Responsible: Psychoeducational Specialist      Signature:  LUIZA Price

## 2021-09-26 NOTE — PROGRESS NOTES
Psychiatry Progress Note                                                  9-     CC: Bipolar I disorder, current or most recent episode depressed, with psychotic features ([facility].                                                                          Subjective     Progress:  Lesly reports she continues to feel better everyday. Reports depression continues to be  less. Denies feelings of harm towards self or others. Reports meds seem to be working really well. Denies having side effects. Good med compliance is verified. Reports appetite and sleep remain  \"good\" Verified slept 7.5 hours broken last night. States she attended 1  group yesterday. . Denies getting any visits or talking to any ne on phone yesterday. Reports feeling more hopeful.       Objective  /80   Pulse 106   Temp 98.8 °F (37.1 °C)   Resp 16   Ht 5' 3\" (1.6 m)   Wt 200 lb (90.7 kg)   SpO2 93%   BMI 35.43 kg/m²      MSE:  Level of consciousness: Alert  Appearance: hospital attire, in chair and fair grooming   Behavior/Motor: no abnormalities noted   Attitude toward examiner: cooperative   Speech: Normal volume, circumstantial   Mood: Euthymic  Affect: Reactive  Thought processes: Linear and goal directed   Suicidal Ideation: Denies suicidal ideations  Homicidal ideation: Denies homicidal ideations  Delusions: No evidence of delusions is observed  Perceptual Disturbance: Denies AH/VH  Cognition: Oriented to person, place, time and situation   Concentration fair   Memory intact   Insight: Limited  Judgment: Limited     Assessment:  Bipolar I disorder, current or most recent episode depressed, with psychotic features (HCC)   Alcohol use disorder, in early remission  PTSD  History of cannabis, cocaine and methamphetamine use       Plan:  Continue current meds as ordered  Continue to encourage group attendance.        Kirk Hi, ARON  3-

## 2021-09-26 NOTE — BH NOTE
Patient found in room crying. When asked about what was wrong patient states \"I'm not going to be able to leave because no one likes me, no one has took my dirty clothes it's like I have the plague. I didn't order my supper so they just gave me a salad. \" Patient was educated on needing to order her meals so she gets what she wants for her meals.

## 2021-09-26 NOTE — GROUP NOTE
Group Therapy Note    Date: 9/26/2021    Group Start Time: 1330  Group End Time: 1400  Group Topic: Psychoeducation    [facility] 4[facility]    GABRIELE Dennison        Group Therapy Note    Attendees: 10         Encouraged patient to attend group. Patient did not attend.     Signature:  GABRIELE Dennison

## 2021-09-26 NOTE — PLAN OF CARE
Problem: Anxiety:  Goal: Level of anxiety will decrease  Description: Level of anxiety will decrease  9/26/2021 1117 by Rima Boone RN  Outcome: Ongoing  Note: Reports having anxiety. \"nervous to go to group. \"     Problem: Discharge Planning:  Goal: Discharged to appropriate level of care  Description: Discharged to appropriate level of care  9/26/2021 1117 by Rima Boone RN  Outcome: Ongoing  Note: Discharge planning in process. Problem: Pain:  Goal: Pain level will decrease  Description: Pain level will decrease  9/26/2021 1117 by Rima Boone RN  Outcome: Ongoing  Note: Reports having abdominal cramping. Was given motrin with relief. Problem: Altered Mood, Manic Behavior:  Goal: Able to verbalize decrease in frequency and intensity of racing thoughts  Description: Able to verbalize decrease in frequency and intensity of racing thoughts  9/26/2021 1117 by Rima Boone RN  Outcome: Ongoing  Note: Does not verbalize racing thoughts. Problem: Altered Mood, Manic Behavior:  Goal: Ability to interact with others will improve  Description: Ability to interact with others will improve  9/26/2021 1117 by Rima Boone RN  Outcome: Ongoing  Note: Patient out on unit more today. Talking more with peers and staff. Problem: Altered Mood, Manic Behavior:  Goal: Mood stable  Description: Mood stable  9/26/2021 1117 by Rima Boone RN  Outcome: Ongoing  Note: Patient does appear to be paranoid and suspicious. Problem: Altered Mood, Manic Behavior:  Goal: Patient specific goal  Description: Patient specific goal  9/26/2021 1117 by Rima Boone RN  Outcome: Ongoing  Note: \"Be up more. Stay positive. \"     Problem: Coping:  Goal: Ability to identify problematic behaviors that deter socialization will improve  Description: Ability to identify problematic behaviors that deter socialization will improve  9/26/2021 1117 by Rima Boone RN  Outcome: Ongoing     Problem: Role Relationship:  Goal: Ability to demonstrate positive changes in social behaviors and relationships will improve  Description: Ability to demonstrate positive changes in social behaviors and relationships will improve  9/26/2021 1117 by Katelyn Barrett RN  Outcome: Ongoing     Problem: Altered Mood, Psychotic Behavior:  Goal: Able to verbalize reality based thinking  Description: Able to verbalize reality based thinking  9/26/2021 1117 by Katelyn Barrett RN  Outcome: Ongoing  Note: Patient is suspicious of others. Problem: Substance Abuse:  Goal: Participates in care planning  Description: Participates in care planning  9/26/2021 1117 by Katelyn Barrett RN  Outcome: Ongoing  Note: Care plan reviewed with patient.   Patient does verbalize understanding of the plan of care and does contribute to goal setting

## 2021-09-26 NOTE — PLAN OF CARE
Problem: Anxiety:  Goal: Level of anxiety will decrease  Description: Level of anxiety will decrease  9/25/2021 2335 by Anitha Boggs RN  Outcome: Met This Shift  Note: Pt denies anxiety this evening  9/25/2021 1300 by Kimberly Acevedo RN  Outcome: Ongoing  Note: Reports having anxiety. Feeling a little better than she did. Problem: Altered Mood, Manic Behavior:  Goal: Patient specific goal  Description: Patient specific goal  9/25/2021 2335 by Anitha Boggs RN  Outcome: Met This Shift  Note: Met goal of taking a walk and going to group today  9/25/2021 1300 by Kimberly Acevedo RN  Outcome: Ongoing  Note: \"Take a walk. \"     Problem: Substance Abuse:  Goal: Participates in care planning  Description: Participates in care planning  Outcome: Met This Shift  Note: Care plan reviewed with patient. Patient does verbalize understanding of the plan of care and does contribute to goal setting      Problem: Discharge Planning:  Goal: Discharged to appropriate level of care  Description: Discharged to appropriate level of care  9/25/2021 2335 by Anitha Boggs RN  Outcome: Ongoing  Note: Plans to return to [facility] at d/c and follow up at [facility]  9/25/2021 1300 by Kimberly Acevedo RN  Outcome: Ongoing  Note: Discharge planning in process. Problem: Pain:  Goal: Pain level will decrease  Description: Pain level will decrease  9/25/2021 2335 by Anitha Boggs RN  Outcome: Ongoing  Note: Pt had Tylenol for c/o menstrual cramps  9/25/2021 1300 by Kimberly Acevedo RN  Outcome: Ongoing  Note: Reports having menstrual cramps, rating them 8 out of 10 with 10 being the worse pain.       Problem: Altered Mood, Manic Behavior:  Goal: Able to verbalize decrease in frequency and intensity of racing thoughts  Description: Able to verbalize decrease in frequency and intensity of racing thoughts  9/25/2021 2335 by Anitha Boggs RN  Outcome: Ongoing  Note: Pt denies having racing thoughts  9/25/2021 1300 by Agnieszka Claudio RN  Outcome: Ongoing  Note: Does not report any racing thoughts. Goal: Ability to interact with others will improve  Description: Ability to interact with others will improve  9/25/2021 2335 by Higinio Gerber RN  Outcome: Ongoing  Note: Pt isolates to room, out on the unit for needs, has some peer interaction  9/25/2021 1300 by Agnieszka Claudio RN  Outcome: Ongoing  Note: Isolates to room. Goal: Mood stable  Description: Mood stable  9/25/2021 2335 by Higinio Gerber RN  Outcome: Ongoing  Note: Rates mood 8/10  9/25/2021 1300 by Agnieszka Claudio RN  Outcome: Ongoing     Problem: Coping:  Goal: Ability to identify problematic behaviors that deter socialization will improve  Description: Ability to identify problematic behaviors that deter socialization will improve  9/25/2021 2335 by Higinio Gerber RN  Outcome: Ongoing  9/25/2021 1300 by Agnieszka Claudio RN  Outcome: Ongoing     Problem: Role Relationship:  Goal: Ability to demonstrate positive changes in social behaviors and relationships will improve  Description: Ability to demonstrate positive changes in social behaviors and relationships will improve  9/25/2021 2335 by Higinio Gerber RN  Outcome: Ongoing  9/25/2021 1300 by Agnieszka Claudio RN  Outcome: Ongoing  9/25/2021 1009 by Dieudonne Antonio  Outcome: Ongoing  Note: Pt has not attended any of the groups that have been offered on the unit so far this shift. Pt has not been seen out on the unit interacting with peers. Pt will be encouraged to attend groups and socialize with peers. Pt progress towards socialization goal is ongoing.      Problem: Altered Mood, Psychotic Behavior:  Goal: Able to verbalize reality based thinking  Description: Able to verbalize reality based thinking  Outcome: Ongoing  Note: Pt appeared paranoid this evening, stated \"I have been receiving hate mail, I can't watch violence on TV because I have been in trouble with the law in the past, I have been sent messages and pieces of music from past people in my life, I am known as a narc,  I narced on people and they went to retirement and so did I, 1 specific person is out to get me\"     Problem: Activity:  Goal: Sleeping patterns will improve  Description: Sleeping patterns will improve  9/25/2021 2335 by Johnie Bryant RN  Outcome: Completed  Note: Pt reports she is sleeping god, taking trazodone at bedtime  9/25/2021 1300 by Rima Boone RN  Outcome: Ongoing  Note: Patient slept 8 hours continuously last night. Patient has slept throughout shift. Problem: Altered Mood, Manic Behavior:  Goal: Ability to disclose and discuss suicidal ideas will improve  Description: Ability to disclose and discuss suicidal ideas will improve  9/25/2021 2335 by Johnie Bryant RN  Outcome: Completed  Note: Pt denies suicidal thoughts  9/25/2021 1300 by Rima Boone RN  Outcome: Ongoing  Note: Denies suicidal thoughts. Goal: Absence of self-harm  Description: Absence of self-harm  9/25/2021 2335 by Johnie Bryant RN  Outcome: Completed  Note: Pt denies urge to self harm, denies self harm  9/25/2021 1300 by Rima Boone RN  Outcome: Ongoing  Note: Free from self harming behaviors. Goal: Ability to achieve adequate nutritional intake will improve  Description: Ability to achieve adequate nutritional intake will improve  9/25/2021 2335 by Johnie Bryant RN  Outcome: Completed  Note: Pt reports her appetite is good  9/25/2021 1300 by Rima Boone RN  Outcome: Ongoing  Note: Eating and drinking well.    Goal: Ability to demonstrate self-control will improve  Description: Ability to demonstrate self-control will improve  9/25/2021 1300 by Rima Boone RN  Outcome: Completed     Problem: Depressive Behavior With or Without Suicide Precautions:  Goal: Able to verbalize and/or display a decrease in depressive symptoms  Description: Able to verbalize and/or display a decrease in depressive symptoms  Outcome: Completed  Note: Pt rates mood 8/10, denies feeling depressed, is hopeful for the future     Problem: Nausea/Vomiting:  Goal: Absence of nausea/vomiting  Description: Absence of nausea/vomiting  Outcome: Completed  Note: Pt denies nausea and vomiting this evening     Problem: General Medical Problem-Behavioral Health  Goal: Compliance with prescribed medication regimen will improve  Description: Compliance with prescribed medication regimen will improve  Outcome: Completed  Note: Pt is taking meds as prescribed

## 2021-09-26 NOTE — BH NOTE
Pt did not attend 0845 goal group and community meeting. Pt received maximum encouragement to attend group. Pt did not attend 1100 recreation therapy music group session. Pt received maximum encouragement to attend.

## 2021-09-27 VITALS
TEMPERATURE: 98.3 F | WEIGHT: 200 LBS | HEART RATE: 86 BPM | OXYGEN SATURATION: 97 % | RESPIRATION RATE: 16 BRPM | BODY MASS INDEX: 35.44 KG/M2 | SYSTOLIC BLOOD PRESSURE: 134 MMHG | HEIGHT: 63 IN | DIASTOLIC BLOOD PRESSURE: 89 MMHG

## 2021-09-27 PROCEDURE — 99239 HOSP IP/OBS DSCHRG MGMT >30: CPT | Performed by: PSYCHIATRY & NEUROLOGY

## 2021-09-27 PROCEDURE — 5130000000 HC BRIDGE APPOINTMENT

## 2021-09-27 PROCEDURE — 6370000000 HC RX 637 (ALT 250 FOR IP): Performed by: PHYSICIAN ASSISTANT

## 2021-09-27 PROCEDURE — 6370000000 HC RX 637 (ALT 250 FOR IP): Performed by: PSYCHIATRY & NEUROLOGY

## 2021-09-27 RX ORDER — RISPERIDONE 0.5 MG/1
0.5 TABLET, FILM COATED ORAL EVERY EVENING
Qty: 30 TABLET | Refills: 0 | Status: SHIPPED | OUTPATIENT
Start: 2021-09-27 | End: 2022-04-29

## 2021-09-27 RX ORDER — HYDROXYZINE 50 MG/1
50 TABLET, FILM COATED ORAL 3 TIMES DAILY PRN
Qty: 90 TABLET | Refills: 0 | Status: SHIPPED | OUTPATIENT
Start: 2021-09-27 | End: 2021-10-27

## 2021-09-27 RX ORDER — FLUOXETINE HYDROCHLORIDE 40 MG/1
80 CAPSULE ORAL DAILY
Qty: 60 CAPSULE | Refills: 0 | Status: SHIPPED | OUTPATIENT
Start: 2021-09-27 | End: 2022-04-29

## 2021-09-27 RX ORDER — PRAZOSIN HYDROCHLORIDE 1 MG/1
1 CAPSULE ORAL NIGHTLY
Qty: 30 CAPSULE | Refills: 0 | Status: SHIPPED | OUTPATIENT
Start: 2021-09-27 | End: 2022-04-29

## 2021-09-27 RX ORDER — TRAZODONE HYDROCHLORIDE 50 MG/1
50 TABLET ORAL NIGHTLY PRN
Qty: 30 TABLET | Refills: 0 | Status: SHIPPED | OUTPATIENT
Start: 2021-09-27

## 2021-09-27 RX ADMIN — ACETAMINOPHEN 650 MG: 325 TABLET ORAL at 13:47

## 2021-09-27 RX ADMIN — LEVOTHYROXINE SODIUM 50 MCG: 0.05 TABLET ORAL at 08:53

## 2021-09-27 RX ADMIN — IBUPROFEN 400 MG: 400 TABLET, FILM COATED ORAL at 11:16

## 2021-09-27 RX ADMIN — FLUOXETINE HYDROCHLORIDE 80 MG: 20 CAPSULE ORAL at 08:53

## 2021-09-27 ASSESSMENT — PAIN SCALES - GENERAL
PAINLEVEL_OUTOF10: 8
PAINLEVEL_OUTOF10: 7
PAINLEVEL_OUTOF10: 8

## 2021-09-27 ASSESSMENT — PAIN DESCRIPTION - FREQUENCY: FREQUENCY: INTERMITTENT

## 2021-09-27 ASSESSMENT — PAIN DESCRIPTION - PROGRESSION: CLINICAL_PROGRESSION: GRADUALLY IMPROVING

## 2021-09-27 ASSESSMENT — PAIN DESCRIPTION - PAIN TYPE: TYPE: ACUTE PAIN

## 2021-09-27 ASSESSMENT — PAIN - FUNCTIONAL ASSESSMENT: PAIN_FUNCTIONAL_ASSESSMENT: ACTIVITIES ARE NOT PREVENTED

## 2021-09-27 ASSESSMENT — PAIN DESCRIPTION - ORIENTATION: ORIENTATION: LOWER

## 2021-09-27 ASSESSMENT — PAIN DESCRIPTION - LOCATION: LOCATION: BACK

## 2021-09-27 NOTE — PROGRESS NOTES
This RN has reviewed and agrees with KOSTAS Maria Junior, LPN's data collection and has collaborated with this LPN regarding the patient's care plan.

## 2021-09-27 NOTE — DISCHARGE INSTR - DIET

## 2021-09-27 NOTE — PLAN OF CARE
COMPLETE VIA PERFECT SERVE   Problem: Anxiety:  Goal: Level of anxiety will decrease  Description: Level of anxiety will decrease  9/27/2021 0051 by Christian Calderon RN  Outcome: Met This Shift  Note: Took atarax for anxiety this shift. 9/26/2021 1117 by Gabriella Valenzuela RN  Outcome: Ongoing  Note: Reports having anxiety. \"nervous to go to group. \"     Problem: Discharge Planning:  Goal: Discharged to appropriate level of care  Description: Discharged to appropriate level of care  9/27/2021 0051 by Christian Calderon RN  Outcome: Met This Shift  Note: Works with an interdisciplinary team towards meeting discharge needs. States that she is hopeful for discharge soon   9/26/2021 1117 by Gabriella Valenzuela RN  Outcome: Ongoing  Note: Discharge planning in process. Problem: Pain:  Goal: Pain level will decrease  Description: Pain level will decrease  9/27/2021 0051 by Christian Calderon RN  Outcome: Met This Shift  Note: Took tylenol with relief  9/26/2021 1117 by Gabriella Valenzuela RN  Outcome: Ongoing  Note: Reports having abdominal cramping. Was given motrin with relief. Problem: Altered Mood, Manic Behavior:  Goal: Able to verbalize decrease in frequency and intensity of racing thoughts  Description: Able to verbalize decrease in frequency and intensity of racing thoughts  9/27/2021 0051 by Christian Calderon RN  Outcome: Met This Shift  Note: Denies racing thoughts   9/26/2021 1117 by Gabriella Valenzuela RN  Outcome: Ongoing  Note: Does not verbalize racing thoughts. Goal: Mood stable  Description: Mood stable  9/27/2021 0051 by Christian Calderon RN  Outcome: Met This Shift  Note: Calm with stable mood this shift. 9/26/2021 1117 by Gabriella Valenzuela RN  Outcome: Ongoing  Note: Patient does appear to be paranoid and suspicious.       Problem: Substance Abuse:  Goal: Participates in care planning  Description: Participates in care planning  9/27/2021 0051 by Christian Calderon RN  Outcome: Met This Shift  Note: Care plan reviewed with patient. Patient verbalize understanding of the plan of care and contribute to goal setting.    9/26/2021 1117 by Ping Brooks RN  Outcome: Ongoing  Note: Care plan reviewed with patient. Patient does verbalize understanding of the plan of care and does contribute to goal setting      Problem: Altered Mood, Manic Behavior:  Goal: Patient specific goal  Description: Patient specific goal  9/27/2021 0051 by Flaca Morales RN  Outcome: Ongoing  Note: Encouraged to set a daily goal.   9/26/2021 1117 by Ping Brooks RN  Outcome: Ongoing  Note: \"Be up more. Stay positive. \"     Problem: Coping:  Goal: Ability to identify problematic behaviors that deter socialization will improve  Description: Ability to identify problematic behaviors that deter socialization will improve  9/27/2021 0051 by Flaca Morales RN  Outcome: Ongoing  9/26/2021 1117 by Ping Brooks RN  Outcome: Ongoing     Problem: Role Relationship:  Goal: Ability to demonstrate positive changes in social behaviors and relationships will improve  Description: Ability to demonstrate positive changes in social behaviors and relationships will improve  9/27/2021 0051 by Flaca Morales RN  Outcome: Ongoing  9/26/2021 1500 by Ambreen Pozo  Outcome: Met This Shift  Note: Pt has attended 2/3 groups that have been offered on the unit so far this shift. Pt has been seen out on the unit interacting with peers. Pt will be encouraged to continue attending groups and to continue interacting with peers. Pt has met socialization goal set for this shift.   9/26/2021 1117 by Ping Brooks RN  Outcome: Ongoing     Problem: Altered Mood, Psychotic Behavior:  Goal: Able to verbalize reality based thinking  Description: Able to verbalize reality based thinking  9/27/2021 0051 by Flaca Morales RN  Outcome: Ongoing  Note: Remains paranoid about peers this evening   9/26/2021 1117 by Ping Brooks RN  Outcome: Ongoing  Note: Patient is suspicious of others. Problem: Altered Mood, Manic Behavior:  Goal: Ability to interact with others will improve  Description: Ability to interact with others will improve  9/27/2021 0051 by Donald Jeffers RN  Outcome: Not Met This Shift  Note: Isolates to room and bed   9/26/2021 1117 by Elza Huang RN  Outcome: Ongoing  Note: Patient out on unit more today. Talking more with peers and staff.

## 2021-09-27 NOTE — PROGRESS NOTES
Behavioral Health   Discharge Note    Pt discharged with followings belongings:   Dentures: None  Vision - Corrective Lenses: Glasses  Hearing Aid: None  Jewelry: Earrings, Bracelet  Body Piercings Removed: N/A  Clothing: Footwear, Pants, Other (Comment)  Were All Patient Medications Collected?: Not Applicable  Other Valuables: Money (Comment), Rose Daniel, [id number] sent home with Patient . Valuables retrieved from safe, Security envelope number:  N/A and returned to patient. Patient left department with transport via ambulation . Discharged to private residence. \"An Important Message from [employer] About Your Rights\" form photocopy original from admission and provided to pt at discharge Y. Patient education on aftercare instructions: YES  Bridge Appointment completed: Reviewed Discharge Instructions with patient. Patient verbalizes understanding and agreement with the discharge plan using the teachback method. Information faxed to N/A by N/A Patient verbalize understanding of AVS:  YES. Status EXAM upon discharge:  Status and Exam  Normal: No  Facial Expression: Flat  Affect: Blunt  Level of Consciousness: Alert  Mood:Normal: No  Mood: Anxious, Worthless, low self-esteem  Motor Activity:Normal: Yes  Interview Behavior: Cooperative  Preception: [place] to Person, Marguerita Civil to Time, [place] to Place, [place] to Situation  Attention:Normal: Yes  Attention: Unable to Concentrate  Thought Processes: Circumstantial  Thought Content:Normal: No  Thought Content: Paranoia  Hallucinations: None  Delusions: Yes  Delusions:  Other(See Comment) (paranoid)  Memory:Normal: No  Memory: Poor Recent  Insight and Judgment: No  Insight and Judgment: Poor Judgment, Poor Insight  Present Suicidal Ideation: No  Present Homicidal Ideation: No    Sarah Logan LPN

## 2021-09-27 NOTE — DISCHARGE INSTR - COC
Continuity of Care Form    Patient Name: Kunal Shah   :  1980  MRN:  [id number]    Admit date:  2021  Discharge date:  ***    Code Status Order: Full Code   Advance Directives:     Admitting Physician:  Salma Madison MD  PCP: Shanon Wiseman DO    Discharging Nurse: [facility] Unit/Room#: 4E-72/46-A  Discharging Unit Phone Number: ***    Emergency Contact:   Extended Emergency Contact Information  Primary Emergency Contact: None,none  Address: [address] [place] [address] Phone: [phone]  Mobile Phone: [phone]  Relation: Other  Preferred language: English   needed?  No    Past Surgical History:  Past Surgical History:   Procedure Laterality Date    CHOLECYSTECTOMY      INNER EAR SURGERY         Immunization History:   Immunization History   Administered Date(s) Administered    COVID-19, Moderna, PF, 100mcg/0.5mL 2021, 2021    Influenza A (R6N4-13) Vaccine Nasal 2009       Active Problems:  Patient Active Problem List   Diagnosis Code    Coker's esophagus without dysplasia K22.70    Moderate episode of recurrent major depressive disorder ([address] F33.1    Bipolar I disorder, current or most recent episode depressed, with psychotic features ([address] F31.5    Schizoaffective disorder, bipolar type ([address] F25.0    Schizoaffective disorder ([address] F25.9    Severe mixed bipolar 1 disorder without psychosis ([address] F31.63    Alcohol use disorder, severe, dependence ([address] F10.20    Amphetamine substance use disorder, severe, in sustained remission ([address] F15.21    Bipolar I disorder with mixed features ([address] F31.9    Class 1 obesity in adult E66.9    Subclinical hypothyroidism E03.9    Anxiety F41.9       Isolation/Infection:   Isolation          No Isolation        Patient Infection Status     None to display          Nurse Assessment:  Last Vital Signs: /89   Pulse 86   Temp 98.3 °F (36.8 °C) (Tympanic)   Resp 16   Ht 5' 3\" (1.6 m)   Wt 200 lb (90.7 kg)   SpO2 97%   BMI 35.43 kg/m²     Last documented pain score (0-10 scale): Pain Level: 7  Last Weight:   Wt Readings from Last 1 Encounters:   21 200 lb (90.7 kg)     Mental Status:  {IP PT MENTAL STATUS:}    IV Access:  { KATIA IV ACCESS:834210360}    Nursing Mobility/ADLs:  Walking   {CHP DME KWJC:277588553}  Transfer  {CHP DME MQFB:761042656}  Bathing  {CHP DME MHGA:823942832}  Dressing  {CHP DME DGMV:052805623}  Toileting  {CHP DME ULDP:391326569}  Feeding  {CHP DME KOPX:659187878}  Med Admin  {CHP DME EBXQ:429745858}  Med Delivery   { KATIA MED Delivery:500523553}    Wound Care Documentation and Therapy:        Elimination:  Continence:   · Bowel: {YES / FZ:69827}  · Bladder: {YES / OF:}  Urinary Catheter: {Urinary Catheter:211876812}   Colostomy/Ileostomy/Ileal Conduit: {YES / VI:33562}       Date of Last BM: ***    Intake/Output Summary (Last 24 hours) at 2021 1357  Last data filed at 2021 2030  Gross per 24 hour   Intake 240 ml   Output    Net 240 ml     I/O last 3 completed shifts:   In: 5 [P.O.:840]  Out: -     Safety Concerns:     [address] Safety Concerns:303937954}    Impairments/Disabilities:      [address] Impairments/Disabilities:670909358}    Nutrition Therapy:  Current Nutrition Therapy:   [address] Diet List:434124978}    Routes of Feeding: {CHP DME Other Feedings:606055019}  Liquids: {Slp liquid thickness:47775}  Daily Fluid Restriction: {CHP DME Yes amt example:155871501}  Last Modified Barium Swallow with Video (Video Swallowing Test): {Done Not Done RB:513888871}    Treatments at the Time of Hospital Discharge:   Respiratory Treatments: ***  Oxygen Therapy:  {Therapy; copd oxygen:11833}  Ventilator:    {NICOLLE UMANZOR Vent JQVL:068266495}    Rehab Therapies: {THERAPEUTIC INTERVENTION:0481014236}  Weight Bearing Status/Restrictions: {NICOLLE UMANZOR Weight Bearin}  Other Medical Equipment (for

## 2021-09-27 NOTE — GROUP NOTE
Group Therapy Note    Date: 9/27/2021    Group Start Time: 1000  Group End Time: 3583  Group Topic: Recreational    [facility] Adult Psych 4E    LUIZA Valentine    Group Therapy Note    Attendees: 10         Patient's Goal:  Pt will improve socialization and knowledge of coping skills through participation of mindfulness and discussions of focusing on the positive ans turning negative situations into positives and areas of growth.     Notes:  Pt was present during group. Group involved discussing things in life in which are taken for granted, focusing on herself, and making sure she is right with herself before trying to tackle other situations. Pt did not participate in discussion but was attentive    Status After Intervention:  Improved    Participation Level:  Active Listener    Participation Quality: Appropriate and Attentive      Speech:  normal      Thought Process/Content: Linear      Affective Functioning: Flat      Mood: euthymic      Level of consciousness:  Alert, Oriented x4 and Attentive      Response to Learning: Able to verbalize current knowledge/experience, Able to verbalize/acknowledge new learning, Able to retain information, Capable of insight, Able to change behavior and Progressing to goal      Endings: None Reported    Modes of Intervention: Education, Support, Socialization, Exploration, Clarifying, Problem-solving, Activity, Confrontation and Reality-testing      Discipline Responsible: Psychoeducational Specialist      Signature:  JORDYN BajwaS

## 2021-09-27 NOTE — PLAN OF CARE
Problem: Anxiety:  Goal: Level of anxiety will decrease  Description: Level of anxiety will decrease  9/27/2021 1314 by Linda Childs LPN  Outcome: Completed  9/27/2021 0051 by Brian Rivas RN  Outcome: Met This Shift  Note: Took atarax for anxiety this shift. Problem: Discharge Planning:  Goal: Discharged to appropriate level of care  Description: Discharged to appropriate level of care  9/27/2021 1314 by Linda Childs LPN  Outcome: Completed  9/27/2021 0051 by Brian Rivas RN  Outcome: Met This Shift  Note: Works with an interdisciplinary team towards meeting discharge needs. States that she is hopeful for discharge soon      Problem: Pain:  Goal: Pain level will decrease  Description: Pain level will decrease  9/27/2021 1314 by Linda Childs LPN  Outcome: Completed  9/27/2021 0051 by Brian Rivas RN  Outcome: Met This Shift  Note: Took tylenol with relief     Problem: Altered Mood, Manic Behavior:  Goal: Able to verbalize decrease in frequency and intensity of racing thoughts  Description: Able to verbalize decrease in frequency and intensity of racing thoughts  9/27/2021 1314 by Linda Childs LPN  Outcome: Completed  9/27/2021 0051 by Brian Rivas RN  Outcome: Met This Shift  Note: Denies racing thoughts   Goal: Ability to interact with others will improve  Description: Ability to interact with others will improve  9/27/2021 1314 by Linda Childs LPN  Outcome: Completed  9/27/2021 0051 by Brian Rivas RN  Outcome: Not Met This Shift  Note: Isolates to room and bed   Goal: Mood stable  Description: Mood stable  9/27/2021 1314 by Linda Childs LPN  Outcome: Completed  9/27/2021 0051 by Brian Rivas RN  Outcome: Met This Shift  Note: Calm with stable mood this shift.    Goal: Patient specific goal  Description: Patient specific goal  9/27/2021 1314 by Linda Childs LPN  Outcome: Completed  9/27/2021

## 2021-09-27 NOTE — GROUP NOTE
Group Therapy Note    Date: 9/27/2021    Group Start Time: 2126  Group End Time: 0935  Group Topic: [address] Adult Psych 4E    LUIZA Valentine    Group Therapy Note    Attendees: 7         Patient's Goal:  \"tie up a few loose ends. \"    Notes:  Pt progress is ongoing. Status After Intervention:  Improved    Participation Level:  Active Listener and Interactive    Participation Quality: Appropriate, Attentive and Sharing      Speech:  normal      Thought Process/Content: Linear      Affective Functioning: Flat      Mood: euthymic      Level of consciousness:  Alert, Oriented x4 and Attentive      Response to Learning: Able to verbalize current knowledge/experience, Able to verbalize/acknowledge new learning, Able to retain information, Capable of insight, Able to change behavior and Progressing to goal      Endings: None Reported    Modes of Intervention: Education, Support, Socialization, Exploration, Clarifying, Activity, Limit-setting and Reality-testing      Discipline Responsible: Psychoeducational Specialist      Signature:  LUIZA Knox

## 2021-09-28 ENCOUNTER — TELEPHONE (OUTPATIENT)
Dept: PSYCHIATRY | Age: 41
End: 2021-09-28

## 2021-09-28 ENCOUNTER — TELEPHONE (OUTPATIENT)
Dept: FAMILY MEDICINE CLINIC | Age: 41
End: 2021-09-28

## 2021-09-28 NOTE — LETTER
[address] [place] [address],[address] [facility]. [address]  [facility] [address]  Phone: [phone]  Fax: [address], [place]      September 29, 2021    [address] [facility] [address]      Dear Eric MORRIS:    We have made several attempts to contact you by phone and have   been unsuccessful. Please call our office at your earliest convenience  At [phone] opt [phone]. Thank you.       Sincerely,        Eduardo Kim DO
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
## 2021-09-29 NOTE — TELEPHONE ENCOUNTER
[facility] Transitions Initial Follow Up Call    Outreach made within 2 business days of discharge: Yes    Patient: Kiley Naranjo Patient : 1980   MRN: [id number]  Reason for Admission: There are no discharge diagnoses documented for the most recent discharge. Discharge Date: 21       Spoke with: MICHAEL for patient to return call at their earliest convenience. Discharge department/facility: [facility]    Contact letter sent    Scheduled appointment with PCP within 7-14 days    Follow Up  No future appointments.     Any Valle CMA (AAMA)

## 2021-10-08 ENCOUNTER — APPOINTMENT (OUTPATIENT)
Dept: CT IMAGING | Age: 41
End: 2021-10-08
Payer: MEDICARE

## 2021-10-08 ENCOUNTER — HOSPITAL ENCOUNTER (EMERGENCY)
Age: 41
Discharge: HOME | End: 2021-10-08
Attending: EMERGENCY MEDICINE
Payer: MEDICARE

## 2021-10-08 VITALS
WEIGHT: 230 LBS | SYSTOLIC BLOOD PRESSURE: 133 MMHG | HEART RATE: 95 BPM | TEMPERATURE: 98.6 F | BODY MASS INDEX: 36.96 KG/M2 | OXYGEN SATURATION: 100 % | HEIGHT: 66 IN | DIASTOLIC BLOOD PRESSURE: 79 MMHG | RESPIRATION RATE: 16 BRPM

## 2021-10-08 DIAGNOSIS — M54.50 ACUTE MIDLINE LOW BACK PAIN WITHOUT SCIATICA: ICD-10-CM

## 2021-10-08 DIAGNOSIS — M47.812 OSTEOARTHRITIS OF CERVICAL SPINE, UNSPECIFIED SPINAL OSTEOARTHRITIS COMPLICATION STATUS: ICD-10-CM

## 2021-10-08 DIAGNOSIS — H66.91 ACUTE OTITIS MEDIA, RIGHT: Primary | ICD-10-CM

## 2021-10-08 DIAGNOSIS — H70.91 MASTOIDITIS OF RIGHT SIDE: ICD-10-CM

## 2021-10-08 LAB
ALBUMIN SERPL-MCNC: 3.4 G/DL (ref 3.5–5.1)
ALP BLD-CCNC: 90 U/L (ref 38–126)
ALT SERPL-CCNC: 24 U/L (ref 11–66)
ANION GAP SERPL CALCULATED.3IONS-SCNC: 8 MEQ/L (ref 8–16)
AST SERPL-CCNC: 28 U/L (ref 5–40)
BASOPHILS # BLD: 1 %
BASOPHILS ABSOLUTE: 0.1 THOU/MM3 (ref 0–0.1)
BILIRUB SERPL-MCNC: 0.3 MG/DL (ref 0.3–1.2)
BILIRUBIN DIRECT: < 0.2 MG/DL (ref 0–0.3)
BILIRUBIN URINE: NEGATIVE
BLOOD, URINE: NEGATIVE
BUN BLDV-MCNC: 7 MG/DL (ref 7–22)
C-REACTIVE PROTEIN: < 0.3 MG/DL (ref 0–1)
CALCIUM SERPL-MCNC: 8.8 MG/DL (ref 8.5–10.5)
CHARACTER, URINE: CLEAR
CHLORIDE BLD-SCNC: 101 MEQ/L (ref 98–111)
CO2: 26 MEQ/L (ref 23–33)
COLOR: YELLOW
CREAT SERPL-MCNC: 0.7 MG/DL (ref 0.4–1.2)
EOSINOPHIL # BLD: 3.8 %
EOSINOPHILS ABSOLUTE: 0.3 THOU/MM3 (ref 0–0.4)
ERYTHROCYTE [DISTWIDTH] IN BLOOD BY AUTOMATED COUNT: 12.9 % (ref 11.5–14.5)
ERYTHROCYTE [DISTWIDTH] IN BLOOD BY AUTOMATED COUNT: 42.6 FL (ref 35–45)
GFR SERPL CREATININE-BSD FRML MDRD: > 90 ML/MIN/1.73M2
GLUCOSE BLD-MCNC: 124 MG/DL (ref 70–108)
GLUCOSE URINE: NEGATIVE MG/DL
HCT VFR BLD CALC: 41.8 % (ref 37–47)
HEMOGLOBIN: 14.4 GM/DL (ref 12–16)
IMMATURE GRANS (ABS): 0.02 THOU/MM3 (ref 0–0.07)
IMMATURE GRANULOCYTES: 0.3 %
KETONES, URINE: ABNORMAL
LEUKOCYTE ESTERASE, URINE: NEGATIVE
LYMPHOCYTES # BLD: 38.1 %
LYMPHOCYTES ABSOLUTE: 3 THOU/MM3 (ref 1–4.8)
MCH RBC QN AUTO: 31.3 PG (ref 26–33)
MCHC RBC AUTO-ENTMCNC: 34.4 GM/DL (ref 32.2–35.5)
MCV RBC AUTO: 90.9 FL (ref 81–99)
MONOCYTES # BLD: 10.9 %
MONOCYTES ABSOLUTE: 0.9 THOU/MM3 (ref 0.4–1.3)
NITRITE, URINE: NEGATIVE
NUCLEATED RED BLOOD CELLS: 0 /100 WBC
OSMOLALITY CALCULATION: 269.5 MOSMOL/KG (ref 275–300)
PH UA: 6.5 (ref 5–9)
PLATELET # BLD: 241 THOU/MM3 (ref 130–400)
PMV BLD AUTO: 8.6 FL (ref 9.4–12.4)
POTASSIUM SERPL-SCNC: 3.5 MEQ/L (ref 3.5–5.2)
PREGNANCY, SERUM: NEGATIVE
PROTEIN UA: NEGATIVE
RBC # BLD: 4.6 MILL/MM3 (ref 4.2–5.4)
SEG NEUTROPHILS: 45.9 %
SEGMENTED NEUTROPHILS ABSOLUTE COUNT: 3.7 THOU/MM3 (ref 1.8–7.7)
SODIUM BLD-SCNC: 135 MEQ/L (ref 135–145)
SPECIFIC GRAVITY, URINE: 1.02 (ref 1–1.03)
TOTAL PROTEIN: 6.4 G/DL (ref 6.1–8)
UROBILINOGEN, URINE: 2 EU/DL (ref 0–1)
WBC # BLD: 8 THOU/MM3 (ref 4.8–10.8)

## 2021-10-08 PROCEDURE — 82248 BILIRUBIN DIRECT: CPT

## 2021-10-08 PROCEDURE — 81003 URINALYSIS AUTO W/O SCOPE: CPT

## 2021-10-08 PROCEDURE — 86140 C-REACTIVE PROTEIN: CPT

## 2021-10-08 PROCEDURE — 72131 CT LUMBAR SPINE W/O DYE: CPT

## 2021-10-08 PROCEDURE — 96375 TX/PRO/DX INJ NEW DRUG ADDON: CPT

## 2021-10-08 PROCEDURE — 96365 THER/PROPH/DIAG IV INF INIT: CPT

## 2021-10-08 PROCEDURE — 96376 TX/PRO/DX INJ SAME DRUG ADON: CPT

## 2021-10-08 PROCEDURE — 6360000002 HC RX W HCPCS: Performed by: EMERGENCY MEDICINE

## 2021-10-08 PROCEDURE — 2580000003 HC RX 258: Performed by: EMERGENCY MEDICINE

## 2021-10-08 PROCEDURE — 72125 CT NECK SPINE W/O DYE: CPT

## 2021-10-08 PROCEDURE — 6370000000 HC RX 637 (ALT 250 FOR IP): Performed by: EMERGENCY MEDICINE

## 2021-10-08 PROCEDURE — 85025 COMPLETE CBC W/AUTO DIFF WBC: CPT

## 2021-10-08 PROCEDURE — 70450 CT HEAD/BRAIN W/O DYE: CPT

## 2021-10-08 PROCEDURE — 84703 CHORIONIC GONADOTROPIN ASSAY: CPT

## 2021-10-08 PROCEDURE — 99284 EMERGENCY DEPT VISIT MOD MDM: CPT

## 2021-10-08 PROCEDURE — 80053 COMPREHEN METABOLIC PANEL: CPT

## 2021-10-08 PROCEDURE — 36415 COLL VENOUS BLD VENIPUNCTURE: CPT

## 2021-10-08 RX ORDER — KETOROLAC TROMETHAMINE 10 MG/1
10 TABLET, FILM COATED ORAL 3 TIMES DAILY
Qty: 15 TABLET | Refills: 0 | Status: SHIPPED | OUTPATIENT
Start: 2021-10-08 | End: 2021-10-16

## 2021-10-08 RX ORDER — HYDROCODONE BITARTRATE AND ACETAMINOPHEN 5; 325 MG/1; MG/1
1 TABLET ORAL ONCE
Status: COMPLETED | OUTPATIENT
Start: 2021-10-08 | End: 2021-10-08

## 2021-10-08 RX ORDER — KETOROLAC TROMETHAMINE 30 MG/ML
30 INJECTION, SOLUTION INTRAMUSCULAR; INTRAVENOUS ONCE
Status: COMPLETED | OUTPATIENT
Start: 2021-10-08 | End: 2021-10-08

## 2021-10-08 RX ORDER — AMOXICILLIN AND CLAVULANATE POTASSIUM 875; 125 MG/1; MG/1
1 TABLET, FILM COATED ORAL 2 TIMES DAILY
Qty: 20 TABLET | Refills: 0 | Status: SHIPPED | OUTPATIENT
Start: 2021-10-08 | End: 2021-10-18

## 2021-10-08 RX ORDER — ONDANSETRON 2 MG/ML
4 INJECTION INTRAMUSCULAR; INTRAVENOUS ONCE
Status: COMPLETED | OUTPATIENT
Start: 2021-10-08 | End: 2021-10-08

## 2021-10-08 RX ORDER — SODIUM CHLORIDE 9 MG/ML
INJECTION, SOLUTION INTRAVENOUS CONTINUOUS
Status: DISCONTINUED | OUTPATIENT
Start: 2021-10-08 | End: 2021-10-09 | Stop reason: HOSPADM

## 2021-10-08 RX ADMIN — SODIUM CHLORIDE: 9 INJECTION, SOLUTION INTRAVENOUS at 18:29

## 2021-10-08 RX ADMIN — HYDROCODONE BITARTRATE AND ACETAMINOPHEN 1 TABLET: 5; 325 TABLET ORAL at 20:50

## 2021-10-08 RX ADMIN — ONDANSETRON 4 MG: 2 INJECTION INTRAMUSCULAR; INTRAVENOUS at 18:27

## 2021-10-08 RX ADMIN — CEFTRIAXONE SODIUM 1000 MG: 1 INJECTION, POWDER, FOR SOLUTION INTRAMUSCULAR; INTRAVENOUS at 20:36

## 2021-10-08 RX ADMIN — KETOROLAC TROMETHAMINE 30 MG: 30 INJECTION, SOLUTION INTRAMUSCULAR; INTRAVENOUS at 20:50

## 2021-10-08 RX ADMIN — KETOROLAC TROMETHAMINE 30 MG: 30 INJECTION, SOLUTION INTRAMUSCULAR; INTRAVENOUS at 18:28

## 2021-10-08 ASSESSMENT — PAIN SCALES - GENERAL
PAINLEVEL_OUTOF10: 10
PAINLEVEL_OUTOF10: 8
PAINLEVEL_OUTOF10: 10

## 2021-10-08 ASSESSMENT — ENCOUNTER SYMPTOMS
BACK PAIN: 1
WHEEZING: 0
CHEST TIGHTNESS: 0
SORE THROAT: 0
VOICE CHANGE: 0
RHINORRHEA: 0
VOMITING: 0
TROUBLE SWALLOWING: 0
EYE PAIN: 0
CONSTIPATION: 0
ABDOMINAL DISTENTION: 0
SINUS PRESSURE: 0
COUGH: 0
ABDOMINAL PAIN: 0
PHOTOPHOBIA: 0
SHORTNESS OF BREATH: 0
DIARRHEA: 0
BLOOD IN STOOL: 0
NAUSEA: 0

## 2021-10-08 NOTE — ED NOTES
Patient resting in bed. Respirations easy and unlabored. No distress noted. Call light within reach.        Rodrigo Garcia RN  10/08/21 2283

## 2021-10-08 NOTE — ED TRIAGE NOTES
Pt arrives to Ed by ambulance with c/o nausea, vomiting, and back pain 10/10. Pt states there is a possibility of pregency, states she had some bleeding 3 weeks ago, but felt it was not like her noma period. Pt is AOx4, states she has had migraine, nausea and vomiting for about 3 weeks. Pt also states she comes from a sober living facility.

## 2021-10-08 NOTE — ED NOTES
Bed: 012A  Expected date:   Expected time:   [facility]  Comments:     Silvia Olivares RN  10/08/21 2088

## 2021-10-08 NOTE — ED PROVIDER NOTES
[facility]        Room # 12/012A    CHIEF COMPLAINT    Chief Complaint   Patient presents with    Nausea     migraine, possible pregant        Nurses Notes reviewed and I agree except as noted in the HPI. HPI    Alaina Arrington is a 39 y.o. female who presents for evaluation of back pain nausea headache and possible pregnancy. The patient relates that she has been having back pains from the neck down to his lumbar area for the past 4 months. Patient admits to have a history of degenerative disc disease, fibromyalgia, and whenever she had back pain , she developed migraine headache with accompanying nausea and vomiting. The patient has been trying to stretch herself. Patient has been taking Motrin over-the-counter. 2 days ago the patient is low-grade fever 100.9. Patient also was concerned that she might be pregnant she has not have any menstruation for the past 6 months however 2 weeks ago she has a good vaginal bleeding for 5 days. Denies any urinary symptoms. However patient admits she has some flank pain especially in the right side      REVIEW OF SYSTEMS    Review of Systems   Constitutional: Positive for fever. Negative for appetite change, chills, diaphoresis and fatigue. HENT: Negative for congestion, ear discharge, ear pain, postnasal drip, rhinorrhea, sinus pressure, sneezing, sore throat, trouble swallowing and voice change. Eyes: Negative for photophobia, pain and visual disturbance. Respiratory: Negative for cough, chest tightness, shortness of breath and wheezing. Cardiovascular: Negative for chest pain, palpitations and leg swelling. Gastrointestinal: Negative for abdominal distention, abdominal pain, blood in stool, constipation, diarrhea, nausea and vomiting. Genitourinary: Positive for menstrual problem and vaginal bleeding.  Negative for dysuria, flank pain, frequency, genital sores, hematuria, urgency and vaginal discharge. Musculoskeletal: Positive for back pain and neck pain. Negative for arthralgias, gait problem, joint swelling, myalgias and neck stiffness. Skin: Negative. Negative for pallor and rash. Neurological: Positive for headaches. Negative for dizziness, tremors, syncope, speech difficulty, weakness, light-headedness and numbness. Hematological: Does not bruise/bleed easily. Psychiatric/Behavioral: Negative for agitation, confusion, dysphoric mood, hallucinations, sleep disturbance and suicidal ideas. The patient is not nervous/anxious. All other systems reviewed and are negative. PAST MEDICAL HISTORY     has a past medical history of Anxiety, Back pain, Colitis, Depression, Fibromyalgia, HPV (human papilloma virus) anogenital infection, and OCD (obsessive compulsive disorder). SURGICAL HISTORY   has a past surgical history that includes Cholecystectomy and Inner ear surgery. CURRENT MEDICATIONS    Previous Medications    FLUOXETINE (PROZAC) 40 MG CAPSULE    Take 2 capsules by mouth daily    HYDROXYZINE (ATARAX) 50 MG TABLET    Take 1 tablet by mouth 3 times daily as needed for Anxiety    IBUPROFEN (ADVIL;MOTRIN) 800 MG TABLET    Take 1 tablet by mouth every 8 hours as needed for Pain    LEVOTHYROXINE (SYNTHROID) 50 MCG TABLET    TAKE 1 TABLET BY MOUTH DAILY    PRAZOSIN (MINIPRESS) 1 MG CAPSULE    Take 1 capsule by mouth nightly    RISPERIDONE (RISPERDAL) 0.5 MG TABLET    Take 1 tablet by mouth every evening    TRAZODONE (DESYREL) 50 MG TABLET    Take 1 tablet by mouth nightly as needed for Sleep       ALLERGIES    has No Known Allergies. FAMILY HISTORY    She indicated that her mother is alive. She indicated that her father is alive. She indicated that the status of her paternal aunt is unknown.   family history includes Cancer in her paternal aunt; Diabetes in her father and mother; Heart Disease in her father and mother.     SOCIAL HISTORY     reports that she has been smoking cigarettes. She has been smoking about 0.10 packs per day. She has never used smokeless tobacco. She reports previous alcohol use. She reports current drug use. Drugs: Marijuana and Cocaine. PHYSICAL EXAM      INITIAL VITALS: /79   Pulse 95   Temp 98.6 °F (37 °C) (Oral)   Resp 16   Ht 5' 6\" (1.676 m)   Wt 230 lb (104.3 kg)   SpO2 100%   BMI 37.12 kg/m² Estimated body mass index is 37.12 kg/m² as calculated from the following:    Height as of this encounter: 5' 6\" (1.676 m). Weight as of this encounter: 230 lb (104.3 kg). Physical Exam  Vitals reviewed. Constitutional:       Appearance: She is well-developed. HENT:      Head: Normocephalic and atraumatic. Right Ear: External ear normal.      Left Ear: External ear normal.      Nose: Nose normal.   Eyes:      General: No scleral icterus. Conjunctiva/sclera: Conjunctivae normal.      Pupils: Pupils are equal, round, and reactive to light. Neck:      Thyroid: No thyromegaly. Vascular: No JVD. Cardiovascular:      Rate and Rhythm: Normal rate and regular rhythm. Heart sounds: No murmur heard. No friction rub. Pulmonary:      Effort: Pulmonary effort is normal.      Breath sounds: Normal breath sounds. No wheezing or rales. Chest:      Chest wall: No tenderness. Abdominal:      General: Bowel sounds are normal.      Palpations: Abdomen is soft. There is no mass. Tenderness: There is no abdominal tenderness. There is right CVA tenderness. Musculoskeletal:      Cervical back: Normal range of motion and neck supple. Comments: Redness on the cervical and lumbar spine especially at the mid lumbar spine which is reproducible. Lymphadenopathy:      Cervical: No cervical adenopathy. Skin:     Findings: No rash. Neurological:      Mental Status: She is alert and oriented to person, place, and time. Psychiatric:         Behavior: Behavior is cooperative.          MEDICAL DECISION MAKING    DIFFERENTIAL DIAGNOSIS:  Back pain UTI pyelonephritis amenorrhea rule out pregnancy rule out ectopic, migraine headache rule out intracranial pathology      DIAGNOSTIC RESULTS    EKG   Interpreted by Blanca Tobias MD      Rhythm: normal sinus   Rate: normal  Axis: normal  Ectopy: none  Conduction: normal  ST Segments: no acute change  T Waves: no acute change  Q Waves: none    Clinical Impression: Normal sinus rhythm with no acute changes/normal EKG      Blanca Tobias MD      RADIOLOGY:  I have reviewed radiologic plain film image(s). The plain films will be read or overread by the radiologist.All other non-plain film images(s) such as CT, Ultrasound and MRI have been read by the radiologist.  [address]   Final Result   Reversal of the normal lordosis. Mild degenerative changes. **This report has been created using voice recognition software. It may contain minor errors which are inherent in voice recognition technology. **      Final report electronically signed by Dr. Jennifer Aguero on 10/8/2021 7:33 PM      CT LUMBAR SPINE WO CONTRAST   Final Result   No acute abnormality            **This report has been created using voice recognition software. It may contain minor errors which are inherent in voice recognition technology. **      Final report electronically signed by Dr. Jennifer Aguero on 10/8/2021 7:37 PM      CT HEAD WO CONTRAST   Final Result   No acute intracranial pathology. Right mastoiditis and possible otitis interna. **This report has been created using voice recognition software. It may contain minor errors which are inherent in voice recognition technology. **      Final report electronically signed by Dr. Jennifer Aguero on 10/8/2021 7:30 PM          LABS:   Labs Reviewed   CBC WITH AUTO DIFFERENTIAL - Abnormal; Notable for the following components:       Result Value    MPV 8.6 (*)     All other components within normal limits   BASIC METABOLIC PANEL - Abnormal; Notable for the following components:    Glucose 124 (*)     All other components within normal limits   HEPATIC FUNCTION PANEL - Abnormal; Notable for the following components:    Albumin 3.4 (*)     All other components within normal limits   URINE RT REFLEX TO CULTURE - Abnormal; Notable for the following components:    Ketones, Urine TRACE (*)     Urobilinogen, Urine 2.0 (*)     All other components within normal limits   OSMOLALITY - Abnormal; Notable for the following components:    Osmolality Calc 269.5 (*)     All other components within normal limits   C-REACTIVE PROTEIN   HCG, SERUM, QUALITATIVE   ANION GAP   GLOMERULAR FILTRATION RATE, ESTIMATED     All other unresulted laboratory test above are normal:    Vitals:    Vitals:    10/08/21 1722 10/08/21 1839   BP: 136/79 133/79   Pulse: 101 95   Resp: 16 16   Temp: 98.9 °F (37.2 °C) 98.6 °F (37 °C)   TempSrc: Oral Oral   SpO2: 100% 100%   Weight: 230 lb (104.3 kg)    Height: 5' 6\" (1.676 m)        EMERGENCY DEPARTMENT COURSE:    Medications   0.9 % sodium chloride infusion ( IntraVENous New Bag 10/8/21 1829)   ondansetron (ZOFRAN) injection 4 mg (4 mg IntraVENous Given 10/8/21 1827)   ketorolac (TORADOL) injection 30 mg (30 mg IntraVENous Given 10/8/21 1828)   cefTRIAXone (ROCEPHIN) 1000 mg IVPB in 50 mL D5W minibag (1,000 mg IntraVENous New Bag 10/8/21 2036)   ketorolac (TORADOL) injection 30 mg (30 mg IntraVENous Given 10/8/21 2050)   HYDROcodone-acetaminophen (NORCO) 5-325 MG per tablet 1 tablet (1 tablet Oral Given 10/8/21 2050)       The pt was seen and evaluated by me. Within the department, I observed the pt's vitalsigns to be within acceptable range. Laboratory and Radiological studies were performed, results were reviewed with the patient. Within the department, the pt was treated with fluid hydration Toradol Zofran and Norco, Rocephin was likewise given for mastoiditis and ear infection.  I observed the pt's condition to hemodynamically stable during the duration of their stay. I explained my proposed course of treatment to the pt, and they were amenable to my decision. They were discharged home, and they will return to the ED if their symptoms become more severein nature, or otherwise change. Controlled Substances Monitoring:        CRITICAL CARE:   None. CONSULTS:  None    PROCEDURES:  None. FINAL IMPRESSION       1. Acute otitis media, right    2. Mastoiditis of right side    3. Acute midline low back pain without sciatica    4. Osteoarthritis of cervical spine, unspecified spinal osteoarthritis complication status          DISPOSITION/PLAN  PATIENT REFERRED TO:  Jasvir Resendez DO  [address]  [facility] [address]  [phone]    Schedule an appointment as soon as possible for a visit in 3 days      [facility]\"" [address] [address] EMERGENCY DEPT  [address]    As needed    DISCHARGE MEDICATIONS:  New Prescriptions    AMOXICILLIN-CLAVULANATE (AUGMENTIN) 875-125 MG PER TABLET    Take 1 tablet by mouth 2 times daily for 10 days    KETOROLAC (TORADOL) 10 MG TABLET    Take 1 tablet by mouth 3 times daily         (Please note that portions of this note were completed with a voice recognition program and electronically transcribed. Efforts were [facility] edit the dictations but occasionally words are mis-transcribed . The transcription may contain errors not detected in proofreading.   This transcription was electronically signed.)     10/08/21 9:22 PM      Ella Gallegos MD      Emergency room physician           Ella Gallegos MD  10/08/21 3536

## 2021-10-11 ENCOUNTER — TELEPHONE (OUTPATIENT)
Dept: FAMILY MEDICINE CLINIC | Age: 41
End: 2021-10-11

## 2021-10-12 ENCOUNTER — TELEPHONE (OUTPATIENT)
Dept: FAMILY MEDICINE CLINIC | Age: 41
End: 2021-10-12

## 2021-10-12 DIAGNOSIS — H70.90 MASTOIDITIS, UNSPECIFIED LATERALITY: Primary | ICD-10-CM

## 2021-10-12 NOTE — TELEPHONE ENCOUNTER
----- Message from Judy De Jesus sent at 10/12/2021  3:22 PM EDT -----  Subject: Message to Provider    QUESTIONS  Information for Provider? Zora Hoyt had called in to ask about getting a   prescription change from the prescription she was given in the ER on   10/8/21 as her insurance would not cover that one. She was informed it was   on her Dr. desk but she may be stopping by if she does not hear back soon. ---------------------------------------------------------------------------  --------------  Elen SHARPE  What is the best way for the office to contact you? Do not leave any   message, patient will call back for answer  Preferred Call Back Phone Number? [phone]  ---------------------------------------------------------------------------  --------------  SCRIPT ANSWERS  Relationship to Patient?  Self

## 2021-10-12 NOTE — TELEPHONE ENCOUNTER
Pt was given rx for toradol at ED. Her insurance does not cover rx. Would like to know if dr will obtain PA or prescribe another medication to help with pain. Pt would prefer to get a stronger medication if possible.    YBNKK:[phone]

## 2021-10-13 ENCOUNTER — TELEPHONE (OUTPATIENT)
Dept: FAMILY MEDICINE CLINIC | Age: 41
End: 2021-10-13

## 2021-10-13 RX ORDER — NAPROXEN 500 MG/1
500 TABLET ORAL 2 TIMES DAILY PRN
Qty: 20 TABLET | Refills: 0 | Status: ON HOLD | OUTPATIENT
Start: 2021-10-13 | End: 2022-05-10 | Stop reason: HOSPADM

## 2021-10-13 NOTE — TELEPHONE ENCOUNTER
----- Message from Sue Attila sent at 10/12/2021  3:22 PM EDT -----  Subject: Message to Provider    QUESTIONS  Information for Provider? Judah Koroamcory had called in to ask about getting a   prescription change from the prescription she was given in the ER on   10/8/21 as her insurance would not cover that one. She was informed it was   on her Dr. hauser but she may be stopping by if she does not hear back soon. ---------------------------------------------------------------------------  --------------  Inna Koo INFO  What is the best way for the office to contact you? Do not leave any   message, patient will call back for answer  Preferred Call Back Phone Number? [phone]  ---------------------------------------------------------------------------  --------------  SCRIPT ANSWERS  Relationship to Patient?  Self

## 2021-10-13 NOTE — TELEPHONE ENCOUNTER
The patient is aware of the prescription, she states that she was going to go pick it up. Please see 10/11/21 telephone encounter. [facility]

## 2021-10-16 ENCOUNTER — HOSPITAL ENCOUNTER (EMERGENCY)
Age: 41
Discharge: HOME | End: 2021-10-16
Attending: EMERGENCY MEDICINE
Payer: MEDICARE

## 2021-10-16 VITALS
DIASTOLIC BLOOD PRESSURE: 97 MMHG | BODY MASS INDEX: 33.29 KG/M2 | SYSTOLIC BLOOD PRESSURE: 132 MMHG | OXYGEN SATURATION: 97 % | HEIGHT: 64 IN | HEART RATE: 80 BPM | TEMPERATURE: 98.1 F | RESPIRATION RATE: 17 BRPM | WEIGHT: 195 LBS

## 2021-10-16 DIAGNOSIS — R19.7 NAUSEA VOMITING AND DIARRHEA: Primary | ICD-10-CM

## 2021-10-16 DIAGNOSIS — R53.83 OTHER FATIGUE: ICD-10-CM

## 2021-10-16 DIAGNOSIS — R52 BODY ACHES: ICD-10-CM

## 2021-10-16 DIAGNOSIS — R11.2 NAUSEA VOMITING AND DIARRHEA: Primary | ICD-10-CM

## 2021-10-16 DIAGNOSIS — M54.50 ACUTE LEFT-SIDED LOW BACK PAIN WITHOUT SCIATICA: ICD-10-CM

## 2021-10-16 LAB
ALBUMIN SERPL-MCNC: 3.1 G/DL (ref 3.5–5.1)
ALP BLD-CCNC: 86 U/L (ref 38–126)
ALT SERPL-CCNC: 22 U/L (ref 11–66)
AMPHETAMINE+METHAMPHETAMINE URINE SCREEN: NEGATIVE
ANION GAP SERPL CALCULATED.3IONS-SCNC: 7 MEQ/L (ref 8–16)
AST SERPL-CCNC: 28 U/L (ref 5–40)
BARBITURATE QUANTITATIVE URINE: NEGATIVE
BASOPHILS # BLD: 0.6 %
BASOPHILS ABSOLUTE: 0.1 THOU/MM3 (ref 0–0.1)
BENZODIAZEPINE QUANTITATIVE URINE: NEGATIVE
BILIRUB SERPL-MCNC: 0.2 MG/DL (ref 0.3–1.2)
BILIRUBIN DIRECT: < 0.2 MG/DL (ref 0–0.3)
BILIRUBIN URINE: NEGATIVE
BLOOD, URINE: NEGATIVE
BUN BLDV-MCNC: 8 MG/DL (ref 7–22)
CALCIUM SERPL-MCNC: 8.2 MG/DL (ref 8.5–10.5)
CANNABINOID QUANTITATIVE URINE: NEGATIVE
CHARACTER, URINE: CLEAR
CHLORIDE BLD-SCNC: 104 MEQ/L (ref 98–111)
CO2: 26 MEQ/L (ref 23–33)
COCAINE METABOLITE QUANTITATIVE URINE: NEGATIVE
COLOR: YELLOW
CREAT SERPL-MCNC: 0.7 MG/DL (ref 0.4–1.2)
EOSINOPHIL # BLD: 4.1 %
EOSINOPHILS ABSOLUTE: 0.4 THOU/MM3 (ref 0–0.4)
ERYTHROCYTE [DISTWIDTH] IN BLOOD BY AUTOMATED COUNT: 12.6 % (ref 11.5–14.5)
ERYTHROCYTE [DISTWIDTH] IN BLOOD BY AUTOMATED COUNT: 42.1 FL (ref 35–45)
GFR SERPL CREATININE-BSD FRML MDRD: > 90 ML/MIN/1.73M2
GLUCOSE BLD-MCNC: 115 MG/DL (ref 70–108)
GLUCOSE URINE: NEGATIVE MG/DL
HCT VFR BLD CALC: 39.7 % (ref 37–47)
HEMOGLOBIN: 13.8 GM/DL (ref 12–16)
IMMATURE GRANS (ABS): 0.02 THOU/MM3 (ref 0–0.07)
IMMATURE GRANULOCYTES: 0.2 %
KETONES, URINE: NEGATIVE
LEUKOCYTE ESTERASE, URINE: NEGATIVE
LIPASE: 40.1 U/L (ref 5.6–51.3)
LYMPHOCYTES # BLD: 25.9 %
LYMPHOCYTES ABSOLUTE: 2.4 THOU/MM3 (ref 1–4.8)
MCH RBC QN AUTO: 32 PG (ref 26–33)
MCHC RBC AUTO-ENTMCNC: 34.8 GM/DL (ref 32.2–35.5)
MCV RBC AUTO: 92.1 FL (ref 81–99)
MONOCYTES # BLD: 7.9 %
MONOCYTES ABSOLUTE: 0.7 THOU/MM3 (ref 0.4–1.3)
NITRITE, URINE: NEGATIVE
NUCLEATED RED BLOOD CELLS: 0 /100 WBC
OPIATES, URINE: NEGATIVE
OSMOLALITY CALCULATION: 273.1 MOSMOL/KG (ref 275–300)
OXYCODONE: NEGATIVE
PH UA: 7.5 (ref 5–9)
PHENCYCLIDINE QUANTITATIVE URINE: NEGATIVE
PLATELET # BLD: 198 THOU/MM3 (ref 130–400)
PMV BLD AUTO: 8.6 FL (ref 9.4–12.4)
POTASSIUM SERPL-SCNC: 4 MEQ/L (ref 3.5–5.2)
PREGNANCY, URINE: NEGATIVE
PROTEIN UA: NEGATIVE
RBC # BLD: 4.31 MILL/MM3 (ref 4.2–5.4)
SARS-COV-2, NAAT: NOT  DETECTED
SEG NEUTROPHILS: 61.3 %
SEGMENTED NEUTROPHILS ABSOLUTE COUNT: 5.6 THOU/MM3 (ref 1.8–7.7)
SODIUM BLD-SCNC: 137 MEQ/L (ref 135–145)
SPECIFIC GRAVITY, URINE: 1.02 (ref 1–1.03)
TOTAL PROTEIN: 5.7 G/DL (ref 6.1–8)
TROPONIN T: < 0.01 NG/ML
UROBILINOGEN, URINE: 0.2 EU/DL (ref 0–1)
WBC # BLD: 9.1 THOU/MM3 (ref 4.8–10.8)

## 2021-10-16 PROCEDURE — 83690 ASSAY OF LIPASE: CPT

## 2021-10-16 PROCEDURE — 93005 ELECTROCARDIOGRAM TRACING: CPT | Performed by: EMERGENCY MEDICINE

## 2021-10-16 PROCEDURE — 85025 COMPLETE CBC W/AUTO DIFF WBC: CPT

## 2021-10-16 PROCEDURE — 82248 BILIRUBIN DIRECT: CPT

## 2021-10-16 PROCEDURE — 87635 SARS-COV-2 COVID-19 AMP PRB: CPT

## 2021-10-16 PROCEDURE — 6370000000 HC RX 637 (ALT 250 FOR IP): Performed by: EMERGENCY MEDICINE

## 2021-10-16 PROCEDURE — 96361 HYDRATE IV INFUSION ADD-ON: CPT

## 2021-10-16 PROCEDURE — 2500000003 HC RX 250 WO HCPCS: Performed by: EMERGENCY MEDICINE

## 2021-10-16 PROCEDURE — 96374 THER/PROPH/DIAG INJ IV PUSH: CPT

## 2021-10-16 PROCEDURE — 81025 URINE PREGNANCY TEST: CPT

## 2021-10-16 PROCEDURE — 2580000003 HC RX 258: Performed by: EMERGENCY MEDICINE

## 2021-10-16 PROCEDURE — 6360000002 HC RX W HCPCS: Performed by: EMERGENCY MEDICINE

## 2021-10-16 PROCEDURE — 99285 EMERGENCY DEPT VISIT HI MDM: CPT

## 2021-10-16 PROCEDURE — 81003 URINALYSIS AUTO W/O SCOPE: CPT

## 2021-10-16 PROCEDURE — 36415 COLL VENOUS BLD VENIPUNCTURE: CPT

## 2021-10-16 PROCEDURE — 80053 COMPREHEN METABOLIC PANEL: CPT

## 2021-10-16 PROCEDURE — 84484 ASSAY OF TROPONIN QUANT: CPT

## 2021-10-16 PROCEDURE — 80307 DRUG TEST PRSMV CHEM ANLYZR: CPT

## 2021-10-16 PROCEDURE — 96375 TX/PRO/DX INJ NEW DRUG ADDON: CPT

## 2021-10-16 RX ORDER — METOCLOPRAMIDE 10 MG/1
10 TABLET ORAL EVERY 8 HOURS PRN
Qty: 8 TABLET | Refills: 0 | Status: SHIPPED | OUTPATIENT
Start: 2021-10-16 | End: 2022-04-29

## 2021-10-16 RX ORDER — ONDANSETRON 2 MG/ML
4 INJECTION INTRAMUSCULAR; INTRAVENOUS ONCE
Status: COMPLETED | OUTPATIENT
Start: 2021-10-16 | End: 2021-10-16

## 2021-10-16 RX ORDER — LANSOPRAZOLE 30 MG/1
30 CAPSULE, DELAYED RELEASE ORAL DAILY
Qty: 14 CAPSULE | Refills: 0 | Status: SHIPPED | OUTPATIENT
Start: 2021-10-16 | End: 2022-04-29

## 2021-10-16 RX ORDER — CYCLOBENZAPRINE HCL 5 MG
5 TABLET ORAL 2 TIMES DAILY PRN
Qty: 10 TABLET | Refills: 0 | Status: SHIPPED | OUTPATIENT
Start: 2021-10-16 | End: 2021-10-26

## 2021-10-16 RX ORDER — 0.9 % SODIUM CHLORIDE 0.9 %
1000 INTRAVENOUS SOLUTION INTRAVENOUS ONCE
Status: COMPLETED | OUTPATIENT
Start: 2021-10-16 | End: 2021-10-16

## 2021-10-16 RX ORDER — HYDROCODONE BITARTRATE AND ACETAMINOPHEN 5; 325 MG/1; MG/1
1 TABLET ORAL ONCE
Status: COMPLETED | OUTPATIENT
Start: 2021-10-16 | End: 2021-10-16

## 2021-10-16 RX ADMIN — HYDROCODONE BITARTRATE AND ACETAMINOPHEN 1 TABLET: 5; 325 TABLET ORAL at 17:39

## 2021-10-16 RX ADMIN — SODIUM CHLORIDE 1000 ML: 9 INJECTION, SOLUTION INTRAVENOUS at 17:39

## 2021-10-16 RX ADMIN — ONDANSETRON 4 MG: 2 INJECTION INTRAMUSCULAR; INTRAVENOUS at 17:39

## 2021-10-16 RX ADMIN — FAMOTIDINE 20 MG: 10 INJECTION, SOLUTION INTRAVENOUS at 17:39

## 2021-10-16 ASSESSMENT — ENCOUNTER SYMPTOMS
VOMITING: 1
NAUSEA: 1
SHORTNESS OF BREATH: 0
BACK PAIN: 1
DIARRHEA: 1
COUGH: 0
COLOR CHANGE: 0
ABDOMINAL PAIN: 1

## 2021-10-16 ASSESSMENT — PAIN SCALES - GENERAL
PAINLEVEL_OUTOF10: 8
PAINLEVEL_OUTOF10: 8
PAINLEVEL_OUTOF10: 5

## 2021-10-16 ASSESSMENT — PAIN DESCRIPTION - PAIN TYPE
TYPE: ACUTE PAIN
TYPE: ACUTE PAIN

## 2021-10-16 ASSESSMENT — PAIN DESCRIPTION - LOCATION
LOCATION: HEAD;BACK
LOCATION: BACK

## 2021-10-16 ASSESSMENT — PAIN DESCRIPTION - DESCRIPTORS: DESCRIPTORS: BURNING

## 2021-10-16 ASSESSMENT — PAIN DESCRIPTION - ORIENTATION: ORIENTATION: LOWER

## 2021-10-16 ASSESSMENT — PAIN DESCRIPTION - FREQUENCY
FREQUENCY: CONTINUOUS
FREQUENCY: CONTINUOUS

## 2021-10-16 NOTE — ED NOTES
ED nurse-to-nurse bedside report    Chief Complaint   Patient presents with    Nausea    Emesis    Fatigue      LOC: alert and orientated to name, place, date  Vital signs   Vitals:    10/16/21 1646 10/16/21 1734 10/16/21 1838   BP: (!) 149/93 127/85 (!) 130/96   Pulse: 91 87 80   Resp: 16 16 16   Temp: 98.1 °F (36.7 °C)     TempSrc: Oral     SpO2: 99% 98% 97%   Weight: 195 lb (88.5 kg)     Height: 5' 4\" (1.626 m)        Pain:    Pain Interventions: Norco  Pain Goal: 2  Oxygen: No    Current needs required Room Air   Telemetry: No  LDAs:   Peripheral IV 10/16/21 Left Forearm (Active)   Site Assessment Clean;Dry; Intact 10/16/21 1659   Line Status Blood return noted;Normal saline locked;Specimen collected 10/16/21 1659   Dressing Status Clean;Dry; Intact 10/16/21 1659     Continuous Infusions:   Mobility: Independent  Webber Fall Risk Score: No flowsheet data found.   Fall Interventions: Rails in place  Report given to: Tiffany Olson [facility], PETTY  10/16/21 1649

## 2021-10-16 NOTE — ED PROVIDER NOTES
[facility]\"" [address] [address] EMERGENCY DEPT    EMERGENCY MEDICINE     Pt Name: Mile Franz  MRN: [id number]  [place] 1980  Date of evaluation: 10/16/2021  Provider: Mallory Kelly MD    [address] [place] [address]       Chief Complaint   Patient presents with    Nausea    Emesis    Fatigue       HISTORY OF PRESENT ILLNESS    Mile Franz is a pleasant 39 y.o. female who presents to the emergency department from home with multiple complaints. She c/o suprapubic abdominal pain, low back pain worse on L, nausea, vomiting, and diarrhea. Also reports she is feeling fatigue, feeling very tired. She reports she was here last week and diagnosed with a double ear infection and her ears and head are better. Triage notes and Nursing notes were reviewed by myself. Any discrepancies are addressed above.     PAST MEDICAL HISTORY     Past Medical History:   Diagnosis Date    Anxiety     Back pain     Colitis     Depression     Fibromyalgia     HPV (human papilloma virus) anogenital infection     OCD (obsessive compulsive disorder)        SURGICAL HISTORY       Past Surgical History:   Procedure Laterality Date    CHOLECYSTECTOMY      INNER EAR SURGERY         CURRENT MEDICATIONS       Previous Medications    AMOXICILLIN-CLAVULANATE (AUGMENTIN) 875-125 MG PER TABLET    Take 1 tablet by mouth 2 times daily for 10 days    FLUOXETINE (PROZAC) 40 MG CAPSULE    Take 2 capsules by mouth daily    HYDROXYZINE (ATARAX) 50 MG TABLET    Take 1 tablet by mouth 3 times daily as needed for Anxiety    IBUPROFEN (ADVIL;MOTRIN) 800 MG TABLET    Take 1 tablet by mouth every 8 hours as needed for Pain    LEVOTHYROXINE (SYNTHROID) 50 MCG TABLET    TAKE 1 TABLET BY MOUTH DAILY    NAPROXEN (NAPROSYN) 500 MG TABLET    Take 1 tablet by mouth 2 times daily as needed for Pain    PRAZOSIN (MINIPRESS) 1 MG CAPSULE    Take 1 capsule by mouth nightly    RISPERIDONE (RISPERDAL) 0.5 MG TABLET    Take 1 tablet by mouth every evening    TRAZODONE (DESYREL) 50 MG TABLET    Take 1 tablet by mouth nightly as needed for Sleep       ALLERGIES     Patient has no known allergies. FAMILY HISTORY       Family History   Problem Relation Age of Onset    Diabetes Mother     Heart Disease Mother     Diabetes Father     Heart Disease Father     Cancer Paternal Aunt         SOCIAL HISTORY       Social History     Socioeconomic History    Marital status:      Spouse name: None    Number of children: 1    Years of education: 15    Highest education level: None   Occupational History    None   Tobacco Use    Smoking status: Current Some Day Smoker     Packs/day: 0.10     Types: Cigarettes    Smokeless tobacco: Never Used   Vaping Use    Vaping Use: Every day   Substance and Sexual Activity    Alcohol use: Not Currently     Comment: last used 07/2021    Drug use: Yes     Types: Marijuana, Cocaine     Comment: \"have not used for several years 4/11/2020\"    Sexual activity: Yes     Partners: Male   Other Topics Concern    None   Social History Narrative    None     Social Determinants of Health     Financial Resource Strain: Medium Risk    Difficulty of Paying Living Expenses: Somewhat hard   Food Insecurity: No Food Insecurity    Worried About Running Out of Food in the Last Year: Never true    Sobeida of Food in the Last Year: Never true   Transportation Needs:     Lack of Transportation (Medical):      Lack of Transportation (Non-Medical):    Physical Activity:     Days of Exercise per Week:     Minutes of Exercise per Session:    Stress:     Feeling of Stress :    Social Connections:     Frequency of Communication with Friends and Family:     Frequency of Social Gatherings with Friends and Family:     Attends Sikhism Services:     Active Member of Clubs or Organizations:     Attends Club or Organization Meetings:     Marital Status:    Intimate Partner Violence:     Fear of Current or Ex-Partner:     Emotionally Abused:     Physically Abused:     Sexually Abused:        REVIEW OF SYSTEMS     Review of Systems   Constitutional: Positive for fatigue. Negative for chills and fever. Respiratory: Negative for cough and shortness of breath. Cardiovascular: Negative for chest pain and leg swelling. Gastrointestinal: Positive for abdominal pain, diarrhea, nausea and vomiting. Musculoskeletal: Positive for back pain. Skin: Negative for color change and rash. All other systems reviewed and are negative. Except as noted above the remainder of the review of systems was reviewed and is. PHYSICAL EXAM    (up to 7 for level 4, 8 or more for level 5)     ED Triage Vitals [10/16/21 1646]   BP Temp Temp Source Pulse Resp SpO2 Height Weight   (!) 149/93 98.1 °F (36.7 °C) Oral 91 16 99 % 5' 4\" (1.626 m) 195 lb (88.5 kg)       Physical Exam  Vitals and nursing note reviewed. HENT:      Head: Normocephalic and atraumatic. Nose: Nose normal.      Mouth/Throat:      Lips: Pink. Mouth: Mucous membranes are moist.   Eyes:      General: Lids are normal.      Conjunctiva/sclera: Conjunctivae normal.   Neck:      Vascular: No JVD. Comments: No meningismus  Cardiovascular:      Rate and Rhythm: Normal rate and regular rhythm. Heart sounds: No murmur heard. No friction rub. No gallop. Pulmonary:      Effort: Pulmonary effort is normal.      Breath sounds: Normal breath sounds and air entry. No wheezing, rhonchi or rales. Abdominal:      Palpations: Abdomen is soft. Tenderness: There is no abdominal tenderness. Negative signs include McBurney's sign. Musculoskeletal:      Cervical back: Neck supple. Comments: +L upper lumbar paraspinal tenderness   Skin:     General: Skin is warm and dry. Capillary Refill: Capillary refill takes less than 2 seconds. Findings: No rash. Comments: No rash, normal turgor   Neurological:      Mental Status: She is alert. GCS: GCS eye subscore is 4.  GCS verbal subscore is 5. GCS motor subscore is 6. Sensory: Sensation is intact. Motor: Motor function is intact. Comments: 5/5 strength in all extremities, 2+ patellar reflex b/l   Psychiatric:         Behavior: Behavior is cooperative. DIAGNOSTIC RESULTS     EKG:(none if blank)  All EKG's are interpreted [facility] Phys[facility] who either signs or Co-signs this chart in the absence of a cardiologist.    Jacki Lowing: NSR at 82, prolonged qt (QTc 486), no stemi    RADIOLOGY: (none if blank)   Interpretation per the Radiologistbelow, if available at the time of this note:    No orders to display       LABS:  Labs Reviewed   CBC WITH AUTO DIFFERENTIAL - Abnormal; Notable for the following components:       Result Value    MPV 8.6 (*)     All other components within normal limits   BASIC METABOLIC PANEL - Abnormal; Notable for the following components:    Glucose 115 (*)     Calcium 8.2 (*)     All other components within normal limits   HEPATIC FUNCTION PANEL - Abnormal; Notable for the following components:    Albumin 3.1 (*)     Total Bilirubin 0.2 (*)     Total Protein 5.7 (*)     All other components within normal limits   ANION GAP - Abnormal; Notable for the following components:    Anion Gap 7.0 (*)     All other components within normal limits   OSMOLALITY - Abnormal; Notable for the following components:    Osmolality Calc 273.1 (*)     All other components within normal limits   COVID-19, RAPID   LIPASE   URINE RT REFLEX TO CULTURE   PREGNANCY, URINE   URINE DRUG SCREEN   GLOMERULAR FILTRATION RATE, ESTIMATED   TROPONIN       All other labs were within normal range or not returned as of this dictation. Please note, any cultures that may have been sent were not resulted at the time of this patient visit. EMERGENCY DEPARTMENT COURSE andMedical Decision Making:     MDM/   Pt presents to the ER with multiple complaints, medicated here, tolerating PO, well appearing, nontoxic.   Symptoms not c/w acs/meningitis/sepsis/cva/abscess. Pt stable for dc home, counseled regarding need for f/u and ER return precautions. Strict returnprecautions and follow up instructions were discussed with the patient with which the patient agrees        ED Medications administered this visit:    Medications   0.9 % sodium chloride bolus (0 mLs IntraVENous Stopped 10/16/21 1839)   ondansetron (ZOFRAN) injection 4 mg (4 mg IntraVENous Given 10/16/21 1739)   HYDROcodone-acetaminophen (NORCO) 5-325 MG per tablet 1 tablet (1 tablet Oral Given 10/16/21 1739)   famotidine (PEPCID) injection 20 mg (20 mg IntraVENous Given 10/16/21 1739)         Procedures: (None if blank)       CLINICAL       1. Nausea vomiting and diarrhea    2. Acute left-sided low back pain without sciatica    3. Other fatigue    4. Body aches          DISPOSITION/PLAN   DISPOSITION Decision To Discharge 10/16/2021 07:14:59 PM      PATIENT REFERRED TO:  No follow-up provider specified.     DISCHARGE MEDICATIONS:  New Prescriptions    CYCLOBENZAPRINE (FLEXERIL) 5 MG TABLET    Take 1 tablet by mouth 2 times daily as needed for Muscle spasms    LANSOPRAZOLE (PREVACID) 30 MG DELAYED RELEASE CAPSULE    Take 1 capsule by mouth daily for 14 days    METOCLOPRAMIDE (REGLAN) 10 MG TABLET    Take 1 tablet by mouth every 8 hours as needed (nausea or vomiting)              (Please note that portions of this note were completed with a voice recognition program.  Efforts were made to edit the dictations but occasionallywords are mis-transcribed.)      Nader Boothe MD,FACEP (electronically signed)  Attending Physician, Emergency Department        Carl Pineda MD  10/16/21 6133

## 2021-10-16 NOTE — ED TRIAGE NOTES
Patient presents to ER via EMS with reports of nausea, vomiting, and fatigue that has been ongoing for the past 2 weeks. Patient also reporting lower back pain and states she had her period in which she has not had in 6 months.

## 2021-10-16 NOTE — ED NOTES
Bed: 015A  Expected date:   Expected time:   Means of arrival: [facility]  Comments:     Ashlyn Mcfarlane RN  10/16/21 [id number]

## 2021-10-17 LAB
EKG ATRIAL RATE: 82 BPM
EKG P AXIS: 49 DEGREES
EKG P-R INTERVAL: 156 MS
EKG Q-T INTERVAL: 416 MS
EKG QRS DURATION: 86 MS
EKG QTC CALCULATION (BAZETT): 486 MS
EKG R AXIS: 26 DEGREES
EKG T AXIS: 46 DEGREES
EKG VENTRICULAR RATE: 82 BPM

## 2021-10-17 PROCEDURE — 93010 ELECTROCARDIOGRAM REPORT: CPT | Performed by: INTERNAL MEDICINE

## 2021-10-26 ENCOUNTER — OFFICE VISIT (OUTPATIENT)
Dept: FAMILY MEDICINE CLINIC | Age: 41
End: 2021-10-26
Payer: MEDICARE

## 2021-10-26 VITALS
DIASTOLIC BLOOD PRESSURE: 82 MMHG | TEMPERATURE: 97 F | SYSTOLIC BLOOD PRESSURE: 130 MMHG | WEIGHT: 204.2 LBS | OXYGEN SATURATION: 98 % | HEIGHT: 64 IN | HEART RATE: 90 BPM | BODY MASS INDEX: 34.86 KG/M2

## 2021-10-26 DIAGNOSIS — G89.29 CHRONIC BILATERAL LOW BACK PAIN WITH LEFT-SIDED SCIATICA: Primary | ICD-10-CM

## 2021-10-26 DIAGNOSIS — M54.42 CHRONIC BILATERAL LOW BACK PAIN WITH LEFT-SIDED SCIATICA: Primary | ICD-10-CM

## 2021-10-26 PROCEDURE — G8427 DOCREV CUR MEDS BY ELIG CLIN: HCPCS | Performed by: STUDENT IN AN ORGANIZED HEALTH CARE EDUCATION/TRAINING PROGRAM

## 2021-10-26 PROCEDURE — G8484 FLU IMMUNIZE NO ADMIN: HCPCS | Performed by: STUDENT IN AN ORGANIZED HEALTH CARE EDUCATION/TRAINING PROGRAM

## 2021-10-26 PROCEDURE — G8417 CALC BMI ABV UP PARAM F/U: HCPCS | Performed by: STUDENT IN AN ORGANIZED HEALTH CARE EDUCATION/TRAINING PROGRAM

## 2021-10-26 PROCEDURE — 4004F PT TOBACCO SCREEN RCVD TLK: CPT | Performed by: STUDENT IN AN ORGANIZED HEALTH CARE EDUCATION/TRAINING PROGRAM

## 2021-10-26 PROCEDURE — 1111F DSCHRG MED/CURRENT MED MERGE: CPT | Performed by: STUDENT IN AN ORGANIZED HEALTH CARE EDUCATION/TRAINING PROGRAM

## 2021-10-26 PROCEDURE — 99213 OFFICE O/P EST LOW 20 MIN: CPT | Performed by: STUDENT IN AN ORGANIZED HEALTH CARE EDUCATION/TRAINING PROGRAM

## 2021-10-26 RX ORDER — METAXALONE 800 MG/1
800 TABLET ORAL 3 TIMES DAILY
Qty: 30 TABLET | Refills: 0 | Status: SHIPPED | OUTPATIENT
Start: 2021-10-26 | End: 2021-11-05

## 2021-10-26 RX ORDER — HYDROXYZINE PAMOATE 50 MG/1
CAPSULE ORAL
Status: ON HOLD | COMMUNITY
Start: 2021-10-25 | End: 2022-06-28 | Stop reason: HOSPADM

## 2021-10-26 RX ORDER — BACLOFEN 10 MG/1
TABLET ORAL
COMMUNITY
Start: 2021-10-25 | End: 2021-10-26 | Stop reason: ALTCHOICE

## 2021-10-26 RX ORDER — METHYLPREDNISOLONE 4 MG/1
TABLET ORAL
COMMUNITY
Start: 2021-10-25 | End: 2022-04-29

## 2021-10-26 ASSESSMENT — ENCOUNTER SYMPTOMS
EYE PAIN: 0
CONSTIPATION: 0
SHORTNESS OF BREATH: 0
ABDOMINAL PAIN: 0
TROUBLE SWALLOWING: 0
DIARRHEA: 0
COUGH: 0
NAUSEA: 0
VOMITING: 0
BLOOD IN STOOL: 0

## 2021-10-26 NOTE — PROGRESS NOTES
[address] [facility] [address] [facility]  Dept: [phone]  Dept Fax: [phone]  Loc: Ed Auguste is a 39 y.o. female who presents today for:  Chief Complaint   Patient presents with   [place] ED Follow-up     10/16/21 [facility]; [facility] ER 10/25/21-back problem lumbar sciatica nerve causing pain    Back Pain     referral requested; lumbar, sciatica region, left sided       Goals    None         HPI:     HPI  Patient presents for ED follow up. States that she was seen in the ED on 10/16/21 at [facility] and seen on 10/25/21 at [facility] for low back pain. States that she was given medrol dose pack and baclofen. States that her symptoms have mildly improved with steroids but muscle relaxers have not helped much. States that this is a chronic issue for her. She was seen in the past by other physicians and told that she may need further testing and possibly may need back surgery. Patient has not tried PT in many years.      Past Medical History:   Diagnosis Date    Anxiety     Back pain     Colitis     Depression     Fibromyalgia     HPV (human papilloma virus) anogenital infection     OCD (obsessive compulsive disorder)       Past Surgical History:   Procedure Laterality Date    CHOLECYSTECTOMY      INNER EAR SURGERY       Family History   Problem Relation Age of Onset    Diabetes Mother     Heart Disease Mother     Diabetes Father     Heart Disease Father     Cancer Paternal Aunt      Social History     Tobacco Use    Smoking status: Current Some Day Smoker     Packs/day: 0.50     Types: Cigarettes    Smokeless tobacco: Never Used   Substance Use Topics    Alcohol use: Not Currently     Comment: last used 07/2021      Current Outpatient Medications   Medication Sig Dispense Refill    hydrOXYzine (VISTARIL) 50 MG capsule Hydroxyzine Pamoate Active 50 MG PO Three Times Daily as needed October 25th, 2021 2:13pm  methylPREDNISolone (MEDROL DOSEPACK) 4 MG tablet       metaxalone (SKELAXIN) 800 MG tablet Take 1 tablet by mouth 3 times daily for 10 days 30 tablet 0    cyclobenzaprine (FLEXERIL) 5 MG tablet Take 1 tablet by mouth 2 times daily as needed for Muscle spasms 10 tablet 0    naproxen (NAPROSYN) 500 MG tablet Take 1 tablet by mouth 2 times daily as needed for Pain 20 tablet 0    FLUoxetine (PROZAC) 40 MG capsule Take 2 capsules by mouth daily 60 capsule 0    traZODone (DESYREL) 50 MG tablet Take 1 tablet by mouth nightly as needed for Sleep 30 tablet 0    prazosin (MINIPRESS) 1 MG capsule Take 1 capsule by mouth nightly 30 capsule 0    risperiDONE (RISPERDAL) 0.5 MG tablet Take 1 tablet by mouth every evening 30 tablet 0    metoclopramide (REGLAN) 10 MG tablet Take 1 tablet by mouth every 8 hours as needed (nausea or vomiting) (Patient not taking: Reported on 10/26/2021) 8 tablet 0    lansoprazole (PREVACID) 30 MG delayed release capsule Take 1 capsule by mouth daily for 14 days (Patient not taking: Reported on 10/26/2021) 14 capsule 0    levothyroxine (SYNTHROID) 50 MCG tablet TAKE 1 TABLET BY MOUTH DAILY (Patient not taking: Reported on 10/26/2021) 28 tablet 3    hydrOXYzine (ATARAX) 50 MG tablet Take 1 tablet by mouth 3 times daily as needed for Anxiety (Patient not taking: Reported on 10/26/2021) 90 tablet 0     No current facility-administered medications for this visit.      No Known Allergies    Health Maintenance   Topic Date Due    Pneumococcal 0-64 years Vaccine (1 of 2 - PPSV23) Never done    HIV screen  Never done    DTaP/Tdap/Td vaccine (1 - Tdap) Never done    Varicella vaccine (1 of 2 - 2-dose childhood series) Never done    Cervical cancer screen  Never done    Diabetes screen  07/10/2020    Flu vaccine (1) 09/01/2021    TSH testing  09/22/2022    Lipid screen  05/27/2026    COVID-19 Vaccine  Completed    Hepatitis C screen  Completed    Hepatitis A vaccine  [employer]. ** Final report electronically signed by Dr. Xavier Galan on 10/8/2021 7:30 PM    CT CERVICAL SPINE WO CONTRAST    Result Date: 10/8/2021  PROCEDURE: CT CERVICAL SPINE WO CONTRAST CLINICAL INFORMATION: Neck and back pain . TECHNIQUE: 2-D multiplanar noncontrast cervical spine All CT scans at this facility use dose modulation, iterative reconstruction, and/or weight-based dosing when appropriate to reduce radiation dose to as low as reasonably achievable. COMPARISON: 7/22/2009 FINDINGS: Reversal normal cervical lordosis. No acute fracture or acute bony malalignment. Mild disc space narrowing at multiple levels. Effacement of the ventral thecal sac at C5-6 AP diameter is reduced to 7 mm. This is due to posterior marginal osteophytes. No other foraminal stenosis. No other significant central canal stenosis. No precervical soft tissue swelling. Reversal of the normal lordosis. Mild degenerative changes. **This report has been created using voice recognition software. It may contain minor errors which are inherent in voice recognition technology. ** Final report electronically signed by Dr. Xavier Galan on 10/8/2021 7:33 PM    CT LUMBAR SPINE WO CONTRAST    Result Date: 10/8/2021  PROCEDURE: CT LUMBAR SPINE WO CONTRAST CLINICAL INFORMATION: LOWER BACK PAIN, Headache neck and back pain . TECHNIQUE: 2-D multiplanar noncontrast lumbar spine. Bone and soft tissue windows are submitted in the transverse plane. well All CT scans at this facility use dose modulation, iterative reconstruction, and/or weight-based dosing when appropriate to reduce radiation dose to as low as reasonably achievable. COMPARISON: No prior study. FINDINGS: No acute fracture or subluxation. 2 otherwise normally aligned. Disc spaces are preserved. There is no foraminal or central canal encroachment. Mild degenerative change at L5-S1 facets     No acute abnormality **This report has been created using voice recognition software.   It may contain minor errors which are inherent in voice recognition technology. ** Final report electronically signed by Dr. Renea Vincent on 10/8/2021 7:37 PM        Assessment/Plan:     Judith Urias was seen today for ed follow-up and back pain. Diagnoses and all orders for this visit:    Chronic bilateral low back pain with left-sided sciatica  -     [facility]  -     metaxalone (SKELAXIN) 800 MG tablet; Take 1 tablet by mouth 3 times daily for 10 days    Continue medrol dose pack  Stop baclofen, trial of skelaxin 800mg TID for muscle relaxant  Refer to PT for chronic low back pain  Consider MRI  Consider referral to ortho based on response. Follow up 6 weeks. Return in about 6 weeks (around 12/7/2021) for follow up low back pain. Medications Prescribed:  Orders Placed This Encounter   Medications    metaxalone (SKELAXIN) 800 MG tablet     Sig: Take 1 tablet by mouth 3 times daily for 10 days     Dispense:  30 tablet     Refill:  0       Future Appointments   Date Time Provider Mark Beani   12/7/2021  9:20 AM Chi Pink  [address]       Patient given educational materials - see patient instructions. Discussed use, benefit, and sideeffects of prescribed medications. All patient questions answered. Pt voiced understanding. Reviewed health maintenance. Instructed to continue current medications, diet and exercise. Patient agreed with treatment plan. Follow up as directed.      Electronically signed by Elton Carbone DO on 10/26/2021 at 3:07 PM

## 2021-10-26 NOTE — PROGRESS NOTES
Magaly Ware is a 39 y. o.female    Chief Complaint   Patient presents with    ED Follow-up     10/16/21 [facility]; [facility] ER 10/25/21-back problem lumbar sciatica nerve causing pain    Back Pain     referral requested; lumbar, sciatica region, left sided       Chief complaint, [place], and all pertinent details of the case reviewed with the resident. Please see resident's note for specific details discussed at today's visit.         Patient Active Problem List   Diagnosis    Coker's esophagus without dysplasia    Moderate episode of recurrent major depressive disorder ([address]    Bipolar I disorder, current or most recent episode depressed, with psychotic features ([address]    Schizoaffective disorder, bipolar type ([address]    Schizoaffective disorder ([address]    Severe mixed bipolar 1 disorder without psychosis ([address]    Alcohol use disorder, severe, dependence ([address]    Amphetamine substance use disorder, severe, in sustained remission ([address]    Bipolar I disorder with mixed features ([address]    Class 1 obesity in adult    Subclinical hypothyroidism    Anxiety       Current Outpatient Medications   Medication Sig Dispense Refill    hydrOXYzine (VISTARIL) 50 MG capsule Hydroxyzine Pamoate Active 50 MG PO Three Times Daily as needed October 25th, 2021 2:13pm      methylPREDNISolone (MEDROL DOSEPACK) 4 MG tablet       metaxalone (SKELAXIN) 800 MG tablet Take 1 tablet by mouth 3 times daily for 10 days 30 tablet 0    cyclobenzaprine (FLEXERIL) 5 MG tablet Take 1 tablet by mouth 2 times daily as needed for Muscle spasms 10 tablet 0    naproxen (NAPROSYN) 500 MG tablet Take 1 tablet by mouth 2 times daily as needed for Pain 20 tablet 0    FLUoxetine (PROZAC) 40 MG capsule Take 2 capsules by mouth daily 60 capsule 0    traZODone (DESYREL) 50 MG tablet Take 1 tablet by mouth nightly as needed for Sleep 30 tablet 0    prazosin (MINIPRESS) 1 MG capsule Take 1 capsule by mouth nightly 30 capsule 0    risperiDONE (RISPERDAL) 0.5 MG tablet Take 1 tablet by mouth every evening 30 tablet 0    metoclopramide (REGLAN) 10 MG tablet Take 1 tablet by mouth every 8 hours as needed (nausea or vomiting) (Patient not taking: Reported on 10/26/2021) 8 tablet 0    lansoprazole (PREVACID) 30 MG delayed release capsule Take 1 capsule by mouth daily for 14 days (Patient not taking: Reported on 10/26/2021) 14 capsule 0    levothyroxine (SYNTHROID) 50 MCG tablet TAKE 1 TABLET BY MOUTH DAILY (Patient not taking: Reported on 10/26/2021) 28 tablet 3    hydrOXYzine (ATARAX) 50 MG tablet Take 1 tablet by mouth 3 times daily as needed for Anxiety (Patient not taking: Reported on 10/26/2021) 90 tablet 0     No current facility-administered medications for this visit. Review of Systems per Dr. Leslie Dominique     /82 (Site: Left Upper Arm, Position: Sitting, Cuff Size: Medium Adult)   Pulse 90   Temp 97 °F (36.1 °C) (Skin)   Ht 5' 4\" (1.626 m)   Wt 204 lb 3.2 oz (92.6 kg)   SpO2 98%   BMI 35.05 kg/m²   BP Readings from Last 3 Encounters:   10/26/21 130/82   10/16/21 (!) 132/97   10/08/21 133/79       Wt Readings from Last 3 Encounters:   10/26/21 204 lb 3.2 oz (92.6 kg)   10/16/21 195 lb (88.5 kg)   10/08/21 230 lb (104.3 kg)     Body mass index is 35.05 kg/m².     Physical Exam per Dr. Rob Gutiérrez      Lab Results   Component Value Date    CHOL 112 10/02/2018    TRIG 52 10/02/2018    HDL 68 10/02/2018    LDLCALC 34 10/02/2018       Lab Results   Component Value Date     10/16/2021    K 4.0 10/16/2021     10/16/2021    CO2 26 10/16/2021    BUN 8 10/16/2021    CREATININE 0.7 10/16/2021    GLUCOSE 115 (H) 10/16/2021    CALCIUM 8.2 (L) 10/16/2021    PROT 5.7 (L) 10/16/2021    LABALBU 3.1 (L) 10/16/2021    BILITOT 0.2 (L) 10/16/2021    ALKPHOS 86 10/16/2021    AST 28 10/16/2021    ALT 22 10/16/2021    LABGLOM >90 10/16/2021         Lab Results   Component Value Date    TSH 1.050 09/22/2021    T4FREE 0.81 (L) 08/10/2011       Lab Results   Component Value Date    WBC 9.1 10/16/2021    HGB 13.8 10/16/2021    HCT 39.7 10/16/2021    MCV 92.1 10/16/2021     10/16/2021         Immunization History   Administered Date(s) Administered    COVID-19, Moderna, PF, 100mcg/0.5mL 08/11/2021, 09/20/2021    Influenza A ([id number]) Vaccine Nasal 11/30/2009       Health Maintenance   Topic Date Due    Pneumococcal 0-64 years Vaccine (1 of 2 - PPSV23) Never done    HIV screen  Never done    DTaP/Tdap/Td vaccine (1 - Tdap) Never done    Varicella vaccine (1 of 2 - 2-dose childhood series) Never done    Cervical cancer screen  Never done    Diabetes screen  07/10/2020    Flu vaccine (1) 09/01/2021    TSH testing  09/22/2022    Lipid screen  05/27/2026    COVID-19 Vaccine  Completed    Hepatitis C screen  Completed    Hepatitis A vaccine  Aged Out    Hepatitis B vaccine  Aged Out    Hib vaccine  Aged Out    Meningococcal (ACWY) vaccine  Aged Out              Future Appointments   Date Time Provider Mark Armstrong   12/7/2021  9:20 AM Chi Pink, DO [facility] [address] [facility]       ASSESSMENT       Diagnosis Orders   1. Chronic bilateral low back pain with left-sided sciatica  [facility]    metaxalone (SKELAXIN) 800 MG tablet       PLAN      After discussion with Dr. Violetta Gregory, we agreed on plan as follows:    Stop baclofen at this time  Start Skelaxin 800 mg-1 pill 3 times daily on an empty stomach as patient has done well with this in the past.  #30/no refills  Complete Medrol Dosepak as previously prescribed  Refer for physical therapy  We will hold any imaging studies at this time as patient denies any red flag symptoms, recent  CT lumbar spine, and CT cervical spine negative  Follow-up in 6 weeks for further evaluation or sooner if any further problems. Attending Physician Statement  I have discussed the case, including pertinent history and exam findings with the resident.   I agree with the documented assessment and plan as documented by the resident.   GE modifier added  to this encounter     Electronically signed by Marie Cox MD on 10/26/2021 at 6:22 PM

## 2021-10-26 NOTE — PROGRESS NOTES
Health Maintenance Due   Topic Date Due    Pneumococcal 0-64 years Vaccine (1 of 2 - PPSV23) Never done    HIV screen  Never done    DTaP/Tdap/Td vaccine (1 - Tdap) Never done    Varicella vaccine (1 of 2 - 2-dose childhood series) Never done    Cervical cancer screen  Never done    Diabetes screen  07/10/2020    Flu vaccine (1) 09/01/2021   Patient here for ED follow up; [facility] ER yesterday-referral requested for back specialist; using prednisone and muscle relaxer for pain; low back pain, left sided; patient states that she needs to be up and about, help people out complete task; c/o sleeping too much during the day, doesn't sleep at night; before starting prednisone

## 2021-10-27 ENCOUNTER — TELEPHONE (OUTPATIENT)
Dept: FAMILY MEDICINE CLINIC | Age: 41
End: 2021-10-27

## 2021-10-27 NOTE — TELEPHONE ENCOUNTER
Spoke with pt, Pt states medication needs to be changed to something different. Pt is requesting [address]. Please advise.

## 2021-10-27 NOTE — TELEPHONE ENCOUNTER
----- Message from Bea Brittle sent at 10/26/2021  1:48 PM EDT -----  Subject: Medication Problem    QUESTIONS  Name of Medication? metaxalone (SKELAXIN) 800 MG tablet  Patient-reported dosage and instructions? Take 1 tablet by mouth 3 times   daily for 10 days  What question or problem do you have with the medication? Toshia Vazquez   patient  insurance will not cover medication . She is   requesting the patient to receive call with update on next step. Preferred Pharmacy? [facility] [place] [facility] [id number] [place] [address] [facility]  Pharmacy phone number (if available)? [phone]  Additional Information for Provider?   ---------------------------------------------------------------------------  --------------  [address]  What is the best way for the office to contact you? OK to leave message on   voicemail  Preferred Call Back Phone Number? [phone]  ---------------------------------------------------------------------------  --------------  SCRIPT ANSWERS  Relationship to Patient? Third Party  Representative Name?  Toshia Remi

## 2021-10-28 ENCOUNTER — TELEPHONE (OUTPATIENT)
Dept: FAMILY MEDICINE CLINIC | Age: 41
End: 2021-10-28

## 2021-10-28 DIAGNOSIS — G89.29 CHRONIC BILATERAL LOW BACK PAIN WITH LEFT-SIDED SCIATICA: Primary | ICD-10-CM

## 2021-10-28 DIAGNOSIS — M54.42 CHRONIC BILATERAL LOW BACK PAIN WITH LEFT-SIDED SCIATICA: Primary | ICD-10-CM

## 2021-10-28 NOTE — TELEPHONE ENCOUNTER
----- Message from Oliver Lowery sent at 10/28/2021  2:08 PM EDT -----  Subject: Medication Problem    QUESTIONS  Name of Medication? metaxalone (SKELAXIN) 800 MG tablet  Patient-reported dosage and instructions? 800MG   What question or problem do you have with the medication? request a   different medication, insurance did not cover the one the was prescribed   Preferred Pharmacy? [facility] [place] - [id number] [facility] [place] [address] [facility]  Pharmacy phone number (if available)? [phone]  Additional Information for Provider?   ---------------------------------------------------------------------------  --------------  [address]  What is the best way for the office to contact you? Do not leave any   message, patient will call back for answer  Preferred Call Back Phone Number? [phone]  ---------------------------------------------------------------------------  --------------  SCRIPT ANSWERS  Relationship to Patient?  Self

## 2021-11-01 RX ORDER — ORPHENADRINE CITRATE 100 MG/1
100 TABLET, EXTENDED RELEASE ORAL 2 TIMES DAILY
Qty: 20 TABLET | Refills: 0 | Status: SHIPPED | OUTPATIENT
Start: 2021-11-01 | End: 2021-11-11

## 2021-11-01 NOTE — TELEPHONE ENCOUNTER
Can try norflex, prescription sent to pharmacy. Unable to prescribe norco as this medication is not indicated at this time, may be a consideration down the road if she is able to get through PT and we find severe issues on future imaging studies.

## 2021-11-02 ENCOUNTER — TELEPHONE (OUTPATIENT)
Dept: FAMILY MEDICINE CLINIC | Age: 41
End: 2021-11-02

## 2021-11-08 ENCOUNTER — HOSPITAL ENCOUNTER (OUTPATIENT)
Dept: PHYSICAL THERAPY | Age: 41
Setting detail: THERAPIES SERIES
Discharge: HOME | End: 2021-11-08
Payer: MEDICARE

## 2021-11-08 PROCEDURE — 97110 THERAPEUTIC EXERCISES: CPT

## 2021-11-08 PROCEDURE — 97161 PT EVAL LOW COMPLEX 20 MIN: CPT
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
## 2021-11-15 ENCOUNTER — HOSPITAL ENCOUNTER (OUTPATIENT)
Dept: PHYSICAL THERAPY | Age: 41
Setting detail: THERAPIES SERIES
End: 2021-11-15
Payer: MEDICARE

## 2021-11-18 ENCOUNTER — APPOINTMENT (OUTPATIENT)
Dept: PHYSICAL THERAPY | Age: 41
End: 2021-11-18
Payer: MEDICARE

## 2021-11-22 ENCOUNTER — APPOINTMENT (OUTPATIENT)
Dept: PHYSICAL THERAPY | Age: 41
End: 2021-11-22
Payer: MEDICARE

## 2021-11-24 ENCOUNTER — APPOINTMENT (OUTPATIENT)
Dept: PHYSICAL THERAPY | Age: 41
End: 2021-11-24
Payer: MEDICARE

## 2021-11-29 ENCOUNTER — APPOINTMENT (OUTPATIENT)
Dept: PHYSICAL THERAPY | Age: 41
End: 2021-11-29
Payer: MEDICARE

## 2021-12-19 ENCOUNTER — HOSPITAL ENCOUNTER (EMERGENCY)
Age: 41
Discharge: LEFT BEFORE DISCHARGE | End: 2021-12-19
Payer: MEDICARE

## 2021-12-19 VITALS
TEMPERATURE: 99 F | DIASTOLIC BLOOD PRESSURE: 87 MMHG | OXYGEN SATURATION: 97 % | HEART RATE: 109 BPM | SYSTOLIC BLOOD PRESSURE: 104 MMHG | RESPIRATION RATE: 17 BRPM

## 2021-12-19 DIAGNOSIS — K92.0 HEMATEMESIS WITH NAUSEA: Primary | ICD-10-CM

## 2021-12-19 PROCEDURE — 99283 EMERGENCY DEPT VISIT LOW MDM: CPT

## 2021-12-19 RX ORDER — ONDANSETRON 2 MG/ML
4 INJECTION INTRAMUSCULAR; INTRAVENOUS ONCE
Status: DISCONTINUED | OUTPATIENT
Start: 2021-12-19 | End: 2021-12-19 | Stop reason: HOSPADM

## 2021-12-19 RX ORDER — 0.9 % SODIUM CHLORIDE 0.9 %
1000 INTRAVENOUS SOLUTION INTRAVENOUS ONCE
Status: DISCONTINUED | OUTPATIENT
Start: 2021-12-19 | End: 2021-12-19 | Stop reason: HOSPADM

## 2021-12-19 RX ORDER — PANTOPRAZOLE SODIUM 40 MG/10ML
40 INJECTION, POWDER, LYOPHILIZED, FOR SOLUTION INTRAVENOUS ONCE
Status: DISCONTINUED | OUTPATIENT
Start: 2021-12-19 | End: 2021-12-19 | Stop reason: HOSPADM

## 2021-12-19 ASSESSMENT — ENCOUNTER SYMPTOMS
VOMITING: 1
NAUSEA: 1
BACK PAIN: 1

## 2021-12-19 ASSESSMENT — PAIN DESCRIPTION - PAIN TYPE: TYPE: ACUTE PAIN

## 2021-12-19 ASSESSMENT — PAIN DESCRIPTION - LOCATION: LOCATION: BACK

## 2021-12-19 ASSESSMENT — PAIN SCALES - GENERAL: PAINLEVEL_OUTOF10: 9
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
## 2022-01-25 DIAGNOSIS — E03.8 SUBCLINICAL HYPOTHYROIDISM: ICD-10-CM

## 2022-01-25 NOTE — TELEPHONE ENCOUNTER
Patient's last appointment was : 10/26/2021  Patient's next appointment is : Visit date not found  Last refilled: 10/6/2021

## 2022-01-26 RX ORDER — LEVOTHYROXINE SODIUM 0.05 MG/1
50 TABLET ORAL DAILY
Qty: 28 TABLET | Refills: 3 | Status: SHIPPED | OUTPATIENT
Start: 2022-01-26 | End: 2022-05-16

## 2022-02-22 NOTE — PLAN OF CARE
Problem: KNOWLEDGE DEFICIT  Goal: Knowledge of discharge plan  Outcome: Ongoing  Note:   Safety plan not completed this shift. Problem: Discharge Planning:  Goal: Discharged to appropriate level of care  Description  Discharged to appropriate level of care  Outcome: Ongoing  Note:   Discharge planning is in progress. Problem: Depressive Behavior With or Without Suicide Precautions:  Goal: Able to verbalize acceptance of life and situations over which he or she has no control  Description  Able to verbalize acceptance of life and situations over which he or she has no control  Outcome: Ongoing  Note:   Patient does not verbalize acceptance. Goal: Able to verbalize and/or display a decrease in depressive symptoms  Description  Able to verbalize and/or display a decrease in depressive symptoms  Outcome: Ongoing  Note:   Patient reports depression and rates mood at a \"3/10. \"  Goal: Ability to disclose and discuss suicidal ideas will improve  Description  Ability to disclose and discuss suicidal ideas will improve  Outcome: Ongoing  Note:   Patient denies suicidal thoughts. Goal: Able to verbalize support systems  Description  Able to verbalize support systems  Outcome: Ongoing  Note:   Patient denies a support system. Goal: Absence of self-harm  Description  Absence of self-harm  Outcome: Ongoing  Note:   Patient remains safe and free from harm. Goal: Patient specific goal  Description  Patient specific goal  Outcome: Ongoing  Note:   Goal of \"feel better\" met per patient. Goal: Participates in care planning  Description  Participates in care planning  Outcome: Ongoing  Note:   Patient participated this shift. Problem: Anxiety:  Goal: Level of anxiety will decrease  Description  Level of anxiety will decrease  Outcome: Ongoing  Note:   Patient reports anxiety, labile affect noted.       Problem: Nutrition  Goal: Optimal nutrition therapy  Outcome: Ongoing  Note:   Patient ate a snack this shift, adequate intake reported. Problem: Coping:  Goal: Ability to identify problematic behaviors that deter socialization will improve  Description  Ability to identify problematic behaviors that deter socialization will improve  4/25/2019 0235 by Sofie Bolton RN  Outcome: Ongoing  4/24/2019 1438 by Natasha Mccallum  Outcome: Ongoing     Problem: Role Relationship:  Goal: Ability to demonstrate positive changes in social behaviors and relationships will improve  Description  Ability to demonstrate positive changes in social behaviors and relationships will improve  4/25/2019 0235 by Sofie Bolton RN  Outcome: Ongoing  4/24/2019 1438 by Natasha Mccallum  Outcome: Ongoing     Problem: Pain:  Goal: Pain level will decrease  Description  Pain level will decrease  Outcome: Ongoing  Note:   Patient reports chronic back pain, refer to flow-sheet. Care plan reviewed with patient. Patient does verbalize understanding of the plan of care and does contribute to goal setting. MEDICATIONS  (STANDING):  apixaban 2.5 milliGRAM(s) Oral two times a day  atorvastatin 40 milliGRAM(s) Oral at bedtime  calcitriol   Capsule 0.5 MICROGram(s) Oral daily  calcium carbonate    500 mG (Tums) Chewable 1 Tablet(s) Chew two times a day  dextrose 40% Gel 15 Gram(s) Oral once  dextrose 5%. 1000 milliLiter(s) (100 mL/Hr) IV Continuous <Continuous>  dextrose 5%. 1000 milliLiter(s) (50 mL/Hr) IV Continuous <Continuous>  dextrose 50% Injectable 25 Gram(s) IV Push once  dextrose 50% Injectable 12.5 Gram(s) IV Push once  dextrose 50% Injectable 25 Gram(s) IV Push once  finasteride 5 milliGRAM(s) Oral daily  glucagon  Injectable 1 milliGRAM(s) IntraMuscular once  insulin glargine Injectable (LANTUS) 20 Unit(s) SubCutaneous at bedtime  insulin lispro (ADMELOG) corrective regimen sliding scale   SubCutaneous three times a day before meals  insulin lispro Injectable (ADMELOG) 4 Unit(s) SubCutaneous three times a day before meals  labetalol 100 milliGRAM(s) Oral every 12 hours  mirtazapine 15 milliGRAM(s) Oral at bedtime  pantoprazole    Tablet 40 milliGRAM(s) Oral before breakfast  QUEtiapine 50 milliGRAM(s) Oral at bedtime  QUEtiapine 50 milliGRAM(s) Oral daily  sodium chloride 0.9%. 1000 milliLiter(s) (75 mL/Hr) IV Continuous <Continuous>  tamsulosin 0.4 milliGRAM(s) Oral at bedtime    MEDICATIONS  (PRN):  acetaminophen     Tablet .. 650 milliGRAM(s) Oral every 6 hours PRN Temp greater or equal to 38C (100.4F), Mild Pain (1 - 3)  guaiFENesin Oral Liquid (Sugar-Free) 100 milliGRAM(s) Oral every 6 hours PRN Cough  meclizine 12.5 milliGRAM(s) Oral every 6 hours PRN Dizziness  melatonin 3 milliGRAM(s) Oral at bedtime PRN Insomnia  ondansetron Injectable 4 milliGRAM(s) IV Push every 8 hours PRN Nausea and/or Vomiting  QUEtiapine 25 milliGRAM(s) Oral every 12 hours PRN Agitation   MEDICATIONS  (STANDING):  apixaban 2.5 milliGRAM(s) Oral two times a day  atorvastatin 40 milliGRAM(s) Oral at bedtime  calcitriol   Capsule 0.5 MICROGram(s) Oral daily  calcium carbonate    500 mG (Tums) Chewable 1 Tablet(s) Chew two times a day  dextrose 40% Gel 15 Gram(s) Oral once  dextrose 5%. 1000 milliLiter(s) (50 mL/Hr) IV Continuous <Continuous>  dextrose 5%. 1000 milliLiter(s) (100 mL/Hr) IV Continuous <Continuous>  dextrose 50% Injectable 25 Gram(s) IV Push once  dextrose 50% Injectable 12.5 Gram(s) IV Push once  dextrose 50% Injectable 25 Gram(s) IV Push once  finasteride 5 milliGRAM(s) Oral daily  glucagon  Injectable 1 milliGRAM(s) IntraMuscular once  insulin glargine Injectable (LANTUS) 20 Unit(s) SubCutaneous at bedtime  insulin lispro (ADMELOG) corrective regimen sliding scale   SubCutaneous three times a day before meals  insulin lispro Injectable (ADMELOG) 4 Unit(s) SubCutaneous three times a day before meals  labetalol 100 milliGRAM(s) Oral every 12 hours  mirtazapine 15 milliGRAM(s) Oral at bedtime  pantoprazole    Tablet 40 milliGRAM(s) Oral before breakfast  QUEtiapine 50 milliGRAM(s) Oral at bedtime  QUEtiapine 50 milliGRAM(s) Oral daily  sodium chloride 0.9%. 1000 milliLiter(s) (75 mL/Hr) IV Continuous <Continuous>  tamsulosin 0.4 milliGRAM(s) Oral at bedtime    MEDICATIONS  (PRN):  acetaminophen     Tablet .. 650 milliGRAM(s) Oral every 6 hours PRN Temp greater or equal to 38C (100.4F), Mild Pain (1 - 3)  guaiFENesin Oral Liquid (Sugar-Free) 100 milliGRAM(s) Oral every 6 hours PRN Cough  meclizine 12.5 milliGRAM(s) Oral every 6 hours PRN Dizziness  melatonin 3 milliGRAM(s) Oral at bedtime PRN Insomnia  ondansetron Injectable 4 milliGRAM(s) IV Push every 8 hours PRN Nausea and/or Vomiting  QUEtiapine 25 milliGRAM(s) Oral every 8 hours PRN Agitation   MEDICATIONS  (STANDING):  apixaban 2.5 milliGRAM(s) Oral two times a day  atorvastatin 40 milliGRAM(s) Oral at bedtime  calcitriol   Capsule 0.5 MICROGram(s) Oral daily  calcium carbonate    500 mG (Tums) Chewable 1 Tablet(s) Chew two times a day  dextrose 40% Gel 15 Gram(s) Oral once  dextrose 5%. 1000 milliLiter(s) (50 mL/Hr) IV Continuous <Continuous>  dextrose 5%. 1000 milliLiter(s) (100 mL/Hr) IV Continuous <Continuous>  dextrose 50% Injectable 25 Gram(s) IV Push once  dextrose 50% Injectable 25 Gram(s) IV Push once  dextrose 50% Injectable 12.5 Gram(s) IV Push once  finasteride 5 milliGRAM(s) Oral daily  glucagon  Injectable 1 milliGRAM(s) IntraMuscular once  insulin glargine Injectable (LANTUS) 20 Unit(s) SubCutaneous at bedtime  insulin lispro (ADMELOG) corrective regimen sliding scale   SubCutaneous three times a day before meals  insulin lispro Injectable (ADMELOG) 4 Unit(s) SubCutaneous three times a day before meals  labetalol 100 milliGRAM(s) Oral every 12 hours  mirtazapine 15 milliGRAM(s) Oral at bedtime  pantoprazole    Tablet 40 milliGRAM(s) Oral before breakfast  QUEtiapine 50 milliGRAM(s) Oral at bedtime  sodium chloride 0.9%. 1000 milliLiter(s) (75 mL/Hr) IV Continuous <Continuous>  tamsulosin 0.4 milliGRAM(s) Oral at bedtime    MEDICATIONS  (PRN):  acetaminophen     Tablet .. 650 milliGRAM(s) Oral every 6 hours PRN Temp greater or equal to 38C (100.4F), Mild Pain (1 - 3)  guaiFENesin Oral Liquid (Sugar-Free) 100 milliGRAM(s) Oral every 6 hours PRN Cough  meclizine 12.5 milliGRAM(s) Oral every 6 hours PRN Dizziness  melatonin 3 milliGRAM(s) Oral at bedtime PRN Insomnia  ondansetron Injectable 4 milliGRAM(s) IV Push every 8 hours PRN Nausea and/or Vomiting  QUEtiapine 25 milliGRAM(s) Oral every 8 hours PRN Agitation   MEDICATIONS  (STANDING):  apixaban 2.5 milliGRAM(s) Oral two times a day  atorvastatin 40 milliGRAM(s) Oral at bedtime  calcitriol   Capsule 0.5 MICROGram(s) Oral daily  calcium carbonate    500 mG (Tums) Chewable 1 Tablet(s) Chew two times a day  dextrose 40% Gel 15 Gram(s) Oral once  dextrose 5%. 1000 milliLiter(s) (50 mL/Hr) IV Continuous <Continuous>  dextrose 5%. 1000 milliLiter(s) (100 mL/Hr) IV Continuous <Continuous>  dextrose 50% Injectable 25 Gram(s) IV Push once  dextrose 50% Injectable 12.5 Gram(s) IV Push once  dextrose 50% Injectable 25 Gram(s) IV Push once  finasteride 5 milliGRAM(s) Oral daily  glucagon  Injectable 1 milliGRAM(s) IntraMuscular once  insulin glargine Injectable (LANTUS) 20 Unit(s) SubCutaneous at bedtime  insulin lispro (ADMELOG) corrective regimen sliding scale   SubCutaneous three times a day before meals  insulin lispro Injectable (ADMELOG) 4 Unit(s) SubCutaneous three times a day before meals  labetalol 100 milliGRAM(s) Oral every 12 hours  mirtazapine 15 milliGRAM(s) Oral at bedtime  pantoprazole    Tablet 40 milliGRAM(s) Oral before breakfast  QUEtiapine 50 milliGRAM(s) Oral at bedtime  QUEtiapine 50 milliGRAM(s) Oral daily  sodium chloride 0.9%. 1000 milliLiter(s) (75 mL/Hr) IV Continuous <Continuous>  tamsulosin 0.4 milliGRAM(s) Oral at bedtime    MEDICATIONS  (PRN):  acetaminophen     Tablet .. 650 milliGRAM(s) Oral every 6 hours PRN Temp greater or equal to 38C (100.4F), Mild Pain (1 - 3)  guaiFENesin Oral Liquid (Sugar-Free) 100 milliGRAM(s) Oral every 6 hours PRN Cough  meclizine 12.5 milliGRAM(s) Oral every 6 hours PRN Dizziness  melatonin 3 milliGRAM(s) Oral at bedtime PRN Insomnia  ondansetron Injectable 4 milliGRAM(s) IV Push every 8 hours PRN Nausea and/or Vomiting  QUEtiapine 25 milliGRAM(s) Oral every 8 hours PRN Agitation   MEDICATIONS  (STANDING):  apixaban 2.5 milliGRAM(s) Oral two times a day  atorvastatin 40 milliGRAM(s) Oral at bedtime  calcitriol   Capsule 0.5 MICROGram(s) Oral daily  calcium carbonate    500 mG (Tums) Chewable 1 Tablet(s) Chew two times a day  dextrose 40% Gel 15 Gram(s) Oral once  dextrose 5%. 1000 milliLiter(s) (50 mL/Hr) IV Continuous <Continuous>  dextrose 5%. 1000 milliLiter(s) (100 mL/Hr) IV Continuous <Continuous>  dextrose 50% Injectable 25 Gram(s) IV Push once  dextrose 50% Injectable 12.5 Gram(s) IV Push once  dextrose 50% Injectable 25 Gram(s) IV Push once  finasteride 5 milliGRAM(s) Oral daily  glucagon  Injectable 1 milliGRAM(s) IntraMuscular once  haloperidol     Tablet 2 milliGRAM(s) Oral at bedtime  haloperidol     Tablet 0.5 milliGRAM(s) Oral daily  insulin glargine Injectable (LANTUS) 20 Unit(s) SubCutaneous at bedtime  insulin lispro (ADMELOG) corrective regimen sliding scale   SubCutaneous three times a day before meals  insulin lispro Injectable (ADMELOG) 4 Unit(s) SubCutaneous three times a day before meals  labetalol 100 milliGRAM(s) Oral every 12 hours  LORazepam     Tablet 0.5 milliGRAM(s) Oral at bedtime  mirtazapine 15 milliGRAM(s) Oral at bedtime  pantoprazole    Tablet 40 milliGRAM(s) Oral before breakfast  sodium chloride 0.9%. 1000 milliLiter(s) (75 mL/Hr) IV Continuous <Continuous>  tamsulosin 0.4 milliGRAM(s) Oral at bedtime    MEDICATIONS  (PRN):  acetaminophen     Tablet .. 650 milliGRAM(s) Oral every 6 hours PRN Temp greater or equal to 38C (100.4F), Mild Pain (1 - 3)  guaiFENesin Oral Liquid (Sugar-Free) 100 milliGRAM(s) Oral every 6 hours PRN Cough  haloperidol    Injectable 1 milliGRAM(s) IntraMuscular every 12 hours PRN Agitation  LORazepam   Injectable 0.5 milliGRAM(s) IV Push two times a day PRN Agitation  meclizine 12.5 milliGRAM(s) Oral every 6 hours PRN Dizziness  melatonin 3 milliGRAM(s) Oral at bedtime PRN Insomnia  ondansetron Injectable 4 milliGRAM(s) IV Push every 8 hours PRN Nausea and/or Vomiting   MEDICATIONS  (STANDING):  amoxicillin  500 milliGRAM(s)/clavulanate 1 Tablet(s) Oral two times a day  apixaban 2.5 milliGRAM(s) Oral two times a day  atorvastatin 40 milliGRAM(s) Oral at bedtime  calcitriol   Capsule 0.5 MICROGram(s) Oral daily  calcium carbonate    500 mG (Tums) Chewable 1 Tablet(s) Chew two times a day  dextrose 40% Gel 15 Gram(s) Oral once  dextrose 5%. 1000 milliLiter(s) (50 mL/Hr) IV Continuous <Continuous>  dextrose 5%. 1000 milliLiter(s) (100 mL/Hr) IV Continuous <Continuous>  dextrose 50% Injectable 25 Gram(s) IV Push once  dextrose 50% Injectable 12.5 Gram(s) IV Push once  dextrose 50% Injectable 25 Gram(s) IV Push once  finasteride 5 milliGRAM(s) Oral daily  glucagon  Injectable 1 milliGRAM(s) IntraMuscular once  haloperidol     Tablet 2 milliGRAM(s) Oral at bedtime  haloperidol     Tablet 0.5 milliGRAM(s) Oral daily  insulin glargine Injectable (LANTUS) 22 Unit(s) SubCutaneous at bedtime  insulin lispro (ADMELOG) corrective regimen sliding scale   SubCutaneous three times a day before meals  insulin lispro Injectable (ADMELOG) 5 Unit(s) SubCutaneous three times a day before meals  labetalol 100 milliGRAM(s) Oral every 12 hours  LORazepam     Tablet 0.25 milliGRAM(s) Oral daily  LORazepam     Tablet 0.5 milliGRAM(s) Oral at bedtime  mirtazapine 15 milliGRAM(s) Oral at bedtime  pantoprazole    Tablet 40 milliGRAM(s) Oral before breakfast  sodium chloride 0.9%. 1000 milliLiter(s) (75 mL/Hr) IV Continuous <Continuous>  tamsulosin 0.4 milliGRAM(s) Oral at bedtime    MEDICATIONS  (PRN):  acetaminophen     Tablet .. 650 milliGRAM(s) Oral every 6 hours PRN Temp greater or equal to 38C (100.4F), Mild Pain (1 - 3)  guaiFENesin Oral Liquid (Sugar-Free) 100 milliGRAM(s) Oral every 6 hours PRN Cough  haloperidol    Injectable 1 milliGRAM(s) IntraMuscular every 12 hours PRN Agitation  LORazepam   Injectable 0.5 milliGRAM(s) IV Push two times a day PRN Agitation  meclizine 12.5 milliGRAM(s) Oral every 6 hours PRN Dizziness  melatonin 3 milliGRAM(s) Oral at bedtime PRN Insomnia  ondansetron Injectable 4 milliGRAM(s) IV Push every 8 hours PRN Nausea and/or Vomiting   MEDICATIONS  (STANDING):  amoxicillin  500 milliGRAM(s)/clavulanate 1 Tablet(s) Oral two times a day  apixaban 2.5 milliGRAM(s) Oral two times a day  atorvastatin 40 milliGRAM(s) Oral at bedtime  calcitriol   Capsule 0.5 MICROGram(s) Oral daily  calcium carbonate    500 mG (Tums) Chewable 1 Tablet(s) Chew two times a day  dextrose 40% Gel 15 Gram(s) Oral once  dextrose 5%. 1000 milliLiter(s) (50 mL/Hr) IV Continuous <Continuous>  dextrose 5%. 1000 milliLiter(s) (100 mL/Hr) IV Continuous <Continuous>  dextrose 50% Injectable 25 Gram(s) IV Push once  dextrose 50% Injectable 12.5 Gram(s) IV Push once  dextrose 50% Injectable 25 Gram(s) IV Push once  finasteride 5 milliGRAM(s) Oral daily  glucagon  Injectable 1 milliGRAM(s) IntraMuscular once  haloperidol     Tablet 2 milliGRAM(s) Oral at bedtime  haloperidol     Tablet 0.5 milliGRAM(s) Oral daily  insulin glargine Injectable (LANTUS) 22 Unit(s) SubCutaneous at bedtime  insulin lispro (ADMELOG) corrective regimen sliding scale   SubCutaneous three times a day before meals  insulin lispro Injectable (ADMELOG) 5 Unit(s) SubCutaneous three times a day before meals  labetalol 100 milliGRAM(s) Oral every 12 hours  LORazepam     Tablet 0.25 milliGRAM(s) Oral daily  LORazepam     Tablet 0.5 milliGRAM(s) Oral at bedtime  mirtazapine 15 milliGRAM(s) Oral at bedtime  pantoprazole    Tablet 40 milliGRAM(s) Oral before breakfast  sodium chloride 0.9%. 1000 milliLiter(s) (75 mL/Hr) IV Continuous <Continuous>  tamsulosin 0.4 milliGRAM(s) Oral at bedtime    MEDICATIONS  (PRN):  acetaminophen     Tablet .. 650 milliGRAM(s) Oral every 6 hours PRN Temp greater or equal to 38C (100.4F), Mild Pain (1 - 3)  guaiFENesin Oral Liquid (Sugar-Free) 100 milliGRAM(s) Oral every 6 hours PRN Cough  haloperidol    Injectable 1 milliGRAM(s) IntraMuscular every 12 hours PRN Agitation  LORazepam   Injectable 0.5 milliGRAM(s) IV Push two times a day PRN Agitation  meclizine 12.5 milliGRAM(s) Oral every 6 hours PRN Dizziness  melatonin 3 milliGRAM(s) Oral at bedtime PRN Insomnia  ondansetron Injectable 4 milliGRAM(s) IV Push every 8 hours PRN Nausea and/or Vomiting   MEDICATIONS  (STANDING):  amoxicillin  500 milliGRAM(s)/clavulanate 1 Tablet(s) Oral two times a day  apixaban 2.5 milliGRAM(s) Oral two times a day  atorvastatin 40 milliGRAM(s) Oral at bedtime  calcitriol   Capsule 0.5 MICROGram(s) Oral daily  calcium carbonate    500 mG (Tums) Chewable 1 Tablet(s) Chew two times a day  dextrose 40% Gel 15 Gram(s) Oral once  dextrose 5%. 1000 milliLiter(s) (50 mL/Hr) IV Continuous <Continuous>  dextrose 5%. 1000 milliLiter(s) (100 mL/Hr) IV Continuous <Continuous>  dextrose 50% Injectable 25 Gram(s) IV Push once  dextrose 50% Injectable 12.5 Gram(s) IV Push once  dextrose 50% Injectable 25 Gram(s) IV Push once  finasteride 5 milliGRAM(s) Oral daily  glucagon  Injectable 1 milliGRAM(s) IntraMuscular once  haloperidol     Tablet 2 milliGRAM(s) Oral at bedtime  insulin glargine Injectable (LANTUS) 22 Unit(s) SubCutaneous at bedtime  insulin lispro (ADMELOG) corrective regimen sliding scale   SubCutaneous three times a day before meals  insulin lispro Injectable (ADMELOG) 5 Unit(s) SubCutaneous three times a day before meals  labetalol 100 milliGRAM(s) Oral every 12 hours  LORazepam     Tablet 0.5 milliGRAM(s) Oral at bedtime  mirtazapine 15 milliGRAM(s) Oral at bedtime  pantoprazole    Tablet 40 milliGRAM(s) Oral before breakfast  sodium chloride 0.9%. 1000 milliLiter(s) (75 mL/Hr) IV Continuous <Continuous>  tamsulosin 0.4 milliGRAM(s) Oral at bedtime    MEDICATIONS  (PRN):  acetaminophen     Tablet .. 650 milliGRAM(s) Oral every 6 hours PRN Temp greater or equal to 38C (100.4F), Mild Pain (1 - 3)  guaiFENesin Oral Liquid (Sugar-Free) 100 milliGRAM(s) Oral every 6 hours PRN Cough  haloperidol    Injectable 1 milliGRAM(s) IntraMuscular every 12 hours PRN Agitation  LORazepam   Injectable 0.5 milliGRAM(s) IV Push two times a day PRN Agitation  meclizine 12.5 milliGRAM(s) Oral every 6 hours PRN Dizziness  melatonin 3 milliGRAM(s) Oral at bedtime PRN Insomnia  ondansetron Injectable 4 milliGRAM(s) IV Push every 8 hours PRN Nausea and/or Vomiting   MEDICATIONS  (STANDING):  apixaban 2.5 milliGRAM(s) Oral two times a day  atorvastatin 40 milliGRAM(s) Oral at bedtime  calcitriol   Capsule 0.5 MICROGram(s) Oral daily  calcium carbonate    500 mG (Tums) Chewable 1 Tablet(s) Chew two times a day  dextrose 40% Gel 15 Gram(s) Oral once  dextrose 5%. 1000 milliLiter(s) (50 mL/Hr) IV Continuous <Continuous>  dextrose 5%. 1000 milliLiter(s) (100 mL/Hr) IV Continuous <Continuous>  dextrose 50% Injectable 25 Gram(s) IV Push once  dextrose 50% Injectable 12.5 Gram(s) IV Push once  dextrose 50% Injectable 25 Gram(s) IV Push once  finasteride 5 milliGRAM(s) Oral daily  glucagon  Injectable 1 milliGRAM(s) IntraMuscular once  haloperidol     Tablet 2 milliGRAM(s) Oral at bedtime  haloperidol     Tablet 0.5 milliGRAM(s) Oral daily  insulin glargine Injectable (LANTUS) 20 Unit(s) SubCutaneous at bedtime  insulin lispro (ADMELOG) corrective regimen sliding scale   SubCutaneous three times a day before meals  insulin lispro Injectable (ADMELOG) 4 Unit(s) SubCutaneous three times a day before meals  labetalol 100 milliGRAM(s) Oral every 12 hours  LORazepam     Tablet 0.25 milliGRAM(s) Oral daily  LORazepam     Tablet 0.5 milliGRAM(s) Oral at bedtime  mirtazapine 15 milliGRAM(s) Oral at bedtime  pantoprazole    Tablet 40 milliGRAM(s) Oral before breakfast  sodium chloride 0.9%. 1000 milliLiter(s) (75 mL/Hr) IV Continuous <Continuous>  tamsulosin 0.4 milliGRAM(s) Oral at bedtime    MEDICATIONS  (PRN):  acetaminophen     Tablet .. 650 milliGRAM(s) Oral every 6 hours PRN Temp greater or equal to 38C (100.4F), Mild Pain (1 - 3)  guaiFENesin Oral Liquid (Sugar-Free) 100 milliGRAM(s) Oral every 6 hours PRN Cough  haloperidol    Injectable 1 milliGRAM(s) IntraMuscular every 12 hours PRN Agitation  LORazepam   Injectable 0.5 milliGRAM(s) IV Push two times a day PRN Agitation  meclizine 12.5 milliGRAM(s) Oral every 6 hours PRN Dizziness  melatonin 3 milliGRAM(s) Oral at bedtime PRN Insomnia  ondansetron Injectable 4 milliGRAM(s) IV Push every 8 hours PRN Nausea and/or Vomiting   MEDICATIONS  (STANDING):  amoxicillin  500 milliGRAM(s)/clavulanate 1 Tablet(s) Oral two times a day  apixaban 2.5 milliGRAM(s) Oral two times a day  atorvastatin 40 milliGRAM(s) Oral at bedtime  calcitriol   Capsule 0.5 MICROGram(s) Oral daily  calcium carbonate    500 mG (Tums) Chewable 1 Tablet(s) Chew two times a day  dextrose 40% Gel 15 Gram(s) Oral once  dextrose 5%. 1000 milliLiter(s) (50 mL/Hr) IV Continuous <Continuous>  dextrose 5%. 1000 milliLiter(s) (100 mL/Hr) IV Continuous <Continuous>  dextrose 50% Injectable 25 Gram(s) IV Push once  dextrose 50% Injectable 12.5 Gram(s) IV Push once  dextrose 50% Injectable 25 Gram(s) IV Push once  finasteride 5 milliGRAM(s) Oral daily  glucagon  Injectable 1 milliGRAM(s) IntraMuscular once  haloperidol     Tablet 1 milliGRAM(s) Oral two times a day  insulin glargine Injectable (LANTUS) 22 Unit(s) SubCutaneous at bedtime  insulin lispro (ADMELOG) corrective regimen sliding scale   SubCutaneous three times a day before meals  insulin lispro Injectable (ADMELOG) 5 Unit(s) SubCutaneous three times a day before meals  labetalol 100 milliGRAM(s) Oral every 12 hours  LORazepam     Tablet 0.25 milliGRAM(s) Oral two times a day  mirtazapine 15 milliGRAM(s) Oral at bedtime  pantoprazole    Tablet 40 milliGRAM(s) Oral before breakfast  tamsulosin 0.4 milliGRAM(s) Oral at bedtime    MEDICATIONS  (PRN):  acetaminophen     Tablet .. 650 milliGRAM(s) Oral every 6 hours PRN Temp greater or equal to 38C (100.4F), Mild Pain (1 - 3)  guaiFENesin Oral Liquid (Sugar-Free) 100 milliGRAM(s) Oral every 6 hours PRN Cough  haloperidol    Injectable 2 milliGRAM(s) IntraMuscular every 12 hours PRN Agitation  LORazepam   Injectable 1 milliGRAM(s) IV Push two times a day PRN Agitation  meclizine 12.5 milliGRAM(s) Oral every 6 hours PRN Dizziness  melatonin 3 milliGRAM(s) Oral at bedtime PRN Insomnia  ondansetron Injectable 4 milliGRAM(s) IV Push every 8 hours PRN Nausea and/or Vomiting

## 2022-04-21 LAB
BUN BLDV-MCNC: 9 MG/DL
CALCIUM SERPL-MCNC: 8.8 MG/DL
CHLORIDE BLD-SCNC: 104 MMOL/L
CO2: 24 MMOL/L
CREAT SERPL-MCNC: 0.7 MG/DL
GFR CALCULATED: >90
GLUCOSE BLD-MCNC: 88 MG/DL
POTASSIUM SERPL-SCNC: 4.3 MMOL/L
SODIUM BLD-SCNC: 138 MMOL/L

## 2022-04-27 ENCOUNTER — OFFICE VISIT (OUTPATIENT)
Dept: FAMILY MEDICINE CLINIC | Age: 42
End: 2022-04-27
Payer: MEDICARE

## 2022-04-27 VITALS
HEART RATE: 117 BPM | OXYGEN SATURATION: 98 % | RESPIRATION RATE: 16 BRPM | TEMPERATURE: 97 F | SYSTOLIC BLOOD PRESSURE: 130 MMHG | BODY MASS INDEX: 27.43 KG/M2 | WEIGHT: 181 LBS | DIASTOLIC BLOOD PRESSURE: 78 MMHG | HEIGHT: 68 IN

## 2022-04-27 DIAGNOSIS — M48.07 SPINAL STENOSIS OF LUMBOSACRAL REGION: ICD-10-CM

## 2022-04-27 DIAGNOSIS — M54.50 CHRONIC LEFT-SIDED LOW BACK PAIN, UNSPECIFIED WHETHER SCIATICA PRESENT: ICD-10-CM

## 2022-04-27 DIAGNOSIS — G89.29 CHRONIC LEFT-SIDED LOW BACK PAIN, UNSPECIFIED WHETHER SCIATICA PRESENT: ICD-10-CM

## 2022-04-27 DIAGNOSIS — M43.17 SPONDYLOLISTHESIS OF LUMBOSACRAL REGION: ICD-10-CM

## 2022-04-27 DIAGNOSIS — Z01.818 PRE-OP EXAM: Primary | ICD-10-CM

## 2022-04-27 PROCEDURE — G8417 CALC BMI ABV UP PARAM F/U: HCPCS | Performed by: STUDENT IN AN ORGANIZED HEALTH CARE EDUCATION/TRAINING PROGRAM

## 2022-04-27 PROCEDURE — 99213 OFFICE O/P EST LOW 20 MIN: CPT | Performed by: STUDENT IN AN ORGANIZED HEALTH CARE EDUCATION/TRAINING PROGRAM

## 2022-04-27 PROCEDURE — G8427 DOCREV CUR MEDS BY ELIG CLIN: HCPCS | Performed by: STUDENT IN AN ORGANIZED HEALTH CARE EDUCATION/TRAINING PROGRAM

## 2022-04-27 RX ORDER — VENLAFAXINE HYDROCHLORIDE 75 MG/1
75 CAPSULE, EXTENDED RELEASE ORAL DAILY
Status: ON HOLD | COMMUNITY
End: 2022-06-28 | Stop reason: HOSPADM

## 2022-04-27 ASSESSMENT — PATIENT HEALTH QUESTIONNAIRE - PHQ9
9. THOUGHTS THAT YOU WOULD BE BETTER OFF DEAD, OR OF HURTING YOURSELF: 0
8. MOVING OR SPEAKING SO SLOWLY THAT OTHER PEOPLE COULD HAVE NOTICED. OR THE OPPOSITE, BEING SO FIGETY OR RESTLESS THAT YOU HAVE BEEN MOVING AROUND A LOT MORE THAN USUAL: 1
5. POOR APPETITE OR OVEREATING: 1
SUM OF ALL RESPONSES TO PHQ QUESTIONS 1-9: 14
SUM OF ALL RESPONSES TO PHQ QUESTIONS 1-9: 14
4. FEELING TIRED OR HAVING LITTLE ENERGY: 3
6. FEELING BAD ABOUT YOURSELF - OR THAT YOU ARE A FAILURE OR HAVE LET YOURSELF OR YOUR FAMILY DOWN: 3
SUM OF ALL RESPONSES TO PHQ QUESTIONS 1-9: 14
7. TROUBLE CONCENTRATING ON THINGS, SUCH AS READING THE NEWSPAPER OR WATCHING TELEVISION: 1
SUM OF ALL RESPONSES TO PHQ QUESTIONS 1-9: 14
SUM OF ALL RESPONSES TO PHQ9 QUESTIONS 1 & 2: 2
3. TROUBLE FALLING OR STAYING ASLEEP: 3
10. IF YOU CHECKED OFF ANY PROBLEMS, HOW DIFFICULT HAVE THESE PROBLEMS MADE IT FOR YOU TO DO YOUR WORK, TAKE CARE OF THINGS AT HOME, OR GET ALONG WITH OTHER PEOPLE: 1
1. LITTLE INTEREST OR PLEASURE IN DOING THINGS: 1
2. FEELING DOWN, DEPRESSED OR HOPELESS: 1

## 2022-04-27 ASSESSMENT — ENCOUNTER SYMPTOMS
DIARRHEA: 0
SHORTNESS OF BREATH: 0
NAUSEA: 0
TROUBLE SWALLOWING: 0
CONSTIPATION: 0
ABDOMINAL PAIN: 0
VOMITING: 0
COUGH: 0
BLOOD IN STOOL: 0
EYE PAIN: 0

## 2022-04-27 NOTE — PROGRESS NOTES
[id number] [facility] JAIRON [facility] [id number]  Dept: [phone]  Dept Fax: [phone]  Loc: Stephanie Meyers is a 39 y.o. female who presents today for:  Chief Complaint   Patient presents with    Surgical Clearance     had preop testing done at [facility]       Goals    None         HPI:     HPI  Patient presents for pre-op exam for upcoming low back surgery. Procedure is scheduled for 5/6/22 with Dr. Juany Baker at [facility]. Patient will be undergoing L5-S1 decompression with fusion and TLIF. Seen at [facility] pre-op testing completed and within normal range. EKG showing normal sinus rhythm. Able to meet 4 METS without any difficulty. No difficulty with anesthesia in the past.     No imaging available in our system for lumbar issue. Spondylolisthesis and spinal stenosis diagnoses for surgery.       Past Medical History:   Diagnosis Date    Anxiety     Back pain     Colitis     Depression     Fibromyalgia     HPV (human papilloma virus) anogenital infection     OCD (obsessive compulsive disorder)       Past Surgical History:   Procedure Laterality Date    CHOLECYSTECTOMY      INNER EAR SURGERY       Family History   Problem Relation Age of Onset    Diabetes Mother     Heart Disease Mother     Diabetes Father     Heart Disease Father     Cancer Paternal Aunt      Social History     Tobacco Use    Smoking status: Current Some Day Smoker     Packs/day: 0.50     Types: Cigarettes    Smokeless tobacco: Never Used   Substance Use Topics    Alcohol use: Not Currently     Comment: last used 07/2021      Current Outpatient Medications   Medication Sig Dispense Refill    ARIPiprazole (ABILIFY IM) Inject into the muscle every 30 days      venlafaxine (EFFEXOR XR) 75 MG extended release capsule Take 75 mg by mouth daily      levothyroxine (SYNTHROID) 50 MCG tablet TAKE 1 TABLET BY MOUTH DAILY 28 tablet 3    hydrOXYzine (VISTARIL) 50 MG capsule Hydroxyzine Pamoate Active 50 MG PO Three Times Daily as needed October 25th, 2021 2:13pm      naproxen (NAPROSYN) 500 MG tablet Take 1 tablet by mouth 2 times daily as needed for Pain 20 tablet 0    traZODone (DESYREL) 50 MG tablet Take 1 tablet by mouth nightly as needed for Sleep 30 tablet 0    methylPREDNISolone (MEDROL DOSEPACK) 4 MG tablet  (Patient not taking: Reported on 4/27/2022)      metoclopramide (REGLAN) 10 MG tablet Take 1 tablet by mouth every 8 hours as needed (nausea or vomiting) (Patient not taking: Reported on 10/26/2021) 8 tablet 0    lansoprazole (PREVACID) 30 MG delayed release capsule Take 1 capsule by mouth daily for 14 days (Patient not taking: Reported on 10/26/2021) 14 capsule 0    FLUoxetine (PROZAC) 40 MG capsule Take 2 capsules by mouth daily (Patient not taking: Reported on 4/27/2022) 60 capsule 0    prazosin (MINIPRESS) 1 MG capsule Take 1 capsule by mouth nightly (Patient not taking: Reported on 4/27/2022) 30 capsule 0    risperiDONE (RISPERDAL) 0.5 MG tablet Take 1 tablet by mouth every evening (Patient not taking: Reported on 4/27/2022) 30 tablet 0     No current facility-administered medications for this visit. No Known Allergies    Health Maintenance   Topic Date Due    COVID-19 Vaccine (1) Never done    Pneumococcal 0-64 years Vaccine (1 - PCV) Never done    Depression Monitoring  Never done    HIV screen  Never done    DTaP/Tdap/Td vaccine (1 - Tdap) Never done    Varicella vaccine (1 of 2 - 2-dose childhood series) Never done    Cervical cancer screen  Never done    Flu vaccine (Season Ended) 09/01/2022    TSH  09/22/2022    Lipids  05/27/2026    Hepatitis C screen  Completed    Hepatitis A vaccine  Aged Out    Hepatitis B vaccine  Aged Out    Hib vaccine  Aged Out    Meningococcal (ACWY) vaccine  Aged Out       Subjective:      Review of Systems   Constitutional: Negative for chills, fatigue and fever.    HENT: Negative for ear pain, postnasal drip and trouble swallowing. Eyes: Negative for pain and visual disturbance. Respiratory: Negative for cough and shortness of breath. Cardiovascular: Negative for chest pain and palpitations. Gastrointestinal: Negative for abdominal pain, blood in stool, constipation, diarrhea, nausea and vomiting. Genitourinary: Negative for dysuria and urgency. Skin: Negative for rash and wound. Neurological: Negative for dizziness and headaches. Psychiatric/Behavioral: Negative for dysphoric mood. The patient is not nervous/anxious. Objective:     Vitals:    04/27/22 1533   BP: 130/78   Site: Left Upper Arm   Position: Sitting   Cuff Size: Medium Adult   Pulse: 117   Resp: 16   Temp: 97 °F (36.1 °C)   SpO2: 98%   Weight: 181 lb (82.1 kg)   Height: 5' 8\" (1.727 m)       Body mass index is 27.52 kg/m². Wt Readings from Last 3 Encounters:   04/27/22 181 lb (82.1 kg)   10/26/21 204 lb 3.2 oz (92.6 kg)   10/16/21 195 lb (88.5 kg)     BP Readings from Last 3 Encounters:   04/27/22 130/78   12/19/21 104/87   10/26/21 130/82       Physical Exam  Vitals and nursing note reviewed. Constitutional:       General: She is not in acute distress. Appearance: She is well-developed. She is not diaphoretic. HENT:      Head: Normocephalic and atraumatic. Right Ear: External ear normal.      Left Ear: External ear normal.      Nose: Nose normal.   Eyes:      General: No scleral icterus. Right eye: No discharge. Left eye: No discharge. Conjunctiva/sclera: Conjunctivae normal.   Cardiovascular:      Rate and Rhythm: Normal rate and regular rhythm. Heart sounds: Normal heart sounds. No murmur heard. Pulmonary:      Effort: Pulmonary effort is normal.      Breath sounds: Normal breath sounds. Musculoskeletal:      Cervical back: Normal range of motion. Skin:     General: Skin is warm and dry. Findings: No erythema or rash.    Neurological:      Mental Status: She is alert and oriented to person, place, and time. Cranial Nerves: No cranial nerve deficit. Psychiatric:         Behavior: Behavior normal.         Thought Content: Thought content normal.         Judgment: Judgment normal.         Lab Results   Component Value Date    WBC 9.1 10/16/2021    HGB 13.8 10/16/2021    HCT 39.7 10/16/2021     10/16/2021    CHOL 112 10/02/2018    TRIG 52 10/02/2018    HDL 68 10/02/2018    LDLCALC 34 10/02/2018    AST 28 10/16/2021     04/21/2022    K 4.3 04/21/2022     04/21/2022    CREATININE 0.7 04/21/2022    BUN 9 04/21/2022    CO2 24 04/21/2022    TSH 1.050 09/22/2021    GLUF 102 10/02/2018    LABGLOM >90 04/21/2022    MG 1.9 11/03/2018    CALCIUM 8.8 04/21/2022       Imaging Results:    No results found. Assessment/Plan:     Mickie MORRIS was seen today for surgical clearance. Diagnoses and all orders for this visit:    Pre-op exam    Spondylolisthesis of lumbosacral region    Chronic left-sided low back pain, unspecified whether sciatica present    Spinal stenosis of lumbosacral region    Testing in EKG reviewed in the office today. WNL. Normal Sinus rhythm. Preoperative Cardiac Risk Stratification:  Sara Jorge L of planned surgery is non-ungent. Patients active clinical conditions are spondylolisthesis, spinal stenosis. The planned surgery isnon-vascular intermediate risk surgery. The patient's MET score is estimated to be 4 METS OR MORE WITHOUT SYMPTOMS - PROCEED WITH SURGERY. Revised Cardiac Risk Index is 0.4% (0 points). The patient is at [address] [place] [address]  for non-cardiac surgery. The patient  IS MEDICALLY OPTIMIZED FOR SURGERY. No follow-ups on file. Medications Prescribed:  No orders of the defined types were placed in this encounter.       Future Appointments   Date Time Provider Mark Armstrong   6/10/2022  8:00 AM Chi Pink DO SRPX  [facility] [address] [facility]       Patient given educational materials - see patient instructions. Discussed use, benefit, and sideeffects of prescribed medications. All patient questions answered. Pt voiced understanding. Reviewed health maintenance. Instructed to continue current medications, diet and exercise. Patient agreed with treatment plan. Follow up as directed.      Electronically signed by Livia Roque DO on 4/27/2022 at 5:06 PM

## 2022-04-27 NOTE — PROGRESS NOTES
S: 39 y.o. female with   Chief Complaint   Patient presents with    Surgical Clearance     had preop testing done at [facility]       HPI: please see resident note for HPI and ROS. Pre op for lumbar decompression with fusion on May 6th with [address]  BMP, CBC and INR normal  EKG NSR  No issues with 4 mets  No issues with anesthesia and no family history of problems with anesthesia   Lost 20 lbs in 6 months  BP good today    BP Readings from Last 3 Encounters:   04/27/22 130/78   12/19/21 104/87   10/26/21 130/82     Wt Readings from Last 3 Encounters:   04/27/22 181 lb (82.1 kg)   10/26/21 204 lb 3.2 oz (92.6 kg)   10/16/21 195 lb (88.5 kg)       O: VS:  height is 5' 8\" (1.727 m) and weight is 181 lb (82.1 kg). Her temperature is 97 °F (36.1 °C). Her blood pressure is 130/78 and her pulse is 117. Her respiration is 16 and oxygen saturation is 98%. Diagnosis Orders   1. Pre-op exam     2.  Chronic left-sided low back pain, unspecified whether sciatica present         Plan:  Low risk for procedure with Ortho        Health Maintenance Due   Topic Date Due    COVID-19 Vaccine (1) Never done    Pneumococcal 0-64 years Vaccine (1 - PCV) Never done    Depression Monitoring  Never done    HIV screen  Never done    DTaP/Tdap/Td vaccine (1 - Tdap) Never done    Varicella vaccine (1 of 2 - 2-dose childhood series) Never done    Cervical cancer screen  Never done         Enio Echavarria MD 4/27/2022 4:28 PM

## 2022-04-29 NOTE — PROGRESS NOTES
Preliminary Discharge Planning Questionnaire  Date of Surgery 5/6   Surgeon [facility]      · Having the proper help and care after surgery is very important to your recovery. Who will be able to help you at home when you are discharged from the hospital? ([facility] - 24/7 for a minimum of 2 weeks)    Patient lives in [facility] with dependency issues. [place] [phone]    · How many steps to enter your home?  0    · Bathroom on first floor? Yes    · Bedroom on the first floor? Yes    · Do you have an elevated toilet seat to use at home? Yes    · Do you have a walker to use at home? · Total Joints - with wheels N/A  · Spine - with wheels  No     Have you been doing home exercises-no    *You will go home with some outpatient physical therapy, where do you prefer to go?  Told her  is planning on her going to [facility]

## 2022-04-29 NOTE — PROGRESS NOTES
Follow all instructions given by your physician    NPO after midnight   Sips of water am of surgery with allowed medications  Bring insurance info and 's license  Wear comfortable clean clothing  No jewelry or contact lenses to be worn day of surgery  No glue on dentures morning of surgery;you will be asked to remove them for surgery. Case for glasses. Shower night before and morning of surgery with a liquid antibacterial soap, dry with fresh clean towel; no lotions, creams or powder. Clean sheets and pillow case on bed night before surgery  Bring medications in original bottles  Bring CPAP/BIPAP machine if you have one ( you may be charged if one is needed in recovery room )   needed at discharge and someone over 18 to stay with you for 24 hours overnight (surgery may be cancelled if you don't have this)  [facility] on 2nd floor  If you would become ill prior to surgery, please call the surgeon  May have a visitor with you, we request that you limit to 2 visitors in pre-op area  Please bring and wear mask  Call [phone] for any questions  Covid questionnaire Complete; Patient negative for symptoms or exposure. See documentation.

## 2022-05-05 NOTE — H&P
History and Physical    Pavithra Angelo (1980)  5/5/2022      CHIEF COMPLAINT:  Back pain    HISTORY OF PRESENT ILLNESS:    The patient is a 42-year-old female with complaints of constant left-sided lumbar pain that radiates pain and tingling into the left anterior lateral thigh. Patient reports been going on for about 6 months. Currently has a VAS score 6 out of 10. Percentage wise, 90% pain is back and 10% to the legs. As it pertains to the legs, 90% left and 10% right. Activities aggravate pain include sitting, standing, walking, leaning forward, bending forward, change additions, and coughing. Activities help relieve the pain include lying down and rising from sitting. Modifying factors ibuprofen, physical therapy, and lumbar injections and medication management. Patient reports these provide her with really no relief. Denies any bladder or bowel incontinence. Does smoke half a pack a day    PCP: Car Kim    Past Medical History:        Diagnosis Date    Anxiety     Back pain     Colitis     Depression     Fibromyalgia     HPV (human papilloma virus) anogenital infection     Hypertension     OCD (obsessive compulsive disorder)     Osteoarthritis (arthritis due to wear and tear of joints)     PONV (postoperative nausea and vomiting)     Seizure ([address]     \" in [facility] in 2021 was checked out by EMS but not taken to hospital\"    Thyroid disease      Past Surgical History:        Procedure Laterality Date    CHOLECYSTECTOMY      INNER EAR SURGERY       Current Medications:   Prior to Admission medications    Medication Sig Start Date End Date Taking?  Authorizing Provider   ARIPiprazole (ABILIFY IM) Inject into the muscle every 30 days    Historical Provider, MD   venlafaxine (EFFEXOR XR) 75 MG extended release capsule Take 75 mg by mouth daily    Historical Provider, MD   levothyroxine (SYNTHROID) 50 MCG tablet TAKE 1 TABLET BY MOUTH DAILY 1/26/22   Chi Pink, DO hydrOXYzine (VISTARIL) 50 MG capsule Hydroxyzine Pamoate Active 50 MG PO Three Times Daily as needed October 25th, 2021 2:13pm 10/25/21   Historical Provider, MD   naproxen (NAPROSYN) 500 MG tablet Take 1 tablet by mouth 2 times daily as needed for Pain 10/13/21   Chi Pink DO   traZODone (DESYREL) 50 MG tablet Take 1 tablet by mouth nightly as needed for Sleep 9/27/21   Jany Hernandez MD    D@  Allergies:  Dairycare [lactase-lactobacillus]    Social History:   TOBACCO:   reports that she has been smoking cigarettes. She has been smoking about 0.50 packs per day. She has never used smokeless tobacco.  ETOH:   reports current alcohol use. DRUGS:   reports current drug use. Drug: Cocaine. Family History:       Problem Relation Age of Onset    Diabetes Mother     Heart Disease Mother     Diabetes Father     Heart Disease Father     Cancer Paternal Aunt        REVIEW OF SYSTEMS:    CONSTITUTIONAL:  negative for fevers, chills  RESPIRATORY:  negative for cough and shortness of breath  CARDIOVASCULAR:  negative for chest pain, palpitations  GASTROINTESTINAL:  negative for nausea, vomiting, incontinence  GENITOURINARY:  negative for frequency and urinary incontinence  MUSCULOSKELETAL:  (+) for back pain, leg pain  NEUROLOGICAL:  (+) for numbness/tingling, negative for headaches  BEHAVIOR/PSYCH:  negative for depressed mood, increased anxiety    PHYSICAL EXAM:    VITAL SIGNS: Ht 5 ft 3 in, Wt 165 lbs, BMI 29.23.  CONSTITUTIONAL:  Awake, alert, cooperative, no apparent distress, and appears stated age. Antalgic gait  HEAD: Atraumatic, normocephalic  LUNGS:  No respiratory distress. CTA bilaterally. No wheezes, rales, rhonchi  CARDIOVASCULAR:  Regular rate and rhythm, no murmur  ABDOMEN:  Normal bowel sounds, soft, non-distended, non-tender  SPINE: Limited lumbar ROM. Inspection of the spine shows no skin lesions or open wounds. TTP lumbar spine.    MUSCULOSKELETAL:  There is no redness, warmth, or swelling of the joints. Full range of motion noted. Motor strength is 5 out of 5 bilateral lower extremities. NEUROLOGIC:  Awake, alert, oriented to name, place and time. Sensory is intact bilateral lower extremities. DATA:    CBC:   Lab Results   Component Value Date    WBC 9.1 10/16/2021    RBC 4.31 10/16/2021    RBC 0-2 11/19/2011    HGB 13.8 10/16/2021    HCT 39.7 10/16/2021    MCV 92.1 10/16/2021    MCH 32.0 10/16/2021    MCHC 34.8 10/16/2021    RDW 12.4 01/27/2013     10/16/2021    MPV 8.6 10/16/2021     WBC:    Lab Results   Component Value Date    WBC 9.1 10/16/2021     Hemoglobin/Hematocrit:    Lab Results   Component Value Date    HGB 13.8 10/16/2021    HCT 39.7 10/16/2021     CMP:    Lab Results   Component Value Date     04/21/2022    K 4.3 04/21/2022     04/21/2022    CO2 24 04/21/2022    BUN 9 04/21/2022    CREATININE 0.7 04/21/2022    LABGLOM >90 04/21/2022    LABGLOM >90 10/16/2021    GLUCOSE 88 04/21/2022    GLUCOSE NEGATIVE 11/19/2011    PROT 5.7 10/16/2021    LABALBU 3.1 10/16/2021    CALCIUM 8.8 04/21/2022    BILITOT 0.2 10/16/2021    ALKPHOS 86 10/16/2021    AST 28 10/16/2021    ALT 22 10/16/2021       Radiology:  MRI Lumbar Spine dated 2/1/2022   IMPRESSION:  1. S1 appears lumbarized and may appear as L5 on xray  2. L5-S1 anterolisthesis with moderate to severe left greater than right facet arthritis  3. Prominent central gray matter favored over tiny syrinx    IMPRESSION/RECOMMENDATIONS:    Assessment:   1) L5-S1 spondylolisthesis grade 1 with stenosis and neurogenic claudication.     Plan:  1) L5-S1 decompression and fusion with transforaminal lumbar interbody fusion    ROMY Hdz

## 2022-05-06 ENCOUNTER — ANESTHESIA (OUTPATIENT)
Dept: OPERATING ROOM | Age: 42
DRG: 460 | End: 2022-05-06
Payer: MEDICARE

## 2022-05-06 ENCOUNTER — HOSPITAL ENCOUNTER (INPATIENT)
Age: 42
LOS: 3 days | Discharge: NURSING, REHAB OR LONG-TERM CARE | DRG: 460 | End: 2022-05-10
Attending: ORTHOPAEDIC SURGERY | Admitting: ORTHOPAEDIC SURGERY
Payer: MEDICARE

## 2022-05-06 ENCOUNTER — APPOINTMENT (OUTPATIENT)
Dept: GENERAL RADIOLOGY | Age: 42
DRG: 460 | End: 2022-05-06
Attending: ORTHOPAEDIC SURGERY
Payer: MEDICARE

## 2022-05-06 ENCOUNTER — ANESTHESIA EVENT (OUTPATIENT)
Dept: OPERATING ROOM | Age: 42
DRG: 460 | End: 2022-05-06
Payer: MEDICARE

## 2022-05-06 VITALS
RESPIRATION RATE: 18 BRPM | DIASTOLIC BLOOD PRESSURE: 62 MMHG | OXYGEN SATURATION: 98 % | TEMPERATURE: 97.9 F | SYSTOLIC BLOOD PRESSURE: 94 MMHG

## 2022-05-06 PROBLEM — M48.061 LUMBAR STENOSIS WITHOUT NEUROGENIC CLAUDICATION: Status: ACTIVE | Noted: 2022-05-06

## 2022-05-06 LAB
AMPHETAMINE+METHAMPHETAMINE URINE SCREEN: NEGATIVE
BARBITURATE QUANTITATIVE URINE: NEGATIVE
BENZODIAZEPINE QUANTITATIVE URINE: NEGATIVE
CANNABINOID QUANTITATIVE URINE: NORMAL
COCAINE METABOLITE QUANTITATIVE URINE: NEGATIVE
OPIATES, URINE: NEGATIVE
OXYCODONE: NEGATIVE
PHENCYCLIDINE QUANTITATIVE URINE: NEGATIVE
PREGNANCY, URINE: NEGATIVE

## 2022-05-06 PROCEDURE — 6370000000 HC RX 637 (ALT 250 FOR IP): Performed by: PHYSICIAN ASSISTANT

## 2022-05-06 PROCEDURE — 3600000004 HC SURGERY LEVEL 4 BASE: Performed by: ORTHOPAEDIC SURGERY

## 2022-05-06 PROCEDURE — 7100000011 HC PHASE II RECOVERY - ADDTL 15 MIN: Performed by: ORTHOPAEDIC SURGERY

## 2022-05-06 PROCEDURE — 2720000010 HC SURG SUPPLY STERILE: Performed by: ORTHOPAEDIC SURGERY

## 2022-05-06 PROCEDURE — 2580000003 HC RX 258: Performed by: PHYSICIAN ASSISTANT

## 2022-05-06 PROCEDURE — 6360000002 HC RX W HCPCS: Performed by: ANESTHESIOLOGY

## 2022-05-06 PROCEDURE — 80305 DRUG TEST PRSMV DIR OPT OBS: CPT

## 2022-05-06 PROCEDURE — 2500000003 HC RX 250 WO HCPCS

## 2022-05-06 PROCEDURE — 7100000001 HC PACU RECOVERY - ADDTL 15 MIN: Performed by: ORTHOPAEDIC SURGERY

## 2022-05-06 PROCEDURE — 7100000010 HC PHASE II RECOVERY - FIRST 15 MIN: Performed by: ORTHOPAEDIC SURGERY

## 2022-05-06 PROCEDURE — 81025 URINE PREGNANCY TEST: CPT

## 2022-05-06 PROCEDURE — C1713 ANCHOR/SCREW BN/BN,TIS/BN: HCPCS | Performed by: ORTHOPAEDIC SURGERY

## 2022-05-06 PROCEDURE — 6360000002 HC RX W HCPCS: Performed by: ORTHOPAEDIC SURGERY

## 2022-05-06 PROCEDURE — 7100000000 HC PACU RECOVERY - FIRST 15 MIN: Performed by: ORTHOPAEDIC SURGERY

## 2022-05-06 PROCEDURE — 3600000014 HC SURGERY LEVEL 4 ADDTL 15MIN: Performed by: ORTHOPAEDIC SURGERY

## 2022-05-06 PROCEDURE — 0SG30J1 FUSION OF LUMBOSACRAL JOINT WITH SYNTHETIC SUBSTITUTE, POSTERIOR APPROACH, POSTERIOR COLUMN, OPEN APPROACH: ICD-10-PCS | Performed by: ORTHOPAEDIC SURGERY

## 2022-05-06 PROCEDURE — 6360000002 HC RX W HCPCS

## 2022-05-06 PROCEDURE — 2709999900 HC NON-CHARGEABLE SUPPLY: Performed by: ORTHOPAEDIC SURGERY

## 2022-05-06 PROCEDURE — 6360000002 HC RX W HCPCS: Performed by: PHYSICIAN ASSISTANT

## 2022-05-06 PROCEDURE — 3700000000 HC ANESTHESIA ATTENDED CARE: Performed by: ORTHOPAEDIC SURGERY

## 2022-05-06 PROCEDURE — 01NB0ZZ RELEASE LUMBAR NERVE, OPEN APPROACH: ICD-10-PCS | Performed by: ORTHOPAEDIC SURGERY

## 2022-05-06 PROCEDURE — 3700000001 HC ADD 15 MINUTES (ANESTHESIA): Performed by: ORTHOPAEDIC SURGERY

## 2022-05-06 PROCEDURE — 72020 X-RAY EXAM OF SPINE 1 VIEW: CPT

## 2022-05-06 PROCEDURE — 2580000003 HC RX 258

## 2022-05-06 DEVICE — SCREW SPNL L40MM DIA6.5MM THORLUM TI ALLY POLYAX STREAMLINE: Type: IMPLANTABLE DEVICE | Site: SPINE LUMBAR | Status: FUNCTIONAL

## 2022-05-06 DEVICE — ROD SPNL L35MM DIA55MM PREBENT FOR STREAMLINE TL SPNL FIX: Type: IMPLANTABLE DEVICE | Site: SPINE LUMBAR | Status: FUNCTIONAL

## 2022-05-06 DEVICE — ROD SPNL L40MM OD5.5MM QNT: Type: IMPLANTABLE DEVICE | Site: SPINE LUMBAR | Status: FUNCTIONAL

## 2022-05-06 DEVICE — SET SCR SPNL TI STREAMLINE: Type: IMPLANTABLE DEVICE | Site: SPINE LUMBAR | Status: FUNCTIONAL

## 2022-05-06 RX ORDER — ROCURONIUM BROMIDE 10 MG/ML
INJECTION, SOLUTION INTRAVENOUS PRN
Status: DISCONTINUED | OUTPATIENT
Start: 2022-05-06 | End: 2022-05-06 | Stop reason: SDUPTHER

## 2022-05-06 RX ORDER — LIDOCAINE HCL/PF 100 MG/5ML
SYRINGE (ML) INJECTION PRN
Status: DISCONTINUED | OUTPATIENT
Start: 2022-05-06 | End: 2022-05-06 | Stop reason: SDUPTHER

## 2022-05-06 RX ORDER — SODIUM CHLORIDE 9 MG/ML
INJECTION, SOLUTION INTRAVENOUS CONTINUOUS
Status: DISCONTINUED | OUTPATIENT
Start: 2022-05-06 | End: 2022-05-06 | Stop reason: HOSPADM

## 2022-05-06 RX ORDER — DEXAMETHASONE SODIUM PHOSPHATE 10 MG/ML
INJECTION, EMULSION INTRAMUSCULAR; INTRAVENOUS PRN
Status: DISCONTINUED | OUTPATIENT
Start: 2022-05-06 | End: 2022-05-06 | Stop reason: SDUPTHER

## 2022-05-06 RX ORDER — PROPOFOL 10 MG/ML
INJECTION, EMULSION INTRAVENOUS PRN
Status: DISCONTINUED | OUTPATIENT
Start: 2022-05-06 | End: 2022-05-06 | Stop reason: SDUPTHER

## 2022-05-06 RX ORDER — POLYETHYLENE GLYCOL 3350 17 G/17G
17 POWDER, FOR SOLUTION ORAL DAILY
Status: DISCONTINUED | OUTPATIENT
Start: 2022-05-06 | End: 2022-05-10 | Stop reason: HOSPADM

## 2022-05-06 RX ORDER — SODIUM CHLORIDE 0.9 % (FLUSH) 0.9 %
5-40 SYRINGE (ML) INJECTION PRN
Status: DISCONTINUED | OUTPATIENT
Start: 2022-05-06 | End: 2022-05-06 | Stop reason: HOSPADM

## 2022-05-06 RX ORDER — HYDROMORPHONE HCL 110MG/55ML
PATIENT CONTROLLED ANALGESIA SYRINGE INTRAVENOUS PRN
Status: DISCONTINUED | OUTPATIENT
Start: 2022-05-06 | End: 2022-05-06 | Stop reason: SDUPTHER

## 2022-05-06 RX ORDER — SODIUM CHLORIDE 0.9 % (FLUSH) 0.9 %
5-40 SYRINGE (ML) INJECTION EVERY 12 HOURS SCHEDULED
Status: DISCONTINUED | OUTPATIENT
Start: 2022-05-06 | End: 2022-05-06 | Stop reason: HOSPADM

## 2022-05-06 RX ORDER — SENNA AND DOCUSATE SODIUM 50; 8.6 MG/1; MG/1
1 TABLET, FILM COATED ORAL 2 TIMES DAILY
Status: DISCONTINUED | OUTPATIENT
Start: 2022-05-06 | End: 2022-05-09

## 2022-05-06 RX ORDER — ACETAMINOPHEN 325 MG/1
650 TABLET ORAL EVERY 6 HOURS
Status: DISCONTINUED | OUTPATIENT
Start: 2022-05-06 | End: 2022-05-10 | Stop reason: HOSPADM

## 2022-05-06 RX ORDER — MORPHINE SULFATE 2 MG/ML
2 INJECTION, SOLUTION INTRAMUSCULAR; INTRAVENOUS
Status: DISCONTINUED | OUTPATIENT
Start: 2022-05-06 | End: 2022-05-10 | Stop reason: HOSPADM

## 2022-05-06 RX ORDER — ONDANSETRON 4 MG/1
4 TABLET, ORALLY DISINTEGRATING ORAL EVERY 8 HOURS PRN
Status: DISCONTINUED | OUTPATIENT
Start: 2022-05-06 | End: 2022-05-10 | Stop reason: HOSPADM

## 2022-05-06 RX ORDER — BISACODYL 10 MG
10 SUPPOSITORY, RECTAL RECTAL DAILY PRN
Status: DISCONTINUED | OUTPATIENT
Start: 2022-05-06 | End: 2022-05-10 | Stop reason: HOSPADM

## 2022-05-06 RX ORDER — SODIUM CHLORIDE 0.9 % (FLUSH) 0.9 %
5-40 SYRINGE (ML) INJECTION EVERY 12 HOURS SCHEDULED
Status: DISCONTINUED | OUTPATIENT
Start: 2022-05-06 | End: 2022-05-10 | Stop reason: HOSPADM

## 2022-05-06 RX ORDER — VENLAFAXINE HYDROCHLORIDE 75 MG/1
75 CAPSULE, EXTENDED RELEASE ORAL DAILY
Status: DISCONTINUED | OUTPATIENT
Start: 2022-05-06 | End: 2022-05-10 | Stop reason: HOSPADM

## 2022-05-06 RX ORDER — LEVOTHYROXINE SODIUM 0.05 MG/1
50 TABLET ORAL DAILY
Status: DISCONTINUED | OUTPATIENT
Start: 2022-05-06 | End: 2022-05-10 | Stop reason: HOSPADM

## 2022-05-06 RX ORDER — CYCLOBENZAPRINE HCL 10 MG
10 TABLET ORAL 3 TIMES DAILY PRN
Status: DISCONTINUED | OUTPATIENT
Start: 2022-05-06 | End: 2022-05-10 | Stop reason: HOSPADM

## 2022-05-06 RX ORDER — DIAZEPAM 5 MG/1
5 TABLET ORAL EVERY 12 HOURS PRN
Status: DISCONTINUED | OUTPATIENT
Start: 2022-05-06 | End: 2022-05-10 | Stop reason: HOSPADM

## 2022-05-06 RX ORDER — ONDANSETRON 2 MG/ML
4 INJECTION INTRAMUSCULAR; INTRAVENOUS
Status: DISCONTINUED | OUTPATIENT
Start: 2022-05-06 | End: 2022-05-06 | Stop reason: HOSPADM

## 2022-05-06 RX ORDER — PHENYLEPHRINE HYDROCHLORIDE 10 MG/ML
INJECTION INTRAVENOUS PRN
Status: DISCONTINUED | OUTPATIENT
Start: 2022-05-06 | End: 2022-05-06 | Stop reason: SDUPTHER

## 2022-05-06 RX ORDER — FENTANYL CITRATE 50 UG/ML
50 INJECTION, SOLUTION INTRAMUSCULAR; INTRAVENOUS EVERY 5 MIN PRN
Status: COMPLETED | OUTPATIENT
Start: 2022-05-06 | End: 2022-05-06

## 2022-05-06 RX ORDER — SODIUM CHLORIDE 9 MG/ML
INJECTION, SOLUTION INTRAVENOUS PRN
Status: DISCONTINUED | OUTPATIENT
Start: 2022-05-06 | End: 2022-05-06 | Stop reason: HOSPADM

## 2022-05-06 RX ORDER — MEPERIDINE HYDROCHLORIDE 25 MG/ML
12.5 INJECTION INTRAMUSCULAR; INTRAVENOUS; SUBCUTANEOUS EVERY 5 MIN PRN
Status: DISCONTINUED | OUTPATIENT
Start: 2022-05-06 | End: 2022-05-06 | Stop reason: HOSPADM

## 2022-05-06 RX ORDER — OXYCODONE HYDROCHLORIDE 5 MG/1
10 TABLET ORAL EVERY 6 HOURS PRN
Status: DISCONTINUED | OUTPATIENT
Start: 2022-05-06 | End: 2022-05-10 | Stop reason: HOSPADM

## 2022-05-06 RX ORDER — ARIPIPRAZOLE 10 MG/1
20 TABLET ORAL DAILY
Status: DISCONTINUED | OUTPATIENT
Start: 2022-05-06 | End: 2022-05-10 | Stop reason: HOSPADM

## 2022-05-06 RX ORDER — KETAMINE HCL IN NACL, ISO-OSM 100MG/10ML
SYRINGE (ML) INJECTION PRN
Status: DISCONTINUED | OUTPATIENT
Start: 2022-05-06 | End: 2022-05-06 | Stop reason: SDUPTHER

## 2022-05-06 RX ORDER — SODIUM PHOSPHATE, DIBASIC AND SODIUM PHOSPHATE, MONOBASIC 7; 19 G/133ML; G/133ML
1 ENEMA RECTAL DAILY PRN
Status: DISCONTINUED | OUTPATIENT
Start: 2022-05-06 | End: 2022-05-10 | Stop reason: HOSPADM

## 2022-05-06 RX ORDER — CEFAZOLIN SODIUM 1 G/3ML
INJECTION, POWDER, FOR SOLUTION INTRAMUSCULAR; INTRAVENOUS PRN
Status: DISCONTINUED | OUTPATIENT
Start: 2022-05-06 | End: 2022-05-06 | Stop reason: SDUPTHER

## 2022-05-06 RX ORDER — ONDANSETRON 2 MG/ML
4 INJECTION INTRAMUSCULAR; INTRAVENOUS EVERY 6 HOURS PRN
Status: DISCONTINUED | OUTPATIENT
Start: 2022-05-06 | End: 2022-05-10 | Stop reason: HOSPADM

## 2022-05-06 RX ORDER — MIDAZOLAM HYDROCHLORIDE 1 MG/ML
INJECTION INTRAMUSCULAR; INTRAVENOUS PRN
Status: DISCONTINUED | OUTPATIENT
Start: 2022-05-06 | End: 2022-05-06 | Stop reason: SDUPTHER

## 2022-05-06 RX ORDER — VANCOMYCIN HYDROCHLORIDE 1 G/20ML
INJECTION, POWDER, LYOPHILIZED, FOR SOLUTION INTRAVENOUS PRN
Status: DISCONTINUED | OUTPATIENT
Start: 2022-05-06 | End: 2022-05-06 | Stop reason: ALTCHOICE

## 2022-05-06 RX ORDER — GLYCOPYRROLATE 1 MG/5 ML
SYRINGE (ML) INTRAVENOUS PRN
Status: DISCONTINUED | OUTPATIENT
Start: 2022-05-06 | End: 2022-05-06 | Stop reason: SDUPTHER

## 2022-05-06 RX ORDER — TRAZODONE HYDROCHLORIDE 50 MG/1
50 TABLET ORAL NIGHTLY PRN
Status: DISCONTINUED | OUTPATIENT
Start: 2022-05-06 | End: 2022-05-10 | Stop reason: HOSPADM

## 2022-05-06 RX ORDER — EPHEDRINE SULFATE/0.9% NACL/PF 50 MG/5 ML
SYRINGE (ML) INTRAVENOUS PRN
Status: DISCONTINUED | OUTPATIENT
Start: 2022-05-06 | End: 2022-05-06 | Stop reason: SDUPTHER

## 2022-05-06 RX ORDER — NEOSTIGMINE METHYLSULFATE 5 MG/5 ML
SYRINGE (ML) INTRAVENOUS PRN
Status: DISCONTINUED | OUTPATIENT
Start: 2022-05-06 | End: 2022-05-06 | Stop reason: SDUPTHER

## 2022-05-06 RX ORDER — HYDROXYZINE PAMOATE 50 MG/1
50 CAPSULE ORAL 3 TIMES DAILY PRN
Status: DISCONTINUED | OUTPATIENT
Start: 2022-05-06 | End: 2022-05-10 | Stop reason: HOSPADM

## 2022-05-06 RX ORDER — OXYCODONE HYDROCHLORIDE 5 MG/1
5 TABLET ORAL EVERY 6 HOURS PRN
Status: DISCONTINUED | OUTPATIENT
Start: 2022-05-06 | End: 2022-05-10 | Stop reason: HOSPADM

## 2022-05-06 RX ORDER — ARIPIPRAZOLE 20 MG/1
20 TABLET ORAL DAILY
Status: ON HOLD | COMMUNITY
End: 2022-06-24

## 2022-05-06 RX ORDER — SODIUM CHLORIDE 9 MG/ML
INJECTION, SOLUTION INTRAVENOUS CONTINUOUS PRN
Status: DISCONTINUED | OUTPATIENT
Start: 2022-05-06 | End: 2022-05-06 | Stop reason: SDUPTHER

## 2022-05-06 RX ORDER — SODIUM CHLORIDE 0.9 % (FLUSH) 0.9 %
5-40 SYRINGE (ML) INJECTION PRN
Status: DISCONTINUED | OUTPATIENT
Start: 2022-05-06 | End: 2022-05-10 | Stop reason: HOSPADM

## 2022-05-06 RX ORDER — SODIUM CHLORIDE 9 MG/ML
INJECTION, SOLUTION INTRAVENOUS PRN
Status: DISCONTINUED | OUTPATIENT
Start: 2022-05-06 | End: 2022-05-10 | Stop reason: HOSPADM

## 2022-05-06 RX ORDER — MORPHINE SULFATE 2 MG/ML
4 INJECTION, SOLUTION INTRAMUSCULAR; INTRAVENOUS
Status: DISCONTINUED | OUTPATIENT
Start: 2022-05-06 | End: 2022-05-10 | Stop reason: HOSPADM

## 2022-05-06 RX ORDER — FENTANYL CITRATE 50 UG/ML
INJECTION, SOLUTION INTRAMUSCULAR; INTRAVENOUS PRN
Status: DISCONTINUED | OUTPATIENT
Start: 2022-05-06 | End: 2022-05-06 | Stop reason: SDUPTHER

## 2022-05-06 RX ORDER — SODIUM CHLORIDE 9 MG/ML
INJECTION, SOLUTION INTRAVENOUS CONTINUOUS
Status: DISCONTINUED | OUTPATIENT
Start: 2022-05-06 | End: 2022-05-10 | Stop reason: HOSPADM

## 2022-05-06 RX ORDER — ONDANSETRON 2 MG/ML
INJECTION INTRAMUSCULAR; INTRAVENOUS PRN
Status: DISCONTINUED | OUTPATIENT
Start: 2022-05-06 | End: 2022-05-06 | Stop reason: SDUPTHER

## 2022-05-06 RX ADMIN — SODIUM CHLORIDE: 9 INJECTION, SOLUTION INTRAVENOUS at 14:33

## 2022-05-06 RX ADMIN — POLYETHYLENE GLYCOL 3350 17 G: 17 POWDER, FOR SOLUTION ORAL at 18:49

## 2022-05-06 RX ADMIN — Medication 80 MG: at 08:22

## 2022-05-06 RX ADMIN — VENLAFAXINE HYDROCHLORIDE 75 MG: 75 CAPSULE, EXTENDED RELEASE ORAL at 18:50

## 2022-05-06 RX ADMIN — FENTANYL CITRATE 50 MCG: 50 INJECTION, SOLUTION INTRAMUSCULAR; INTRAVENOUS at 08:19

## 2022-05-06 RX ADMIN — CEFAZOLIN 2000 MG: 1 INJECTION, POWDER, FOR SOLUTION INTRAMUSCULAR; INTRAVENOUS at 08:37

## 2022-05-06 RX ADMIN — PHENYLEPHRINE HYDROCHLORIDE 100 MCG: 10 INJECTION INTRAVENOUS at 09:49

## 2022-05-06 RX ADMIN — ONDANSETRON 4 MG: 2 INJECTION INTRAMUSCULAR; INTRAVENOUS at 17:13

## 2022-05-06 RX ADMIN — ACETAMINOPHEN 650 MG: 325 TABLET ORAL at 18:50

## 2022-05-06 RX ADMIN — Medication 10 MG: at 08:54

## 2022-05-06 RX ADMIN — Medication 4 MG: at 10:06

## 2022-05-06 RX ADMIN — BISACODYL 5 MG: 5 TABLET, COATED ORAL at 18:50

## 2022-05-06 RX ADMIN — HYDROMORPHONE HYDROCHLORIDE 0.5 MG: 2 INJECTION INTRAMUSCULAR; INTRAVENOUS; SUBCUTANEOUS at 10:18

## 2022-05-06 RX ADMIN — ROCURONIUM BROMIDE 10 MG: 50 INJECTION, SOLUTION INTRAVENOUS at 09:25

## 2022-05-06 RX ADMIN — HYDROMORPHONE HYDROCHLORIDE 0.25 MG: 1 INJECTION, SOLUTION INTRAMUSCULAR; INTRAVENOUS; SUBCUTANEOUS at 11:16

## 2022-05-06 RX ADMIN — OXYCODONE 10 MG: 5 TABLET ORAL at 18:50

## 2022-05-06 RX ADMIN — MORPHINE SULFATE 4 MG: 2 INJECTION, SOLUTION INTRAMUSCULAR; INTRAVENOUS at 17:12

## 2022-05-06 RX ADMIN — FENTANYL CITRATE 50 MCG: 50 INJECTION, SOLUTION INTRAMUSCULAR; INTRAVENOUS at 08:47

## 2022-05-06 RX ADMIN — Medication 5 MG: at 09:01

## 2022-05-06 RX ADMIN — Medication 2000 MG: at 18:49

## 2022-05-06 RX ADMIN — Medication 0.2 MG: at 09:07

## 2022-05-06 RX ADMIN — OXYCODONE 10 MG: 5 TABLET ORAL at 23:45

## 2022-05-06 RX ADMIN — OXYCODONE 10 MG: 5 TABLET ORAL at 12:14

## 2022-05-06 RX ADMIN — SENNOSIDES AND DOCUSATE SODIUM 1 TABLET: 50; 8.6 TABLET ORAL at 20:19

## 2022-05-06 RX ADMIN — DEXAMETHASONE SODIUM PHOSPHATE 8 MG: 10 INJECTION, EMULSION INTRAMUSCULAR; INTRAVENOUS at 08:37

## 2022-05-06 RX ADMIN — MIDAZOLAM 2 MG: 1 INJECTION INTRAMUSCULAR; INTRAVENOUS at 08:19

## 2022-05-06 RX ADMIN — PROPOFOL 110 MG: 10 INJECTION, EMULSION INTRAVENOUS at 08:22

## 2022-05-06 RX ADMIN — SODIUM CHLORIDE: 9 INJECTION, SOLUTION INTRAVENOUS at 07:46

## 2022-05-06 RX ADMIN — Medication 5 MG: at 08:57

## 2022-05-06 RX ADMIN — Medication 30 MG: at 08:45

## 2022-05-06 RX ADMIN — Medication 0.6 MG: at 10:06

## 2022-05-06 RX ADMIN — FENTANYL CITRATE 50 MCG: 50 INJECTION, SOLUTION INTRAMUSCULAR; INTRAVENOUS at 10:56

## 2022-05-06 RX ADMIN — ACETAMINOPHEN 650 MG: 325 TABLET ORAL at 12:19

## 2022-05-06 RX ADMIN — ACETAMINOPHEN 650 MG: 325 TABLET ORAL at 23:46

## 2022-05-06 RX ADMIN — ONDANSETRON 4 MG: 2 INJECTION INTRAMUSCULAR; INTRAVENOUS at 10:03

## 2022-05-06 RX ADMIN — PHENYLEPHRINE HYDROCHLORIDE 100 MCG: 10 INJECTION INTRAVENOUS at 09:24

## 2022-05-06 RX ADMIN — ROCURONIUM BROMIDE 40 MG: 50 INJECTION, SOLUTION INTRAVENOUS at 08:22

## 2022-05-06 RX ADMIN — FENTANYL CITRATE 50 MCG: 50 INJECTION, SOLUTION INTRAMUSCULAR; INTRAVENOUS at 10:44

## 2022-05-06 RX ADMIN — HYDROMORPHONE HYDROCHLORIDE 0.25 MG: 1 INJECTION, SOLUTION INTRAMUSCULAR; INTRAVENOUS; SUBCUTANEOUS at 11:07

## 2022-05-06 RX ADMIN — SODIUM CHLORIDE: 9 INJECTION, SOLUTION INTRAVENOUS at 09:49

## 2022-05-06 RX ADMIN — Medication 10 MG: at 09:04

## 2022-05-06 RX ADMIN — DIAZEPAM 5 MG: 5 TABLET ORAL at 18:50

## 2022-05-06 RX ADMIN — ARIPIPRAZOLE 20 MG: 10 TABLET ORAL at 18:50

## 2022-05-06 RX ADMIN — CYCLOBENZAPRINE 10 MG: 10 TABLET, FILM COATED ORAL at 15:19

## 2022-05-06 RX ADMIN — LEVOTHYROXINE SODIUM 50 MCG: 0.05 TABLET ORAL at 18:50

## 2022-05-06 RX ADMIN — HYDROMORPHONE HYDROCHLORIDE 0.5 MG: 2 INJECTION INTRAMUSCULAR; INTRAVENOUS; SUBCUTANEOUS at 10:20

## 2022-05-06 RX ADMIN — ROCURONIUM BROMIDE 10 MG: 50 INJECTION, SOLUTION INTRAVENOUS at 08:44

## 2022-05-06 RX ADMIN — SODIUM CHLORIDE: 9 INJECTION, SOLUTION INTRAVENOUS at 08:18

## 2022-05-06 RX ADMIN — SODIUM CHLORIDE, PRESERVATIVE FREE 10 ML: 5 INJECTION INTRAVENOUS at 20:19

## 2022-05-06 ASSESSMENT — PAIN SCALES - GENERAL
PAINLEVEL_OUTOF10: 0
PAINLEVEL_OUTOF10: 6
PAINLEVEL_OUTOF10: 8
PAINLEVEL_OUTOF10: 8
PAINLEVEL_OUTOF10: 7
PAINLEVEL_OUTOF10: 9
PAINLEVEL_OUTOF10: 8
PAINLEVEL_OUTOF10: 7
PAINLEVEL_OUTOF10: 8
PAINLEVEL_OUTOF10: 6
PAINLEVEL_OUTOF10: 5
PAINLEVEL_OUTOF10: 8
PAINLEVEL_OUTOF10: 5
PAINLEVEL_OUTOF10: 7
PAINLEVEL_OUTOF10: 6
PAINLEVEL_OUTOF10: 8
PAINLEVEL_OUTOF10: 7

## 2022-05-06 ASSESSMENT — PAIN DESCRIPTION - PAIN TYPE
TYPE: ACUTE PAIN;SURGICAL PAIN
TYPE: SURGICAL PAIN

## 2022-05-06 ASSESSMENT — PULMONARY FUNCTION TESTS
PIF_VALUE: 5
PIF_VALUE: 20
PIF_VALUE: 2
PIF_VALUE: 20
PIF_VALUE: 20
PIF_VALUE: 19
PIF_VALUE: 5
PIF_VALUE: 19
PIF_VALUE: 19
PIF_VALUE: 20
PIF_VALUE: 19
PIF_VALUE: 19
PIF_VALUE: 5
PIF_VALUE: 6
PIF_VALUE: 18
PIF_VALUE: 18
PIF_VALUE: 20
PIF_VALUE: 21
PIF_VALUE: 19
PIF_VALUE: 20
PIF_VALUE: 19
PIF_VALUE: 20
PIF_VALUE: 20
PIF_VALUE: 5
PIF_VALUE: 20
PIF_VALUE: 5
PIF_VALUE: 20
PIF_VALUE: 20
PIF_VALUE: 19
PIF_VALUE: 6
PIF_VALUE: 5
PIF_VALUE: 20
PIF_VALUE: 20
PIF_VALUE: 4
PIF_VALUE: 19
PIF_VALUE: 17
PIF_VALUE: 19
PIF_VALUE: 20
PIF_VALUE: 19
PIF_VALUE: 20
PIF_VALUE: 20
PIF_VALUE: 19
PIF_VALUE: 4
PIF_VALUE: 20
PIF_VALUE: 19
PIF_VALUE: 18
PIF_VALUE: 20
PIF_VALUE: 19
PIF_VALUE: 18
PIF_VALUE: 20
PIF_VALUE: 19
PIF_VALUE: 19
PIF_VALUE: 3
PIF_VALUE: 19
PIF_VALUE: 19
PIF_VALUE: 4
PIF_VALUE: 20
PIF_VALUE: 19
PIF_VALUE: 21
PIF_VALUE: 18
PIF_VALUE: 20
PIF_VALUE: 19
PIF_VALUE: 20
PIF_VALUE: 20
PIF_VALUE: 19
PIF_VALUE: 18
PIF_VALUE: 19
PIF_VALUE: 5
PIF_VALUE: 20
PIF_VALUE: 20
PIF_VALUE: 19
PIF_VALUE: 5
PIF_VALUE: 20
PIF_VALUE: 20
PIF_VALUE: 19
PIF_VALUE: 19
PIF_VALUE: 20
PIF_VALUE: 18
PIF_VALUE: 20
PIF_VALUE: 19
PIF_VALUE: 20
PIF_VALUE: 17
PIF_VALUE: 20
PIF_VALUE: 19
PIF_VALUE: 19
PIF_VALUE: 20
PIF_VALUE: 19
PIF_VALUE: 19
PIF_VALUE: 17
PIF_VALUE: 19
PIF_VALUE: 22
PIF_VALUE: 19
PIF_VALUE: 20
PIF_VALUE: 23
PIF_VALUE: 5
PIF_VALUE: 5
PIF_VALUE: 19
PIF_VALUE: 18
PIF_VALUE: 21
PIF_VALUE: 19
PIF_VALUE: 19
PIF_VALUE: 18
PIF_VALUE: 19

## 2022-05-06 ASSESSMENT — PAIN DESCRIPTION - LOCATION
LOCATION: BACK

## 2022-05-06 ASSESSMENT — PAIN DESCRIPTION - FREQUENCY
FREQUENCY: CONTINUOUS

## 2022-05-06 ASSESSMENT — PAIN DESCRIPTION - DESCRIPTORS
DESCRIPTORS: ACHING
DESCRIPTORS: BURNING;STABBING
DESCRIPTORS: ACHING

## 2022-05-06 ASSESSMENT — PAIN DESCRIPTION - ORIENTATION
ORIENTATION: LOWER
ORIENTATION: LOWER
ORIENTATION: LEFT
ORIENTATION: LOWER
ORIENTATION: MID;LOWER
ORIENTATION: LOWER

## 2022-05-06 ASSESSMENT — PAIN DESCRIPTION - DIRECTION: RADIATING_TOWARDS: DOWN LEGS

## 2022-05-06 ASSESSMENT — PAIN - FUNCTIONAL ASSESSMENT: PAIN_FUNCTIONAL_ASSESSMENT: ACTIVITIES ARE NOT PREVENTED

## 2022-05-06 ASSESSMENT — LIFESTYLE VARIABLES: SMOKING_STATUS: 1

## 2022-05-06 ASSESSMENT — PAIN DESCRIPTION - ONSET
ONSET: ON-GOING
ONSET: ON-GOING

## 2022-05-06 NOTE — FLOWSHEET NOTE
05/06/22 0710   Encounter Summary   Encounter Overview/Reason  Initial Encounter   Service Provided For: Patient   Last Encounter  05/06/22   Complexity of Encounter Low   Begin Time 0710   End Time  0718   Total Time Calculated 8 min   Encounter    Type Pre-Procedural   Spiritual/Emotional needs   Type Spiritual Support   Pre surgery contact with prayer.

## 2022-05-06 NOTE — FLOWSHEET NOTE
Pt admitted to [facility] room 15 and oriented to unit. SCD sleeves applied. Nares swabbed. Pt verbalized permission for first name, last initial and physicians name on white board. SDS board and admission to [facility] post op, pt verbalized understanding. Pt denies thoughts of harming self or others. Call light in reach.  Family not present, number on the chart to call friend post.

## 2022-05-06 NOTE — ANESTHESIA POSTPROCEDURE EVALUATION
Department of Anesthesiology  Postprocedure Note    Patient: Arleen Chaudhry  MRN: [id number]  YOB: 1980  Date of evaluation: 5/6/2022  Time:  12:33 PM     Procedure Summary     Date: 05/06/22 Room / Location: [address] ROMA Huang    Anesthesia Start: 0818 Anesthesia Stop: 1549    Procedure: L5=S1 Decompression L5-S1 Posterior Fusion & TLIF (N/A Spine Lumbar) Diagnosis: (Lumbosacral spinal stenosis)    Surgeons: Geovani Millan MD Responsible Provider: Ila Nguyen DO    Anesthesia Type: general ASA Status: 3          Anesthesia Type: No value filed. Danilo Phase I: Danilo Score: 8    Danilo Phase II: Danilo Score: 10    Last vitals: Reviewed and per EMR flowsheets.        Anesthesia Post Evaluation    Patient location during evaluation: PACU  Patient participation: complete - patient participated  Level of consciousness: awake and alert  Pain score: 3  Airway patency: patent  Nausea & Vomiting: no nausea and no vomiting  Complications: no  Cardiovascular status: hemodynamically stable and blood pressure returned to baseline  Respiratory status: spontaneous ventilation, room air and acceptable  Hydration status: stable

## 2022-05-06 NOTE — PROGRESS NOTES
1037- Pt arrived to PACU at this time drowsy in no s/s of distress. VSS. 1044- Pt medicated at this time for c/o surgical back pain. 1056- Pain improving per Pt's report. Medicated again with PRN Fentanyl at this time. 1107- Pain continues to improve. Medicated with PRN Dilaudid at this time. 1116- Pt medicated at this time for c/o lower back pain secondary to surgery. No change in pain level from previous rating. Pt resting with eyes closed, respirations easy and non-labored. Pt educated on alternative pain relief methods and post-op day 1 pain expectations. 1124- Pt tolerating ice chips well. 1135- Pt transferred to same day surgery at this time in stable condition. 1138- Handoff given to PETTY Woo Mai at this time.

## 2022-05-06 NOTE — BRIEF OP NOTE
Brief Postoperative Note      Patient: Rosa Bose  YOB: 1980  MRN: [id number]    Date of Procedure: 5/6/2022    Pre-Op Diagnosis:   1. S1 appears lumbarized and may appear as L5 on xray  2. L5-S1 anterolisthesis with moderate to severe left greater than right facet arthritis  3. Prominent central gray matter favored over tiny syrinx    Post-Op Diagnosis: Same      Procedure:   L5-S1 Decompression and Fusion    Surgeon(s):  Vannessa Álvarez MD    Assistant:  Physician Assistant: Jeff Jean PA-C and Alonso Ayala PA-C    Anesthesia: General    Estimated Blood Loss (mL): 755 ml    Complications: None    Specimens:   * No specimens in log *    Implants:  Implant Name Type Inv.  Item Serial No.  Lot No. LRB No. Used Action   SET SCR SPNL TI STREAMLINE - [id number]  SET SCR SPNL TI STREAMLINE  [employer]  N/A 4 Implanted   SCREW SPNL L40MM DIA6.5MM THORLUM TI ALLY POLYAX STREAMLINE - [id number]  SCREW SPNL L40MM DIA6.5MM THORLUM TI ALLY POLYAX STREAMLINE  [employer]  N/A 4 Implanted   BELLE SPNL L40MM OD5.5MM QNT - [id number]  BELLE SPNL L40MM OD5.5MM QNT  [employer]  N/A 1 Implanted   BELLE SPNL L35MM [id number] PREBENT FOR STREAMLINE TL SPNL FIX - [id number]  BELLE SPNL L35MM [id number] PREBENT FOR STREAMLINE TL SPNL FIX  [employer]  N/A 1 Implanted         Drains:   Closed/Suction Drain Back Accordion (Active)       Findings: Lumbosacral stenosis    Electronically signed by Brittney Castrejon PA-C on 5/6/2022 at 10:35 AM

## 2022-05-06 NOTE — PROGRESS NOTES
Report called to Kiran Gibbs, [facility] on [facility]. Transport requested.

## 2022-05-06 NOTE — BRIEF OP NOTE
Brief Postoperative Note      Patient: Mahi Anton  YOB: 1980  MRN: [id number]    Date of Procedure: 5/6/2022    Pre-Op Diagnosis:   1) L5-S1 spondylolisthesis grade 1 with stenosis and neurogenic claudication. Post-Op Diagnosis: Same       Procedure: L5-S1 decompression and fusion    Surgeon(s):  Vaughn Juárez MD    Assistant:  Physician Assistant: Justino Jean PA-C    Anesthesia: General    Estimated Blood Loss (mL): 065 mL    Complications: None    Specimens:   * No specimens in log *    Implants:  Implant Name Type Inv.  Item Serial No.  Lot No. LRB No. Used Action   SET SCR SPNL TI STREAMLINE - [id number]  SET SCR SPNL TI STREAMLINE  [employer]  N/A 4 Implanted   SCREW SPNL L40MM DIA6.5MM THORLUM TI ALLY POLYAX STREAMLINE - [id number]  SCREW SPNL L40MM DIA6.5MM THORLUM TI ALLY POLYAX STREAMLINE  [employer]  N/A 4 Implanted   BELLE SPNL L40MM OD5.5MM QNT - [id number]  BELLE SPNL L40MM OD5.5MM QNT  [employer]  N/A 1 Implanted   BELLE SPNL L35MM [id number] PREBENT FOR STREAMLINE TL SPNL FIX - [id number]  BELLE SPNL L35MM [id number] PREBENT FOR STREAMLINE TL SPNL FIX  [employer]  N/A 1 Implanted         Drains:   Closed/Suction Drain Back Accordion (Active)       Findings: Stenosis    Electronically signed by Vaughn Juárez MD on 5/6/2022 at 10:44 AM

## 2022-05-06 NOTE — ANESTHESIA PRE PROCEDURE
Department of Anesthesiology  Preprocedure Note       Name:  Rachel Garner   Age:  39 y.o.  :  1980                                          MRN:  [id number]         Date:  2022      Surgeon: Traci Bains):  Butch Mariscal MD    Procedure: Procedure(s):  L5=S1 Decompression L5-S1 Posterior Fusion & TLIF    Medications prior to admission:   Prior to Admission medications    Medication Sig Start Date End Date Taking? Authorizing Provider   ARIPiprazole (ABILIFY) 20 MG tablet Take 20 mg by mouth daily   Yes Historical Provider, MD   ARIPiprazole (ABILIFY IM) Inject into the muscle every 30 days    Historical Provider, MD   venlafaxine (EFFEXOR XR) 75 MG extended release capsule Take 75 mg by mouth daily    Historical Provider, MD   levothyroxine (SYNTHROID) 50 MCG tablet TAKE 1 TABLET BY MOUTH DAILY 22   Chi Pink DO   hydrOXYzine (VISTARIL) 50 MG capsule Hydroxyzine Pamoate Active 50 MG PO Three Times Daily as needed 2021 2:13pm 10/25/21   Historical Provider, MD   naproxen (NAPROSYN) 500 MG tablet Take 1 tablet by mouth 2 times daily as needed for Pain 10/13/21   Chi Pink DO   traZODone (DESYREL) 50 MG tablet Take 1 tablet by mouth nightly as needed for Sleep 21   Lili Rowan MD       Current medications:    No current facility-administered medications for this encounter. Allergies:     Allergies   Allergen Reactions    Dairycare [Lactase-Lactobacillus] Nausea And Vomiting       Problem List:    Patient Active Problem List   Diagnosis Code    Coker's esophagus without dysplasia K22.70    Moderate episode of recurrent major depressive disorder ([address] F33.1    Bipolar I disorder, current or most recent episode depressed, with psychotic features ([address] F31.5    Schizoaffective disorder, bipolar type ([address] F25.0    Schizoaffective disorder ([address] F25.9    Severe mixed bipolar 1 disorder without psychosis ([address] F31.63    Alcohol use disorder, severe, dependence ([facility]) F10.20    Amphetamine substance use disorder, severe, in sustained remission ([facility]) F15.21    Bipolar I disorder with mixed features ([address] F31.9    Class 1 obesity in adult E66.9    Subclinical hypothyroidism E03.8    Anxiety F41.9       Past Medical History:        Diagnosis Date    Anxiety     Back pain     Colitis     Depression     Fibromyalgia     HPV (human papilloma virus) anogenital infection     Hypertension     OCD (obsessive compulsive disorder)     Osteoarthritis (arthritis due to wear and tear of joints)     PONV (postoperative nausea and vomiting)     Seizure ([address]     \" in [employer] in 2021 was checked out by EMS but not taken to hospital\"    Thyroid disease        Past Surgical History:        Procedure Laterality Date    CHOLECYSTECTOMY      INNER EAR SURGERY         Social History:    Social History     Tobacco Use    Smoking status: Current Some Day Smoker     Packs/day: 0.50     Types: Cigarettes    Smokeless tobacco: Never Used   Substance Use Topics    Alcohol use: Yes     Comment: occasionally                                Ready to quit: Not Answered  Counseling given: Not Answered      Vital Signs (Current):   Vitals:    04/29/22 0932 05/06/22 0654   BP:  (!) 136/92   Pulse:  100   Resp:  16   Temp:  98.4 °F (36.9 °C)   TempSrc:  Temporal   SpO2:  92%   Weight: 175 lb (79.4 kg) 181 lb 6.4 oz (82.3 kg)   Height: 5' 2\" (1.575 m) 5' 2\" (1.575 m)                                              BP Readings from Last 3 Encounters:   05/06/22 (!) 136/92   05/06/22 113/79   04/27/22 130/78       NPO Status: Time of last liquid consumption: 0100                        Time of last solid consumption: 1700                        Date of last liquid consumption: 05/06/22                        Date of last solid food consumption: 05/05/22    BMI:   Wt Readings from Last 3 Encounters:   05/06/22 181 lb 6.4 oz (82.3 kg)   04/27/22 181 lb (82.1 kg) 10/26/21 204 lb 3.2 oz (92.6 kg)     Body mass index is 33.18 kg/m². CBC:   Lab Results   Component Value Date    WBC 9.1 10/16/2021    RBC 4.31 10/16/2021    RBC 0-2 11/19/2011    HGB 13.8 10/16/2021    HCT 39.7 10/16/2021    MCV 92.1 10/16/2021    RDW 12.4 01/27/2013     10/16/2021       CMP:   Lab Results   Component Value Date     04/21/2022    K 4.3 04/21/2022     04/21/2022    CO2 24 04/21/2022    BUN 9 04/21/2022    CREATININE 0.7 04/21/2022    LABGLOM >90 04/21/2022    LABGLOM >90 10/16/2021    GLUCOSE 88 04/21/2022    GLUCOSE NEGATIVE 11/19/2011    PROT 5.7 10/16/2021    CALCIUM 8.8 04/21/2022    BILITOT 0.2 10/16/2021    ALKPHOS 86 10/16/2021    AST 28 10/16/2021    ALT 22 10/16/2021       POC Tests: No results for input(s): POCGLU, POCNA, POCK, POCCL, POCBUN, POCHEMO, POCHCT in the last 72 hours. Coags: No results found for: PROTIME, INR, APTT    HCG (If Applicable):   Lab Results   Component Value Date    PREGTESTUR negative 05/06/2022    PREGSERUM NEGATIVE 10/08/2021        ABGs: No results found for: PHART, PO2ART, NLL3HXH, ZCG9DXO, BEART, M8EOFTYQ     Type & Screen (If Applicable):  No results found for: LABABO, LABRH    Drug/Infectious Status (If Applicable):  No results found for: HIV, HEPCAB    COVID-19 Screening (If Applicable):   Lab Results   Component Value Date    COVID19 NOT  DETECTED 10/16/2021           Anesthesia Evaluation  Patient summary reviewed and Nursing notes reviewed   history of anesthetic complications: PONV. Airway: Mallampati: III  TM distance: >3 FB   Neck ROM: full  Mouth opening: > = 3 FB Dental:          Pulmonary:normal exam  breath sounds clear to auscultation  (+) current smoker          Patient did not smoke on day of surgery.                  Cardiovascular:  Exercise tolerance: good (>4 METS),   (+) hypertension:,       ECG reviewed                        Neuro/Psych:   (+) neuromuscular disease:, psychiatric history: GI/Hepatic/Renal:            ROS comment: obesity. Endo/Other:    (+) hypothyroidism::., .          Pt had no PAT visit       Abdominal:             Vascular: negative vascular ROS. Other Findings:             Anesthesia Plan      general     ASA 3       Induction: intravenous. MIPS: Postoperative opioids intended and Prophylactic antiemetics administered. Anesthetic plan and risks discussed with patient. Plan discussed with CRNA.                   [address] [facility], DO   5/6/2022

## 2022-05-06 NOTE — PROGRESS NOTES
Walked into patient room because this nurse heard patient audibly crying. Patient scared and asked this nurse if she can be tested for AIDS. This nurse explained that she could ask the doctor about this when he rounds. Patient continues crying and states that she is scared she is going to penitentiary because she has AIDS and spread it to other men. This nurse noted that the patient was watching something on TV with people in penitentiary. This nurse was able to calm patient but once this nurse left the room, patient was heard multiple times audibly crying again.  Will notify MD.

## 2022-05-07 PROBLEM — M48.062 LUMBAR STENOSIS WITH NEUROGENIC CLAUDICATION: Status: ACTIVE | Noted: 2022-05-07

## 2022-05-07 LAB
ANION GAP SERPL CALCULATED.3IONS-SCNC: 8 MEQ/L (ref 8–16)
BUN BLDV-MCNC: 9 MG/DL (ref 7–22)
CALCIUM SERPL-MCNC: 8 MG/DL (ref 8.5–10.5)
CHLORIDE BLD-SCNC: 107 MEQ/L (ref 98–111)
CO2: 21 MEQ/L (ref 23–33)
CREAT SERPL-MCNC: 0.7 MG/DL (ref 0.4–1.2)
GFR SERPL CREATININE-BSD FRML MDRD: > 90 ML/MIN/1.73M2
GLUCOSE BLD-MCNC: 119 MG/DL (ref 70–108)
HCT VFR BLD CALC: 38.8 % (ref 37–47)
HEMOGLOBIN: 12.5 GM/DL (ref 12–16)
POTASSIUM SERPL-SCNC: 4.5 MEQ/L (ref 3.5–5.2)
SODIUM BLD-SCNC: 136 MEQ/L (ref 135–145)

## 2022-05-07 PROCEDURE — 97166 OT EVAL MOD COMPLEX 45 MIN: CPT

## 2022-05-07 PROCEDURE — 85014 HEMATOCRIT: CPT

## 2022-05-07 PROCEDURE — 97535 SELF CARE MNGMENT TRAINING: CPT

## 2022-05-07 PROCEDURE — 97110 THERAPEUTIC EXERCISES: CPT

## 2022-05-07 PROCEDURE — 6360000002 HC RX W HCPCS: Performed by: PHYSICIAN ASSISTANT

## 2022-05-07 PROCEDURE — 85018 HEMOGLOBIN: CPT

## 2022-05-07 PROCEDURE — 6370000000 HC RX 637 (ALT 250 FOR IP): Performed by: PHYSICIAN ASSISTANT

## 2022-05-07 PROCEDURE — 1200000000 HC SEMI PRIVATE

## 2022-05-07 PROCEDURE — 97162 PT EVAL MOD COMPLEX 30 MIN: CPT

## 2022-05-07 PROCEDURE — 80048 BASIC METABOLIC PNL TOTAL CA: CPT

## 2022-05-07 PROCEDURE — 36415 COLL VENOUS BLD VENIPUNCTURE: CPT

## 2022-05-07 PROCEDURE — 94760 N-INVAS EAR/PLS OXIMETRY 1: CPT

## 2022-05-07 PROCEDURE — 97530 THERAPEUTIC ACTIVITIES: CPT

## 2022-05-07 PROCEDURE — 2580000003 HC RX 258: Performed by: PHYSICIAN ASSISTANT

## 2022-05-07 RX ADMIN — DIAZEPAM 5 MG: 5 TABLET ORAL at 07:48

## 2022-05-07 RX ADMIN — SODIUM CHLORIDE, PRESERVATIVE FREE 10 ML: 5 INJECTION INTRAVENOUS at 20:42

## 2022-05-07 RX ADMIN — OXYCODONE 10 MG: 5 TABLET ORAL at 06:21

## 2022-05-07 RX ADMIN — SENNOSIDES AND DOCUSATE SODIUM 1 TABLET: 50; 8.6 TABLET ORAL at 07:48

## 2022-05-07 RX ADMIN — ACETAMINOPHEN 650 MG: 325 TABLET ORAL at 06:21

## 2022-05-07 RX ADMIN — MORPHINE SULFATE 2 MG: 2 INJECTION, SOLUTION INTRAMUSCULAR; INTRAVENOUS at 10:41

## 2022-05-07 RX ADMIN — CYCLOBENZAPRINE 10 MG: 10 TABLET, FILM COATED ORAL at 02:28

## 2022-05-07 RX ADMIN — Medication 2000 MG: at 02:20

## 2022-05-07 RX ADMIN — SENNOSIDES AND DOCUSATE SODIUM 1 TABLET: 50; 8.6 TABLET ORAL at 20:28

## 2022-05-07 RX ADMIN — OXYCODONE 10 MG: 5 TABLET ORAL at 13:32

## 2022-05-07 RX ADMIN — CYCLOBENZAPRINE 10 MG: 10 TABLET, FILM COATED ORAL at 10:23

## 2022-05-07 RX ADMIN — CYCLOBENZAPRINE 10 MG: 10 TABLET, FILM COATED ORAL at 18:19

## 2022-05-07 RX ADMIN — OXYCODONE 10 MG: 5 TABLET ORAL at 20:28

## 2022-05-07 RX ADMIN — BISACODYL 5 MG: 5 TABLET, COATED ORAL at 07:48

## 2022-05-07 RX ADMIN — ARIPIPRAZOLE 20 MG: 10 TABLET ORAL at 07:48

## 2022-05-07 RX ADMIN — CYCLOBENZAPRINE 10 MG: 10 TABLET, FILM COATED ORAL at 23:43

## 2022-05-07 RX ADMIN — LEVOTHYROXINE SODIUM 50 MCG: 0.05 TABLET ORAL at 07:48

## 2022-05-07 RX ADMIN — SODIUM CHLORIDE: 9 INJECTION, SOLUTION INTRAVENOUS at 04:58

## 2022-05-07 RX ADMIN — VENLAFAXINE HYDROCHLORIDE 75 MG: 75 CAPSULE, EXTENDED RELEASE ORAL at 07:48

## 2022-05-07 RX ADMIN — POLYETHYLENE GLYCOL 3350 17 G: 17 POWDER, FOR SOLUTION ORAL at 07:48

## 2022-05-07 ASSESSMENT — PAIN DESCRIPTION - ORIENTATION
ORIENTATION: MID

## 2022-05-07 ASSESSMENT — PAIN - FUNCTIONAL ASSESSMENT
PAIN_FUNCTIONAL_ASSESSMENT: PREVENTS OR INTERFERES SOME ACTIVE ACTIVITIES AND ADLS
PAIN_FUNCTIONAL_ASSESSMENT: ACTIVITIES ARE NOT PREVENTED
PAIN_FUNCTIONAL_ASSESSMENT: ACTIVITIES ARE NOT PREVENTED

## 2022-05-07 ASSESSMENT — PAIN SCALES - GENERAL
PAINLEVEL_OUTOF10: 4
PAINLEVEL_OUTOF10: 9
PAINLEVEL_OUTOF10: 6
PAINLEVEL_OUTOF10: 7
PAINLEVEL_OUTOF10: 6
PAINLEVEL_OUTOF10: 8
PAINLEVEL_OUTOF10: 7
PAINLEVEL_OUTOF10: 6
PAINLEVEL_OUTOF10: 4
PAINLEVEL_OUTOF10: 7
PAINLEVEL_OUTOF10: 7

## 2022-05-07 ASSESSMENT — PAIN DESCRIPTION - LOCATION
LOCATION: BACK

## 2022-05-07 ASSESSMENT — PAIN DESCRIPTION - ONSET
ONSET: ON-GOING

## 2022-05-07 ASSESSMENT — PAIN DESCRIPTION - DESCRIPTORS
DESCRIPTORS: ACHING
DESCRIPTORS: ACHING;BURNING;SHARP
DESCRIPTORS: SORE
DESCRIPTORS: ACHING

## 2022-05-07 ASSESSMENT — PAIN DESCRIPTION - FREQUENCY
FREQUENCY: CONTINUOUS

## 2022-05-07 ASSESSMENT — PAIN DESCRIPTION - PAIN TYPE
TYPE: SURGICAL PAIN

## 2022-05-07 ASSESSMENT — PAIN DESCRIPTION - DIRECTION
RADIATING_TOWARDS: DOWN BILATERAL LEGS
RADIATING_TOWARDS: DOWN LEGS

## 2022-05-07 NOTE — PROGRESS NOTES
Department of Orthopedic Surgery  Spine Service  Attending Progress Note        Subjective:  Doing well, sitting up in bed, No BM    Vitals  VITALS:  /70   Pulse 70   Temp 98.4 °F (36.9 °C) (Oral)   Resp 16   Ht 5' 2\" (1.575 m)   Wt 181 lb 6.4 oz (82.3 kg)   LMP 04/20/2022   SpO2 95%   BMI 33.18 kg/m²   24HR INTAKE/OUTPUT:    Intake/Output Summary (Last 24 hours) at 5/7/2022 0027  Last data filed at 5/7/2022 0630  Gross per 24 hour   Intake 510 ml   Output 690 ml   Net -180 ml     URINARY CATHETER OUTPUT (Bowen):     DRAIN/TUBE OUTPUT:  Closed/Suction Drain Back Accordion-Output (ml): 50 ml      PHYSICAL EXAM:    Orientation:  alert and oriented to person, place and time    Incision:  dressing in place, clean, dry, intact    Lower Extremity Motor :  quadriceps, extensor hallucis longus, dorsiflexion, plantarflexion 5/5 bilaterally  Lower Extremity Sensory:  Intact L1-S1    Flatus:  positive    ABNORMAL EXAM FINDINGS:  none    LABS:    HgB:    Lab Results   Component Value Date    HGB 12.5 05/07/2022       ASSESSMENT AND PLAN:    Post operative day 1 status post L5-S1 decompression and fusion    1:  Monitor labs and drain output  2:  Activity Level:  As tolerated  3:  Pain Control:  good  4:  Discharge Planning:  Pending, patient wanting to go to [facility]

## 2022-05-07 NOTE — PROGRESS NOTES
[facility]  INPATIENT OCCUPATIONAL THERAPY  [facility] ORTHOPEDICS 7K  EVALUATION    Time In: 7512  Time Out: 1500  Timed Code Treatment Minutes: 15 Minutes  Minutes: 23          Date: 2022  Patient Name: Mahi Anton,   Gender: female      MRN: [id number]  : 1980  (39 y.o.)  Referring Practitioner: Lolly Kussmaul, PA  Diagnosis: Lumbar Stenosis without Neurogenic Claudication  Additional Pertinent Hx: Pt. is a 39year old female whom underwent L5-S1 decompression and fusion on 22 by Dr. Rowena Kramer. Restrictions/Precautions:  Restrictions/Precautions: Surgical Protocols,Fall Risk  Required Braces or Orthoses  Spinal: Lumbar Corset  Position Activity Restriction  Spinal Precautions: No Twisting,No Lifting,No Bending    Subjective  Chart Reviewed: Yes,Progress Notes,Orders,History and Physical  Patient assessed for rehabilitation services?: Yes  Family / Caregiver Present: No         Pain: 8/10: back    Vitals: Vitals not assessed per clinical judgement, see nursing flowsheet    Social/Functional History:  Lives With: Friend(s) (6 roomates in sober living home)  Type of Home: House  Home Layout: One level  Home Access: Stairs to enter with rails  Entrance Stairs - Number of Steps: 3-4  Entrance Stairs - Rails: None  Home Equipment: Walker, rolling   Bathroom Shower/Tub: Walk-in shower,Shower chair with back  Bathroom Toilet: Standard  Bathroom Equipment: Shower chair  Bathroom Accessibility: Accessible       ADL Assistance: Independent  Homemaking Assistance: Independent  Ambulation Assistance: Independent  Transfer Assistance: Independent    Active : No  Leisure & Hobbies: write poems, sing and chat with friends  Additional Comments: Pt reports high PLOFand did not use AD prior but does have RW if needed.     VISION:reading glasses    HEARING:  WNL    COGNITION: Slow Processing, Decreased Problem Solving, Decreased Safety Awareness and Impulsive    RANGE OF MOTION:  Bilateral Upper Extremity:  WFL    STRENGTH:  Not tested d/t spinal precautions to prevent further injury. SENSATION:   WFL in BUE    ADL:   Grooming: Contact Guard Assistance. with verbal cues for AD placement  Toiletin [address] Toilet Transfer: [address], with verbal cues  and with increased time for completion. [employer] management: Max A    BALANCE:  Sitting Balance:  Supervision. EOB  Standing Balance: Contact Guard Assistance. BED MOBILITY:  Supine to sit: SBA  Sit to Supine: min A for BLE and vc for technique    TRANSFERS:  Sit to Stand:  [address], with increased time for completion, cues for hand placement. Stand to Sit: [address], with increased time for completion, cues for hand placement. FUNCTIONAL MOBILITY:  Assistive Device: Rolling Walker  Assist Level: Independent and Contact Guard Assistance. Distance: To and from bathroom          Activity Tolerance:  Patient tolerance of  treatment: fair. Pain/fatigue present      Assessment:  Assessment: Pt. seen for OT eval this date and not at PLOF. Pt. requires assistance to carryout all ADLs. Pt. has spinal precuations with further OT education necessary to abide by precuations during daily occupations. Pt. to be training on AE as appropriate and to adapt/modify daily routines to progress towards PLOF as able. WIthout continued OT services pt is at risk of falls, further decline in functioning and increased dependency on others all of which can greatly impact her quality of life. Performance deficits / Impairments: Decreased functional mobility ,Decreased endurance,Decreased ADL status,Decreased balance,Decreased strength,Decreased safe awareness,Decreased high-level IADLs  Prognosis: Good  Decision Making: Medium Complexity    Treatment Initiated: Treatment and education initiated within context of evaluation.   Evaluation time included review of current medical information, gathering information related to past medical, social and functional history, completion of standardized testing, formal and informal observation of tasks, assessment of data and development of plan of care and goals. Treatment time included skilled education and facilitation of tasks to increase safety and independence with ADL's for improved functional independence and quality of life. Discharge Recommendations:  Continue to assess pending progress,Patient would benefit from continued therapy after discharge,Home with Home health OT    Patient Education:     Patient Education  Education Given To: Patient  Education Provided: Role of Jerardo Andrew of [facility] [address] Strategies,Precautions    Equipment Recommendations: Other: continue t assess pending pt's progression    Plan:  Times per Week: 6x/wk  Current Treatment Recommendations: Strengthening,Neuromuscular re-education,Equipment evaluation, education, & procurement,Balance training,Functional mobility training,Safety education & training,Endurance training,Patient/Caregiver education & training,Self-Care / ADL. See long-term goal time frame for expected duration of plan of care. If no long-term goals established, a short length of stay is anticipated. Goals:  Patient goals : to improve indpendence  Short Term Goals  Time Frame for Short term goals: by time of d/c  Short Term Goal 1: Pt. to complete toilet transfer/toileting with Supervision with min vc for safety to progress to PLOF. Short Term Goal 2: [place] grooming to be performed with Supervision with good adherence to spinal precuations to enhance I with ADLs. Short Term Goal 3: Pt. to don/doff back brace with Supervision with good carryover of technique. Short Term Goal 4: Pt. to complete LB ADLs with use of AE prn with Supervision to enhance I with ADLs. Long Term Goals  Time Frame for Long term goals : No LTG d/t ELOS.          Following session, patient left in safe position with all fall risk precautions in place.

## 2022-05-07 NOTE — PROGRESS NOTES
[facility]  INPATIENT PHYSICAL THERAPY  EVALUATION  [facility] ORTHOPEDICS 7K - 7K-22/022-A    Time In: 9917  Time Out: 5140  Timed Code Treatment Minutes: 25 Minutes  Minutes: 35          Date: 2022  Patient Name: Madeline Concepcion,  Gender:  female        MRN: [id number]  : 1980  (39 y.o.)      Referring Practitioner: ROMY Garcia  Diagnosis: Lumbar stenosis without neurogenic claudication  Additional Pertinent Hx: Pt is status post L5-S1 decompression and fusion  by Dr Ricardo Lugo. Restrictions/Precautions:  Restrictions/Precautions: Surgical Protocols,Fall Risk  Required Braces or Orthoses  Spinal: Lumbar Corset  Position Activity Restriction  Spinal Precautions: No Twisting,No Lifting,No Bending    Subjective:  Chart Reviewed: Yes  Patient assessed for rehabilitation services?: Yes  Family / Caregiver Present: No  Subjective: RN approved session, pt is in bed, agreeable to PT.     General:  Overall Orientation Status: Within Functional Limits     Vision  Vision: Within Functional Limits  Hearing  Hearing: Within functional limits  Hearing: Within functional limits       Pain: 8/10    Vitals: Vitals not assessed per clinical judgement, see nursing flowsheet    Social/Functional History:    Lives With: Friend(s) (6 other women, sober living facility per pt)  Type of Home: House  Home Layout: One level  Home Access: Stairs to enter with rails  Entrance Stairs - Number of Steps: 3-4  Home Equipment: Walker, rolling        Ambulation Assistance: Independent  Transfer Assistance: Independent    Active : No          OBJECTIVE:  Range of Motion:  Bilateral Lower Extremity: WFL    Strength:  Bilateral Lower Extremity: Impaired - grossly 3+/5    Balance:  Static Sitting Balance:  Supervision  Dynamic Sitting Balance: Supervision  Static Standing Balance: Stand By Assistance  Dynamic Standing Balance: Stand By Assistance    Bed Mobility:  Supine to Sit: Stand By Assistance, X 1, with head of bed raised, with rail, with increased time for completion  Sit to Supine: Stand By Assistance, X 1, with head of bed raised, with rail, with increased time for completion   Scooting: Stand By Assistance    Transfers:  Sit to Stand: Stand By Assistance  Stand to Sit:Stand By Assistance    Ambulation:  Stand By Assistance  Distance: 100 feet  Surface: Level Tile  Device:Rolling Walker  Gait Deviations: Forward Flexed Posture, Slow Dana, Decreased Step Length Bilaterally and Decreased Gait Speed    Exercise:  Patient was guided in 1 set(s) 10 reps of exercise to both lower extremities, while seated EOB. Ankle pumps, Seated marches and Long arc quads. Exercises were completed for increased independence with functional mobility. Functional Outcome Measures: Not completed     ASSESSMENT:  Activity Tolerance:  Patient tolerance of  treatment: good. Treatment Initiated: Treatment and education initiated within context of evaluation. Evaluation time included review of current medical information, gathering information related to past medical, social and functional history, completion of standardized testing, formal and informal observation of tasks, assessment of data and development of plan of care and goals. Treatment time included skilled education and facilitation of tasks to increase safety and independence with functional mobility for improved independence and quality of life. Extensive education on back precautions, walking program.    Assessment: Body Structures, Functions, Activity Limitations Requiring Skilled Therapeutic Intervention: Decreased functional mobility ,Increased pain,Decreased strength  Assessment: Pt tolerates session well, limited by post-op pain, weakness. Pt education on walking program while hospitalized. PT to continue to progress strength and functional mobility.   Therapy Prognosis: Excellent    Requires PT Follow-Up: Yes    Discharge Recommendations:  Discharge Recommendations: Home with assist PRN    Patient Education:      . Patient Education  Education Given To: Patient  Education Provided: Role of Therapy,Plan of Care,Precautions  Education Method: Verbal  Barriers to Learning: None  Education Outcome: Verbalized understanding,Continued education needed       Equipment Recommendations:  Equipment Needed: No    Plan:  Current Treatment Recommendations: Strengthening,Functional mobility training,Transfer training,Stair training,Gait training,Patient/Caregiver education & training,Safety education & training,Home exercise program  Plan:  (6x O)    Goals:  Patient goals : to get better  Short Term Goals  Time Frame for Short term goals: by discharge  Short term goal 1: Pt to transfer supine <--> sit mod I to enable pt to get in/out of bed. Short term goal 2: Pt to transfer sit <--> stand mod I for increased functional mobility. Short term goal 3: Pt to ambulate >300 feet with RW mod I for household ambulation. Short term goal 4: Pt to ascend/descend 4 steps with HR SBA for home entry. Long Term Goals  Time Frame for Long term goals : NA due to short length of stay    Following session, patient left in safe position with all fall risk precautions in place.

## 2022-05-07 NOTE — PLAN OF CARE
Problem: Discharge Planning  Goal: Discharge to home or other facility with appropriate resources  Outcome: Progressing  Flowsheets (Taken 5/7/2022 0112)  Discharge to home or other facility with appropriate resources:   Identify barriers to discharge with patient and caregiver   Arrange for needed discharge resources and transportation as appropriate   Identify discharge learning needs (meds, wound care, etc)     Problem: ABCDS Injury Assessment  Goal: Absence of physical injury  Outcome: Progressing  Flowsheets  Taken 5/7/2022 0113  Absence of Physical Injury: Implement safety measures based on patient assessment    Problem: Pain  Goal: Verbalizes/displays adequate comfort level or baseline comfort level  Outcome: Progressing  Flowsheets  Taken 5/7/2022 0113  Verbalizes/displays adequate comfort level or baseline comfort level:   Encourage patient to monitor pain and request assistance   Assess pain using appropriate pain scale   Administer analgesics based on type and severity of pain and evaluate response   Implement non-pharmacological measures as appropriate and evaluate response    Problem: Cardiovascular - Adult  Goal: Maintains optimal cardiac output and hemodynamic stability  Outcome: Progressing  Flowsheets (Taken 5/7/2022 0113)  Maintains optimal cardiac output and hemodynamic stability:   Monitor blood pressure and heart rate   Monitor urine output and notify Licensed Independent Practitioner for values outside of normal range   Assess for signs of decreased cardiac output     Problem: Skin/Tissue Integrity - Adult  Goal: Incisions, wounds, or drain sites healing without S/S of infection  Outcome: Progressing  Flowsheets (Taken 5/6/2022 2125)  Incisions, Wounds, or Drain Sites Healing Without Sign and Symptoms of Infection: TWICE DAILY: Assess and document skin integrity     Problem: Musculoskeletal - Adult  Goal: Return ADL status to a safe level of function  Outcome: Progressing  Flowsheets (Taken 5/7/2022 0113)  Return ADL Status to a Safe Level of Function:   Administer medication as ordered   Assess activities of daily living deficits and provide assistive devices as needed     Problem: Gastrointestinal - Adult  Goal: Maintains or returns to baseline bowel function  Outcome: Progressing  Flowsheets (Taken 5/7/2022 0113)  Maintains or returns to baseline bowel function:   Assess bowel function   Encourage oral fluids to ensure adequate hydration   Administer ordered medications as needed     Problem: Genitourinary - Adult  Goal: Absence of urinary retention  Outcome: Progressing  Flowsheets (Taken 5/7/2022 0113)  Absence of urinary retention: Assess patients ability to void and empty bladder  Note: Patient voiding adequately this shift. Problem: Infection - Adult  Goal: Absence of infection at discharge  Outcome: Progressing  Flowsheets (Taken 5/6/2022 2125)  Absence of infection at discharge:   Assess and monitor for signs and symptoms of infection   Monitor lab/diagnostic results   Monitor all insertion sites i.e., indwelling lines, tubes and drains   Administer medications as ordered     Problem: Skin/Tissue Integrity  Goal: Absence of new skin breakdown  Description: 1. Monitor for areas of redness and/or skin breakdown  2. Assess vascular access sites hourly  3. Every 4-6 hours minimum:  Change oxygen saturation probe site  4. Every 4-6 hours:  If on nasal continuous positive airway pressure, respiratory therapy assess nares and determine need for appliance change or resting period. Outcome: Progressing  Note: No evidence of skin break down this shift.       Problem: Chronic Conditions and Co-morbidities  Goal: Patient's chronic conditions and co-morbidity symptoms are monitored and maintained or improved  Outcome: Progressing  Flowsheets (Taken 5/7/2022 0113)  Care Plan - Patient's Chronic Conditions and Co-Morbidity Symptoms are Monitored and Maintained or Improved: Monitor and assess patient's chronic conditions and comorbid symptoms for stability, deterioration, or improvement     Problem: Anxiety  Goal: Will report anxiety at manageable levels  Description: INTERVENTIONS:  1. Administer medication as ordered  2. Teach and rehearse alternative coping skills  3. Provide emotional support with 1:1 interaction with staff  Outcome: Progressing  Flowsheets (Taken 5/7/2022 0113)  Will report anxiety at manageable levels:   Administer medication as ordered   Provide emotional support with 1:1 interaction with staff     Problem: Self Care Deficit  Goal: Return ADL status to a safe level of function  Description: INTERVENTIONS:  1. Administer medication as ordered  2. Assess ADL deficits and provide assistive devices as needed  3. Obtain PT/OT consults as needed  4. Assist and instruct patient to increase activity and self care as tolerated  Outcome: Progressing  Flowsheets (Taken 5/7/2022 0113)  Return ADL Status to a Safe Level of Function:   Administer medication as ordered   Assess activities of daily living deficits and provide assistive devices as needed     Problem: Safety - Adult  Goal: Free from fall injury  Outcome: Progressing  Flowsheets (Taken 5/6/2022 2123)  Free From Fall Injury: Instruct family/caregiver on patient safety     Care plan reviewed with patient. Patient verbalizes understanding of the plan of care and contribute to goal setting.

## 2022-05-07 NOTE — PLAN OF CARE
Problem: Discharge Planning  Goal: Discharge to home or other facility with appropriate resources  5/7/2022 0958 by Sonia Randle LPN  Outcome: Progressing  Flowsheets  Taken 5/7/2022 0958 by Sonia Randle LPN  Discharge to home or other facility with appropriate resources:   Identify barriers to discharge with patient and caregiver   Arrange for needed discharge resources and transportation as appropriate  Taken 5/7/2022 0730 by Milton Sanchez RN  Discharge to home or other facility with appropriate resources:   Identify barriers to discharge with patient and caregiver   Arrange for needed discharge resources and transportation as appropriate  5/7/2022 0113 by Cherry Weller LPN  Outcome: Progressing  Flowsheets (Taken 5/7/2022 0112)  Discharge to home or other facility with appropriate resources:   Identify barriers to discharge with patient and caregiver   Arrange for needed discharge resources and transportation as appropriate   Identify discharge learning needs (meds, wound care, etc)     Problem: ABCDS Injury Assessment  Goal: Absence of physical injury  5/7/2022 0958 by Sonia Randle LPN  Outcome: Progressing  Flowsheets (Taken 5/7/2022 0958)  Absence of Physical Injury: Implement safety measures based on patient assessment  5/7/2022 0113 by Cherry Weller LPN  Outcome: Progressing  Flowsheets  Taken 5/7/2022 0113  Absence of Physical Injury: Implement safety measures based on patient assessment  Taken 5/6/2022 2123  Absence of Physical Injury: Implement safety measures based on patient assessment     Problem: Pain  Goal: Verbalizes/displays adequate comfort level or baseline comfort level  5/7/2022 0958 by Sonia Randle LPN  Outcome: Progressing  Flowsheets (Taken 5/7/2022 0958)  Verbalizes/displays adequate comfort level or baseline comfort level:   Encourage patient to monitor pain and request assistance   Assess pain using appropriate pain scale   Administer analgesics based on type and severity of pain and evaluate response   Implement non-pharmacological measures as appropriate and evaluate response   Consider cultural and social influences on pain and pain management   Notify Licensed Independent Practitioner if interventions unsuccessful or patient reports new pain  5/7/2022 0113 by Ayde Sahu LPN  Outcome: Progressing  Flowsheets  Taken 5/7/2022 0113  Verbalizes/displays adequate comfort level or baseline comfort level:   Encourage patient to monitor pain and request assistance   Assess pain using appropriate pain scale   Administer analgesics based on type and severity of pain and evaluate response   Implement non-pharmacological measures as appropriate and evaluate response  Taken 5/6/2022 2015  Verbalizes/displays adequate comfort level or baseline comfort level:   Encourage patient to monitor pain and request assistance   Assess pain using appropriate pain scale   Administer analgesics based on type and severity of pain and evaluate response   Implement non-pharmacological measures as appropriate and evaluate response     Problem: Cardiovascular - Adult  Goal: Maintains optimal cardiac output and hemodynamic stability  5/7/2022 0958 by Zeus Mejia LPN  Outcome: Progressing  Flowsheets  Taken 5/7/2022 0958 by Zeus Mejia LPN  Maintains optimal cardiac output and hemodynamic stability:   Monitor blood pressure and heart rate   Assess for signs of decreased cardiac output   Administer fluid and/or volume expanders as ordered  Taken 5/7/2022 0730 by Albaro Isaac RN  Maintains optimal cardiac output and hemodynamic stability: Monitor blood pressure and heart rate  5/7/2022 0113 by Ayde Sahu LPN  Outcome: Progressing  Flowsheets (Taken 5/7/2022 0113)  Maintains optimal cardiac output and hemodynamic stability:   Monitor blood pressure and heart rate   Monitor urine output and notify Licensed Independent Practitioner for values outside of normal range   Assess for signs of decreased cardiac output     Problem: Skin/Tissue Integrity - Adult  Goal: Incisions, wounds, or drain sites healing without S/S of infection  5/7/2022 0958 by Sunday Cross LPN  Outcome: Progressing  Flowsheets  Taken 5/7/2022 0958 by Sunday Cross LPN  Incisions, Wounds, or Drain Sites Healing Without Sign and Symptoms of Infection: TWICE DAILY: Assess and document skin integrity  Taken 5/7/2022 0730 by Eder Duong RN  Incisions, Wounds, or Drain Sites Healing Without Sign and Symptoms of Infection: TWICE DAILY: Assess and document skin integrity  5/7/2022 0113 by Tc Ca LPN  Outcome: Progressing  Flowsheets (Taken 5/6/2022 2125)  Incisions, Wounds, or Drain Sites Healing Without Sign and Symptoms of Infection: TWICE DAILY: Assess and document skin integrity     Problem: Musculoskeletal - Adult  Goal: Return ADL status to a safe level of function  5/7/2022 0958 by Sunday Cross LPN  Outcome: Progressing  Flowsheets (Taken 5/7/2022 0958)  Return ADL Status to a Safe Level of Function:   Administer medication as ordered   Assess activities of daily living deficits and provide assistive devices as needed  5/7/2022 0113 by Tc Ca LPN  Outcome: Progressing  Flowsheets (Taken 5/7/2022 0113)  Return ADL Status to a Safe Level of Function:   Administer medication as ordered   Assess activities of daily living deficits and provide assistive devices as needed     Problem: Gastrointestinal - Adult  Goal: Maintains or returns to baseline bowel function  5/7/2022 0958 by Sunday Cross LPN  Outcome: Progressing  Flowsheets  Taken 5/7/2022 0958 by Sunday Cross LPN  Maintains or returns to baseline bowel function:   Assess bowel function   Encourage oral fluids to ensure adequate hydration   Administer IV fluids as ordered to ensure adequate hydration   Administer ordered medications as needed   Encourage mobilization and activity  Taken 5/7/2022 0730 by Eder Duong RN  Maintains or returns to baseline bowel function: Assess bowel function   Encourage oral fluids to ensure adequate hydration  5/7/2022 0113 by Chucho Harrington LPN  Outcome: Progressing  Flowsheets (Taken 5/7/2022 0113)  Maintains or returns to baseline bowel function:   Assess bowel function   Encourage oral fluids to ensure adequate hydration   Administer ordered medications as needed  Goal: Maintains adequate nutritional intake  5/7/2022 0958 by Jackson Harrington LPN  Outcome: Progressing  Flowsheets  Taken 5/7/2022 0958 by Jackson Harrington LPN  Maintains adequate nutritional intake: Monitor percentage of each meal consumed  Taken 5/7/2022 0730 by Marianna Michael RN  Maintains adequate nutritional intake: Monitor percentage of each meal consumed  5/7/2022 0113 by Chucho Harrington LPN  Outcome: Progressing     Problem: Genitourinary - Adult  Goal: Absence of urinary retention  5/7/2022 0958 by Jackson Harrington LPN  Outcome: Progressing  Flowsheets  Taken 5/7/2022 0958 by Jackson Harrington LPN  Absence of urinary retention: Assess patients ability to void and empty bladder  Taken 5/7/2022 0730 by Marianna Michael RN  Absence of urinary retention: Assess patients ability to void and empty bladder  5/7/2022 0113 by Chucho Harrington LPN  Outcome: Progressing  Flowsheets (Taken 5/7/2022 0113)  Absence of urinary retention: Assess patients ability to void and empty bladder  Note: Patient voiding adequately this shift.      Problem: Infection - Adult  Goal: Absence of infection at discharge  5/7/2022 0958 by Jackson Harrington LPN  Outcome: Progressing  Flowsheets  Taken 5/7/2022 0958 by Jackson Harrington LPN  Absence of infection at discharge:   Assess and monitor for signs and symptoms of infection   Monitor lab/diagnostic results   Monitor all insertion sites i.e., indwelling lines, tubes and drains   Instruct and encourage patient and family to use good hand hygiene technique  Taken 5/7/2022 0730 by Marianna Michael RN  Absence of infection at discharge:   Assess and monitor for signs and symptoms of infection   Monitor lab/diagnostic results  5/7/2022 0113 by Andi Guaman LPN  Outcome: Progressing  Flowsheets (Taken 5/6/2022 2125)  Absence of infection at discharge:   Assess and monitor for signs and symptoms of infection   Monitor lab/diagnostic results   Monitor all insertion sites i.e., indwelling lines, tubes and drains   Administer medications as ordered     Problem: Skin/Tissue Integrity  Goal: Absence of new skin breakdown  Description: 1. Monitor for areas of redness and/or skin breakdown  2. Assess vascular access sites hourly  3. Every 4-6 hours minimum:  Change oxygen saturation probe site  4. Every 4-6 hours:  If on nasal continuous positive airway pressure, respiratory therapy assess nares and determine need for appliance change or resting period. 5/7/2022 0958 by Jero Leal LPN  Outcome: Progressing  Note: Pt's back surgical incision healing. Dressing is dry and intact and not due to be changed today. No other skin impairments noted. Pt understands the importance of frequent repositioning in order to prevent any skin breakdown. 5/7/2022 0113 by Andi Guaman LPN  Outcome: Progressing  Note: No evidence of skin break down this shift.       Problem: Chronic Conditions and Co-morbidities  Goal: Patient's chronic conditions and co-morbidity symptoms are monitored and maintained or improved  5/7/2022 0958 by Jero Leal LPN  Outcome: Progressing  Flowsheets  Taken 5/7/2022 0958 by Jero Leal LPN  Care Plan - Patient's Chronic Conditions and Co-Morbidity Symptoms are Monitored and Maintained or Improved: Monitor and assess patient's chronic conditions and comorbid symptoms for stability, deterioration, or improvement  Taken 5/7/2022 0730 by Trinity Chaves, RN  Care Plan - Patient's Chronic Conditions and Co-Morbidity Symptoms are Monitored and Maintained or Improved: Monitor and assess patient's chronic conditions and comorbid symptoms for stability, deterioration, or improvement  5/7/2022 0113 by Abbie Boswell LPN  Outcome: Progressing  Flowsheets (Taken 5/7/2022 0113)  Care Plan - Patient's Chronic Conditions and Co-Morbidity Symptoms are Monitored and Maintained or Improved: Monitor and assess patient's chronic conditions and comorbid symptoms for stability, deterioration, or improvement     Problem: Anxiety  Goal: Will report anxiety at manageable levels  Description: INTERVENTIONS:  1. Administer medication as ordered  2. Teach and rehearse alternative coping skills  3. Provide emotional support with 1:1 interaction with staff  5/7/2022 0958 by Kathleen Gonzalez LPN  Outcome: Progressing  Flowsheets  Taken 5/7/2022 0958 by Kathleen Gonzalez LPN  Will report anxiety at manageable levels:   Administer medication as ordered   Teach and rehearse alternative coping skills   Provide emotional support with 1:1 interaction with staff  Taken 5/7/2022 0730 by Kandi Serrato RN  Will report anxiety at manageable levels:   Teach and rehearse alternative coping skills   Provide emotional support with 1:1 interaction with staff  5/7/2022 0113 by Abbie Boswell LPN  Outcome: Progressing  Flowsheets (Taken 5/7/2022 0113)  Will report anxiety at manageable levels:   Administer medication as ordered   Provide emotional support with 1:1 interaction with staff     Problem: Self Care Deficit  Goal: Return ADL status to a safe level of function  Description: INTERVENTIONS:  1. Administer medication as ordered  2. Assess ADL deficits and provide assistive devices as needed  3. Obtain PT/OT consults as needed  4.  Assist and instruct patient to increase activity and self care as tolerated  5/7/2022 0958 by Kathleen Gonzalez LPN  Outcome: Progressing  Flowsheets (Taken 5/7/2022 0958)  Return ADL Status to a Safe Level of Function:   Administer medication as ordered   Assess activities of daily living deficits and provide assistive devices as needed  5/7/2022 0113 by Abbie Boswell LPN  Outcome: Progressing  Flowsheets (Taken 5/7/2022 0113)  Return ADL Status to a Safe Level of Function:   Administer medication as ordered   Assess activities of daily living deficits and provide assistive devices as needed     Problem: Safety - Adult  Goal: Free from fall injury  5/7/2022 0958 by Dony Garcia LPN  Outcome: Progressing  Flowsheets (Taken 5/7/2022 0958)  Free From Fall Injury: Instruct family/caregiver on patient safety  5/7/2022 0113 by Artie Lira LPN  Outcome: Progressing  Flowsheets (Taken 5/6/2022 2123)  Free From Fall Injury: Instruct family/caregiver on patient safety   Care plan reviewed with patient. Patient verbalizes understanding of the plan of care and contributes to goal setting.

## 2022-05-07 NOTE — CONSULTS
[facility],   [address]    General Internal Medicine     Consultation Note    Dr Areli Almanza covering for Dr. Shantel Larios    Patient:  Mandy Jimenez  YOB: 1980  Date of Service: 5/7/2022  MRN: [id number]   Acct:  [de-identified]   Primary Care Physician: Terence Mackay DO  Referring Al Rodriguez MD  Reason for consultation: Medical management        History of Present Illness:   History obtained from the patient. The patient is a 39 y.o. female who has a background history of anxiety, depression, fibromyalgia, HTN, osteoarthritis and thyroid disease had undergone L5-S1 decompression and fusion yesterday. On direct questioning she denies having any chest pain shortness of breath orthopnea PND no palpitations no ankle edema. No cough wheezing rhinorrhea nasal congestion or sore throat and no fever or chills. Past Medical History:        Diagnosis Date    Anxiety     Back pain     Colitis     Depression     Fibromyalgia     HPV (human papilloma virus) anogenital infection     Hypertension     OCD (obsessive compulsive disorder)     Osteoarthritis (arthritis due to wear and tear of joints)     PONV (postoperative nausea and vomiting)     Seizure ([address]     \" in [facility] in 2021 was checked out by EMS but not taken to hospital\"    Thyroid disease        Past Surgical History:        Procedure Laterality Date    CHOLECYSTECTOMY      INNER EAR SURGERY         Home Medications:   No current facility-administered medications on file prior to encounter.      Current Outpatient Medications on File Prior to Encounter   Medication Sig Dispense Refill    ARIPiprazole (ABILIFY) 20 MG tablet Take 20 mg by mouth daily      ARIPiprazole (ABILIFY IM) Inject into the muscle every 30 days      venlafaxine (EFFEXOR XR) 75 MG extended release capsule Take 75 mg by mouth daily      levothyroxine (SYNTHROID) 50 MCG tablet TAKE 1 TABLET BY MOUTH DAILY 28 tablet 3    hydrOXYzine (VISTARIL) 50 MG capsule Hydroxyzine Pamoate Active 50 MG PO Three Times Daily as needed October 25th, 2021 2:13pm      naproxen (NAPROSYN) 500 MG tablet Take 1 tablet by mouth 2 times daily as needed for Pain 20 tablet 0    traZODone (DESYREL) 50 MG tablet Take 1 tablet by mouth nightly as needed for Sleep 30 tablet 0       Allergies:  Dairycare [lactase-lactobacillus]    Social History:    reports that she has been smoking cigarettes. She has been smoking about 0.50 packs per day. She has never used smokeless tobacco. She reports current alcohol use. She reports current drug use. Drug: Cocaine. Family History:       Problem Relation Age of Onset    Diabetes Mother     Heart Disease Mother     Diabetes Father     Heart Disease Father     Cancer Paternal Aunt        Review of systems:    CNS: No seizures, no dizziness, no facial droop,  no paresthesia, numbness or muscle weakness, no dysphasia or  Dysphagia,  CARDIOVASCULAR: As per HPI otherwise negative  RESPIRATORY: As per HPI otherwise negative  GASTROINTESTINAL SYSTEM: No nausea,  vomiting , diarrhea , abdominal pain  abdominal swelling , hematochezia or melanotic stools, no weight loss, no heart  burn, no dysphagia or odynophagia  GENITOURINARY SYSTEM: No dysuria , hematuria, urinary frequency , incontinence,  flank pains , urgency , genital discharge  SKIN / Regi Lee:  No rashes, petechiae, , no open wounds , no soft tissue swelling   MUSCULOSKELETAL: No bone pains, no arthralgia, no joint swelling, no myalgia  HEMATOLOGIC: No easy bruising, no bleeding  OPHTHALMOLOGY: No conjuctival injection, no discharge, no pain, no blurring of vision, no loss of vision, no diplopia  EAR: No loss of hearing , tinnitus, ear discharge , no ear pain          Vitals:   Vitals:    05/07/22 1332   BP: 133/81   Pulse: 95   Resp: 16   Temp: 97.9 °F (36.6 °C)   SpO2: 98%      BMI: Body mass index is 33.18 kg/m².     PHYSICAL EXAMINATION:            General appearance:  No apparent distress, appears stated age and cooperative. HEENT:  Normal cephalic, atraumatic without obvious deformity. Pupils equal, round, and reactive to light. Extra ocular muscles intact. Conjunctivae/corneas clear. Neck: Supple  Respiratory:  clinically clear bilaterally  Cardiovascular:  Regular rhythm with normal S1/S2 without rubs or gallops. Abdomen: Soft, non-tender, non-distended with normal bowel sounds. Musculoskeletal:  No clubbing, cyanosis  or edema bilaterally . Spine: Dressing in place with drain  Neurologic: Alert and oriented x3 no gross motor deficits        Review of Labs and Diagnostic Testing:    CBC:   Lab Results   Component Value Date    WBC 9.1 10/16/2021    HGB 12.5 05/07/2022    HCT 38.8 05/07/2022    MCV 92.1 10/16/2021     10/16/2021     BMP:   Lab Results   Component Value Date     05/07/2022    K 4.5 05/07/2022     05/07/2022    CO2 21 05/07/2022    BUN 9 05/07/2022    CREATININE 0.7 05/07/2022    CALCIUM 8.0 05/07/2022     PT/INR: No results found for: PROTIME, INR  APTT: No results found for: APTT  Lipids:   Lab Results   Component Value Date    ALKPHOS 86 10/16/2021    ALT 22 10/16/2021    AST 28 10/16/2021    BILITOT 0.2 10/16/2021    BILIDIR <0.2 10/16/2021    LABALBU 3.1 10/16/2021    AMYLASE 52 01/27/2013    LIPASE 40.1 10/16/2021     Troponin: No results found for: TROPONINI     Radiology:     XR LUMBAR SPINE 1 VW    Result Date: 5/6/2022  PROCEDURE: XR LUMBAR SPINE 1 VW CLINICAL INFORMATION: fusion. COMPARISON: Plain radiographs dated 1/27/2020. TECHNIQUE: Single lateral view of the lumbar spine. Hilda Reed FINDINGS: There are postoperative changes with bilateral pedicular screws at L5 and S1, new since previous study dated 1/27/2020. There appears to be a transitional vertebral body at the lumbosacral junction. The remaining lumbar spine is unremarkable.       1. Postoperative changes with bilateral pedicular screws at L5 and S1, new since previous study dated 1/27/2020. **This report has been created using voice recognition software. It may contain minor errors which are inherent in voice recognition technology. ** Final report electronically signed by DR Janet Moreno on 5/6/2022 10:55 AM        Principal Problem:    Lumbar stenosis without neurogenic claudication  Active Problems:    Lumbar stenosis with neurogenic claudication  Resolved Problems:    * No resolved hospital problems. *  Other problems:  Anxiety/depression   HTN  Fibromyalgia  Hypothyroidism         Plan:  Continue antidepressants/antianxiety medications, continue levothyroxine. Maintain low-sodium diet. bp controlled with diet.    Pain management and encourage early ambulation      DVT prophylaxis: [] Lovenox                                 [x] SCDs                                 [] SQ Heparin                                 [] Encourage ambulation, low risk for DVT, no chemical or      mechanical prophylaxis necessary              [] Already on Anticoagulation                Anticipated Disposition upon discharge: [x] Home                                                                         [] Home with Home Health                                                                         [] [facility]                                                                         [] [address]          Electronically signed by Elizabeth Crowe MD on 5/7/2022 at 2:26 PM

## 2022-05-08 LAB
ANION GAP SERPL CALCULATED.3IONS-SCNC: 7 MEQ/L (ref 8–16)
BUN BLDV-MCNC: 7 MG/DL (ref 7–22)
CALCIUM SERPL-MCNC: 8.2 MG/DL (ref 8.5–10.5)
CHLORIDE BLD-SCNC: 107 MEQ/L (ref 98–111)
CO2: 25 MEQ/L (ref 23–33)
CREAT SERPL-MCNC: 0.6 MG/DL (ref 0.4–1.2)
GFR SERPL CREATININE-BSD FRML MDRD: > 90 ML/MIN/1.73M2
GLUCOSE BLD-MCNC: 91 MG/DL (ref 70–108)
HCT VFR BLD CALC: 35.7 % (ref 37–47)
HEMOGLOBIN: 11.5 GM/DL (ref 12–16)
POTASSIUM SERPL-SCNC: 4.3 MEQ/L (ref 3.5–5.2)
SODIUM BLD-SCNC: 139 MEQ/L (ref 135–145)

## 2022-05-08 PROCEDURE — 6360000002 HC RX W HCPCS

## 2022-05-08 PROCEDURE — 36415 COLL VENOUS BLD VENIPUNCTURE: CPT

## 2022-05-08 PROCEDURE — 85018 HEMOGLOBIN: CPT

## 2022-05-08 PROCEDURE — 6370000000 HC RX 637 (ALT 250 FOR IP): Performed by: PHYSICIAN ASSISTANT

## 2022-05-08 PROCEDURE — 80048 BASIC METABOLIC PNL TOTAL CA: CPT

## 2022-05-08 PROCEDURE — 2580000003 HC RX 258: Performed by: PHYSICIAN ASSISTANT

## 2022-05-08 PROCEDURE — 85014 HEMATOCRIT: CPT

## 2022-05-08 PROCEDURE — 1200000000 HC SEMI PRIVATE

## 2022-05-08 PROCEDURE — 6360000002 HC RX W HCPCS: Performed by: PHYSICIAN ASSISTANT

## 2022-05-08 RX ORDER — DEXAMETHASONE SODIUM PHOSPHATE 4 MG/ML
6 INJECTION, SOLUTION INTRA-ARTICULAR; INTRALESIONAL; INTRAMUSCULAR; INTRAVENOUS; SOFT TISSUE EVERY 8 HOURS
Status: COMPLETED | OUTPATIENT
Start: 2022-05-08 | End: 2022-05-09

## 2022-05-08 RX ADMIN — SENNOSIDES AND DOCUSATE SODIUM 1 TABLET: 50; 8.6 TABLET ORAL at 08:24

## 2022-05-08 RX ADMIN — MORPHINE SULFATE 2 MG: 2 INJECTION, SOLUTION INTRAMUSCULAR; INTRAVENOUS at 08:23

## 2022-05-08 RX ADMIN — VENLAFAXINE HYDROCHLORIDE 75 MG: 75 CAPSULE, EXTENDED RELEASE ORAL at 08:23

## 2022-05-08 RX ADMIN — DEXAMETHASONE SODIUM PHOSPHATE 6 MG: 4 INJECTION, SOLUTION INTRA-ARTICULAR; INTRALESIONAL; INTRAMUSCULAR; INTRAVENOUS; SOFT TISSUE at 10:27

## 2022-05-08 RX ADMIN — ARIPIPRAZOLE 20 MG: 10 TABLET ORAL at 08:24

## 2022-05-08 RX ADMIN — CYCLOBENZAPRINE 10 MG: 10 TABLET, FILM COATED ORAL at 08:23

## 2022-05-08 RX ADMIN — SODIUM CHLORIDE, PRESERVATIVE FREE 10 ML: 5 INJECTION INTRAVENOUS at 19:58

## 2022-05-08 RX ADMIN — OXYCODONE 10 MG: 5 TABLET ORAL at 06:52

## 2022-05-08 RX ADMIN — MORPHINE SULFATE 4 MG: 2 INJECTION, SOLUTION INTRAMUSCULAR; INTRAVENOUS at 12:43

## 2022-05-08 RX ADMIN — SODIUM CHLORIDE, PRESERVATIVE FREE 10 ML: 5 INJECTION INTRAVENOUS at 08:24

## 2022-05-08 RX ADMIN — OXYCODONE 10 MG: 5 TABLET ORAL at 20:00

## 2022-05-08 RX ADMIN — ACETAMINOPHEN 650 MG: 325 TABLET ORAL at 05:21

## 2022-05-08 RX ADMIN — SENNOSIDES AND DOCUSATE SODIUM 1 TABLET: 50; 8.6 TABLET ORAL at 19:57

## 2022-05-08 RX ADMIN — BISACODYL 10 MG: 10 SUPPOSITORY RECTAL at 10:28

## 2022-05-08 RX ADMIN — CYCLOBENZAPRINE 10 MG: 10 TABLET, FILM COATED ORAL at 14:40

## 2022-05-08 RX ADMIN — MORPHINE SULFATE 2 MG: 2 INJECTION, SOLUTION INTRAMUSCULAR; INTRAVENOUS at 10:28

## 2022-05-08 RX ADMIN — DIAZEPAM 5 MG: 5 TABLET ORAL at 05:21

## 2022-05-08 RX ADMIN — CYCLOBENZAPRINE 10 MG: 10 TABLET, FILM COATED ORAL at 22:52

## 2022-05-08 RX ADMIN — ACETAMINOPHEN 650 MG: 325 TABLET ORAL at 17:12

## 2022-05-08 RX ADMIN — DEXAMETHASONE SODIUM PHOSPHATE 6 MG: 4 INJECTION, SOLUTION INTRA-ARTICULAR; INTRALESIONAL; INTRAMUSCULAR; INTRAVENOUS; SOFT TISSUE at 17:12

## 2022-05-08 RX ADMIN — OXYCODONE 10 MG: 5 TABLET ORAL at 02:16

## 2022-05-08 RX ADMIN — LEVOTHYROXINE SODIUM 50 MCG: 0.05 TABLET ORAL at 08:24

## 2022-05-08 RX ADMIN — DIAZEPAM 5 MG: 5 TABLET ORAL at 17:10

## 2022-05-08 RX ADMIN — BISACODYL 5 MG: 5 TABLET, COATED ORAL at 08:24

## 2022-05-08 RX ADMIN — POLYETHYLENE GLYCOL 3350 17 G: 17 POWDER, FOR SOLUTION ORAL at 08:24

## 2022-05-08 RX ADMIN — OXYCODONE 10 MG: 5 TABLET ORAL at 12:04

## 2022-05-08 RX ADMIN — ACETAMINOPHEN 650 MG: 325 TABLET ORAL at 11:34

## 2022-05-08 ASSESSMENT — PAIN DESCRIPTION - LOCATION
LOCATION: BACK

## 2022-05-08 ASSESSMENT — PAIN SCALES - GENERAL
PAINLEVEL_OUTOF10: 6
PAINLEVEL_OUTOF10: 7
PAINLEVEL_OUTOF10: 9
PAINLEVEL_OUTOF10: 7
PAINLEVEL_OUTOF10: 8
PAINLEVEL_OUTOF10: 8
PAINLEVEL_OUTOF10: 4
PAINLEVEL_OUTOF10: 8
PAINLEVEL_OUTOF10: 9
PAINLEVEL_OUTOF10: 6
PAINLEVEL_OUTOF10: 8
PAINLEVEL_OUTOF10: 8

## 2022-05-08 ASSESSMENT — PAIN DESCRIPTION - FREQUENCY
FREQUENCY: CONTINUOUS
FREQUENCY: CONTINUOUS

## 2022-05-08 ASSESSMENT — PAIN DESCRIPTION - ORIENTATION
ORIENTATION: MID

## 2022-05-08 ASSESSMENT — PAIN - FUNCTIONAL ASSESSMENT
PAIN_FUNCTIONAL_ASSESSMENT: PREVENTS OR INTERFERES SOME ACTIVE ACTIVITIES AND ADLS
PAIN_FUNCTIONAL_ASSESSMENT: PREVENTS OR INTERFERES SOME ACTIVE ACTIVITIES AND ADLS

## 2022-05-08 ASSESSMENT — PAIN DESCRIPTION - ONSET
ONSET: ON-GOING
ONSET: ON-GOING

## 2022-05-08 ASSESSMENT — PAIN DESCRIPTION - PAIN TYPE
TYPE: SURGICAL PAIN
TYPE: SURGICAL PAIN

## 2022-05-08 ASSESSMENT — PAIN DESCRIPTION - DESCRIPTORS
DESCRIPTORS: ACHING
DESCRIPTORS: ACHING;CRAMPING
DESCRIPTORS: ACHING

## 2022-05-08 NOTE — PLAN OF CARE
Problem: Discharge Planning  Goal: Discharge to home or other facility with appropriate resources  5/7/2022 2145 by Simón Meng RN  Outcome: Progressing  Note: Plan to discharge home with assist of  when medically cleared to discharge. 5/7/2022 0958 by Bairon Nguyen LPN  Outcome: Progressing  Flowsheets  Taken 5/7/2022 0958 by Bairon Nguyen LPN  Discharge to home or other facility with appropriate resources:   Identify barriers to discharge with patient and caregiver   Arrange for needed discharge resources and transportation as appropriate  Taken 5/7/2022 0730 by Johnny Larkin RN  Discharge to home or other facility with appropriate resources:   Identify barriers to discharge with patient and caregiver   Arrange for needed discharge resources and transportation as appropriate     Problem: Pain  Goal: Verbalizes/displays adequate comfort level or baseline comfort level  5/7/2022 2145 by Simón Meng RN  Outcome: Progressing  Note: Pain goal 3/10, reports adequate pain control with current treatment. 5/7/2022 0958 by Bairon Nguyen LPN  Outcome: Progressing  Flowsheets (Taken 5/7/2022 4158)  Verbalizes/displays adequate comfort level or baseline comfort level:   Encourage patient to monitor pain and request assistance   Assess pain using appropriate pain scale   Administer analgesics based on type and severity of pain and evaluate response   Implement non-pharmacological measures as appropriate and evaluate response   Consider cultural and social influences on pain and pain management   Notify Licensed Independent Practitioner if interventions unsuccessful or patient reports new pain     Problem: Musculoskeletal - Adult  Goal: Return ADL status to a safe level of function  5/7/2022 2145 by Simón Meng RN  Outcome: Progressing  Note: Assisted with ambulation.    5/7/2022 0958 by Bairon Nguyen LPN  Outcome: Progressing  Flowsheets (Taken 5/7/2022 4500)  Return ADL Status to a Safe Level of Function:   Administer medication as ordered   Assess activities of daily living deficits and provide assistive devices as needed     Problem: Anxiety  Goal: Will report anxiety at manageable levels  Description: INTERVENTIONS:  1. Administer medication as ordered  2. Teach and rehearse alternative coping skills  3. Provide emotional support with 1:1 interaction with staff  5/7/2022 2145 by Gricelda Mathew RN  Outcome: Progressing  Note: Reports anxiety is controlled at this time. Encouraged to focus on current progress. 5/7/2022 0958 by Roland Obando LPN  Outcome: Progressing  Flowsheets  Taken 5/7/2022 0958 by Roland Obando LPN  Will report anxiety at manageable levels:   Administer medication as ordered   Teach and rehearse alternative coping skills   Provide emotional support with 1:1 interaction with staff  Taken 5/7/2022 0730 by Hans Anne RN  Will report anxiety at manageable levels:   Teach and rehearse alternative coping skills   Provide emotional support with 1:1 interaction with staff     Problem: Safety - Adult  Goal: Free from fall injury  5/7/2022 2145 by Gricelda Mathew RN  Outcome: Progressing  Flowsheets (Taken 5/7/2022 1239 by Hans Anne RN)  Free From Fall Injury: Instruct family/caregiver on patient safety  Note: Bed in lowest locked position, call light within reach. 5/7/2022 0958 by Roland Obando LPN  Outcome: Progressing  Flowsheets (Taken 5/7/2022 0958)  Free From Fall Injury: Instruct family/caregiver on patient safety       Plan of care reviewed with pt, verbalized understanding, participated in goal setting.

## 2022-05-08 NOTE — PROGRESS NOTES
Department of Orthopedic Surgery  Spine Service  Attending Progress Note        Subjective:  Doing okay, c/o significant back pain and left groin/thigh pain that wraps around the whole thigh. Has ambulated to restroom. No BM.     Vitals  VITALS:  /71   Pulse 88   Temp 98 °F (36.7 °C) (Oral)   Resp 16   Ht 5' 2\" (1.575 m)   Wt 181 lb 6.4 oz (82.3 kg)   LMP 04/20/2022   SpO2 94%   BMI 33.18 kg/m²   24HR INTAKE/OUTPUT:      Intake/Output Summary (Last 24 hours) at 5/8/2022 0908  Last data filed at 5/8/2022 9923  Gross per 24 hour   Intake 1939.14 ml   Output --   Net 1939.14 ml     URINARY CATHETER OUTPUT (Bowen):     DRAIN/TUBE OUTPUT:  Closed/Suction Drain Back Accordion-Output (ml): 50 ml      PHYSICAL EXAM:    Orientation:  alert and oriented to person, place and time    Incision:  dressing in place, clean, dry, intact    Lower Extremity Motor :  quadriceps, extensor hallucis longus, dorsiflexion, plantarflexion 5/5 bilaterally  Lower Extremity Sensory:  Intact L1-S1    Flatus:  positive    ABNORMAL EXAM FINDINGS:  none    LABS:    HgB:    Lab Results   Component Value Date    HGB 11.5 05/08/2022       ASSESSMENT AND PLAN:    Post operative day 2 status post L5-S1 decompression and fusion    1:  Monitor labs and drain output  2:  Activity Level:  As tolerated with back brace   3:  Pain Control:  Poor - will add decadron   4:  Discharge Planning:  Pending, patient wanting to go to [facility] - [facility]   5:  Bowel regimen: suppository today    emory knox PA-C

## 2022-05-08 NOTE — PROGRESS NOTES
INTERNAL MEDICINE SPECIALTIES  Progress Note Dr Mira Mcclellan for Dr Memo Chu      Patient:  Boyd Falk  YOB: 1980  Date of Service: 5/8/2022  MRN: [id number]   Acct:  [de-identified]   Primary Care Physician: Radha Slaughter DO    SUBJECTIVE: has  Some tingling down left leg. Home Medications:   No current facility-administered medications on file prior to encounter.      Current Outpatient Medications on File Prior to Encounter   Medication Sig Dispense Refill    ARIPiprazole (ABILIFY) 20 MG tablet Take 20 mg by mouth daily      ARIPiprazole (ABILIFY IM) Inject into the muscle every 30 days      venlafaxine (EFFEXOR XR) 75 MG extended release capsule Take 75 mg by mouth daily      levothyroxine (SYNTHROID) 50 MCG tablet TAKE 1 TABLET BY MOUTH DAILY 28 tablet 3    hydrOXYzine (VISTARIL) 50 MG capsule Hydroxyzine Pamoate Active 50 MG PO Three Times Daily as needed October 25th, 2021 2:13pm      naproxen (NAPROSYN) 500 MG tablet Take 1 tablet by mouth 2 times daily as needed for Pain 20 tablet 0    traZODone (DESYREL) 50 MG tablet Take 1 tablet by mouth nightly as needed for Sleep 30 tablet 0         Scheduled Meds:   dexamethasone  6 mg IntraVENous Q8H    ARIPiprazole  20 mg Oral Daily    levothyroxine  50 mcg Oral Daily    venlafaxine  75 mg Oral Daily    sodium chloride flush  5-40 mL IntraVENous 2 times per day    acetaminophen  650 mg Oral Q6H    polyethylene glycol  17 g Oral Daily    bisacodyl  5 mg Oral Daily    sennosides-docusate sodium  1 tablet Oral BID     Continuous Infusions:   sodium chloride      sodium chloride Stopped (05/07/22 1031)     PRN Meds:hydrOXYzine, traZODone, sodium chloride flush, sodium chloride, ondansetron **OR** ondansetron, oxyCODONE **OR** oxyCODONE, cyclobenzaprine, morphine **OR** morphine, bisacodyl, fleet, diazePAM        Allergies:  Dairycare [lactase-lactobacillus]    OBJECTIVE:    Vitals:   Vitals:    05/08/22 1130   BP: 115/73   Pulse: 90   Resp: 19   Temp: 98 °F (36.7 °C)   SpO2: 98%      BMI: Body mass index is 33.18 kg/m². PHYSICAL EXAMINATION:              General appearance:  No apparent distress, appears stated age and cooperative. HEENT:  Normal cephalic, atraumatic without obvious deformity. Pupils equal, round, and reactive to light.  Extra ocular muscles intact. Conjunctivae/corneas clear. Neck: Supple  Respiratory:  clinically clear bilaterally  Cardiovascular:  Regular rhythm with normal S1/S2 without rubs or gallops. Abdomen: Soft, non-tender, non-distended with normal bowel sounds. Musculoskeletal:  No clubbing, cyanosis  or edema bilaterally .   Spine: Dressing in place with drain  Neurologic: Alert and oriented x3 no gross motor deficits      Review of Labs and Diagnostic Testing:    Recent Results (from the past 24 hour(s))   Hemoglobin and hematocrit, blood    Collection Time: 05/08/22  6:25 AM   Result Value Ref Range    Hemoglobin 11.5 (L) 12.0 - 16.0 gm/dl    Hematocrit 35.7 (L) 37.0 - 47.0 %   Basic Metabolic Panel    Collection Time: 05/08/22  6:25 AM   Result Value Ref Range    Sodium 139 135 - 145 meq/L    Potassium 4.3 3.5 - 5.2 meq/L    Chloride 107 98 - 111 meq/L    CO2 25 23 - 33 meq/L    Glucose 91 70 - 108 mg/dL    BUN 7 7 - 22 mg/dL    CREATININE 0.6 0.4 - 1.2 mg/dL    Calcium 8.2 (L) 8.5 - 10.5 mg/dL   Anion Gap    Collection Time: 05/08/22  6:25 AM   Result Value Ref Range    Anion Gap 7.0 (L) 8.0 - 16.0 meq/L   Glomerular Filtration Rate, Estimated    Collection Time: 05/08/22  6:25 AM   Result Value Ref Range    Est, Glom Filt Rate >90 ml/min/1.73m2       Radiology:     XR LUMBAR SPINE 1 VW    Result Date: 5/6/2022  PROCEDURE: XR LUMBAR SPINE 1 VW CLINICAL INFORMATION: fusion. COMPARISON: Plain radiographs dated 1/27/2020. TECHNIQUE: Single lateral view of the lumbar spine. Rand Terrazas FINDINGS: There are postoperative changes with bilateral pedicular screws at L5 and S1, new since previous study dated 1/27/2020. There appears to be a transitional vertebral body at the lumbosacral junction. The remaining lumbar spine is unremarkable. 1. Postoperative changes with bilateral pedicular screws at L5 and S1, new since previous study dated 1/27/2020. **This report has been created using voice recognition software. It may contain minor errors which are inherent in voice recognition technology. ** Final report electronically signed by DR Radha Hung on 5/6/2022 10:55 AM        ASSESMENT:      Principal Problem:    Lumbar stenosis without neurogenic claudication  Active Problems:    Lumbar stenosis with neurogenic claudication  Resolved Problems:    * No resolved hospital problems. *      PLAN:  Plan is to continue antidepressants/antianxiety medications, continue levothyroxine. Maintain low-sodium diet. bp controlled with diet. Pain management and encourage early ambulation.       Dr Nancy Yi resumes care in a.m        DVT prophylaxis: [] Lovenox                                 [x] SCDs                                 [] SQ Heparin                                 [] Encourage ambulation, low risk for DVT, no chemical or mechanical prophylaxis necessary              [] Already on Anticoagulation                Anticipated Disposition upon discharge: [] Home                                                                         [] Home with Home Health                                                                         [] [facility]                                                                         [] [address]          Electronically signed by Bethel Thomas MD on 5/8/2022 at 12:18 PM

## 2022-05-09 LAB
ANION GAP SERPL CALCULATED.3IONS-SCNC: 9 MEQ/L (ref 8–16)
BUN BLDV-MCNC: 7 MG/DL (ref 7–22)
CALCIUM SERPL-MCNC: 9 MG/DL (ref 8.5–10.5)
CHLORIDE BLD-SCNC: 105 MEQ/L (ref 98–111)
CO2: 24 MEQ/L (ref 23–33)
CREAT SERPL-MCNC: 0.5 MG/DL (ref 0.4–1.2)
GFR SERPL CREATININE-BSD FRML MDRD: > 90 ML/MIN/1.73M2
GLUCOSE BLD-MCNC: 155 MG/DL (ref 70–108)
HCT VFR BLD CALC: 37.7 % (ref 37–47)
HEMOGLOBIN: 12.1 GM/DL (ref 12–16)
POTASSIUM SERPL-SCNC: 4.7 MEQ/L (ref 3.5–5.2)
SODIUM BLD-SCNC: 138 MEQ/L (ref 135–145)

## 2022-05-09 PROCEDURE — 1200000000 HC SEMI PRIVATE

## 2022-05-09 PROCEDURE — 97530 THERAPEUTIC ACTIVITIES: CPT

## 2022-05-09 PROCEDURE — 85014 HEMATOCRIT: CPT

## 2022-05-09 PROCEDURE — 97116 GAIT TRAINING THERAPY: CPT

## 2022-05-09 PROCEDURE — 6360000002 HC RX W HCPCS

## 2022-05-09 PROCEDURE — 36415 COLL VENOUS BLD VENIPUNCTURE: CPT

## 2022-05-09 PROCEDURE — 97110 THERAPEUTIC EXERCISES: CPT

## 2022-05-09 PROCEDURE — 6370000000 HC RX 637 (ALT 250 FOR IP): Performed by: PHYSICIAN ASSISTANT

## 2022-05-09 PROCEDURE — 80048 BASIC METABOLIC PNL TOTAL CA: CPT

## 2022-05-09 PROCEDURE — 6360000002 HC RX W HCPCS: Performed by: PHYSICIAN ASSISTANT

## 2022-05-09 PROCEDURE — 97535 SELF CARE MNGMENT TRAINING: CPT

## 2022-05-09 PROCEDURE — 85018 HEMOGLOBIN: CPT

## 2022-05-09 RX ORDER — SENNA AND DOCUSATE SODIUM 50; 8.6 MG/1; MG/1
2 TABLET, FILM COATED ORAL 2 TIMES DAILY
Status: DISCONTINUED | OUTPATIENT
Start: 2022-05-09 | End: 2022-05-10 | Stop reason: HOSPADM

## 2022-05-09 RX ADMIN — ACETAMINOPHEN 650 MG: 325 TABLET ORAL at 00:56

## 2022-05-09 RX ADMIN — OXYCODONE 10 MG: 5 TABLET ORAL at 06:32

## 2022-05-09 RX ADMIN — ARIPIPRAZOLE 20 MG: 10 TABLET ORAL at 08:57

## 2022-05-09 RX ADMIN — OXYCODONE 10 MG: 5 TABLET ORAL at 12:03

## 2022-05-09 RX ADMIN — POLYETHYLENE GLYCOL 3350 17 G: 17 POWDER, FOR SOLUTION ORAL at 08:56

## 2022-05-09 RX ADMIN — CYCLOBENZAPRINE 10 MG: 10 TABLET, FILM COATED ORAL at 08:56

## 2022-05-09 RX ADMIN — SENNOSIDES AND DOCUSATE SODIUM 1 TABLET: 50; 8.6 TABLET ORAL at 08:56

## 2022-05-09 RX ADMIN — OXYCODONE 10 MG: 5 TABLET ORAL at 18:12

## 2022-05-09 RX ADMIN — VENLAFAXINE HYDROCHLORIDE 75 MG: 75 CAPSULE, EXTENDED RELEASE ORAL at 08:57

## 2022-05-09 RX ADMIN — ACETAMINOPHEN 650 MG: 325 TABLET ORAL at 06:33

## 2022-05-09 RX ADMIN — ACETAMINOPHEN 650 MG: 325 TABLET ORAL at 12:02

## 2022-05-09 RX ADMIN — CYCLOBENZAPRINE 10 MG: 10 TABLET, FILM COATED ORAL at 18:12

## 2022-05-09 RX ADMIN — LEVOTHYROXINE SODIUM 50 MCG: 0.05 TABLET ORAL at 08:57

## 2022-05-09 RX ADMIN — BISACODYL 5 MG: 5 TABLET, COATED ORAL at 08:57

## 2022-05-09 RX ADMIN — ACETAMINOPHEN 650 MG: 325 TABLET ORAL at 18:12

## 2022-05-09 RX ADMIN — MORPHINE SULFATE 4 MG: 2 INJECTION, SOLUTION INTRAMUSCULAR; INTRAVENOUS at 00:56

## 2022-05-09 RX ADMIN — DIAZEPAM 5 MG: 5 TABLET ORAL at 19:53

## 2022-05-09 RX ADMIN — ONDANSETRON 4 MG: 2 INJECTION INTRAMUSCULAR; INTRAVENOUS at 03:06

## 2022-05-09 RX ADMIN — DEXAMETHASONE SODIUM PHOSPHATE 6 MG: 4 INJECTION, SOLUTION INTRA-ARTICULAR; INTRALESIONAL; INTRAMUSCULAR; INTRAVENOUS; SOFT TISSUE at 00:56

## 2022-05-09 ASSESSMENT — PAIN DESCRIPTION - PAIN TYPE
TYPE: SURGICAL PAIN
TYPE: SURGICAL PAIN

## 2022-05-09 ASSESSMENT — PAIN DESCRIPTION - ORIENTATION
ORIENTATION: MID
ORIENTATION: MID

## 2022-05-09 ASSESSMENT — PAIN SCALES - GENERAL
PAINLEVEL_OUTOF10: 7
PAINLEVEL_OUTOF10: 7
PAINLEVEL_OUTOF10: 3
PAINLEVEL_OUTOF10: 7
PAINLEVEL_OUTOF10: 4
PAINLEVEL_OUTOF10: 8

## 2022-05-09 ASSESSMENT — PAIN DESCRIPTION - FREQUENCY
FREQUENCY: CONTINUOUS
FREQUENCY: INTERMITTENT

## 2022-05-09 ASSESSMENT — PAIN DESCRIPTION - LOCATION
LOCATION: BACK
LOCATION: BACK

## 2022-05-09 ASSESSMENT — PAIN DESCRIPTION - DESCRIPTORS
DESCRIPTORS: SORE
DESCRIPTORS: SPASM
DESCRIPTORS: ACHING

## 2022-05-09 ASSESSMENT — PAIN DESCRIPTION - ONSET
ONSET: ON-GOING
ONSET: ON-GOING

## 2022-05-09 NOTE — DISCHARGE INSTR - COC
Continuity of Care Form    Patient Name: Moo Tong   :  1980  MRN:  [id number]    Admit date:  2022  Discharge date:  5/10/2022    Code Status Order: Full Code   Advance Directives:   Advance Care Flowsheet Documentation       Date/Time Healthcare Directive Type of Healthcare Directive Copy in [address] Agent's Name Healthcare Agent's Phone Number    [phone] No, patient does not have an advance directive for healthcare treatment -- -- -- -- --            Admitting Physician:  Liliana Davila MD  PCP: Ella Godwin DO    Discharging Nurse: Ryan Solorio [facility] Unit/Room#: 7K-22/022-A  Discharging Unit Phone Number: [phone]    Emergency Contact:   Extended Emergency Contact Information  Primary Emergency Contact: Chapito Castellanos, [address] [place] [address] Phone: [phone]  Relation: Other   needed?  No    Past Surgical History:  Past Surgical History:   Procedure Laterality Date    CHOLECYSTECTOMY      INNER EAR SURGERY      LUMBAR FUSION N/A 2022    L5=S1 Decompression L5-S1 Posterior Fusion & TLIF performed by Liliana Davila MD at [facility]       Immunization History:   Immunization History   Administered Date(s) Administered    Influenza A (R2L6-44) Vaccine Nasal 2009       Active Problems:  Patient Active Problem List   Diagnosis Code    Coker's esophagus without dysplasia K22.70    Moderate episode of recurrent major depressive disorder ([address] F33.1    Bipolar I disorder, current or most recent episode depressed, with psychotic features ([address] F31.5    Schizoaffective disorder, bipolar type ([address] F25.0    Schizoaffective disorder (HCC) F25.9    Severe mixed bipolar 1 disorder without psychosis ([address] F31.63    Alcohol use disorder, severe, dependence ([address] F10.20    Amphetamine substance use disorder, severe, in sustained remission ([address] F15.21    Bipolar I disorder with mixed features ([address] F31.9    Class 1 obesity in adult E66.9    Subclinical hypothyroidism E03.8    Anxiety F41.9    Lumbar stenosis without neurogenic claudication M48.061    Lumbar stenosis with neurogenic claudication M48.062       Isolation/Infection:   Isolation            No Isolation          Patient Infection Status       Infection Onset Added Last Indicated Last Indicated By Review Planned Expiration Resolved Resolved By    None active    Resolved    COVID-19 (Rule Out) 10/16/21 10/16/21 10/16/21 COVID-19, Rapid (Ordered)   10/16/21 Rule-Out Test Resulted            Nurse Assessment:  Last Vital Signs: BP (!) 134/91   Pulse 80   Temp 98 °F (36.7 °C) (Oral)   Resp 18   Ht 5' 2\" (1.575 m)   Wt 181 lb 6.4 oz (82.3 kg)   LMP 04/20/2022   SpO2 98%   BMI 33.18 kg/m²     Last documented pain score (0-10 scale): Pain Level: 8  Last Weight:   Wt Readings from Last 1 Encounters:   05/06/22 181 lb 6.4 oz (82.3 kg)     Mental Status:  oriented and alert    IV Access:  - None    Nursing Mobility/ADLs:  Walking   Assisted  Transfer  Independent  Bathing  Assisted  Dressing  Assisted  Toileting  Assisted  Feeding  Independent  Med Admin  Assisted  Med Delivery   whole    Wound Care Documentation and Therapy:  Incision 05/06/22 Back Lower;Medial (Active)   Dressing Status Clean;Dry; Intact 05/09/22 0912   Dressing/Treatment Tegaderm/transparent film dressing 05/09/22 0912   Closure Sutures;Surgical glue 05/09/22 0912   Drainage Amount None 05/07/22 0730   Odor None 05/09/22 0912   Number of days: 3        Elimination:  Continence: Bowel: Yes  Bladder: Yes  Urinary Catheter: None   Colostomy/Ileostomy/Ileal Conduit: No       Date of Last BM: 5/10/2022    Intake/Output Summary (Last 24 hours) at 5/9/2022 1118  Last data filed at 5/9/2022 0300  Gross per 24 hour   Intake 960 ml   Output 200 ml   Net 760 ml     I/O last 3 completed shifts: In: 2899.1 [P.O.:1260;  I.V.:1639.1]  Out: 200 [Drains:200]    Safety Concerns:     None    Impairments/Disabilities: None    Nutrition Therapy:  Current Nutrition Therapy:   - Oral Diet:  General    Routes of Feeding: Oral  Liquids: No Restrictions  Daily Fluid Restriction: no  Last Modified Barium Swallow with Video (Video Swallowing Test): not done    Treatments at the Time of Hospital Discharge:   Respiratory Treatments: none  Oxygen Therapy:  is not on home oxygen therapy. Ventilator:    - No ventilator support    Rehab Therapies: Physical Therapy and Occupational Therapy  Weight Bearing Status/Restrictions: No weight bearing restrictions  Other Medical Equipment (for information only, NOT a DME order):  walker  Other Treatments: none    Patient's personal belongings (please select all that are sent with patient):  Marietta Kumar RN SIGNATURE:  Electronically signed by Josefina Neely RN on 5/10/22 at 12:17 PM EDT    CASE MANAGEMENT/SOCIAL WORK SECTION    Inpatient Status Date: 05/07/22    Readmission Risk Assessment Score:  Readmission Risk              Risk of Unplanned Readmission:  9           Discharging to Facility/ Agency   Name: [facility]   Address: [address] [place] [address]  Phone: [phone]  Fax: [phone]    Dialysis Facility (if applicable)   Name:  Address:  Dialysis Schedule:  Phone:  Fax:    / signature: Electronically signed by GABRIELE Walters on 5/10/2022 at 7:30 AM     PHYSICIAN SECTION    Prognosis: Good    Condition at Discharge: Stable    Rehab Potential (if transferring to Rehab): Good    Recommended Labs or Other Treatments After Discharge: monitor dressing    Physician Certification: I certify the above information and transfer of Rachel Garner  is necessary for the continuing treatment of the diagnosis listed and that she requires [facility] for *** 30 days.      Update Admission H&P: No change in H&P    PHYSICIAN SIGNATURE:  Electronically signed by ROMY Hannon on 5/10/22 at 12:42 PM EDT

## 2022-05-09 NOTE — CARE COORDINATION
DISCHARGE/PLANNING EVALUATION  5/9/22, 11:13 AM EDT    Reason for Referral: ecf   Mental Status: alert, oriented  Decision Making: makes own decisions with support from    Family/Social/Home Environment:  Toni Richmond lives in a [facility] group home ([address]). Staff assists with meals, housekeeping,  helps arrange transportation. Toni Richmond has been managing her own personal care. Plan prior to admission was [facility] at discharge. Current Services including food security, transportation and housekeeping:  [facility]  Current Equipment: walker   Payment Source: medicare , medicaid   Concerns or Barriers to Discharge:  Referral to [facility], facility staff will meet with Lesly today prior to accepting   Post acute provider list with quality measures, geographic area and applicable managed care information provided. Questions regarding selection process answered: declined list, requests [facility]     Teach Back Method used with patient regarding care plan and discharge plan  Patient and care manager  verbalize understanding of the plan of care and contribute to goal setting. Patient goals, treatment preferences and discharge plan:  Spoke with care manager, Janice Aguilar, who shares that she spoke with [facility] staff prior to admission to plan on admission to [facility]. Surgery was changed from O to [address] [place] [address]. Lesly and , Janice Aguilar, are requesting [facility]. Referral made, facility will review on site with patient today before accepting. Spoke with Toni Richmond, she confirms her plan is for [facility] and would like her care manager, Janice Aguilar to help with this plan.       Electronically signed by GABRIELE Osorio on 5/9/2022 at 11:13 AM
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
## 2022-05-09 NOTE — PROGRESS NOTES
INTERNAL MEDICINE Progress Note  5/9/2022 6:59 PM  Subjective:   Admit Date: 5/6/2022  PCP: Aryan Dickens DO  Interval History:  Doing well, no HA, no legs weakness    Objective:   Vitals: /82   Pulse 93   Temp 98 °F (36.7 °C) (Oral)   Resp 18   Ht 5' 2\" (1.575 m)   Wt 181 lb 6.4 oz (82.3 kg)   LMP 04/20/2022   SpO2 97%   BMI 33.18 kg/m²   General appearance: alert and cooperative with exam  HEENT: Head: atraumatic  Neck: no adenopathy, no carotid bruit and no JVD  Lungs: clear to auscultation bilaterally  Heart: regular rate and rhythm and S1, S2 normal  Abdomen: soft, non-tender; bowel sounds normal; no masses,  no organomegaly  Extremities: no edema, redness or tenderness in the calves or thighs  Neurologic: Mental status: Alert, oriented, thought content appropriate      Medications:   Scheduled Meds:   ARIPiprazole  20 mg Oral Daily    levothyroxine  50 mcg Oral Daily    venlafaxine  75 mg Oral Daily    sodium chloride flush  5-40 mL IntraVENous 2 times per day    acetaminophen  650 mg Oral Q6H    polyethylene glycol  17 g Oral Daily    bisacodyl  5 mg Oral Daily    sennosides-docusate sodium  1 tablet Oral BID     Continuous Infusions:   sodium chloride      sodium chloride Stopped (05/07/22 1031)       Lab Results:   CBC:   Recent Labs     05/07/22  0621 05/08/22  0625 05/09/22  0631   HGB 12.5 11.5* 12.1     BMP:    Recent Labs     05/07/22  0621 05/08/22  0625 05/09/22  0631    139 138   K 4.5 4.3 4.7    107 105   CO2 21* 25 24   BUN 9 7 7   CREATININE 0.7 0.6 0.5   GLUCOSE 119* 91 155*     Lipids: Magnesium:    Lab Results   Component Value Date    MG 1.9 11/03/2018     TSH:    Lab Results   Component Value Date    TSH 1.050 09/22/2021           Assessment and Plan:   1. Lumbar stenosis without neurogenic claudication  2. S/p L5-S1 decompression and fusion  3. Hypothyroidism  4. Depression on abilify    Cont post op care  Bowel regimen.   Cont thyroid supplement. BOAZ.     Rosey Gregory MD, MD

## 2022-05-09 NOTE — PLAN OF CARE
Problem: Discharge Planning  Goal: Discharge to home or other facility with appropriate resources  5/9/2022 0939 by Juanjose Stanton RN  Outcome: Progressing  5/9/2022 0346 by Tanvi Corrales RN  Outcome: Progressing  Flowsheets  Taken 5/9/2022 0346 by Tanvi Corrales RN  Discharge to home or other facility with appropriate resources:   Identify barriers to discharge with patient and caregiver   Identify discharge learning needs (meds, wound care, etc)  Taken 5/8/2022 1618 by Shantal Reyna RN  Discharge to home or other facility with appropriate resources: Identify barriers to discharge with patient and caregiver     Problem: Pain  Goal: Verbalizes/displays adequate comfort level or baseline comfort level  5/9/2022 0939 by Juanjose Stanton RN  Outcome: Progressing  Flowsheets (Taken 5/9/2022 1693)  Verbalizes/displays adequate comfort level or baseline comfort level:   Encourage patient to monitor pain and request assistance   Assess pain using appropriate pain scale   Administer analgesics based on type and severity of pain and evaluate response   Implement non-pharmacological measures as appropriate and evaluate response   Consider cultural and social influences on pain and pain management   Notify Licensed Independent Practitioner if interventions unsuccessful or patient reports new pain  Note: Pt report pain at 7 on scale. Pt states oral/iv/im medication helping to achieve pain goal of a 2 on scale. 5/9/2022 0346 by Tanvi Corrales RN  Flowsheets (Taken 5/9/2022 6882)  Verbalizes/displays adequate comfort level or baseline comfort level:   Encourage patient to monitor pain and request assistance   Assess pain using appropriate pain scale   Administer analgesics based on type and severity of pain and evaluate response   Implement non-pharmacological measures as appropriate and evaluate response  Note: Ongoing assessment & interventions provided throughout shift.   Reminded patient to report any pain, pressure, or shortness of breath to the nurse. Pain medications provided per physician's orders. Patient had 7/10 back pain. Nonpharmacological measures include rest and repositioning. Problem: Musculoskeletal - Adult  Goal: Return ADL status to a safe level of function  5/9/2022 0939 by Nina Babb RN  Outcome: Progressing  5/9/2022 0346 by Roxane Horvath RN  Outcome: Progressing  Flowsheets  Taken 5/9/2022 0346 by Roxane Horvath RN  Return ADL Status to a Safe Level of Function:   Administer medication as ordered   Assess activities of daily living deficits and provide assistive devices as needed   Obtain physical therapy/occupational therapy consults as needed  Taken 5/8/2022 1618 by Shantal Reyna RN  Return ADL Status to a Safe Level of Function: Administer medication as ordered  Note: Patient is working with PT and OT. Patient uses her back brace and walker correctly when ambulating. Problem: Anxiety  Goal: Will report anxiety at manageable levels  Description: INTERVENTIONS:  1. Administer medication as ordered  2. Teach and rehearse alternative coping skills  3. Provide emotional support with 1:1 interaction with staff  5/9/2022 0939 by Nina Babb RN  Outcome: Progressing  5/9/2022 0346 by Roxane Horvath RN  Outcome: Progressing  Flowsheets (Taken 5/9/2022 0346)  Will report anxiety at manageable levels:   Administer medication as ordered   Teach and rehearse alternative coping skills   Provide emotional support with 1:1 interaction with staff  Note: Patient listened to music when feeling anxious and did not require medication for anxiety during this shift. Problem: Self Care Deficit  Goal: Return ADL status to a safe level of function  Description: INTERVENTIONS:  1. Administer medication as ordered  2. Assess ADL deficits and provide assistive devices as needed  3. Obtain PT/OT consults as needed  4.  Assist and instruct patient to increase activity and self care as

## 2022-05-09 NOTE — PROGRESS NOTES
[address] [address]  INPATIENT PHYSICAL THERAPY  DAILY NOTE  [facility] ORTHOPEDICS 7K - 7K-22/022-A    Time In: 8136  Time Out: 1123  Timed Code Treatment Minutes: 40 Minutes  Minutes: 44          Date: 2022  Patient Name: Stan Miller,  Gender:  female        MRN: [id number]  : 1980  (39 y.o.)     Referring Practitioner: ROMY Rincon  Diagnosis: Lumbar stenosis without neurogenic claudication  Additional Pertinent Hx: Pt is status post L5-S1 decompression and fusion  by Dr Seema Flynn. Prior Level of Function:  Lives With: Friend(s) (6 roomates in sober living home)  Type of Home: House  Home Layout: One level  Home Access: Stairs to enter with rails  Entrance Stairs - Number of Steps: 3-4  Entrance Stairs - Rails: None  Home Equipment: Walker, rolling   Bathroom Shower/Tub: Walk-in shower,Shower chair with back  Bathroom Toilet: Standard  Bathroom Equipment: Shower chair  Bathroom Accessibility: Accessible    ADL Assistance: Independent  Homemaking Assistance: Independent  Ambulation Assistance: Independent  Transfer Assistance: Independent  Active : No  Additional Comments: Pt reports high PLOFand did not use AD prior but does have RW if needed. Restrictions/Precautions:  Restrictions/Precautions: Surgical Protocols,Fall Risk  Required Braces or Orthoses  Spinal: Lumbar Corset  Position Activity Restriction  Spinal Precautions: No Twisting,No Lifting,No Bending     SUBJECTIVE: RN approved session. Pt up with tech upon arrival initially very emotional and expressing frustrations about life and her stay at the hospital. Pt did eventually calm down and mood did improve with discussion and progression of PT session. Pt pleasant and cooperative.      PAIN: did report back pain and pain down into R thigh/hip    Vitals: Vitals not assessed per clinical judgement, see nursing flowsheet    OBJECTIVE:  Bed Mobility:  Supine to Sit: Stand By Assistance  Sit to Supine: Stand By Assistance Scooting: Stand By Assistance  Extra time to complete, did complete 2x during session    Transfers:  Sit to Stand: Stand By Assistance  Stand to Sit:Stand By Assistance    Ambulation:  Stand By Assistance  Distance: 80ft 15ftx2  Surface: Level Tile  Device:Rolling Walker  Gait Deviations: Forward Flexed Posture, Slow Dana, Decreased Step Length Bilaterally, Decreased Gait Speed, Decreased Heel Strike Bilaterally, Mild Path Deviations, Unsteady Gait and Decreased Terminal Knee Extension    Balance:    Pt completed functional tasks in bathroom with assist for stability and safety. Pt able to complete reaching tasks with double hand release from AD. Pt stood for ~3 mins. Pt demos 0 LOB. Exercise:  Patient was guided in 1 set(s) 10 reps of exercise to both lower extremities. Ankle pumps, Glut sets, Quad sets, Hooklying hip abduction/adduction and Pelvic tilts. Exercises were completed for increased independence with functional mobility. Provided and reviewed HEP    Functional Outcome Measures: Completed  AM-PAC Inpatient Mobility Raw Score : 18  AM-PAC Inpatient T-Scale Score : 43.63    ASSESSMENT:  Assessment: Patient progressing toward established goals. Activity Tolerance:  Patient tolerance of  treatment: good. Pt tolerated session well. Pt demos decreased endurane strength and independence with mobiity.  Pt will benefit from cont PT at this time     Equipment Recommendations:Equipment Needed: No  Discharge Recommendations: Continue to assess pending progress, Home with Assist as Needed and Home with Home Health PT  Plan: Current Treatment Recommendations: Strengthening,Functional mobility training,Transfer training,Stair training,Gait training,Patient/Caregiver education & training,Safety education & training,Home exercise program  Plan:  (6x O)    Patient Education  Patient Education: Plan of Care, Home Exercise Program, Precautions/Restrictions, Bed Mobility, Transfers, Gait, Verbal Exercise Instruction    Goals:  Patient goals : to get better  Short Term Goals  Time Frame for Short term goals: by discharge  Short term goal 1: Pt to transfer supine <--> sit mod I to enable pt to get in/out of bed. Short term goal 2: Pt to transfer sit <--> stand mod I for increased functional mobility. Short term goal 3: Pt to ambulate >300 feet with RW mod I for household ambulation. Short term goal 4: Pt to ascend/descend 4 steps with HR SBA for home entry. Long Term Goals  Time Frame for Long term goals : NA due to short length of stay    Following session, patient left in safe position with all fall risk precautions in place.
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
## 2022-05-09 NOTE — PROGRESS NOTES
Department of Orthopedic Surgery  Spine Service  Attending Progress Note        Subjective:  Continues to have back pain with intermittent pain into the right leg. Ambulated around room. (+) small BM. Vitals  VITALS:  /80   Pulse 66   Temp 98.3 °F (36.8 °C) (Oral)   Resp 16   Ht 5' 2\" (1.575 m)   Wt 181 lb 6.4 oz (82.3 kg)   LMP 04/20/2022   SpO2 95%   BMI 33.18 kg/m²   24HR INTAKE/OUTPUT:      Intake/Output Summary (Last 24 hours) at 5/9/2022 9947  Last data filed at 5/9/2022 0300  Gross per 24 hour   Intake 965 ml   Output 200 ml   Net 765 ml     URINARY CATHETER OUTPUT (Bowen):     DRAIN/TUBE OUTPUT:  Closed/Suction Drain Back Accordion-Output (ml): 50 ml      PHYSICAL EXAM:    Orientation:  alert and oriented to person, place and time    Incision:  dressing in place, clean, dry, intact    Lower Extremity Motor :  quadriceps, extensor hallucis longus, dorsiflexion, plantarflexion 5/5 bilaterally  Lower Extremity Sensory:  Intact L1-S1    Flatus:  positive    ABNORMAL EXAM FINDINGS:  none    LABS:    HgB:    Lab Results   Component Value Date    HGB 12.1 05/09/2022       ASSESSMENT AND PLAN:    Post operative day 3 status post L5-S1 decompression and fusion    1:  Monitor labs and drain output  2:  Activity Level:  As tolerated with back brace.  PT/OT  3:  Pain Control: ok  4:  Discharge Planning:  Pending, patient wanting to go to [facility] - [address] [place] [address], [facility]

## 2022-05-09 NOTE — FLOWSHEET NOTE
05/09/22 1548   Encounter Summary   Encounter Overview/Reason  Spiritual/Emotional Needs   Service Provided For: Patient   Last Encounter  05/09/22   Complexity of Encounter Low   Begin Time 1445   End Time  1455   Total Time Calculated 10 min   Spiritual/Emotional needs   Type Spiritual Support   Assessment/Intervention/Outcome   Assessment Anxious; Tearful   Intervention Nurtured Hope   Outcome Coping   Assessment: In my encounter with the 39 yr old patient, while rounding  the unit 7K. I provided spiritual care to patient through conversation, I also came to assess the patients spiritual needs present. The pt was admitted due to lumbar stenosis. When the pt was walking the halls for therapy she was tearful. Interventions:  I provided prayer, emotional support and words of comfort.  provided a listening presence and encouraged pt to share their beliefs and how they support him during their hospitalization. Outcomes: The patient was encouraged and didnt share any further spiritual needs at this time. Plan:  Chaplains will follow-up at a later time for assessment of any spiritual care needs present.

## 2022-05-09 NOTE — PLAN OF CARE
Problem: Discharge Planning  Goal: Discharge to home or other facility with appropriate resources  Outcome: Progressing  Flowsheets  Taken 5/9/2022 0346 by Suhail Urbina RN  Discharge to home or other facility with appropriate resources:   Identify barriers to discharge with patient and caregiver   Identify discharge learning needs (meds, wound care, etc)     Problem: Pain  Goal: Verbalizes/displays adequate comfort level or baseline comfort level  Flowsheets (Taken 5/9/2022 0346)  Verbalizes/displays adequate comfort level or baseline comfort level:   Encourage patient to monitor pain and request assistance   Assess pain using appropriate pain scale   Administer analgesics based on type and severity of pain and evaluate response   Implement non-pharmacological measures as appropriate and evaluate response  Note: Ongoing assessment & interventions provided throughout shift. Reminded patient to report any pain, pressure, or shortness of breath to the nurse. Pain medications provided per physician's orders. Patient had 7/10 back pain. Nonpharmacological measures include rest and repositioning. Problem: Skin/Tissue Integrity - Adult  Goal: Incisions, wounds, or drain sites healing without S/S of infection  Recent Flowsheet Documentation  Taken 5/9/2022 0346 by Suhail Urbina RN  Incisions, Wounds, or Drain Sites Healing Without Sign and Symptoms of Infection: TWICE DAILY: Assess and document skin integrity     Problem: Musculoskeletal - Adult  Goal: Return ADL status to a safe level of function  Outcome: Progressing  Flowsheets  Taken 5/9/2022 0346 by Suhail Urbina RN  Return ADL Status to a Safe Level of Function:   Administer medication as ordered   Assess activities of daily living deficits and provide assistive devices as needed   Obtain physical therapy/occupational therapy consults as needed  Note: Patient is working with PT and OT. Patient uses her back brace and walker correctly when ambulating. Problem: Infection - Adult  Goal: Absence of infection at discharge  Recent Flowsheet Documentation  Taken 5/9/2022 0346 by Jacey Cancino RN  Absence of infection at discharge: Assess and monitor for signs and symptoms of infection     Problem: Anxiety  Goal: Will report anxiety at manageable levels  Description: INTERVENTIONS:  1. Administer medication as ordered  2. Teach and rehearse alternative coping skills  3. Provide emotional support with 1:1 interaction with staff  Outcome: Progressing  Flowsheets (Taken 5/9/2022 0346)  Will report anxiety at manageable levels:   Administer medication as ordered   Teach and rehearse alternative coping skills   Provide emotional support with 1:1 interaction with staff  Note: Patient listened to music when feeling anxious and did not require medication for anxiety during this shift. Problem: Self Care Deficit  Goal: Return ADL status to a safe level of function  Description: INTERVENTIONS:  1. Administer medication as ordered  2. Assess ADL deficits and provide assistive devices as needed  3. Obtain PT/OT consults as needed  4. Assist and instruct patient to increase activity and self care as tolerated  Recent Flowsheet Documentation  Taken 5/9/2022 0346 by Jacey Cancino RN  Return ADL Status to a Safe Level of Function:   Administer medication as ordered   Assess activities of daily living deficits and provide assistive devices as needed   Obtain physical therapy/occupational therapy consults as needed     Problem: Safety - Adult  Goal: Free from fall injury  Outcome: Progressing  Flowsheets (Taken 5/9/2022 0346)  Free From Fall Injury: Instruct family/caregiver on patient safety  Note: Patient remained free from falls this shift. Used call light appropriately. Patient participated in plan of care and contributed to goal setting.

## 2022-05-09 NOTE — CARE COORDINATION
5/9/22, 8:36 AM EDT  DISCHARGE PLANNING EVALUATION:    Salomón Singleton       Admitted: 5/6/2022/ 0641   Hospital day: 2   Location: [facility]22/022-A Reason for admit: Lumbar stenosis without neurogenic claudication [M48.061]  Lumbar stenosis with neurogenic claudication [M48.062]   PMH:  has a past medical history of Anxiety, Back pain, Colitis, Depression, Fibromyalgia, HPV (human papilloma virus) anogenital infection, Hypertension, OCD (obsessive compulsive disorder), Osteoarthritis (arthritis due to wear and tear of joints), PONV (postoperative nausea and vomiting), Seizure ([address] and Thyroid disease. Procedure: 5/6 L5-S1 decompression and fusion by Dr Edwar Lindsey  Barriers to Discharge:  POD #3, monitor drain output, bowel regimen, Decadron IV, back brace, PT/OT. Pain control. PCP: Landen Rojo DO  Readmission Risk Score: 7.2 ( )%  Patient's Healthcare Decision Maker: Legal Next of Kin    Patient Goals/Plan/Treatment Preferences:  patient wanting to go to [address] [place] [address] per PA. Spoke with Nelida Suh; she lives in sober living group home. IMM completed and pt agrees to go to [facility]. SW saw today; see her note. Transportation/Food Security/Housekeeping Addressed:  No issues identified.

## 2022-05-09 NOTE — PLAN OF CARE
Nutrition Problem #1: Moderate malnutrition,In context of chronic illness  Intervention: Food and/or Nutrient Delivery: Continue Current Diet,Start Oral Nutrition Supplement  Problem: Nutrition Deficit:  Goal: Optimize nutritional status  Outcome: Progressing

## 2022-05-10 VITALS
OXYGEN SATURATION: 98 % | HEART RATE: 86 BPM | WEIGHT: 181.4 LBS | TEMPERATURE: 98 F | BODY MASS INDEX: 33.38 KG/M2 | SYSTOLIC BLOOD PRESSURE: 142 MMHG | DIASTOLIC BLOOD PRESSURE: 83 MMHG | HEIGHT: 62 IN | RESPIRATION RATE: 18 BRPM

## 2022-05-10 LAB
ANION GAP SERPL CALCULATED.3IONS-SCNC: 9 MEQ/L (ref 8–16)
BUN BLDV-MCNC: 8 MG/DL (ref 7–22)
CALCIUM SERPL-MCNC: 8.8 MG/DL (ref 8.5–10.5)
CHLORIDE BLD-SCNC: 105 MEQ/L (ref 98–111)
CO2: 26 MEQ/L (ref 23–33)
CREAT SERPL-MCNC: 0.6 MG/DL (ref 0.4–1.2)
GFR SERPL CREATININE-BSD FRML MDRD: > 90 ML/MIN/1.73M2
GLUCOSE BLD-MCNC: 96 MG/DL (ref 70–108)
HCT VFR BLD CALC: 37.9 % (ref 37–47)
HEMOGLOBIN: 12.6 GM/DL (ref 12–16)
POTASSIUM SERPL-SCNC: 3.5 MEQ/L (ref 3.5–5.2)
SODIUM BLD-SCNC: 140 MEQ/L (ref 135–145)

## 2022-05-10 PROCEDURE — 97535 SELF CARE MNGMENT TRAINING: CPT

## 2022-05-10 PROCEDURE — 6370000000 HC RX 637 (ALT 250 FOR IP): Performed by: PHYSICIAN ASSISTANT

## 2022-05-10 PROCEDURE — 6370000000 HC RX 637 (ALT 250 FOR IP): Performed by: INTERNAL MEDICINE

## 2022-05-10 PROCEDURE — 85018 HEMOGLOBIN: CPT

## 2022-05-10 PROCEDURE — 97530 THERAPEUTIC ACTIVITIES: CPT

## 2022-05-10 PROCEDURE — 36415 COLL VENOUS BLD VENIPUNCTURE: CPT

## 2022-05-10 PROCEDURE — 97116 GAIT TRAINING THERAPY: CPT

## 2022-05-10 PROCEDURE — 2580000003 HC RX 258: Performed by: PHYSICIAN ASSISTANT

## 2022-05-10 PROCEDURE — 80048 BASIC METABOLIC PNL TOTAL CA: CPT

## 2022-05-10 PROCEDURE — 97110 THERAPEUTIC EXERCISES: CPT

## 2022-05-10 PROCEDURE — 85014 HEMATOCRIT: CPT

## 2022-05-10 RX ADMIN — NALOXEGOL OXALATE 12.5 MG: 12.5 TABLET, FILM COATED ORAL at 07:34

## 2022-05-10 RX ADMIN — POLYETHYLENE GLYCOL 3350 17 G: 17 POWDER, FOR SOLUTION ORAL at 07:36

## 2022-05-10 RX ADMIN — LEVOTHYROXINE SODIUM 50 MCG: 0.05 TABLET ORAL at 07:32

## 2022-05-10 RX ADMIN — ACETAMINOPHEN 650 MG: 325 TABLET ORAL at 00:21

## 2022-05-10 RX ADMIN — SODIUM CHLORIDE, PRESERVATIVE FREE 10 ML: 5 INJECTION INTRAVENOUS at 07:34

## 2022-05-10 RX ADMIN — ARIPIPRAZOLE 20 MG: 10 TABLET ORAL at 07:32

## 2022-05-10 RX ADMIN — CYCLOBENZAPRINE 10 MG: 10 TABLET, FILM COATED ORAL at 01:51

## 2022-05-10 RX ADMIN — CYCLOBENZAPRINE 10 MG: 10 TABLET, FILM COATED ORAL at 07:36

## 2022-05-10 RX ADMIN — VENLAFAXINE HYDROCHLORIDE 75 MG: 75 CAPSULE, EXTENDED RELEASE ORAL at 07:32

## 2022-05-10 RX ADMIN — OXYCODONE 10 MG: 5 TABLET ORAL at 06:09

## 2022-05-10 RX ADMIN — SENNOSIDES AND DOCUSATE SODIUM 2 TABLET: 50; 8.6 TABLET ORAL at 07:36

## 2022-05-10 RX ADMIN — SENNOSIDES AND DOCUSATE SODIUM 2 TABLET: 50; 8.6 TABLET ORAL at 00:22

## 2022-05-10 RX ADMIN — OXYCODONE 10 MG: 5 TABLET ORAL at 12:04

## 2022-05-10 RX ADMIN — OXYCODONE 10 MG: 5 TABLET ORAL at 00:22

## 2022-05-10 RX ADMIN — ACETAMINOPHEN 650 MG: 325 TABLET ORAL at 06:09

## 2022-05-10 RX ADMIN — ACETAMINOPHEN 650 MG: 325 TABLET ORAL at 12:04

## 2022-05-10 RX ADMIN — SODIUM CHLORIDE, PRESERVATIVE FREE 10 ML: 5 INJECTION INTRAVENOUS at 00:22

## 2022-05-10 RX ADMIN — BISACODYL 5 MG: 5 TABLET, COATED ORAL at 07:32

## 2022-05-10 ASSESSMENT — PAIN SCALES - GENERAL
PAINLEVEL_OUTOF10: 9
PAINLEVEL_OUTOF10: 6
PAINLEVEL_OUTOF10: 7
PAINLEVEL_OUTOF10: 7
PAINLEVEL_OUTOF10: 9

## 2022-05-10 NOTE — CARE COORDINATION
5/10/22, 2:05 PM EDT    DISCHARGE PLANNING EVALUATION      Spoke with [facility], can accept today. Brandon Leblanculevard faxed. Spoke with Dee and , Shyanne Mason. They are in agreement with [facility] today. ambulette transport scheduled for 2:30.      5/10/22, 2:06 PM EDT    Patient goals/plan/ treatment preferences discussed by  and . Patient goals/plan/ treatment preferences reviewed with patient/ family. Patient/ family verbalize understanding of discharge plan and are in agreement with goal/plan/treatment preferences. Understanding was demonstrated using the teach back method. AVS provided by RN at time of discharge, which includes all necessary medical information pertaining to the patients current course of illness, treatment, post-discharge goals of care, and treatment preferences. Services At/After Discharge: [facility]) and In [facility]       IMM Letter  IMM Letter given to Patient/Family/Significant other/Guardian/POA/by[de-identified] CM:  Lauryn Valente RN  IMM Letter date given[de-identified] 05/09/22  IMM Letter time given[de-identified] 0758

## 2022-05-10 NOTE — PROGRESS NOTES
[facility]  Occupational Therapy  Daily Note  Time:   Time In: 0720  Time Out: 0800  Timed Code Treatment Minutes: 40 Minutes  Minutes: 40          Date: 5/10/2022  Patient Name: Kiley Naranjo,   Gender: female      Room: [facility]  MRN: [id number]  : 1980  (39 y.o.)  Referring Practitioner: ROMY Tee  Diagnosis: Lumbar Stenosis without Neurogenic Claudication  Additional Pertinent Hx: Pt. is a 39year old female whom underwent L5-S1 decompression and fusion on 22 by Dr. Susie Benton. Restrictions/Precautions:  Restrictions/Precautions: Surgical Protocols,Fall Risk  Required Braces or Orthoses  Spinal: Lumbar Corset  Position Activity Restriction  Spinal Precautions: No Twisting,No Lifting,No Bending     SUBJECTIVE: Patient sidelying, supine in bed upon arrival; agreeable to therapy this date. Patient pleasant and cooperative throughout session. PAIN: 8/10:     Vitals: Nurse checked vitals prior to session    COGNITION: WFL    ADL:   Grooming: Stand By Assistance. at sink with RW to wash hands  Upper Extremity Dressing: Stand By Assistance, with set-up and with increased time for completion. José/doff lumbar corset seated  Lower Extremity Dressing: Stand By Assistance, X 1 and with increased time for completion. Doff/josé B socks seated EOB  Toileting: Minimal Assistance. Clothing management d/t drain  Toilet Transfer: Stand By Assistance. Standard toilet. BALANCE:  Sitting Balance:  Supervision. Standing Balance: Stand By Assistance, X 1. RW, no LOB, verbal cues for RW placement for safety; demonstrating understanding    BED MOBILITY:  Supine to Sit: Stand By Assistance, X 1, with head of bed flat, with rail, with verbal cues , with increased time for completion      TRANSFERS:  Sit to Stand:  Stand By Assistance, X 1, with increased time for completion. Stand to Sit: Stand By Assistance.       FUNCTIONAL MOBILITY:  Assistive Device: Rolling Walker  Assist Level:  Stand By Assistance. Distance: To and from bathroom and ~250 feet   Slow pace, no LOB, x3 minimal standing rest breaks     ADDITIONAL ACTIVITIES:  Provided patient education regarding AE with reacher/shower chair in order to increase independence and safety with ADLs/IADLs; verbalizing understanding. .    Patient able to verbalize/demonstrated 3/3 spinal precautions throughout session independently. ASSESSMENT:     Activity Tolerance:  Patient tolerance of  treatment: good. Discharge Recommendations: Subacute/skilled nursing facility  Equipment Recommendations: Other: continue t assess pending pt's progression  Plan: Times per Week: 6x/wk  Current Treatment Recommendations: Strengthening,Neuromuscular re-education,Equipment evaluation, education, & procurement,Balance training,Functional mobility training,Safety education & training,Endurance training,Patient/Caregiver education & training,Self-Care / ADL    Patient Education  Patient Education: Role of OT, Plan of Care, ADL's, IADL's, Precautions, Equipment Education, Home Safety, Importance of Increasing Activity and Assistive Device Safety    Goals  Short Term Goals  Time Frame for Short term goals: by time of d/c  Short Term Goal 1: Pt. to complete toilet transfer/toileting with Supervision with min vc for safety to progress to PLOF. Short Term Goal 2: Sinkside grooming to be performed with Supervision with good adherence to spinal precuations to enhance I with ADLs. Short Term Goal 3: Pt. to don/doff back brace with Supervision with good carryover of technique. Short Term Goal 4: Pt. to complete LB ADLs with use of AE prn with Supervision to enhance I with ADLs. Long Term Goals  Time Frame for Long term goals : No LTG d/t ELOS. Following session, patient left in safe position with all fall risk precautions in place.

## 2022-05-10 NOTE — PROGRESS NOTES
Patient alert and oriented; vital signs stable. Discharge instructions reviewed with Estelita Powers RN at [facility]. Discharge packet/AVS, last dose MAR, and scripts sent with patient and LACP. Telemetry and IV removed. All belongings with patient, LACP transportation provided.

## 2022-05-10 NOTE — DISCHARGE SUMMARY
Discharge Summary        Date of Admission: 5/6/22   Date of Discharge: 5/10/22     Admitting Physician: Jami Elizabeth MD  Consults: IM     PREOPERATIVE DIAGNOSES:  1.  L5-S1 spondylolisthesis, grade 1  2.  L5-S1 lumbar stenosis with neurogenic claudication  3.  Obesity, BMI 33.18     POSTOPERATIVE DIAGNOSES:  1.  L5-S1 spondylolisthesis, grade 1  2.  L5-S1 lumbar stenosis with neurogenic claudication  3.  Obesity, BMI 33.18     OPERATION PERFORMED:  1.  L5-S1 bilateral laminectomy, partial medial facetectomies and foraminotomies of L5 and S1 nerve roots along with total facetectomies at L5-S1 bilaterally for complete decompression of the bilateral L5 nerve roots  2.  L5-S1 posterior spinal fusion. 3.  L5-S1 posterior spinal instrumentation, Streamline, SurgAlign instrumentation. 4.  Use of local autograft bone      SURGEON: Geovani Millan MD     INDICATIONS:  This is a 39 y.o. female with refractory back and bilateral leg pain with tingling from degenerative spondylolisthesis and lumbar stenosis from L5-S1.  Patient has failed full conservative therapy including medication management, physical therapy, and epidural steroid injections. Due to the persistence of symptoms and reduction in the ADLs, patient elected surgical treatment. Patient, therefore, understood indications for the surgery as well as its risks, benefits, and alternatives.  These risks include but are not limited to paralysis, infection, hematoma, dural tear, nerve root injury, nonunion, DVT/PE, stroke, MI, death etc.  All questions were answered. Informed consent was obtained. HOSPITAL COURSE:   The patient was admitted on the above date had the above procedure performed. Patient had no complications during surgery. The patient was then transferred to the PACU and to a regular nursing floor for postop care.  The patient's pain was initially controlled with IV medications, but we were able to transition to oral pain medications soon after arrival to the floor. Internal Medicine was consulted for medical management. POD#1 Pt was doing well. Patient had improvement of their radicular symptoms. Muscle strength 5/5 and sensation intact. Pt requested discharge to SNF. POD#2 Pt c/o back pain and left groin/thigh pain. She ambulated around the room with no issues. She was started on decadron for LLE sxs. She was also given a suppository for constipation. POD#3 Pt continued to have back pain and intermittent leg pain. She was able to have bowel movement. POD#4 No new issues. Hemovac drain was low enough it could be removed. The patient was seen by Physical Therapy and was found to be an ideal candidate for discharge to [facility]. The patient was accepted to [facility]. The patient was then safely discharged to [facility] on the above date. PHYSICAL EXAMINATION ON DISCHARGE:   The patient was afebrile, stable. Dressing was clean, dry and intact. 5/5 strength in bilateral lower extremities. Neurologically intact. DISCHARGE INSTRUCTIONS:   Patient can resume regular diet. Resume home medications except for NSAIDs or blood thinners. Resume asa POD#2 and all other blood thinners POD#5. Okay to resume NSAIDs 6 months after surgery. Take previously prescribed narcotic pain medications as directed. Change the dressing daily until the incision is clean and dry and then leave open to air. Okay to take a shower without submerging the incision. Wear the brace at all times when up and ambulating. Limit any heavy lifting, bending or twisting until first postoperative visit. Patient to follow up with Dr. Ugo Avendaño 6 weeks post discharge as scheduled.     CONDITION AT DISCHARGE:  Stable    DISPOSITION:   [facility] - [address] [place] [address]

## 2022-05-10 NOTE — PROGRESS NOTES
[facility]  INPATIENT PHYSICAL THERAPY  DAILY NOTE  [facility] ORTHOPEDICS 7K - 7K-22/022-A    Time In: 920  Time Out: 3663  Timed Code Treatment Minutes: 32 Minutes  Minutes: 31          Date: 5/10/2022  Patient Name: Parvez Batista,  Gender:  female        MRN: [id number]  : 1980  (39 y.o.)     Referring Practitioner: ROMY Tate  Diagnosis: Lumbar stenosis without neurogenic claudication  Additional Pertinent Hx: Pt is status post L5-S1 decompression and fusion  by Dr Cecilia Coreas. Prior Level of Function:  Lives With: Friend(s) (6 roomates in sober living home)  Type of Home: House  Home Layout: One level  Home Access: Stairs to enter with rails  Entrance Stairs - Number of Steps: 3-4  Entrance Stairs - Rails: None  Home Equipment: Walker, rolling   Bathroom Shower/Tub: Walk-in shower,Shower chair with back  Bathroom Toilet: Standard  Bathroom Equipment: Shower chair  Bathroom Accessibility: Accessible    ADL Assistance: Independent  Homemaking Assistance: Independent  Ambulation Assistance: Independent  Transfer Assistance: Independent  Active : No  Additional Comments: Pt reports high PLOFand did not use AD prior but does have RW if needed. Restrictions/Precautions:  Restrictions/Precautions: Surgical Protocols,Fall Risk  Required Braces or Orthoses  Spinal: Lumbar Corset  Position Activity Restriction  Spinal Precautions: No Twisting,No Lifting,No Bending     SUBJECTIVE: RN approved session. Pt in bed upon arrival and agrees to therapy pt did reqeust to use restroom at beginning of session, reporting she had a Bm.     PAIN: back, did not quantify- reporting pain was worse earlier and ovrnight    Vitals: Vitals not assessed per clinical judgement, see nursing flowsheet    OBJECTIVE:  Bed Mobility:  Supine to Sit: Stand By Assistance, with increased time for completion  Sit to Supine: Stand By Assistance, with increased time for completion   Scooting: Stand By Assistance, with increased time for completion  Pt needing cues and reminders for spinal precautions/restrictions- pt turning and twisting mult times, pt demos poor logroll technique as well  Transfers:  Sit to Stand: Stand By Assistance, with increased time for completion  Stand to Sit:Stand By Assistance- 1 LOB noted with poor alignment to chair and pt did not reach hands back- sat impulsively. Ambulation:  Stand By Assistance, with verbal cues , with increased time for completion  Distance: 50ft 30ft 15ft extended seated rest breaks between each bout  Surface: Level Tile  Device:Rolling Walker  Gait Deviations: Forward Flexed Posture, Slow Dana, Decreased Step Length Bilaterally, Decreased Gait Speed, Decreased Heel Strike Bilaterally, Mild Path Deviations, Unsteady Gait and Decreased Terminal Knee Extension    Balance:    Pt completed functional tasks in bathroom with assist for stability and safety. Pt able to complete reaching tasks with double hand release from AD. Pt stood for ~3.5 mins. Pt demos 0 LOB. Exercise:  Patient was guided in 1 set(s) 10 reps of exercise to both lower extremities. Glut sets, Seated heel/toe raises, Long arc quads, Seated isometric hip adduction and Scapular retraction. Exercises were completed for increased independence with functional mobility. Functional Outcome Measures: Completed  -PAC Inpatient Mobility Raw Score : 18  AM-PAC Inpatient T-Scale Score : 43.63    ASSESSMENT:  Assessment: Patient progressing toward established goals. Activity Tolerance:  Patient tolerance of  treatment: good. Pt demos decreased safety with mobility, decreased carryover with restrictions and will benefit from cont PT at this time to imrpove overall function and independence with mobility.      Equipment Recommendations:Equipment Needed: No  Discharge Recommendations: Subacte/Skilled Nursing Facility/ 24 hour supervision or assist  Plan: Current Treatment Recommendations: Strengthening,Functional mobility training,Transfer training,Stair training,Gait training,Patient/Caregiver education & training,Safety education & training,Home exercise program  Plan:  (6x O)    Patient Education  Patient Education: Plan of Care, Precautions/Restrictions, Bed Mobility, Transfers, Gait, Verbal Exercise Instruction    Goals:  Patient goals : to get better  Short Term Goals  Time Frame for Short term goals: by discharge  Short term goal 1: Pt to transfer supine <--> sit mod I to enable pt to get in/out of bed. Short term goal 2: Pt to transfer sit <--> stand mod I for increased functional mobility. Short term goal 3: Pt to ambulate >300 feet with RW mod I for household ambulation. Short term goal 4: Pt to ascend/descend 4 steps with HR SBA for home entry. Long Term Goals  Time Frame for Long term goals : NA due to short length of stay    Following session, patient left in safe position with all fall risk precautions in place.

## 2022-05-10 NOTE — PLAN OF CARE
Problem: Discharge Planning  Goal: Discharge to home or other facility with appropriate resources  5/10/2022 1213 by Diogo Esparza RN  Outcome: Completed  5/10/2022 0904 by Diogo Esparza RN  Outcome: Progressing  5/9/2022 2355 by Anthony House RN  Outcome: Progressing  Flowsheets (Taken 5/9/2022 0346 by Starr Caro RN)  Discharge to home or other facility with appropriate resources:   Identify barriers to discharge with patient and caregiver   Identify discharge learning needs (meds, wound care, etc)     Problem: Pain  Goal: Verbalizes/displays adequate comfort level or baseline comfort level  5/10/2022 1213 by Diogo Esparza RN  Outcome: Completed  5/10/2022 0904 by Diogo Esparza RN  Outcome: Progressing  Flowsheets (Taken 5/9/2022 0805)  Verbalizes/displays adequate comfort level or baseline comfort level:   Encourage patient to monitor pain and request assistance   Assess pain using appropriate pain scale   Administer analgesics based on type and severity of pain and evaluate response   Implement non-pharmacological measures as appropriate and evaluate response   Consider cultural and social influences on pain and pain management   Notify Licensed Independent Practitioner if interventions unsuccessful or patient reports new pain  Note: Pt report pain at 7 on scale. Pt states oral/iv/im medication helping to achieve pain goal of a 3 on scale.     5/9/2022 2355 by Anthony House RN  Outcome: Progressing  Flowsheets (Taken 5/9/2022 0939 by Diogo Esparza RN)  Verbalizes/displays adequate comfort level or baseline comfort level:   Encourage patient to monitor pain and request assistance   Assess pain using appropriate pain scale   Administer analgesics based on type and severity of pain and evaluate response   Implement non-pharmacological measures as appropriate and evaluate response   Consider cultural and social influences on pain and pain management   Notify Licensed Independent Practitioner if interventions unsuccessful or patient reports new pain     Problem: Musculoskeletal - Adult  Goal: Return ADL status to a safe level of function  5/10/2022 1213 by Pia Ojeda RN  Outcome: Completed  5/10/2022 0904 by Pia Ojeda RN  Outcome: Progressing  5/9/2022 2355 by Jordy Hills RN  Outcome: Progressing  Flowsheets (Taken 5/9/2022 0346 by Ana Burnette RN)  Return ADL Status to a Safe Level of Function:   Administer medication as ordered   Assess activities of daily living deficits and provide assistive devices as needed   Obtain physical therapy/occupational therapy consults as needed     Problem: Safety - Adult  Goal: Free from fall injury  5/10/2022 1213 by Pia Ojeda RN  Outcome: Completed  5/10/2022 0904 by Pia Ojeda RN  Outcome: Progressing  5/9/2022 2355 by Jordy Hills RN  Outcome: Progressing  Flowsheets (Taken 5/9/2022 0346 by Ana Burnette RN)  Free From Fall Injury: Instruct family/caregiver on patient safety     Problem: Nutrition Deficit:  Goal: Optimize nutritional status  5/10/2022 1213 by Pia Ojeda RN  Outcome: Completed  5/10/2022 0904 by Pia Ojeda RN  Outcome: Progressing  5/9/2022 2355 by Jordy Hills RN  Outcome: Progressing  Flowsheets (Taken 5/9/2022 2355)  Nutrient intake appropriate for improving, restoring, or maintaining nutritional needs:   Monitor oral intake, labs, and treatment plans   Assess nutritional status and recommend course of action

## 2022-05-10 NOTE — PLAN OF CARE
Problem: Discharge Planning  Goal: Discharge to home or other facility with appropriate resources  5/10/2022 0904 by Pedro Camarena RN  Outcome: Progressing  5/9/2022 2355 by Ronald Baig RN  Outcome: Progressing  Flowsheets (Taken 5/9/2022 0346 by Lola Argueta RN)  Discharge to home or other facility with appropriate resources:   Identify barriers to discharge with patient and caregiver   Identify discharge learning needs (meds, wound care, etc)     Problem: Pain  Goal: Verbalizes/displays adequate comfort level or baseline comfort level  5/10/2022 0904 by Pedro Camarena RN  Outcome: Progressing  Flowsheets (Taken 5/9/2022 0939)  Verbalizes/displays adequate comfort level or baseline comfort level:   Encourage patient to monitor pain and request assistance   Assess pain using appropriate pain scale   Administer analgesics based on type and severity of pain and evaluate response   Implement non-pharmacological measures as appropriate and evaluate response   Consider cultural and social influences on pain and pain management   Notify Licensed Independent Practitioner if interventions unsuccessful or patient reports new pain  Note: Pt report pain at 7 on scale. Pt states oral/iv/im medication helping to achieve pain goal of a 3 on scale.     5/9/2022 2355 by Ronald Baig RN  Outcome: Progressing  Flowsheets (Taken 5/9/2022 0939 by Pedro Camarena RN)  Verbalizes/displays adequate comfort level or baseline comfort level:   Encourage patient to monitor pain and request assistance   Assess pain using appropriate pain scale   Administer analgesics based on type and severity of pain and evaluate response   Implement non-pharmacological measures as appropriate and evaluate response   Consider cultural and social influences on pain and pain management   Notify Licensed Independent Practitioner if interventions unsuccessful or patient reports new pain     Problem: Musculoskeletal - Adult  Goal: Return ADL status to a safe level of function  5/10/2022 0904 by Elizabeth Jj RN  Outcome: Progressing  5/9/2022 2355 by Clyde Ray RN  Outcome: Progressing  Flowsheets (Taken 5/9/2022 0346 by Jose Wheeler RN)  Return ADL Status to a Safe Level of Function:   Administer medication as ordered   Assess activities of daily living deficits and provide assistive devices as needed   Obtain physical therapy/occupational therapy consults as needed     Problem: Anxiety  Goal: Will report anxiety at manageable levels  Description: INTERVENTIONS:  1. Administer medication as ordered  2. Teach and rehearse alternative coping skills  3.  Provide emotional support with 1:1 interaction with staff  5/10/2022 0904 by Elizabeth Jj RN  Outcome: Progressing  5/9/2022 2355 by Clyde Ray RN  Outcome: Progressing  Flowsheets (Taken 5/9/2022 0346 by Jose Wheeler RN)  Will report anxiety at manageable levels:   Administer medication as ordered   Teach and rehearse alternative coping skills   Provide emotional support with 1:1 interaction with staff     Problem: Safety - Adult  Goal: Free from fall injury  5/10/2022 0904 by Elizabeth Jj RN  Outcome: Progressing  5/9/2022 2355 by Clyde Rya RN  Outcome: Progressing  Flowsheets (Taken 5/9/2022 0346 by Jose Wheeler RN)  Free From Fall Injury: Instruct family/caregiver on patient safety     Problem: Nutrition Deficit:  Goal: Optimize nutritional status  5/10/2022 0904 by Elizabeth Jj RN  Outcome: Progressing  Flowsheets (Taken 5/9/2022 2355 by Clyde Ray RN)  Nutrient intake appropriate for improving, restoring, or maintaining nutritional needs:   Monitor oral intake, labs, and treatment plans   Assess nutritional status and recommend course of action  5/9/2022 2355 by Clyde Ray RN  Outcome: Progressing  Flowsheets (Taken 5/9/2022 2355)  Nutrient intake appropriate for improving, restoring, or maintaining nutritional needs:   Monitor oral intake, labs, and treatment plans   Assess nutritional status and recommend course of action

## 2022-05-10 NOTE — PROGRESS NOTES
INTERNAL MEDICINE Progress Note  5/10/2022 10:31 AM  Subjective:   Admit Date: 5/6/2022  PCP: Olga Browning DO  Interval History:  No new c/o  BM+  no HA, no legs weakness    Objective:   Vitals: /84   Pulse 79   Temp 98.2 °F (36.8 °C) (Oral)   Resp 18   Ht 5' 2\" (1.575 m)   Wt 181 lb 6.4 oz (82.3 kg)   LMP 04/20/2022   SpO2 97%   BMI 33.18 kg/m²   General appearance: alert and cooperative with exam  HEENT: Head: atraumatic  Neck: no JVD  Lungs: clear to auscultation bilaterally  Heart: regular rate and rhythm and S1, S2 normal  Abdomen: soft, non-tender; bowel sounds normal; no masses,  no organomegaly  Extremities: no edema,  Neurologic: Alert, oriented, thought content appropriate      Medications:   Scheduled Meds:   sennosides-docusate sodium  2 tablet Oral BID    naloxegol  12.5 mg Oral QAM    ARIPiprazole  20 mg Oral Daily    levothyroxine  50 mcg Oral Daily    venlafaxine  75 mg Oral Daily    sodium chloride flush  5-40 mL IntraVENous 2 times per day    acetaminophen  650 mg Oral Q6H    polyethylene glycol  17 g Oral Daily    bisacodyl  5 mg Oral Daily     Continuous Infusions:   sodium chloride      sodium chloride Stopped (05/07/22 1031)       Lab Results:   CBC:   Recent Labs     05/08/22  0625 05/09/22  0631 05/10/22  0631   HGB 11.5* 12.1 12.6     BMP:    Recent Labs     05/08/22  0625 05/09/22  0631 05/10/22  0631    138 140   K 4.3 4.7 3.5    105 105   CO2 25 24 26   BUN 7 7 8   CREATININE 0.6 0.5 0.6   GLUCOSE 91 155* 96     Lipids: Magnesium:    Lab Results   Component Value Date    MG 1.9 11/03/2018     TSH:    Lab Results   Component Value Date    TSH 1.050 09/22/2021           Assessment and Plan:   1. Lumbar stenosis without neurogenic claudication  2. S/p L5-S1 decompression and fusion  3. Hypothyroidism  4. Depression on abilify    Cont post op care  Bowel regimen. Cont thyroid supplement.     Nely Lamb MD, MD

## 2022-05-10 NOTE — PLAN OF CARE
Problem: Discharge Planning  Goal: Discharge to home or other facility with appropriate resources  Outcome: Progressing  Flowsheets (Taken 5/9/2022 0346 by Corinna Traore RN)  Discharge to home or other facility with appropriate resources:   Identify barriers to discharge with patient and caregiver   Identify discharge learning needs (meds, wound care, etc)     Problem: Pain  Goal: Verbalizes/displays adequate comfort level or baseline comfort level  Outcome: Progressing  Flowsheets (Taken 5/9/2022 0939 by Parvin Martin RN)  Verbalizes/displays adequate comfort level or baseline comfort level:   Encourage patient to monitor pain and request assistance   Assess pain using appropriate pain scale   Administer analgesics based on type and severity of pain and evaluate response   Implement non-pharmacological measures as appropriate and evaluate response   Consider cultural and social influences on pain and pain management   Notify Licensed Independent Practitioner if interventions unsuccessful or patient reports new pain     Problem: Musculoskeletal - Adult  Goal: Return ADL status to a safe level of function  Outcome: Progressing  Flowsheets (Taken 5/9/2022 0346 by Corinna Traore RN)  Return ADL Status to a Safe Level of Function:   Administer medication as ordered   Assess activities of daily living deficits and provide assistive devices as needed   Obtain physical therapy/occupational therapy consults as needed     Problem: Anxiety  Goal: Will report anxiety at manageable levels  Description: INTERVENTIONS:  1. Administer medication as ordered  2. Teach and rehearse alternative coping skills  3.  Provide emotional support with 1:1 interaction with staff  Outcome: Dary Chand (Taken 5/9/2022 0346 by Corinna Traore, RN)  Will report anxiety at manageable levels:   Administer medication as ordered   Teach and rehearse alternative coping skills   Provide emotional support with 1:1 interaction with staff     Problem: Safety - Adult  Goal: Free from fall injury  Outcome: Progressing  Flowsheets (Taken 5/9/2022 0346 by Yung Ovalle RN)  Free From Fall Injury: Instruct family/caregiver on patient safety     Problem: Nutrition Deficit:  Goal: Optimize nutritional status  5/9/2022 1725 by Domingo Cancino RN  Outcome: Progressing  Flowsheets (Taken 5/9/2022 2355)  Nutrient intake appropriate for improving, restoring, or maintaining nutritional needs:   Monitor oral intake, labs, and treatment plans   Assess nutritional status and recommend course of action  5/9/2022 1617 by Antony Boast, RD, LD  Outcome: Progressing     Care plan reviewed with patient. Patient verbalizes understanding of the plan of care and contribute to goal setting.

## 2022-05-10 NOTE — PROGRESS NOTES
Department of Orthopedic Surgery  Spine Service  Attending Progress Note        Subjective:  No new issues. Continues to have back pain with intermittent pain into the right leg. Tingling in LLE, L5 distribution. Ambulated around room. (+) small BM. Vitals  VITALS:  BP (!) 131/92   Pulse 70   Temp 98.2 °F (36.8 °C) (Oral)   Resp 18   Ht 5' 2\" (1.575 m)   Wt 181 lb 6.4 oz (82.3 kg)   LMP 04/20/2022   SpO2 100%   BMI 33.18 kg/m²   24HR INTAKE/OUTPUT:      Intake/Output Summary (Last 24 hours) at 5/10/2022 0655  Last data filed at 5/9/2022 2000  Gross per 24 hour   Intake 1160 ml   Output 70 ml   Net 1090 ml     URINARY CATHETER OUTPUT (Bowen):     DRAIN/TUBE OUTPUT:  Closed/Suction Drain Back Accordion-Output (ml): 70 ml      PHYSICAL EXAM:    Orientation:  alert and oriented to person, place and time    Incision:  dressing in place, clean, dry, intact    Lower Extremity Motor :  quadriceps, extensor hallucis longus, dorsiflexion, plantarflexion 5/5 bilaterally  Lower Extremity Sensory:  Intact L1-S1    Flatus:  positive    ABNORMAL EXAM FINDINGS:  none    LABS:    HgB:    Lab Results   Component Value Date    HGB 12.1 05/09/2022       ASSESSMENT AND PLAN:    Post operative day 4 status post L5-S1 decompression and fusion    1:  Monitor labs   2:  Activity Level:  As tolerated with back brace.  PT/OT  3:  Pain Control: ok  4:  Discharge Planning:  Pending, patient wanting to go to [facility]   5:  Remove hemovac drain      emory knox PA-C

## 2022-05-10 NOTE — CARE COORDINATION
5/10/22, 11:44 AM EDT    [facility] will accept patient at discharge. Discussed with care manager and RN. Waiting decision on discharge date.

## 2022-05-11 ENCOUNTER — TELEPHONE (OUTPATIENT)
Dept: FAMILY MEDICINE CLINIC | Age: 42
End: 2022-05-11

## 2022-05-11 PROBLEM — Z47.89 UNSPECIFIED ORTHOPEDIC AFTERCARE: Status: ACTIVE | Noted: 2022-05-11

## 2022-05-11 NOTE — TELEPHONE ENCOUNTER
S[facility] Transitions Initial Follow Up Call    Outreach made within 2 business days of discharge: Yes    Patient: Parvez Batista Patient : 1980   MRN: [id number]  Reason for Admission: There are no discharge diagnoses documented for the most recent discharge. Discharge Date: 5/10/22       Spoke with: Melita Tabor    Discharge department/facility: [facility] Interactive Patient Contact:  Was patient able to fill all prescriptions: Yes  Was patient instructed to bring all medications to the follow-up visit: Yes  Is patient taking all medications as directed in the discharge summary?  Yes  Does patient understand their discharge instructions: Yes  Does patient have questions or concerns that need addressed prior to 7-14 day follow up office visit: no  She has at [facility]     Scheduled appointment with PCP within 7-14 days    Follow Up  Future Appointments   Date Time Provider Mark Armstrong   6/10/2022  8:00 AM Madhu Klein,  [address]       [facility], [address] [place] [address] Kiya Orozco

## 2022-05-12 VITALS
BODY MASS INDEX: 34.2 KG/M2 | RESPIRATION RATE: 16 BRPM | HEART RATE: 97 BPM | TEMPERATURE: 97.7 F | SYSTOLIC BLOOD PRESSURE: 131 MMHG | WEIGHT: 187 LBS | DIASTOLIC BLOOD PRESSURE: 83 MMHG

## 2022-05-12 NOTE — PROGRESS NOTES
Ajay Landeros is a 39 y.o. female that is being seen at [facility] for [address] [facility]  1980  5/12/22      HPI:  Patient was not seen on 5/14/22 due to not being available on rounds. OPERATION PERFORMED:  1.  L5-S1 bilateral laminectomy, partial medial facetectomies and foraminotomies of L5 and S1 nerve roots along with total facetectomies at L5-S1 bilaterally for complete decompression of the bilateral L5 nerve roots  2.  L5-S1 posterior spinal fusion. 3.  L5-S1 posterior spinal instrumentation, Streamline, SurgAlign instrumentation. 4.  Use of local autograft bone      Joselyn Cross MD     INDICATIONS:  This is J 64 y.o. female with refractory back and bilateral leg pain with tingling from degenerative spondylolisthesis and lumbar stenosis from L5-S1.  Patient has failed full conservative therapy including medication management, physical therapy, and epidural steroid injections. Due to the persistence of symptoms and reduction in the ADLs, patient elected surgical treatment. Patient, therefore, understood indications for the surgery as well as its risks, benefits, and alternatives.  These risks include but are not limited to paralysis, infection, hematoma, dural tear, nerve root injury, nonunion, DVT/PE, stroke, MI, death etc.  All questions were answered. Informed consent was obtained.  [address]:   The patient was admitted on the above date had the above procedure performed. Patient had no complications during surgery. The patient was then transferred to the PACU and to a regular nursing floor for postop care. The patient's pain was initially controlled with IV medications, but we were able to transition to oral pain medications soon after arrival to the floor. Internal Medicine was consulted for medical management.     POD#1 Pt was doing well. Patient had improvement of their radicular symptoms. Muscle strength 5/5 and sensation intact.  Pt requested discharge to SNF.      POD#2 Pt c/o back pain and left groin/thigh pain. She ambulated around the room with no issues. She was started on decadron for LLE sxs. She was also given a suppository for constipation.      POD#3 Pt continued to have back pain and intermittent leg pain. She was able to have bowel movement.      POD#4 No new issues. Hemovac drain was low enough it could be removed. The patient was seen by Physical Therapy and was found to be an ideal candidate for discharge to SNF. The patient was accepted to [facility]. The patient was then safely discharged to [facility] on the above date.        I have reviewed the patient's past medical history, past surgical history, allergies,medications, social and family history and I have made updates where appropriate.     Past Medical History:   Diagnosis Date    Anxiety     Back pain     Colitis     Depression     Fibromyalgia     HPV (human papilloma virus) anogenital infection     Hypertension     OCD (obsessive compulsive disorder)     Osteoarthritis (arthritis due to wear and tear of joints)     PONV (postoperative nausea and vomiting)     Seizure ([address]     \" in [facility] in 2021 was checked out by EMS but not taken to hospital\"    Thyroid disease        Past Surgical History:   Procedure Laterality Date    CHOLECYSTECTOMY      INNER EAR SURGERY      LUMBAR FUSION N/A 5/6/2022    L5=S1 Decompression L5-S1 Posterior Fusion & TLIF performed by Severa Moan, MD at [address] [place] [address] History     Tobacco Use    Smoking status: Current Some Day Smoker     Packs/day: 0.50     Types: Cigarettes    Smokeless tobacco: Never Used   Vaping Use    Vaping Use: Former   Substance Use Topics    Alcohol use: Yes     Comment: occasionally    Drug use: Yes     Types: Cocaine     Comment: cocaine 4/1/2022, Kinsey Ng 2 weeks ago       Family History   Problem Relation Age of Onset    Diabetes Mother     Heart Disease Mother     Diabetes Father  Heart Disease Father     Cancer Paternal Aunt          Review of Systems        PHYSICAL EXAM:    /83   Pulse 97   Temp 97.7 °F (36.5 °C)   Resp 16   Wt 187 lb (84.8 kg)   LMP 04/20/2022   BMI 34.20 kg/m²       Physical Exam    ASSESSMENT & PLAN  Lesly MORRIS was seen today for back pain.     Diagnoses and all orders for this visit:    Lumbar stenosis with neurogenic claudication    Severe mixed bipolar 1 disorder without psychosis [address] [facility]    Subclinical hypothyroidism    Spinal stenosis:  Cont percocet  Bipolar:  Cont abilify, effexor, trazodone, hydroxyzine  Hypothyroidism  Cont levothyroxine      All copied or forwarded information in the progress note was verified by me to be accurate at the time of visit  Patient's past medical, surgical, social and family history were reviewed and updated

## 2022-05-13 ENCOUNTER — OUTSIDE SERVICES (OUTPATIENT)
Dept: FAMILY MEDICINE CLINIC | Age: 42
End: 2022-05-13

## 2022-05-13 DIAGNOSIS — M48.062 LUMBAR STENOSIS WITH NEUROGENIC CLAUDICATION: Primary | ICD-10-CM

## 2022-05-13 DIAGNOSIS — F31.63 SEVERE MIXED BIPOLAR 1 DISORDER WITHOUT PSYCHOSIS (HCC): Chronic | ICD-10-CM

## 2022-05-13 DIAGNOSIS — E03.8 SUBCLINICAL HYPOTHYROIDISM: ICD-10-CM

## 2022-05-16 DIAGNOSIS — E03.8 SUBCLINICAL HYPOTHYROIDISM: ICD-10-CM

## 2022-05-16 RX ORDER — LEVOTHYROXINE SODIUM 0.05 MG/1
50 TABLET ORAL DAILY
Qty: 28 TABLET | Refills: 3 | Status: SHIPPED | OUTPATIENT
Start: 2022-05-16 | End: 2022-08-02

## 2022-06-13 ENCOUNTER — TELEPHONE (OUTPATIENT)
Dept: FAMILY MEDICINE CLINIC | Age: 42
End: 2022-06-13

## 2022-06-13 NOTE — TELEPHONE ENCOUNTER
Luis Willingham no show their appointment on 06/10/2022. Appointment was not confirmed by Luis Willingham. Staff member, Francisco Diane called the patient 06/09/2022,  left a voicemail to confirm appointment. Staff member, Francisco Diane called the patient 06/10/2022,  left a voicemail to help patient to reschedule their no show. No show letter has been made, sent, printed and mailed to patient.

## 2022-06-23 ENCOUNTER — HOSPITAL ENCOUNTER (INPATIENT)
Age: 42
LOS: 5 days | Discharge: HOME | DRG: 885 | End: 2022-06-28
Attending: FAMILY MEDICINE | Admitting: PSYCHIATRY & NEUROLOGY
Payer: MEDICARE

## 2022-06-23 DIAGNOSIS — R45.851 SUICIDAL IDEATION: ICD-10-CM

## 2022-06-23 DIAGNOSIS — R45.851 DEPRESSION WITH SUICIDAL IDEATION: Primary | ICD-10-CM

## 2022-06-23 DIAGNOSIS — F31.9 BIPOLAR 1 DISORDER (HCC): ICD-10-CM

## 2022-06-23 DIAGNOSIS — F32.A DEPRESSION WITH SUICIDAL IDEATION: Primary | ICD-10-CM

## 2022-06-23 PROBLEM — F32.9 MAJOR DEPRESSIVE DISORDER, SINGLE EPISODE: Status: ACTIVE | Noted: 2022-06-23

## 2022-06-23 LAB
AMPHETAMINE+METHAMPHETAMINE URINE SCREEN: NEGATIVE
ANION GAP SERPL CALCULATED.3IONS-SCNC: 8 MEQ/L (ref 8–16)
BARBITURATE QUANTITATIVE URINE: NEGATIVE
BASOPHILS # BLD: 0.9 %
BASOPHILS ABSOLUTE: 0.1 THOU/MM3 (ref 0–0.1)
BENZODIAZEPINE QUANTITATIVE URINE: NEGATIVE
BUN BLDV-MCNC: 9 MG/DL (ref 7–22)
CALCIUM SERPL-MCNC: 9.3 MG/DL (ref 8.5–10.5)
CANNABINOID QUANTITATIVE URINE: NEGATIVE
CHLORIDE BLD-SCNC: 104 MEQ/L (ref 98–111)
CO2: 25 MEQ/L (ref 23–33)
COCAINE METABOLITE QUANTITATIVE URINE: NEGATIVE
CREAT SERPL-MCNC: 0.8 MG/DL (ref 0.4–1.2)
EOSINOPHIL # BLD: 2.3 %
EOSINOPHILS ABSOLUTE: 0.2 THOU/MM3 (ref 0–0.4)
ERYTHROCYTE [DISTWIDTH] IN BLOOD BY AUTOMATED COUNT: 12.5 % (ref 11.5–14.5)
ERYTHROCYTE [DISTWIDTH] IN BLOOD BY AUTOMATED COUNT: 40.9 FL (ref 35–45)
GFR SERPL CREATININE-BSD FRML MDRD: 79 ML/MIN/1.73M2
GLUCOSE BLD-MCNC: 80 MG/DL (ref 70–108)
HCT VFR BLD CALC: 38.6 % (ref 37–47)
HEMOGLOBIN: 13.4 GM/DL (ref 12–16)
IMMATURE GRANS (ABS): 0.08 THOU/MM3 (ref 0–0.07)
IMMATURE GRANULOCYTES: 0.8 %
LYMPHOCYTES # BLD: 29.1 %
LYMPHOCYTES ABSOLUTE: 2.9 THOU/MM3 (ref 1–4.8)
MCH RBC QN AUTO: 31 PG (ref 26–33)
MCHC RBC AUTO-ENTMCNC: 34.7 GM/DL (ref 32.2–35.5)
MCV RBC AUTO: 89.4 FL (ref 81–99)
MONOCYTES # BLD: 6.7 %
MONOCYTES ABSOLUTE: 0.7 THOU/MM3 (ref 0.4–1.3)
NUCLEATED RED BLOOD CELLS: 0 /100 WBC
OPIATES, URINE: NEGATIVE
OSMOLALITY CALCULATION: 271.5 MOSMOL/KG (ref 275–300)
OXYCODONE: NEGATIVE
PHENCYCLIDINE QUANTITATIVE URINE: NEGATIVE
PLATELET # BLD: 306 THOU/MM3 (ref 130–400)
PMV BLD AUTO: 9 FL (ref 9.4–12.4)
POTASSIUM REFLEX MAGNESIUM: 3.9 MEQ/L (ref 3.5–5.2)
PREGNANCY, URINE: NEGATIVE
RBC # BLD: 4.32 MILL/MM3 (ref 4.2–5.4)
SEG NEUTROPHILS: 60.2 %
SEGMENTED NEUTROPHILS ABSOLUTE COUNT: 6.1 THOU/MM3 (ref 1.8–7.7)
SODIUM BLD-SCNC: 137 MEQ/L (ref 135–145)
WBC # BLD: 10.1 THOU/MM3 (ref 4.8–10.8)

## 2022-06-23 PROCEDURE — 6370000000 HC RX 637 (ALT 250 FOR IP): Performed by: FAMILY MEDICINE

## 2022-06-23 PROCEDURE — 6370000000 HC RX 637 (ALT 250 FOR IP): Performed by: PSYCHIATRY & NEUROLOGY

## 2022-06-23 PROCEDURE — 85025 COMPLETE CBC W/AUTO DIFF WBC: CPT

## 2022-06-23 PROCEDURE — 99285 EMERGENCY DEPT VISIT HI MDM: CPT

## 2022-06-23 PROCEDURE — 80048 BASIC METABOLIC PNL TOTAL CA: CPT

## 2022-06-23 PROCEDURE — 80307 DRUG TEST PRSMV CHEM ANLYZR: CPT

## 2022-06-23 PROCEDURE — 81025 URINE PREGNANCY TEST: CPT

## 2022-06-23 PROCEDURE — 1240000000 HC EMOTIONAL WELLNESS R&B

## 2022-06-23 RX ORDER — HYDROXYZINE HYDROCHLORIDE 25 MG/1
50 TABLET, FILM COATED ORAL 3 TIMES DAILY PRN
Status: DISCONTINUED | OUTPATIENT
Start: 2022-06-23 | End: 2022-06-28 | Stop reason: HOSPADM

## 2022-06-23 RX ORDER — CYCLOBENZAPRINE HCL 10 MG
10 TABLET ORAL ONCE
Status: COMPLETED | OUTPATIENT
Start: 2022-06-23 | End: 2022-06-23

## 2022-06-23 RX ORDER — ACETAMINOPHEN 325 MG/1
650 TABLET ORAL EVERY 4 HOURS PRN
Status: DISCONTINUED | OUTPATIENT
Start: 2022-06-23 | End: 2022-06-28 | Stop reason: HOSPADM

## 2022-06-23 RX ORDER — IBUPROFEN 200 MG
400 TABLET ORAL EVERY 6 HOURS PRN
Status: DISCONTINUED | OUTPATIENT
Start: 2022-06-23 | End: 2022-06-28 | Stop reason: HOSPADM

## 2022-06-23 RX ORDER — TRAZODONE HYDROCHLORIDE 50 MG/1
50 TABLET ORAL NIGHTLY PRN
Status: DISCONTINUED | OUTPATIENT
Start: 2022-06-23 | End: 2022-06-28 | Stop reason: HOSPADM

## 2022-06-23 RX ORDER — MAGNESIUM HYDROXIDE/ALUMINUM HYDROXICE/SIMETHICONE 120; 1200; 1200 MG/30ML; MG/30ML; MG/30ML
30 SUSPENSION ORAL EVERY 6 HOURS PRN
Status: DISCONTINUED | OUTPATIENT
Start: 2022-06-23 | End: 2022-06-28 | Stop reason: HOSPADM

## 2022-06-23 RX ADMIN — ACETAMINOPHEN 650 MG: 325 TABLET ORAL at 23:06

## 2022-06-23 RX ADMIN — CYCLOBENZAPRINE HYDROCHLORIDE 10 MG: 10 TABLET, FILM COATED ORAL at 18:45

## 2022-06-23 ASSESSMENT — PAIN DESCRIPTION - DESCRIPTORS
DESCRIPTORS: ACHING

## 2022-06-23 ASSESSMENT — PAIN DESCRIPTION - PAIN TYPE
TYPE: CHRONIC PAIN
TYPE: CHRONIC PAIN

## 2022-06-23 ASSESSMENT — PAIN - FUNCTIONAL ASSESSMENT
PAIN_FUNCTIONAL_ASSESSMENT: 0-10
PAIN_FUNCTIONAL_ASSESSMENT: ACTIVITIES ARE NOT PREVENTED
PAIN_FUNCTIONAL_ASSESSMENT: 0-10
PAIN_FUNCTIONAL_ASSESSMENT: ACTIVITIES ARE NOT PREVENTED
PAIN_FUNCTIONAL_ASSESSMENT: ACTIVITIES ARE NOT PREVENTED

## 2022-06-23 ASSESSMENT — SLEEP AND FATIGUE QUESTIONNAIRES
SLEEP PATTERN: DISTURBED/INTERRUPTED SLEEP;DIFFICULTY FALLING ASLEEP
DO YOU HAVE DIFFICULTY SLEEPING: YES
DO YOU USE A SLEEP AID: NO

## 2022-06-23 ASSESSMENT — PAIN DESCRIPTION - ORIENTATION
ORIENTATION: LOWER;LEFT
ORIENTATION: LOWER
ORIENTATION: LEFT;LOWER
ORIENTATION: LOWER
ORIENTATION: LEFT;LOWER

## 2022-06-23 ASSESSMENT — PAIN SCALES - GENERAL
PAINLEVEL_OUTOF10: 8
PAINLEVEL_OUTOF10: 7
PAINLEVEL_OUTOF10: 0
PAINLEVEL_OUTOF10: 9

## 2022-06-23 ASSESSMENT — PAIN DESCRIPTION - FREQUENCY
FREQUENCY: CONTINUOUS
FREQUENCY: CONTINUOUS

## 2022-06-23 ASSESSMENT — PAIN DESCRIPTION - LOCATION
LOCATION: BACK

## 2022-06-23 ASSESSMENT — LIFESTYLE VARIABLES: HOW OFTEN DO YOU HAVE A DRINK CONTAINING ALCOHOL: NEVER

## 2022-06-23 ASSESSMENT — PATIENT HEALTH QUESTIONNAIRE - PHQ9: SUM OF ALL RESPONSES TO PHQ QUESTIONS 1-9: 15

## 2022-06-23 ASSESSMENT — PAIN DESCRIPTION - ONSET
ONSET: ON-GOING
ONSET: ON-GOING

## 2022-06-23 NOTE — ED NOTES
Patient is resting in bed with easy and unlabored respirations. Call light in reach. Side rails up x1. Patient denies further complaints or concerns. Video monitoring in progress.       Nuzhat Hernandez RN  06/23/22 [id number]

## 2022-06-23 NOTE — ED PROVIDER NOTES
EMERGENCY DEPARTMENT ENCOUNTER     CHIEF COMPLAINT   Chief Complaint   Patient presents with    Suicidal     EMC from [facility]      HPI   Rosa Bose is a 39 y.o. female with depression, who presents with SI while at [facility]. She takes effexor, buspar and abilify for her condition. Pt has vague thoughts for self harm (jumping off a bridge, swallowing razor blades). REVIEW OF SYSTEMS   Psychiatric: +auditory hallucinations, SI; denies homicidal Ideations   Respiratory:No difficulty breathing or new cough   General:No fevers   Neurologic:No known syncope   GI: No vomiting or abdominal pain   See HPI for further details. All other review of systems otherwise negative.      PAST MEDICAL & SURGICAL HISTORY   Past Medical History:   Diagnosis Date    Anxiety     Back pain     Colitis     Depression     Fibromyalgia     HPV (human papilloma virus) anogenital infection     Hypertension     OCD (obsessive compulsive disorder)     Osteoarthritis (arthritis due to wear and tear of joints)     PONV (postoperative nausea and vomiting)     Seizure ([address]     \" in [place] in 2021 was checked out by EMS but not taken to hospital\"    Thyroid disease       Past Surgical History:   Procedure Laterality Date    CHOLECYSTECTOMY      INNER EAR SURGERY      LUMBAR FUSION N/A 5/6/2022    L5=S1 Decompression L5-S1 Posterior Fusion & TLIF performed by Vannessa Álvarez MD at [address]   Current Outpatient Rx   Medication Sig Dispense Refill    levothyroxine (SYNTHROID) 50 MCG tablet TAKE 1 TABLET BY MOUTH DAILY 28 tablet 3    ARIPiprazole (ABILIFY) 20 MG tablet Take 20 mg by mouth daily      ARIPiprazole (ABILIFY IM) Inject into the muscle every 30 days      venlafaxine (EFFEXOR XR) 75 MG extended release capsule Take 75 mg by mouth daily      hydrOXYzine (VISTARIL) 50 MG capsule Hydroxyzine Pamoate Active 50 MG PO Three Times Daily as needed October 25th, 2021 2:13pm      traZODone (DESYREL) 50 MG tablet Take 1 tablet by mouth nightly as needed for Sleep 30 tablet 0      ALLERGIES   Allergies   Allergen Reactions    Dairycare [Lactase-Lactobacillus] Nausea And Vomiting      SOCIAL & FAMILYHISTORY   Social History     Socioeconomic History    Marital status:      Spouse name: Not on file    Number of children: 1    Years of education: 12    Highest education level: Not on file   Occupational History    Not on file   Tobacco Use    Smoking status: Current Some Day Smoker     Packs/day: 0.50     Types: Cigarettes    Smokeless tobacco: Never Used   Vaping Use    Vaping Use: Former   Substance and Sexual Activity    Alcohol use: Yes     Comment: occasionally    Drug use: Yes     Types: Cocaine     Comment: cocaine 4/1/2022, Cornelious Alexander 2 weeks ago    Sexual activity: Yes     Partners: Male   Other Topics Concern    Not on file   Social History Narrative    Not on file     Social Determinants of Health     Financial Resource Strain:     Difficulty of Paying Living Expenses: Not on file   Food Insecurity:     Worried About Running Out of Food in the Last Year: Not on file    Sobeida of Food in the Last Year: Not on file   Transportation Needs:     Lack of Transportation (Medical): Not on file    Lack of Transportation (Non-Medical):  Not on file   Physical Activity:     Days of Exercise per Week: Not on file    Minutes of Exercise per Session: Not on file   Stress:     Feeling of Stress : Not on file   Social Connections:     Frequency of Communication with Friends and Family: Not on file    Frequency of Social Gatherings with Friends and Family: Not on file    Attends Druze Services: Not on file    Active Member of Clubs or Organizations: Not on file    Attends Club or Organization Meetings: Not on file    Marital Status: Not on file   Intimate Partner Violence:     Fear of Current or Ex-Partner: Not on file    Emotionally Abused: Not on file    Physically Abused: Not on file    Sexually Abused: Not on file   Housing Stability:     Unable to Pay for Housing in the Last Year: Not on file    Number of Places Lived in the Last Year: Not on file    Unstable Housing in the Last Year: Not on file      Family History   Problem Relation Age of Onset    Diabetes Mother     Heart Disease Mother     Diabetes Father     Heart Disease Father     Cancer Paternal Aunt         PHYSICAL EXAM   VITAL SIGNS: BP (!) 144/99   Pulse (!) 113   Temp 98.1 °F (36.7 °C)   Resp 18   SpO2 97%    Constitutional: Well developed, well nourished, no acute distress   Eyes: PERRL, conjunctiva normal   HENT: Atraumatic, external ears normal, nose normal   Neck: supple, No JVD   Respiratory: Normal respiratory effort  Musculoskeletal: No edema, no deformities   Integument: Well hydrated   Neurologic: Awake alert and oriented, no slurred speech, normal gross motor coordination and strength. Psychiatric: pressured speech, does make good eye contact, judgment not optimal     Labs Reviewed   CBC WITH AUTO DIFFERENTIAL - Abnormal; Notable for the following components:       Result Value    MPV 9.0 (*)     Immature Grans (Abs) 0.08 (*)     All other components within normal limits   GLOMERULAR FILTRATION RATE, ESTIMATED - Abnormal; Notable for the following components:    Est, Glom Filt Rate 79 (*)     All other components within normal limits   OSMOLALITY - Abnormal; Notable for the following components:    Osmolality Calc 271.5 (*)     All other components within normal limits   BASIC METABOLIC PANEL W/ REFLEX TO MG FOR LOW K   URINE DRUG SCREEN   PREGNANCY, URINE   ANION GAP       ED COURSE & MEDICAL DECISION MAKING   Pertinent Labs studies reviewed and interpreted.  (See chart for details)   Consultation: Mental health Professional consulted in the emergency Department   Differential diagnoses: Metabolic emergency, infection, primary neurologic emergency, psychosis, behavioral psychiatric problem, toxidrome, illicit drug use, other   The patient is medically stable     FINAL IMPRESSION   1. Depression with suicidal ideation    2. Suicidal ideation    3. Bipolar 1 disorder ([address]         PLAN   MDM - pt appeared to have bipolar disorder vs schizoaffective disorder with close management by [facility]. She lives in a group home arrangement. She has hallucinations (auditory and visual), with vague thoughts of self harm. Pt was medically cleared by me at 1845. Pt was given PO flexeril for her chronic back pain. [facility] Dr. Roman Bryant, who recommended admission to .     Electronically signed by: Mariann Bliss MD, 6/23/2022 7:29 PM     (This note was completed with a voice recognition program)         Wade Reyes MD  06/23/22 1929

## 2022-06-23 NOTE — ED TRIAGE NOTES
Pt in through ED lobby. She was seen at [facility] for crisis. She stated to them that she has been having suicidal thoughts and has the plan to kill herself with a razor blade. She has had depression because of her chronic back pain. [address] states that pt has had auditory and visual hallucinations and that she has been sending strangers money over her phone. She is tearful in room. She was placed in ligature resistant room and changed into saferoom scrubs. Belongings locked in locker.

## 2022-06-23 NOTE — PROGRESS NOTES
Pt requested hand soap or hand . Clinician provided pt with hand soap. Clinician also provided pt with an update when shift change is over a report will be given for pt to go to the unit.
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
## 2022-06-23 NOTE — PROGRESS NOTES
1926  Dr. Mahesh Neff updated on disposition. 1928  Call to 4E, no answer, left message requesting a return call. 1941  Call to 4E, consulted with Savanna Porter will follow up with Clinician to take report.      2000  Call from Nurse Tiffanie Door on 4E, report given, pt to be admitted

## 2022-06-24 PROBLEM — F25.9 SCHIZOAFFECTIVE DISORDER (HCC): Status: RESOLVED | Noted: 2019-12-03 | Resolved: 2022-06-24

## 2022-06-24 PROCEDURE — 99222 1ST HOSP IP/OBS MODERATE 55: CPT | Performed by: PSYCHIATRY & NEUROLOGY

## 2022-06-24 PROCEDURE — 6370000000 HC RX 637 (ALT 250 FOR IP): Performed by: PSYCHIATRY & NEUROLOGY

## 2022-06-24 PROCEDURE — APPSS30 APP SPLIT SHARED TIME 16-30 MINUTES: Performed by: PHYSICIAN ASSISTANT

## 2022-06-24 PROCEDURE — 1240000000 HC EMOTIONAL WELLNESS R&B

## 2022-06-24 PROCEDURE — 6370000000 HC RX 637 (ALT 250 FOR IP): Performed by: PHYSICIAN ASSISTANT

## 2022-06-24 RX ORDER — BUSPIRONE HYDROCHLORIDE 7.5 MG/1
TABLET ORAL
Status: ON HOLD | COMMUNITY
Start: 2022-06-06 | End: 2022-06-28 | Stop reason: HOSPADM

## 2022-06-24 RX ORDER — LEVOTHYROXINE SODIUM 0.05 MG/1
50 TABLET ORAL DAILY
Status: DISCONTINUED | OUTPATIENT
Start: 2022-06-24 | End: 2022-06-28 | Stop reason: HOSPADM

## 2022-06-24 RX ORDER — CYCLOBENZAPRINE HCL 10 MG
10 TABLET ORAL 3 TIMES DAILY PRN
Status: DISCONTINUED | OUTPATIENT
Start: 2022-06-24 | End: 2022-06-28 | Stop reason: HOSPADM

## 2022-06-24 RX ORDER — CYCLOBENZAPRINE HCL 10 MG
10 TABLET ORAL 3 TIMES DAILY PRN
COMMUNITY
Start: 2022-05-19 | End: 2022-08-02 | Stop reason: ALTCHOICE

## 2022-06-24 RX ORDER — RISPERIDONE 0.25 MG/1
0.5 TABLET, FILM COATED ORAL NIGHTLY
Status: DISCONTINUED | OUTPATIENT
Start: 2022-06-24 | End: 2022-06-28 | Stop reason: HOSPADM

## 2022-06-24 RX ORDER — VENLAFAXINE HYDROCHLORIDE 150 MG/1
CAPSULE, EXTENDED RELEASE ORAL
Status: ON HOLD | COMMUNITY
Start: 2022-05-08 | End: 2022-06-24

## 2022-06-24 RX ORDER — TRAMADOL HYDROCHLORIDE 50 MG/1
50 TABLET ORAL 2 TIMES DAILY PRN
Status: DISCONTINUED | OUTPATIENT
Start: 2022-06-24 | End: 2022-06-28 | Stop reason: HOSPADM

## 2022-06-24 RX ORDER — VENLAFAXINE HYDROCHLORIDE 75 MG/1
75 CAPSULE, EXTENDED RELEASE ORAL DAILY
Status: DISCONTINUED | OUTPATIENT
Start: 2022-06-24 | End: 2022-06-28 | Stop reason: HOSPADM

## 2022-06-24 RX ORDER — ARIPIPRAZOLE 400 MG
KIT INTRAMUSCULAR
Status: ON HOLD | COMMUNITY
Start: 2022-06-06 | End: 2022-06-28 | Stop reason: HOSPADM

## 2022-06-24 RX ADMIN — IBUPROFEN 400 MG: 200 TABLET, FILM COATED ORAL at 00:48

## 2022-06-24 RX ADMIN — CYCLOBENZAPRINE 10 MG: 10 TABLET, FILM COATED ORAL at 00:48

## 2022-06-24 RX ADMIN — VENLAFAXINE HYDROCHLORIDE 75 MG: 75 CAPSULE, EXTENDED RELEASE ORAL at 08:51

## 2022-06-24 RX ADMIN — TRAMADOL HYDROCHLORIDE 50 MG: 50 TABLET, COATED ORAL at 11:03

## 2022-06-24 RX ADMIN — HYDROXYZINE HYDROCHLORIDE 50 MG: 25 TABLET, FILM COATED ORAL at 08:52

## 2022-06-24 RX ADMIN — CYCLOBENZAPRINE 10 MG: 10 TABLET, FILM COATED ORAL at 17:07

## 2022-06-24 RX ADMIN — HYDROXYZINE HYDROCHLORIDE 50 MG: 25 TABLET, FILM COATED ORAL at 00:49

## 2022-06-24 RX ADMIN — CYCLOBENZAPRINE 10 MG: 10 TABLET, FILM COATED ORAL at 08:52

## 2022-06-24 RX ADMIN — IBUPROFEN 400 MG: 200 TABLET, FILM COATED ORAL at 08:51

## 2022-06-24 RX ADMIN — RISPERIDONE 0.5 MG: 0.25 TABLET ORAL at 21:32

## 2022-06-24 RX ADMIN — HYDROXYZINE HYDROCHLORIDE 50 MG: 25 TABLET, FILM COATED ORAL at 17:07

## 2022-06-24 RX ADMIN — LEVOTHYROXINE SODIUM 50 MCG: 0.05 TABLET ORAL at 08:52

## 2022-06-24 RX ADMIN — TRAMADOL HYDROCHLORIDE 50 MG: 50 TABLET, COATED ORAL at 21:32

## 2022-06-24 ASSESSMENT — PAIN DESCRIPTION - ONSET
ONSET: ON-GOING

## 2022-06-24 ASSESSMENT — LIFESTYLE VARIABLES
HOW OFTEN DO YOU HAVE A DRINK CONTAINING ALCOHOL: MONTHLY OR LESS
HOW MANY STANDARD DRINKS CONTAINING ALCOHOL DO YOU HAVE ON A TYPICAL DAY: 1 OR 2
HOW OFTEN DO YOU HAVE A DRINK CONTAINING ALCOHOL: NEVER

## 2022-06-24 ASSESSMENT — PAIN - FUNCTIONAL ASSESSMENT
PAIN_FUNCTIONAL_ASSESSMENT: ACTIVITIES ARE NOT PREVENTED

## 2022-06-24 ASSESSMENT — PAIN DESCRIPTION - LOCATION
LOCATION: BACK

## 2022-06-24 ASSESSMENT — PAIN SCALES - GENERAL
PAINLEVEL_OUTOF10: 6
PAINLEVEL_OUTOF10: 3
PAINLEVEL_OUTOF10: 8
PAINLEVEL_OUTOF10: 9
PAINLEVEL_OUTOF10: 8
PAINLEVEL_OUTOF10: 6
PAINLEVEL_OUTOF10: 0

## 2022-06-24 ASSESSMENT — PAIN DESCRIPTION - FREQUENCY
FREQUENCY: CONTINUOUS

## 2022-06-24 ASSESSMENT — PAIN DESCRIPTION - ORIENTATION
ORIENTATION: LOWER
ORIENTATION: LEFT;LOWER

## 2022-06-24 ASSESSMENT — SLEEP AND FATIGUE QUESTIONNAIRES
DO YOU HAVE DIFFICULTY SLEEPING: YES
AVERAGE NUMBER OF SLEEP HOURS: 4
DO YOU USE A SLEEP AID: NO

## 2022-06-24 ASSESSMENT — PAIN DESCRIPTION - DESCRIPTORS
DESCRIPTORS: ACHING
DESCRIPTORS: ACHING;DISCOMFORT
DESCRIPTORS: ACHING;DISCOMFORT
DESCRIPTORS: ACHING
DESCRIPTORS: ACHING;DISCOMFORT
DESCRIPTORS: ACHING

## 2022-06-24 ASSESSMENT — PAIN DESCRIPTION - PAIN TYPE
TYPE: CHRONIC PAIN

## 2022-06-24 ASSESSMENT — PATIENT HEALTH QUESTIONNAIRE - PHQ9: SUM OF ALL RESPONSES TO PHQ QUESTIONS 1-9: 21

## 2022-06-24 NOTE — PLAN OF CARE
Pt has not attended neither the daily goal group or the recreational group this morning. Pt has not demonstrated effective coping strategies.

## 2022-06-24 NOTE — PROGRESS NOTES
Behavioral Services  Medicare Certification Upon Admission    I certify that this patient's inpatient psychiatric hospital admission is medically necessary for:    [x] (1) Treatment which could reasonably be expected to improve this patient's condition,       [x] (2) Or for diagnostic study;     AND     [x](2) The inpatient psychiatric services are provided while the individual is under the care of a physician and are included in the individualized plan of care.     Estimated length of stay/service 3-5 days    Plan for post-hospital care hc    Electronically signed by Stephani Quiñonez MD on 6/24/2022 at 7:32 AM

## 2022-06-24 NOTE — ED NOTES
Pt transported to [facility] with police escort.  Spoke to [facility] prior to [place]  06/23/22 0915
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
## 2022-06-24 NOTE — ED NOTES
Pt resting in bed. No distress noted. Respires even and unlabored. Pt being continuously monitored for pt safety.       Tio Akbar RN  06/23/22 8076
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
## 2022-06-24 NOTE — PLAN OF CARE
Problem: Pain  Goal: Verbalizes/displays adequate comfort level or baseline comfort level  Outcome: Progressing  Flowsheets  Taken 6/24/2022 1103  Verbalizes/displays adequate comfort level or baseline comfort level: Encourage patient to monitor pain and request assistance  Taken 6/24/2022 0800  Verbalizes/displays adequate comfort level or baseline comfort level:   Encourage patient to monitor pain and request assistance   Assess pain using appropriate pain scale   Implement non-pharmacological measures as appropriate and evaluate response  Note: Pain Assessment: 0-10  Pain Level: 8   Patient's Stated Pain Goal: 3   Is pain goal met at this time? Yes   Patient reports back pain, taking ultram and tylenol as needed, reports decrease pain. Non-Pharmaceutical Pain Intervention(s): Rest       Problem: Self Harm/Suicidality  Goal: Will have no self-injury during hospital stay  Description: INTERVENTIONS:  1. Q 30 MINUTES: Routine safety checks  2. Q SHIFT & PRN: Assess risk to determine if routine checks are adequate to maintain patient safety  Outcome: Progressing  Note: No self harm behaviors were observed or reported so far this shift. Remains on every 15 minutes precautions for safety. Patient reports fleeting suicidal ideations, no plan, contracts. Problem: Discharge Planning  Goal: Discharge to home or other facility with appropriate resources  Outcome: Progressing  Flowsheets (Taken 6/24/2022 0800)  Discharge to home or other facility with appropriate resources:   Identify barriers to discharge with patient and caregiver   Arrange for needed discharge resources and transportation as appropriate  Note: Patient voices no needs before discharge. Patient plans to be discharged to [facility]. Discharge planner working with patient to achieve optimal discharge plans, specific to individual needs. Problem: Depression  Goal: Will be euthymic at discharge  Description: INTERVENTIONS:  1.  Administer medication as ordered  2. Provide emotional support via 1:1 interaction with staff  3. Encourage involvement in milieu/groups/activities  4. Monitor for social isolation  Outcome: Progressing  Note: Patient reports mood 4/10 with 10 being normal. Has flat and tearful affect. Speech clear. Fair eye contact. Reports hope for future and identifies no one as their support system. Problem: Anxiety  Goal: Will report anxiety at manageable levels  Description: INTERVENTIONS:  1. Administer medication as ordered  2. Teach and rehearse alternative coping skills  3. Provide emotional support with 1:1 interaction with staff  Outcome: Not Progressing  Flowsheets (Taken 6/24/2022 0800)  Will report anxiety at manageable levels: Teach and rehearse alternative coping skills  Note: Patient reports anxiety. Pt taking Atarax prn. Verbalized medication was effective. Problem: Sleep Disturbance  Goal: Will exhibit normal sleeping pattern  Description: INTERVENTIONS:  1. Administer medication as ordered  2. Decrease environmental stimuli, including noise, as appropriate  3. Discourage social isolation and naps during the day  Outcome: Progressing  Note: Patient slept 6.5 hours last night, reports she did not slept well. Encourage patient not to take naps during the day, relax several hours before bed and to take prescribed sleep meds as ordered. Patient verbalized understanding.         Problem: Chronic Conditions and Co-morbidities  Goal: Patient's chronic conditions and co-morbidity symptoms are monitored and maintained or improved  Outcome: Progressing  Flowsheets (Taken 6/24/2022 0800)  Care Plan - Patient's Chronic Conditions and Co-Morbidity Symptoms are Monitored and Maintained or Improved: Monitor and assess patient's chronic conditions and comorbid symptoms for stability, deterioration, or improvement     Problem: Coping  Goal: Pt/Family able to verbalize concerns and demonstrate effective coping strategies  Description: INTERVENTIONS:  1. Assist patient/family to identify coping skills, available support systems and cultural and spiritual values  2. Provide emotional support, including active listening and acknowledgement of concerns of patient and caregivers  3. Reduce environmental stimuli, as able  4. Instruct patient/family in relaxation techniques, as appropriate  5. Assess for spiritual pain/suffering and initiate Spiritual Care, Psychosocial Clinical Specialist consults as needed  6/24/2022 1457 by Katelyn Puri RN  Outcome: Progressing  Flowsheets (Taken 6/24/2022 0800)  Patient/family able to verbalize anxieties, fears, and concerns, and demonstrate effective coping: Assist patient/family to identify coping skills, available support systems and cultural and spiritual values     Care plan reviewed with patient. Patient verbalize understanding of the plan of care and contribute to goal setting.

## 2022-06-24 NOTE — PATIENT CARE CONFERENCE
[facility]  Initial Interdisciplinary Treatment Plan NOTE    Review Date & Time: 6/24/2022 1057     Patient was in treatment team.  See Multidisciplinary Treatment Team sheet for participants. Admission Type:   Admission Type: Involuntary    Reason for admission:  Reason for Admission: \"I have been feeling hopeless and my thought pattern has changed, my medications are not working. \"      Estimated Length of Stay Update:  3-5 days   Estimated Discharge Date Update: 2-4 days     Patient Strengths/Barriers  Strengths (Must Choose Two): Stable housing,Support from organized community  Barriers: Support from family,Independent living  Addictive Behavior:Addictive Behavior  In the Past 3 Months, Have You Felt or Has Someone Told You That You Have a Problem With  : None  Medical Problems:  Past Medical History:   Diagnosis Date    Anxiety     Back pain     Colitis     Depression     Fibromyalgia     HPV (human papilloma virus) anogenital infection     Hypertension     OCD (obsessive compulsive disorder)     Osteoarthritis (arthritis due to wear and tear of joints)     PONV (postoperative nausea and vomiting)     Seizure ([address]     \" in [facility] in 2021 was checked out by EMS but not taken to hospital\"    Thyroid disease        EDUCATION:   Learner Progress Toward Treatment Goals: Reviewed results and recommendations of this team, Reviewed group plan and strategies, Reviewed signs, symptoms and risk of self harm and violent behavior and Reviewed goals and plan of care    Method: Individual    Outcome: Verbalized understanding and Demonstrated Understanding    PATIENT GOALS: Work on decreasing my anxiety     PLAN/TREATMENT RECOMMENDATIONS UPDATE:   1. What is the most important thing we can help you with while you are here? See above   2. Who is your support system? trevor  3. Do you have follow-up providers? trevor  4. Do you have the ability to pay for your medications? Yes   5.  Where will you be residing when you leave the hospital? [address] [facility] [address]   6. Will need a return to work slip or FMLA paper completion?  No       GOALS UPDATE:   Time frame for Short-Term Goals: ongoing    [facility], [place], [facility] [address]
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
## 2022-06-25 PROCEDURE — 6370000000 HC RX 637 (ALT 250 FOR IP): Performed by: PSYCHIATRY & NEUROLOGY

## 2022-06-25 PROCEDURE — 6370000000 HC RX 637 (ALT 250 FOR IP): Performed by: PHYSICIAN ASSISTANT

## 2022-06-25 PROCEDURE — 1240000000 HC EMOTIONAL WELLNESS R&B

## 2022-06-25 PROCEDURE — 99231 SBSQ HOSP IP/OBS SF/LOW 25: CPT | Performed by: PSYCHIATRY & NEUROLOGY

## 2022-06-25 PROCEDURE — 99231 SBSQ HOSP IP/OBS SF/LOW 25: CPT | Performed by: NURSE PRACTITIONER

## 2022-06-25 RX ADMIN — VENLAFAXINE HYDROCHLORIDE 75 MG: 75 CAPSULE, EXTENDED RELEASE ORAL at 08:30

## 2022-06-25 RX ADMIN — RISPERIDONE 0.5 MG: 0.25 TABLET ORAL at 20:35

## 2022-06-25 RX ADMIN — TRAZODONE HYDROCHLORIDE 50 MG: 50 TABLET ORAL at 20:35

## 2022-06-25 RX ADMIN — IBUPROFEN 400 MG: 200 TABLET, FILM COATED ORAL at 15:24

## 2022-06-25 RX ADMIN — ACETAMINOPHEN 650 MG: 325 TABLET ORAL at 09:23

## 2022-06-25 RX ADMIN — CYCLOBENZAPRINE 10 MG: 10 TABLET, FILM COATED ORAL at 15:24

## 2022-06-25 RX ADMIN — HYDROXYZINE HYDROCHLORIDE 50 MG: 25 TABLET, FILM COATED ORAL at 16:55

## 2022-06-25 RX ADMIN — CYCLOBENZAPRINE 10 MG: 10 TABLET, FILM COATED ORAL at 08:30

## 2022-06-25 RX ADMIN — ACETAMINOPHEN 650 MG: 325 TABLET ORAL at 15:24

## 2022-06-25 RX ADMIN — IBUPROFEN 400 MG: 200 TABLET, FILM COATED ORAL at 09:23

## 2022-06-25 RX ADMIN — TRAMADOL HYDROCHLORIDE 50 MG: 50 TABLET, COATED ORAL at 08:30

## 2022-06-25 RX ADMIN — LEVOTHYROXINE SODIUM 50 MCG: 0.05 TABLET ORAL at 08:30

## 2022-06-25 ASSESSMENT — PAIN SCALES - GENERAL
PAINLEVEL_OUTOF10: 6
PAINLEVEL_OUTOF10: 10
PAINLEVEL_OUTOF10: 6
PAINLEVEL_OUTOF10: 0
PAINLEVEL_OUTOF10: 10
PAINLEVEL_OUTOF10: 9
PAINLEVEL_OUTOF10: 6

## 2022-06-25 ASSESSMENT — PAIN DESCRIPTION - FREQUENCY
FREQUENCY: CONTINUOUS

## 2022-06-25 ASSESSMENT — PAIN DESCRIPTION - DESCRIPTORS
DESCRIPTORS: ACHING;DISCOMFORT

## 2022-06-25 ASSESSMENT — PAIN DESCRIPTION - ONSET
ONSET: ON-GOING

## 2022-06-25 ASSESSMENT — PAIN - FUNCTIONAL ASSESSMENT
PAIN_FUNCTIONAL_ASSESSMENT: ACTIVITIES ARE NOT PREVENTED

## 2022-06-25 ASSESSMENT — PAIN DESCRIPTION - PAIN TYPE
TYPE: CHRONIC PAIN

## 2022-06-25 ASSESSMENT — PAIN DESCRIPTION - ORIENTATION
ORIENTATION: LOWER

## 2022-06-25 ASSESSMENT — PAIN DESCRIPTION - LOCATION
LOCATION: BACK

## 2022-06-25 NOTE — PROGRESS NOTES
Psychiatry Progress Note      6-    CC: suicidal ideation and hallucinations                      Subjective    Progress:  Lesly reports feeling better today. Reports feeling less stressed. Denies feelings of harm towards self or others. Reports meds are working well without side effects. Good med compliance is verified. Reports appetite and sleep are improved. Verified slept 7.5 hours and woke x2. Reports did not attend groups yesterday. Denies getting any visits or talking to anyone on phone. States when came to hospital was just overwhelmed and unhappy she was moved from her room at [facility]. Denies having AH/VH. Objective  BP (!) 138/91   Pulse 96   Temp 97.9 °F (36.6 °C) (Oral)   Resp 18   Ht 5' 3\" (1.6 m)   Wt 175 lb (79.4 kg)   SpO2 97%   BMI 31.00 kg/m²     MSE:  Level of consciousness: Alert  Appearance: hospital attire, in chair and fair grooming   Behavior/Motor: no abnormalities noted   Attitude toward examiner: cooperative   Speech: Normal volume, circumstantial   Mood: Euthymic  Affect: Reactive  Thought processes: Linear and goal directed   Suicidal Ideation: Denies suicidal ideations  Homicidal ideation: Denies homicidal ideations  Delusions: No evidence of delusions is observed  Perceptual Disturbance: Denies AH/VH;  No evidence of psychosis is observed. Cognition: Oriented to person, place, time and situation   Concentration fair   Memory intact   Insight: Limited  Judgment: Limited    Assessment:  Schizoaffective D/O Bipolar Type   Hx Polysubstance Abuse    Plan:  Continue current meds as ordered  Continue to encourage group attendance.       Radha Bourgeois, CNP  7-         Schizoaffective Disorder, Bipolar type    I concur with above clinical findings and plan of care
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
## 2022-06-25 NOTE — GROUP NOTE
Group Therapy Note    Date: 6/25/2022    Group Start Time: 1330  Group End Time: 0419  Group Topic: Psychotherapy    [facility] Adult Psych 4E    VIKAS Mckeon        Group Therapy Note: Today's group focused on recognizing our own vulnerability to stress. Group participants completed a assessment to rate their vulnerability to stress. They then looked at 5 different areas that can increase our vulnerability and identified which they feel that they struggle with. Group then discussed the things that the found interesting on the assessment and shared their own experiences of the vulnerabilities to stress. Attendees: 9         Patient's Goal:  Work on decreasing anxiety    Notes:  Patient was present in group and completed group activities. Patient asked some unralated questions.       Status After Intervention:  Decompensated    Participation Level: Minimal    Participation Quality: Inappropriate      Speech:  hesitant      Thought Process/Content: Delusional      Affective Functioning: Incongruent      Mood: anxious      Level of consciousness:  Alert      Response to Learning: Progressing to goal      Endings: None Reported    Modes of Intervention: Support, Exploration and Activity      Discipline Responsible: /Counselor      Signature:  VIKAS Tovar

## 2022-06-25 NOTE — PLAN OF CARE
Problem: Pain  Goal: Verbalizes/displays adequate comfort level or baseline comfort level  Outcome: Progressing  Flowsheets (Taken 6/24/2022 2132 by Jolynn Chowdhury RN)  Verbalizes/displays adequate comfort level or baseline comfort level: Encourage patient to monitor pain and request assistance  Note: Pain Assessment: 0-10  Pain Level: 6   Patient's Stated Pain Goal: 3   Is pain goal met at this time? No  Patient reports lower back pain, taking PRN medications as needed. Non-Pharmaceutical Pain Intervention(s): Rest       Problem: Self Harm/Suicidality  Goal: Will have no self-injury during hospital stay  Description: INTERVENTIONS:  1. Q 30 MINUTES: Routine safety checks  2. Q SHIFT & PRN: Assess risk to determine if routine checks are adequate to maintain patient safety  Outcome: Progressing  Note: No self harm behaviors were observed or reported so far this shift. Remains on every 15 minutes precautions for safety. Problem: Discharge Planning  Goal: Discharge to home or other facility with appropriate resources  Outcome: Progressing  Flowsheets (Taken 6/25/2022 0830)  Discharge to home or other facility with appropriate resources: Identify barriers to discharge with patient and caregiver  Note: Patient voices no needs before discharge. Patient plans to be discharged back to [facility]. Discharge planner working with patient to achieve optimal discharge plans, specific to individual needs. Problem: Depression  Goal: Will be euthymic at discharge  Description: INTERVENTIONS:  1. Administer medication as ordered  2. Provide emotional support via 1:1 interaction with staff  3. Encourage involvement in milieu/groups/activities  4. Monitor for social isolation  Outcome: Progressing  Note: Patient reports mood 7/10 with 10 being normal. Has flat and tearful affect, brightens a little with interaction. Speech clear. Good eye contact. Reports hope for future and identifies no one as their support system. Problem: Anxiety  Goal: Will report anxiety at manageable levels  Description: INTERVENTIONS:  1. Administer medication as ordered  2. Teach and rehearse alternative coping skills  3. Provide emotional support with 1:1 interaction with staff  Outcome: Progressing  Flowsheets  Taken 6/25/2022 1429  Will report anxiety at manageable levels: Provide emotional support with 1:1 interaction with staff  Taken 6/25/2022 0830  Will report anxiety at manageable levels: Teach and rehearse alternative coping skills  Note: Patient reports anxiety. Patient declined the need for medications. Problem: Sleep Disturbance  Goal: Will exhibit normal sleeping pattern  Description: INTERVENTIONS:  1. Administer medication as ordered  2. Decrease environmental stimuli, including noise, as appropriate  3. Discourage social isolation and naps during the day  Outcome: Progressing  Note: Patient slept 7.5 hours last night, reports she slept well. Encourage patient not to take naps during the day, relax several hours before bed and to take prescribed sleep meds as ordered. Patient verbalized understanding. Problem: Psychosis  Goal: Will report no hallucinations or delusions  Description: INTERVENTIONS:  1. Administer medication as  ordered  2. Assist with reality testing to support increasing orientation  3. Assess if patient's hallucinations or delusions are encouraging self harm or harm to others and intervene as appropriate  Outcome: Progressing  Note: Patient denies hallucinations so far this shift. Reports feeling paranoid that she can not return to [facility]. Patient became upset after attending EA meeting in the morning.      Problem: Chronic Conditions and Co-morbidities  Goal: Patient's chronic conditions and co-morbidity symptoms are monitored and maintained or improved  Outcome: Progressing  Flowsheets (Taken 6/25/2022 0830)  Care Plan - Patient's Chronic Conditions and Co-Morbidity Symptoms are Monitored and

## 2022-06-26 PROCEDURE — 99231 SBSQ HOSP IP/OBS SF/LOW 25: CPT | Performed by: NURSE PRACTITIONER

## 2022-06-26 PROCEDURE — 99231 SBSQ HOSP IP/OBS SF/LOW 25: CPT | Performed by: PSYCHIATRY & NEUROLOGY

## 2022-06-26 PROCEDURE — 6370000000 HC RX 637 (ALT 250 FOR IP): Performed by: PHYSICIAN ASSISTANT

## 2022-06-26 PROCEDURE — 1240000000 HC EMOTIONAL WELLNESS R&B

## 2022-06-26 PROCEDURE — 6370000000 HC RX 637 (ALT 250 FOR IP): Performed by: PSYCHIATRY & NEUROLOGY

## 2022-06-26 RX ADMIN — RISPERIDONE 0.5 MG: 0.25 TABLET ORAL at 20:32

## 2022-06-26 RX ADMIN — TRAMADOL HYDROCHLORIDE 50 MG: 50 TABLET, COATED ORAL at 09:25

## 2022-06-26 RX ADMIN — LEVOTHYROXINE SODIUM 50 MCG: 0.05 TABLET ORAL at 09:25

## 2022-06-26 RX ADMIN — HYDROXYZINE HYDROCHLORIDE 50 MG: 25 TABLET, FILM COATED ORAL at 09:25

## 2022-06-26 RX ADMIN — CYCLOBENZAPRINE 10 MG: 10 TABLET, FILM COATED ORAL at 20:32

## 2022-06-26 RX ADMIN — VENLAFAXINE HYDROCHLORIDE 75 MG: 75 CAPSULE, EXTENDED RELEASE ORAL at 09:25

## 2022-06-26 RX ADMIN — CYCLOBENZAPRINE 10 MG: 10 TABLET, FILM COATED ORAL at 15:23

## 2022-06-26 RX ADMIN — TRAMADOL HYDROCHLORIDE 50 MG: 50 TABLET, COATED ORAL at 20:32

## 2022-06-26 RX ADMIN — CYCLOBENZAPRINE 10 MG: 10 TABLET, FILM COATED ORAL at 09:25

## 2022-06-26 RX ADMIN — HYDROXYZINE HYDROCHLORIDE 50 MG: 25 TABLET, FILM COATED ORAL at 20:32

## 2022-06-26 RX ADMIN — MAGNESIUM HYDROXIDE 30 ML: 400 SUSPENSION ORAL at 20:34

## 2022-06-26 RX ADMIN — HYDROXYZINE HYDROCHLORIDE 50 MG: 25 TABLET, FILM COATED ORAL at 15:23

## 2022-06-26 ASSESSMENT — PAIN DESCRIPTION - LOCATION
LOCATION: BACK

## 2022-06-26 ASSESSMENT — PAIN - FUNCTIONAL ASSESSMENT
PAIN_FUNCTIONAL_ASSESSMENT: ACTIVITIES ARE NOT PREVENTED
PAIN_FUNCTIONAL_ASSESSMENT: PREVENTS OR INTERFERES SOME ACTIVE ACTIVITIES AND ADLS

## 2022-06-26 ASSESSMENT — PAIN SCALES - GENERAL
PAINLEVEL_OUTOF10: 0
PAINLEVEL_OUTOF10: 8
PAINLEVEL_OUTOF10: 7
PAINLEVEL_OUTOF10: 8
PAINLEVEL_OUTOF10: 8

## 2022-06-26 ASSESSMENT — PAIN DESCRIPTION - DESCRIPTORS: DESCRIPTORS: ACHING

## 2022-06-26 ASSESSMENT — PAIN DESCRIPTION - ORIENTATION: ORIENTATION: LOWER

## 2022-06-26 NOTE — BH NOTE
Group Therapy Note    Date: 6/25/2022  Start Time: 2000   End Time:  2030    Type of Group: Wrap-Up    Patient's Goal:  Go home     Notes:  Goal not met     Status After Intervention:  Improved    Participation Level:  Active Listener and Interactive    Participation Quality: Appropriate and Attentive      Speech:  normal      Thought Process/Content: Logical      Affective Functioning: Congruent      Mood: anxious      Level of consciousness:  Alert and Oriented x4      Response to Learning: Able to verbalize current knowledge/experience and Able to verbalize/acknowledge new learning      Endings: none reported     Modes of Intervention: Education and Support      Discipline Responsible: Licensed Practical Nurse      Signature:  Ole Brito LPN

## 2022-06-26 NOTE — PATIENT CARE CONFERENCE
[facility]  Day 3 Interdisciplinary Treatment Plan NOTE    Review Date & Time: 6/26/2022 1339    Patient was in treatment team    Admission Type:   Admission Type: Involuntary    Reason for admission:  Reason for Admission: \"I have been feeling hopeless and my thought pattern has changed, my medications are not working. \"  Estimated Length of Stay Update:  3-5 days   Estimated Discharge Date Update: 1-3 days     Patient Strengths/Barriers  Strengths (Must Choose Two): Stable housing,Support from organized community  Barriers: Support from family,Independent living  Addictive Behavior:Addictive Behavior  In the Past 3 Months, Have You Felt or Has Someone Told You That You Have a Problem With  : None  Medical Problems:  Past Medical History:   Diagnosis Date    Anxiety     Back pain     Colitis     Depression     Fibromyalgia     HPV (human papilloma virus) anogenital infection     Hypertension     OCD (obsessive compulsive disorder)     Osteoarthritis (arthritis due to wear and tear of joints)     PONV (postoperative nausea and vomiting)     Seizure ([facility] [address]     \" in [place] in 2021 was checked out by EMS but not taken to hospital\"    Thyroid disease        Risk:  Fall Risk   Long Scale Long Scale Score: 21    Status EXAM:   Mental Status and Behavioral Exam  Normal: No  Level of Assistance: Independent/Self  Facial Expression: Flat,Worried,Brightened  Affect: Unstable  Level of Consciousness: Alert  Frequency of Checks: 4 times per hour, close  Mood:Normal: No  Mood: Depressed,Anxious,Suspicious,Sad  Motor Activity:Normal: No  Motor Activity: Decreased  Eye Contact: Good  Observed Behavior: Cooperative  Sexual Misconduct History: Current - no  Preception: [place] to person,[place] to time,[place] to place,[place] to situation  Attention:Normal: No  Attention: Unable to concentrate,Hyperalert  Thought Processes: Circumstantial  Thought Content:Normal: No  Thought Content: Paranoia  Depression Symptoms: No problems reported or observed. Anxiety Symptoms: Generalized  Leann Symptoms: No problems reported or observed. Hallucinations: None  Delusions: Yes  Delusions: Paranoid  Memory:Normal: No  Memory: Poor recent  Insight and Judgment: No  Insight and Judgment: Poor judgment,Poor insight    Daily Assessment Last Entry:   Daily Sleep (WDL): Within Defined Limits            Daily Nutrition (WDL): Within Defined Limits  Level of Assistance: Independent/Self    Patient Monitoring:  Frequency of Checks: 4 times per hour, close    Psychiatric Symptoms:   Depression Symptoms  Depression Symptoms: No problems reported or observed. Anxiety Symptoms  Anxiety Symptoms: Generalized  Leann Symptoms  Leann Symptoms: No problems reported or observed. Suicide Risk CSSR-S:  1) Within the past month, have you wished you were dead or wished you could go to sleep and not wake up? : Yes  2) Have you actually had any thoughts of killing yourself? : Yes  3) Have you been thinking about how you might kill yourself? : Yes  5) Have you started to work out or worked out the details of how to kill yourself? Do you intend to carry out this plan? : No  6) Have you ever done anything, started to do anything, or prepared to do anything to end your life?: Yes    EDUCATION:   Learner Progress Toward Treatment Goals: Reviewed results and recommendations of this team, Reviewed group plan and strategies, Reviewed signs, symptoms and risk of self harm and violent behavior and Reviewed goals and plan of care    Method: Individual    Outcome: Verbalized understanding and Demonstrated Understanding    PATIENT GOALS: work on decreasing anxiety     PLAN/TREATMENT RECOMMENDATIONS UPDATE:  1. How are you progressing toward meeting your main treatment goal? I am feeling a lot better then the other day   2. Are there discharge barriers/lingering problems that need to be addressed?  none      3.   Do you have the ability to pay for your

## 2022-06-26 NOTE — PLAN OF CARE
Problem: Pain  Goal: Verbalizes/displays adequate comfort level or baseline comfort level  Outcome: Progressing  Flowsheets (Taken 6/25/2022 1930 by Prasad Dejesus LPN)  Verbalizes/displays adequate comfort level or baseline comfort level: Encourage patient to monitor pain and request assistance  Note: Reports having back pain. Problem: Self Harm/Suicidality  Goal: Will have no self-injury during hospital stay  Description: INTERVENTIONS:  1. Q 30 MINUTES: Routine safety checks  2. Q SHIFT & PRN: Assess risk to determine if routine checks are adequate to maintain patient safety  Outcome: Progressing  Note: Free from self harming behaviors. Denies suicidal thoughts. Problem: Discharge Planning  Goal: Discharge to home or other facility with appropriate resources  Outcome: Progressing  Flowsheets (Taken 6/26/2022 6254)  Discharge to home or other facility with appropriate resources: Identify barriers to discharge with patient and caregiver  Note: Discharge planning in process. Problem: Depression  Goal: Will be euthymic at discharge  Description: INTERVENTIONS:  1. Administer medication as ordered  2. Provide emotional support via 1:1 interaction with staff  3. Encourage involvement in milieu/groups/activities  4. Monitor for social isolation  Outcome: Progressing  Note: Rates her mood 7 out of 10 with 10 being the best mood. Reports she is anxious about her personal belongings at [facility]. Reports feeling depressed. Patient is tearful during groups. Problem: Anxiety  Goal: Will report anxiety at manageable levels  Description: INTERVENTIONS:  1. Administer medication as ordered  2. Teach and rehearse alternative coping skills  3. Provide emotional support with 1:1 interaction with staff  Outcome: Progressing     Problem: Sleep Disturbance  Goal: Will exhibit normal sleeping pattern  Description: INTERVENTIONS:  1. Administer medication as ordered  2.  Decrease environmental stimuli, including noise, as appropriate  3. Discourage social isolation and naps during the day  Outcome: Progressing  Note: Patient slept 8.5 hours continuously last night. Problem: Chronic Conditions and Co-morbidities  Goal: Patient's chronic conditions and co-morbidity symptoms are monitored and maintained or improved  Outcome: Progressing  Flowsheets (Taken 6/26/2022 6288)  Care Plan - Patient's Chronic Conditions and Co-Morbidity Symptoms are Monitored and Maintained or Improved: Monitor and assess patient's chronic conditions and comorbid symptoms for stability, deterioration, or improvement     Problem: Coping  Goal: Pt/Family able to verbalize concerns and demonstrate effective coping strategies  Description: INTERVENTIONS:  1. Assist patient/family to identify coping skills, available support systems and cultural and spiritual values  2. Provide emotional support, including active listening and acknowledgement of concerns of patient and caregivers  3. Reduce environmental stimuli, as able  4. Instruct patient/family in relaxation techniques, as appropriate  5. Assess for spiritual pain/suffering and initiate Spiritual Care, Psychosocial Clinical Specialist consults as needed  Outcome: Progressing     Problem: Psychosis  Goal: Will report no hallucinations or delusions  Description: INTERVENTIONS:  1. Administer medication as  ordered  2. Assist with reality testing to support increasing orientation  3. Assess if patient's hallucinations or delusions are encouraging self harm or harm to others and intervene as appropriate  Outcome: Progressing  Note: Denies hallucinations. Patient is paranoid about other patients judging her.

## 2022-06-26 NOTE — PLAN OF CARE
Problem: Anxiety  Goal: Will report anxiety at manageable levels  Description: INTERVENTIONS:  1. Administer medication as ordered  2. Teach and rehearse alternative coping skills  3. Provide emotional support with 1:1 interaction with staff  6/25/2022 2230 by Salomón Cuevas LPN  Outcome: Not Progressing  Flowsheets (Taken 6/25/2022 1930)  Will report anxiety at manageable levels: Provide emotional support with 1:1 interaction with staff  Note: Patient is reporting some anxiety was given meds per order   6/25/2022 1429 by Lora Felton RN  Outcome: Progressing  Flowsheets  Taken 6/25/2022 1429  Will report anxiety at manageable levels: Provide emotional support with 1:1 interaction with staff  Taken 6/25/2022 0830  Will report anxiety at manageable levels: Teach and rehearse alternative coping skills  Note: Patient reports anxiety. Patient declined the need for medications. Problem: Pain  Goal: Verbalizes/displays adequate comfort level or baseline comfort level  6/25/2022 2230 by Salomón Cuevas LPN  Outcome: Progressing  Flowsheets (Taken 6/25/2022 1930)  Verbalizes/displays adequate comfort level or baseline comfort level: Encourage patient to monitor pain and request assistance  Note: Patient is stating that she is in pain, but is very dizzy   6/25/2022 1429 by Lora Felton RN  Outcome: Progressing  Flowsheets (Taken 6/24/2022 2132 by Chata Donohue RN)  Verbalizes/displays adequate comfort level or baseline comfort level: Encourage patient to monitor pain and request assistance  Note: Pain Assessment: 0-10  Pain Level: 6   Patient's Stated Pain Goal: 3   Is pain goal met at this time? No  Patient reports lower back pain, taking PRN medications as needed.   Non-Pharmaceutical Pain Intervention(s): Rest       Problem: Self Harm/Suicidality  Goal: Will have no self-injury during hospital stay  Description: INTERVENTIONS:  1. Q 30 MINUTES: Routine safety checks  2. Q SHIFT & PRN: Assess risk to determine if routine checks are adequate to maintain patient safety  6/25/2022 2230 by Nubia Schwab LPN  Outcome: Progressing  Note: Patient is free from self injury at this point in the shift and staff is completing 15 minute safety checks   6/25/2022 1429 by Brittany Ricketts RN  Outcome: Progressing  Note: No self harm behaviors were observed or reported so far this shift. Remains on every 15 minutes precautions for safety. Problem: Discharge Planning  Goal: Discharge to home or other facility with appropriate resources  6/25/2022 2230 by Nubai Schwab LPN  Outcome: Progressing  Flowsheets (Taken 6/25/2022 1930)  Discharge to home or other facility with appropriate resources: Identify barriers to discharge with patient and caregiver  Note: Ongoing     6/25/2022 1429 by Brittany Ricketts RN  Outcome: Progressing  Flowsheets (Taken 6/25/2022 0830)  Discharge to home or other facility with appropriate resources: Identify barriers to discharge with patient and caregiver  Note: Patient voices no needs before discharge. Patient plans to be discharged back to [facility]. Discharge planner working with patient to achieve optimal discharge plans, specific to individual needs. Problem: Depression  Goal: Will be euthymic at discharge  Description: INTERVENTIONS:  1. Administer medication as ordered  2. Provide emotional support via 1:1 interaction with staff  3. Encourage involvement in milieu/groups/activities  4. Monitor for social isolation  6/25/2022 2230 by Nubia Schwab LPN  Outcome: Progressing  Note: Patient is stating that she is depressed and is having anxiety and just feels worthless     6/25/2022 1429 by Brittany Ricketts RN  Outcome: Progressing  Note: Patient reports mood 7/10 with 10 being normal. Has flat and tearful affect, brightens a little with interaction. Speech clear. Good eye contact. Reports hope for future and identifies no one as their support system.          Problem: Sleep Disturbance  Goal: Will exhibit normal sleeping pattern  Description: INTERVENTIONS:  1. Administer medication as ordered  2. Decrease environmental stimuli, including noise, as appropriate  3. Discourage social isolation and naps during the day  6/25/2022 2230 by Odalys Angulo LPN  Outcome: Progressing  6/25/2022 1429 by Sheron Milligan RN  Outcome: Progressing  Note: Patient slept 7.5 hours last night, reports she slept well. Encourage patient not to take naps during the day, relax several hours before bed and to take prescribed sleep meds as ordered. Patient verbalized understanding. Problem: Chronic Conditions and Co-morbidities  Goal: Patient's chronic conditions and co-morbidity symptoms are monitored and maintained or improved  6/25/2022 2230 by Odalys Angulo LPN  Outcome: Progressing  Flowsheets (Taken 6/25/2022 1930)  Care Plan - Patient's Chronic Conditions and Co-Morbidity Symptoms are Monitored and Maintained or Improved: Monitor and assess patient's chronic conditions and comorbid symptoms for stability, deterioration, or improvement  6/25/2022 1429 by Sheron Milligan RN  Outcome: Progressing  Flowsheets (Taken 6/25/2022 0830)  Care Plan - Patient's Chronic Conditions and Co-Morbidity Symptoms are Monitored and Maintained or Improved: Monitor and assess patient's chronic conditions and comorbid symptoms for stability, deterioration, or improvement     Problem: Coping  Goal: Pt/Family able to verbalize concerns and demonstrate effective coping strategies  Description: INTERVENTIONS:  1. Assist patient/family to identify coping skills, available support systems and cultural and spiritual values  2. Provide emotional support, including active listening and acknowledgement of concerns of patient and caregivers  3. Reduce environmental stimuli, as able  4. Instruct patient/family in relaxation techniques, as appropriate  5.  Assess for spiritual pain/suffering and initiate Spiritual Care, Psychosocial Clinical Specialist consults as needed  6/25/2022 2230 by Jc Washington LPN  Outcome: Progressing  Flowsheets (Taken 6/25/2022 1930)  Patient/family able to verbalize anxieties, fears, and concerns, and demonstrate effective coping: Assist patient/family to identify coping skills, available support systems and cultural and spiritual values  6/25/2022 1429 by Jeanna Gallegos RN  Outcome: Progressing  Flowsheets (Taken 6/25/2022 0830)  Patient/family able to verbalize anxieties, fears, and concerns, and demonstrate effective coping: Assist patient/family to identify coping skills, available support systems and cultural and spiritual values     Problem: Psychosis  Goal: Will report no hallucinations or delusions  Description: INTERVENTIONS:  1. Administer medication as  ordered  2. Assist with reality testing to support increasing orientation  3. Assess if patient's hallucinations or delusions are encouraging self harm or harm to others and intervene as appropriate  6/25/2022 2230 by Jc Washington LPN  Outcome: Progressing  Note: Patient denies any hallucinations   6/25/2022 1429 by Jeanna Gallegos RN  Outcome: Progressing  Note: Patient denies hallucinations so far this shift. Reports feeling paranoid that she can not return to [facility]. Patient became upset after attending EA meeting in the morning. Care plan reviewed with patient. Patient verbalize understanding of the plan of care and contribute to goal setting.

## 2022-06-26 NOTE — PROGRESS NOTES
Psychiatry Progress Note                                                  6-     CC: suicidal ideation and hallucinations                                                                             Subjective     Progress:  Lesly reports reports feeling better every day. Reports depression and stress continue to be less. Denies feelings of harm towards self or others. Reports Risperdal is really working well. Denies having  side effects. Good med compliance is verified. Reports appetite and sleep continue to improve. . Verified slept 8.5  hours continuous. Reports she did groups yesterday and reports is glad she went and learned somethings about herself. Denies getting any visits or talking to anyone on phone. Denies having AH/VH.      Objective  /86   Pulse 91   Temp 98.4 °F (36.9 °C) (Oral)   Resp 16   Ht 5' 3\" (1.6 m)   Wt 175 lb (79.4 kg)   SpO2 100%   BMI 31.00 kg/m²      MSE:  Level of consciousness: Alert  Appearance: hospital attire, in chair and fair grooming   Behavior/Motor: no abnormalities noted   Attitude toward examiner: cooperative   Speech: Normal volume, circumstantial   Mood: Euthymic  Affect: Reactive  Thought processes: Linear and goal directed   Suicidal Ideation: Denies suicidal ideations  Homicidal ideation: Denies homicidal ideations  Delusions: No evidence of delusions is observed  Perceptual Disturbance: Denies AH/VH;  No evidence of psychosis is observed.   Cognition: Oriented to person, place, time and situation   Concentration fair   Memory intact   Insight: Limited  Judgment: Limited     Assessment:  Schizoaffective D/O Bipolar Type   Hx Polysubstance Abuse     Plan:  Continue current meds as ordered  Continue to encourage group attendance.        Robert Lentz CNP  2-        Schizoaffective Disorder, Bipolar Type    I concur with above clinical findings and plan of care

## 2022-06-26 NOTE — BH NOTE
This RN has reviewed and agrees with Evelyn Jones LPN's data collection and has collaborated with this LPN regarding the patient's care plan.

## 2022-06-26 NOTE — GROUP NOTE
Group Therapy Note    Date: 6/26/2022    Group Start Time: 1520  Group End Time: 1600  Group Topic: Psychotherapy    [facility] Adult [facility] 4E    VIKAS Mckeon        Group Therapy Note: Today's group focused on how stress effects us. Group completed a stress test to gauge their level of stress. Then had a discussion about how stress effects our bodies in the short and long term. Group discussed health vs unhealthy coping strategies and barriers that might  the way of utilizing healthy coping skills. Attendees: 9          Patient's Goal:  Work on decreasing anxiety    Notes:  Patient was present during group and participated in activity. Patient left half way through group. Status After Intervention:  Improved    Participation Level:  Active Listener and Interactive    Participation Quality: Appropriate and Attentive      Speech:  normal      Thought Process/Content: Logical      Affective Functioning: Congruent      Mood: euthymic      Level of consciousness:  Alert      Response to Learning: Able to verbalize current knowledge/experience and Able to verbalize/acknowledge new learning      Endings: None Reported    Modes of Intervention: Education, Exploration and Activity      Discipline Responsible: /Counselor      Signature:  Sylvie Pacheco

## 2022-06-27 PROCEDURE — 6370000000 HC RX 637 (ALT 250 FOR IP): Performed by: PHYSICIAN ASSISTANT

## 2022-06-27 PROCEDURE — 1240000000 HC EMOTIONAL WELLNESS R&B

## 2022-06-27 PROCEDURE — 6360000002 HC RX W HCPCS: Performed by: PHYSICIAN ASSISTANT

## 2022-06-27 PROCEDURE — 99232 SBSQ HOSP IP/OBS MODERATE 35: CPT | Performed by: PSYCHIATRY & NEUROLOGY

## 2022-06-27 PROCEDURE — APPSS30 APP SPLIT SHARED TIME 16-30 MINUTES: Performed by: PHYSICIAN ASSISTANT

## 2022-06-27 PROCEDURE — 90833 PSYTX W PT W E/M 30 MIN: CPT | Performed by: PSYCHIATRY & NEUROLOGY

## 2022-06-27 PROCEDURE — 6370000000 HC RX 637 (ALT 250 FOR IP): Performed by: PSYCHIATRY & NEUROLOGY

## 2022-06-27 RX ADMIN — TRAMADOL HYDROCHLORIDE 50 MG: 50 TABLET, COATED ORAL at 08:00

## 2022-06-27 RX ADMIN — IBUPROFEN 400 MG: 200 TABLET, FILM COATED ORAL at 16:28

## 2022-06-27 RX ADMIN — MAGNESIUM HYDROXIDE 30 ML: 400 SUSPENSION ORAL at 08:00

## 2022-06-27 RX ADMIN — CYCLOBENZAPRINE 10 MG: 10 TABLET, FILM COATED ORAL at 08:00

## 2022-06-27 RX ADMIN — HYDROXYZINE HYDROCHLORIDE 50 MG: 25 TABLET, FILM COATED ORAL at 20:18

## 2022-06-27 RX ADMIN — CYCLOBENZAPRINE 10 MG: 10 TABLET, FILM COATED ORAL at 15:16

## 2022-06-27 RX ADMIN — VENLAFAXINE HYDROCHLORIDE 75 MG: 75 CAPSULE, EXTENDED RELEASE ORAL at 08:00

## 2022-06-27 RX ADMIN — CYCLOBENZAPRINE 10 MG: 10 TABLET, FILM COATED ORAL at 20:18

## 2022-06-27 RX ADMIN — RISPERIDONE 90 MG: KIT SUBCUTANEOUS at 12:01

## 2022-06-27 RX ADMIN — LEVOTHYROXINE SODIUM 50 MCG: 0.05 TABLET ORAL at 08:00

## 2022-06-27 RX ADMIN — TRAMADOL HYDROCHLORIDE 50 MG: 50 TABLET, COATED ORAL at 20:18

## 2022-06-27 RX ADMIN — RISPERIDONE 0.5 MG: 0.25 TABLET ORAL at 20:18

## 2022-06-27 RX ADMIN — HYDROXYZINE HYDROCHLORIDE 50 MG: 25 TABLET, FILM COATED ORAL at 08:00

## 2022-06-27 ASSESSMENT — PAIN DESCRIPTION - PAIN TYPE
TYPE: CHRONIC PAIN
TYPE: CHRONIC PAIN

## 2022-06-27 ASSESSMENT — PAIN DESCRIPTION - DESCRIPTORS
DESCRIPTORS: ACHING

## 2022-06-27 ASSESSMENT — PAIN - FUNCTIONAL ASSESSMENT
PAIN_FUNCTIONAL_ASSESSMENT: PREVENTS OR INTERFERES SOME ACTIVE ACTIVITIES AND ADLS
PAIN_FUNCTIONAL_ASSESSMENT: ACTIVITIES ARE NOT PREVENTED
PAIN_FUNCTIONAL_ASSESSMENT: ACTIVITIES ARE NOT PREVENTED
PAIN_FUNCTIONAL_ASSESSMENT: PREVENTS OR INTERFERES SOME ACTIVE ACTIVITIES AND ADLS

## 2022-06-27 ASSESSMENT — PAIN DESCRIPTION - ORIENTATION
ORIENTATION: LOWER

## 2022-06-27 ASSESSMENT — PAIN SCALES - GENERAL
PAINLEVEL_OUTOF10: 7
PAINLEVEL_OUTOF10: 8
PAINLEVEL_OUTOF10: 8
PAINLEVEL_OUTOF10: 6
PAINLEVEL_OUTOF10: 0
PAINLEVEL_OUTOF10: 6
PAINLEVEL_OUTOF10: 4
PAINLEVEL_OUTOF10: 4

## 2022-06-27 ASSESSMENT — PAIN DESCRIPTION - LOCATION
LOCATION: BACK

## 2022-06-27 ASSESSMENT — PAIN DESCRIPTION - FREQUENCY
FREQUENCY: INTERMITTENT
FREQUENCY: INTERMITTENT

## 2022-06-27 ASSESSMENT — PAIN DESCRIPTION - ONSET: ONSET: ON-GOING

## 2022-06-27 NOTE — PLAN OF CARE
Problem: Pain  Goal: Verbalizes/displays adequate comfort level or baseline comfort level  6/26/2022 2126 by Aileen Cintron RN  Outcome: Progressing  Note: Pt c/o #7 low back pain, flexeril and ultram given per pt's request  6/26/2022 1519 by Rodney Guillermo RN  Outcome: Progressing  Flowsheets (Taken 6/25/2022 1930 by Darien Barcenas LPN)  Verbalizes/displays adequate comfort level or baseline comfort level: Encourage patient to monitor pain and request assistance  Note: Reports having back pain. Problem: Self Harm/Suicidality  Goal: Will have no self-injury during hospital stay  Description: INTERVENTIONS:  1. Q 30 MINUTES: Routine safety checks  2. Q SHIFT & PRN: Assess risk to determine if routine checks are adequate to maintain patient safety  6/26/2022 2126 by Aileen Cintron RN  Outcome: Progressing  Note: Pt denies having suicidal thoughts, denies having self harm thoughts or urges  6/26/2022 1519 by Rodney Guillermo RN  Outcome: Progressing  Note: Free from self harming behaviors. Denies suicidal thoughts. Problem: Discharge Planning  Goal: Discharge to home or other facility with appropriate resources  6/26/2022 2126 by Aileen Cintron RN  Outcome: Progressing  Note: Pt reports she will return to [facility], will follow up at [facility] with Dr Rickey Funk  6/26/2022 1519 by Rodney Guillermo RN  Outcome: Progressing  Flowsheets (Taken 6/26/2022 1454)  Discharge to home or other facility with appropriate resources: Identify barriers to discharge with patient and caregiver  Note: Discharge planning in process. Problem: Depression  Goal: Will be euthymic at discharge  Description: INTERVENTIONS:  1. Administer medication as ordered  2. Provide emotional support via 1:1 interaction with staff  3. Encourage involvement in milieu/groups/activities  4.  Monitor for social isolation  6/26/2022 2126 by Aileen Cintron RN  Outcome: Progressing  Note: Pt reports she is sad because she wants to go home, rates mood 7/10, has good eye contact, blunt affect, worriesome, does brighten some with interaction, pt reports she is hopeful  6/26/2022 1519 by Katya Hardin RN  Outcome: Progressing  Note: Rates her mood 7 out of 10 with 10 being the best mood. Reports she is anxious about her personal belongings at [facility]. Reports feeling depressed. Patient is tearful during groups. Problem: Anxiety  Goal: Will report anxiety at manageable levels  Description: INTERVENTIONS:  1. Administer medication as ordered  2. Teach and rehearse alternative coping skills  3. Provide emotional support with 1:1 interaction with staff  6/26/2022 2126 by Baldomero Acevedo RN  Outcome: Progressing  Note: Pt had atarax for c/o anxiety at bedtime  6/26/2022 1519 by Katya Hardin RN  Outcome: Progressing     Problem: Chronic Conditions and Co-morbidities  Goal: Patient's chronic conditions and co-morbidity symptoms are monitored and maintained or improved  6/26/2022 2126 by Baldomero Acevedo RN  Outcome: Progressing  Flowsheets (Taken 6/26/2022 2126)  Care Plan - Patient's Chronic Conditions and Co-Morbidity Symptoms are Monitored and Maintained or Improved: Monitor and assess patient's chronic conditions and comorbid symptoms for stability, deterioration, or improvement  6/26/2022 1519 by Katya Hardin RN  Outcome: Progressing  Flowsheets (Taken 6/26/2022 7974)  Care Plan - Patient's Chronic Conditions and Co-Morbidity Symptoms are Monitored and Maintained or Improved: Monitor and assess patient's chronic conditions and comorbid symptoms for stability, deterioration, or improvement     Problem: Coping  Goal: Pt/Family able to verbalize concerns and demonstrate effective coping strategies  Description: INTERVENTIONS:  1. Assist patient/family to identify coping skills, available support systems and cultural and spiritual values  2.  Provide emotional support, including active listening and acknowledgement of concerns of patient and caregivers  3. Reduce environmental stimuli, as able  4. Instruct patient/family in relaxation techniques, as appropriate  5. Assess for spiritual pain/suffering and initiate Spiritual Care, Psychosocial Clinical Specialist consults as needed  6/26/2022 2126 by Edilberto Acosta RN  Outcome: Progressing  Flowsheets (Taken 6/26/2022 2126)  Patient/family able to verbalize anxieties, fears, and concerns, and demonstrate effective coping: Provide emotional support, including active listening and acknowledgement of concerns of patient and caregivers  6/26/2022 1519 by Fina Garcias RN  Outcome: Progressing     Problem: Psychosis  Goal: Will report no hallucinations or delusions  Description: INTERVENTIONS:  1. Administer medication as  ordered  2. Assist with reality testing to support increasing orientation  3. Assess if patient's hallucinations or delusions are encouraging self harm or harm to others and intervene as appropriate  6/26/2022 2126 by Edilberto Acosta RN  Outcome: Progressing  Note: Pt denied having hallucinations, pt appeared paranoid at bedtime, was very worriesome that she is being sent to [facility] when discharged here  6/26/2022 1519 by Fina Garcias RN  Outcome: Progressing  Note: Denies hallucinations. Patient is paranoid about other patients judging her. Problem: Gastrointestinal - Adult  Goal: Maintains or returns to baseline bowel function  Outcome: Progressing  Note: Pt had MOM at bedtime for c/o constipation     Problem: Sleep Disturbance  Goal: Will exhibit normal sleeping pattern  Description: INTERVENTIONS:  1. Administer medication as ordered  2. Decrease environmental stimuli, including noise, as appropriate  3.  Discourage social isolation and naps during the day  6/26/2022 2126 by Edilberto Acosta RN  Outcome: Completed  Note: Pt reports she is sleeping good, refused offer of prn trazodone at bedtime  6/26/2022 1519 by Davida Kumari

## 2022-06-27 NOTE — PLAN OF CARE
Patient has attended at least one group so far today and has been out of her room at times this shift so she is progressing toward identifying effective coping strategies.

## 2022-06-27 NOTE — PROGRESS NOTES
Discharge planning-Lesly is scheduoled for a follow up appointment with Dr. Tucker Goyal on 07/08/22 at 10:30 am in office.

## 2022-06-27 NOTE — PLAN OF CARE
Problem: Pain  Goal: Verbalizes/displays adequate comfort level or baseline comfort level  Outcome: Progressing  Flowsheets (Taken 6/27/2022 0800)  Verbalizes/displays adequate comfort level or baseline comfort level:   Encourage patient to monitor pain and request assistance   Assess pain using appropriate pain scale  Note: Pain Assessment: 0-10  Pain Level: 8   Patient's Stated Pain Goal: 3   Is pain goal met at this time? No   Patient reports chronic lower back, taking PRN medications as scheduled , reports decrease pain. Non-Pharmaceutical Pain Intervention(s): Rest       Problem: Self Harm/Suicidality  Goal: Will have no self-injury during hospital stay  Description: INTERVENTIONS:  1. Q 30 MINUTES: Routine safety checks  2. Q SHIFT & PRN: Assess risk to determine if routine checks are adequate to maintain patient safety  Outcome: Progressing  Note: No self harm behaviors were observed or reported so far this shift. Remains on every 15 minutes precautions for safety. Patient denies suicidal ideations so far this shift. Problem: Discharge Planning  Goal: Discharge to home or other facility with appropriate resources  Outcome: Progressing  Flowsheets (Taken 6/27/2022 0800)  Discharge to home or other facility with appropriate resources: Identify barriers to discharge with patient and caregiver  Note: Patient voices no needs before discharge. Patient plans to be discharged to [facility]. Discharge planner working with patient to achieve optimal discharge plans, specific to individual needs. Problem: Depression  Goal: Will be euthymic at discharge  Description: INTERVENTIONS:  1. Administer medication as ordered  2. Provide emotional support via 1:1 interaction with staff  3. Encourage involvement in milieu/groups/activities  4. Monitor for social isolation  Outcome: Progressing  Note: Patient reports mood 7/10 with 10 being normal. Has flat affect, brightens with interaction. Speech clear.  Good eye contact. Reports hope for future and identifies \"the girls at the house\" as their support system. Problem: Anxiety  Goal: Will report anxiety at manageable levels  Description: INTERVENTIONS:  1. Administer medication as ordered  2. Teach and rehearse alternative coping skills  3. Provide emotional support with 1:1 interaction with staff  Outcome: Progressing  Flowsheets (Taken 6/27/2022 0800)  Will report anxiety at manageable levels: Administer medication as ordered  Note: Patient reports \"little\" anxiety. Pt taking Atarax prn. Verbalized medication was effective. Problem: Psychosis  Goal: Will report no hallucinations or delusions  Description: INTERVENTIONS:  1. Administer medication as  ordered  2. Assist with reality testing to support increasing orientation  3. Assess if patient's hallucinations or delusions are encouraging self harm or harm to others and intervene as appropriate  Outcome: Progressing  Note: Patient denies hallucinations, reports feeling paranoid at times. Problem: Chronic Conditions and Co-morbidities  Goal: Patient's chronic conditions and co-morbidity symptoms are monitored and maintained or improved  Outcome: Progressing  Flowsheets (Taken 6/27/2022 0800)  Care Plan - Patient's Chronic Conditions and Co-Morbidity Symptoms are Monitored and Maintained or Improved: Monitor and assess patient's chronic conditions and comorbid symptoms for stability, deterioration, or improvement     Problem: Coping  Goal: Pt/Family able to verbalize concerns and demonstrate effective coping strategies  Description: INTERVENTIONS:  1. Assist patient/family to identify coping skills, available support systems and cultural and spiritual values  2. Provide emotional support, including active listening and acknowledgement of concerns of patient and caregivers  3. Reduce environmental stimuli, as able  4. Instruct patient/family in relaxation techniques, as appropriate  5.  Assess for spiritual pain/suffering and initiate Spiritual Care, Psychosocial Clinical Specialist consults as needed  Outcome: Progressing  Flowsheets (Taken 6/27/2022 0800)  Patient/family able to verbalize anxieties, fears, and concerns, and demonstrate effective coping: Provide emotional support, including active listening and acknowledgement of concerns of patient and caregivers     Problem: Gastrointestinal - Adult  Goal: Maintains or returns to baseline bowel function  Outcome: Progressing  Flowsheets (Taken 6/27/2022 0800)  Maintains or returns to baseline bowel function:   Assess bowel function   Encourage oral fluids to ensure adequate hydration   Administer ordered medications as needed  Note: Patient taking milk of magnesium as needed constipation. Care plan reviewed with patient. Patient verbalize understanding of the plan of care and contribute to goal setting.

## 2022-06-27 NOTE — DISCHARGE INSTR - DIET

## 2022-06-27 NOTE — PROGRESS NOTES
Pt attended goal wrap up and relaxation group, Pt reports she is trying to meet her goal of not be sad and stop worrying.

## 2022-06-27 NOTE — BH NOTE
Group Therapy Note    Date: 6/27/2022  Start Time: 9:00  End Time:  9:30  Number of Participants: 9    Type of Group: Healthy Living/Wellness    Wellness Binder Information    Patient's Goal: Talk to Dr. Jaimie Walker    Notes: Patient attended group but left early to talk to Dr. Carney Lombard After Intervention:  Unchanged    Participation Level:  Active Listener    Participation Quality: Attentive and Supportive      Speech:  normal      Thought Process/Content: Logical      Affective Functioning: Congruent      Mood: euphoric      Level of consciousness:  Alert and Oriented x4      Response to Learning: Able to retain information      Endings: None Reported    Modes of Intervention: Socialization      Discipline Responsible: Psychoeducational Specialist      Signature:  Gomez Pendleton

## 2022-06-27 NOTE — PROGRESS NOTES
Department of Psychiatry  Progress Note     Chief Complaint:  suicidal ideation and hallucinations    PROGRESS:  Lesly presents to the interview room. She is cooperative and agrees to speak with this writer. She reports she is doing okay today. She brightens with interaction. She says her weekend was long but helpful when she attended groups. She feels she got something out of the groups. She mentions that she realizes that she needs to worry about herself more. She had mentioned that she worried a lot about other people upon admission. She rates her mood 7 out of 10 with 10 being the best.  Her depression is better today. She denies any active suicidal ideation at this time. She is able to contract for safety in the unit. She is feeling less hopeless and helpless about her situation. She found out that she is able to go back to [facility] upon discharge. She identifies her roommates there as her support. She denies any auditory hallucinations. She says \"I have none. \"  Staff reported at times, the patient has been appearing paranoid at times about peers judging her or talking about her. Per staff, over the weekend, she would come out of her room crying after attending groups. She has been sleeping and eating well on the unit. Staff reported she slept 7 hours broken last night. She has been compliant with her medications and denies any side effects. She really likes the Risperdal.  She mentions that she was worried about the Risperdal causing weight gain but was provided education about that. She has been on the unit and interacting well with peers and attending groups. Bre Klein is receptive to receiving the Risperdal Perseris long-acting injection today. Barbara Rosales wanted Lesly to receive her long-acting injectable prior to her returning to their residence.     Suicidal ideations: denies active suicidal ideation  Compliance with medications: good   Medication side effects: absent  ROS: Patient has new complaints:  no  Sleep quality: 7 hours broken last night per staff  Attending groups: yes      OBJECTIVE      Medications  Current Facility-Administered Medications: cyclobenzaprine (FLEXERIL) tablet 10 mg, 10 mg, Oral, TID PRN  levothyroxine (SYNTHROID) tablet 50 mcg, 50 mcg, Oral, Daily  venlafaxine (EFFEXOR XR) extended release capsule 75 mg, 75 mg, Oral, Daily  risperiDONE (RISPERDAL) tablet 0.5 mg, 0.5 mg, Oral, Nightly  traMADol (ULTRAM) tablet 50 mg, 50 mg, Oral, BID PRN  acetaminophen (TYLENOL) tablet 650 mg, 650 mg, Oral, Q4H PRN  ibuprofen (ADVIL;MOTRIN) tablet 400 mg, 400 mg, Oral, Q6H PRN  magnesium hydroxide (MILK OF MAGNESIA) 400 MG/5ML suspension 30 mL, 30 mL, Oral, Daily PRN  aluminum & magnesium hydroxide-simethicone (MAALOX) 200-200-20 MG/5ML suspension 30 mL, 30 mL, Oral, Q6H PRN  hydrOXYzine HCl (ATARAX) tablet 50 mg, 50 mg, Oral, TID PRN  traZODone (DESYREL) tablet 50 mg, 50 mg, Oral, Nightly PRN     Physical     height is 5' 3\" (1.6 m) and weight is 175 lb (79.4 kg). Her tympanic temperature is 98.5 °F (36.9 °C). Her blood pressure is 138/98 (abnormal) and her pulse is 99. Her respiration is 16 and oxygen saturation is 97%.    Lab Results   Component Value Date    WBC 10.1 06/23/2022    HGB 13.4 06/23/2022    HCT 38.6 06/23/2022     06/23/2022    CHOL 112 10/02/2018    TRIG 52 10/02/2018    HDL 68 10/02/2018    ALT 22 10/16/2021    AST 28 10/16/2021     06/23/2022    K 3.9 06/23/2022     06/23/2022    CREATININE 0.8 06/23/2022    BUN 9 06/23/2022    CO2 25 06/23/2022    TSH 1.050 09/22/2021    GLUF 102 10/02/2018          Mental Status Exam:   Level of consciousness: awake  Appearance:  well-appearing, hospital attire, in chair, fair grooming and fair hygiene  Behavior/Motor: No abnormalities noted; brightens with interaction  Attitude toward examiner:  cooperative, attentive and good eye contact  Speech:  spontaneous, normal rate and normal volume  Mood: Okay per patient  Affect: Reactive  Thought processes:  linear, goal directed and coherent  Thought content:  Denies homicidal ideation  Suicidal Ideation:    Denies active suicidal ideation   delusions: Denies; paranoid at times per staff  Perceptual Disturbance:  denies any current perceptual disturbance  Cognition: Patient is oriented to person, place, time and situation  Concentration: clinically adequate  Memory: intact  Insight & Judgement: Improving       Electronically signed by Salvador Lizama PA-C on 6/27/2022 at 1:30 PM Reviewed patient's current plan of care and vital signs with nursing staff. **This report has been created using voice recognition software. It may contain minor errors which are inherent in voice recognition technology. **                                    Psychiatry Attending Attestation     I assessed this patient and reviewed the case and plan of care with Salvador Lizama PA-C. I have reviewed the above documentation and I agree with the findings and treatment plan with the following updates. Patient feels better than before. Mood and affect are better. Patient reports fleeting suicidal thoughts with no intent or plan. Patient notes that these thoughts are occurring less frequently. Denies any homicidal thoughts, that was explored with the patient. Oriented to time place and person. Recent and remote memory is intact. Patient feels hopeful. Sleep and appetite is good. No side effect from medication reported. Side-effect of medication were discussed with the patient . Patient is responding to current treatment. Discharge soon, if patient continues to show improvement. Case discussed with the staff. DSM-5 DIAGNOSIS:  Schizoaffective disorder, bipolar type [facility])     PLAN  Patient s symptoms   are improving  Will administer Risperdal Perseris 90mg once monthly shot today  Attempt to develop insight  Psycho-education conducted. Supportive Therapy conducted.   Probable discharge is tomorrow  Follow-up [facility]    More than 16 mins of the session was spent doing Supportive psychotherapy and coordinating care. Session lasted for over 30 mins. Patient was evaluated by Curry Melo PA-C on the unit in person and I evaluated patient as Tele visit. This Virtual Visit was conducted with patient's consent. The patient is located in a state where I am licensed to provide care. Ajay Landeros is a 39 y.o. female being evaluated by a Virtual Visit (video visit) encounter to address concerns as mentioned above. A caregiver was present in the room along with the patient. Patient is present at [address] [place] [address] and I am physically present at my home in [facility]     --Sandra Piedra MD on 6/27/2022 at 5:13 PM    An electronic signature was used to authenticate this note. **This report has been created using voice recognition software. It may contain minor errors which are inherent in voice recognition technology. **

## 2022-06-28 VITALS
RESPIRATION RATE: 16 BRPM | WEIGHT: 175 LBS | HEART RATE: 95 BPM | SYSTOLIC BLOOD PRESSURE: 121 MMHG | OXYGEN SATURATION: 96 % | DIASTOLIC BLOOD PRESSURE: 81 MMHG | TEMPERATURE: 97.9 F | HEIGHT: 63 IN | BODY MASS INDEX: 31.01 KG/M2

## 2022-06-28 PROCEDURE — 99239 HOSP IP/OBS DSCHRG MGMT >30: CPT | Performed by: PSYCHIATRY & NEUROLOGY

## 2022-06-28 PROCEDURE — 6370000000 HC RX 637 (ALT 250 FOR IP): Performed by: PHYSICIAN ASSISTANT

## 2022-06-28 PROCEDURE — 6370000000 HC RX 637 (ALT 250 FOR IP): Performed by: PSYCHIATRY & NEUROLOGY

## 2022-06-28 PROCEDURE — 5130000000 HC BRIDGE APPOINTMENT

## 2022-06-28 RX ORDER — VENLAFAXINE HYDROCHLORIDE 75 MG/1
75 CAPSULE, EXTENDED RELEASE ORAL DAILY
Qty: 30 CAPSULE | Refills: 0 | Status: SHIPPED | OUTPATIENT
Start: 2022-06-28

## 2022-06-28 RX ORDER — RISPERIDONE 0.5 MG/1
0.5 TABLET, FILM COATED ORAL NIGHTLY
Qty: 30 TABLET | Refills: 0 | Status: SHIPPED | OUTPATIENT
Start: 2022-06-28 | End: 2022-08-02 | Stop reason: ALTCHOICE

## 2022-06-28 RX ORDER — HYDROXYZINE 50 MG/1
50 TABLET, FILM COATED ORAL 3 TIMES DAILY PRN
Qty: 90 TABLET | Refills: 0 | Status: SHIPPED | OUTPATIENT
Start: 2022-06-28 | End: 2022-07-28

## 2022-06-28 RX ADMIN — HYDROXYZINE HYDROCHLORIDE 50 MG: 25 TABLET, FILM COATED ORAL at 08:31

## 2022-06-28 RX ADMIN — LEVOTHYROXINE SODIUM 50 MCG: 0.05 TABLET ORAL at 08:30

## 2022-06-28 RX ADMIN — TRAMADOL HYDROCHLORIDE 50 MG: 50 TABLET, COATED ORAL at 08:30

## 2022-06-28 RX ADMIN — VENLAFAXINE HYDROCHLORIDE 75 MG: 75 CAPSULE, EXTENDED RELEASE ORAL at 08:30

## 2022-06-28 RX ADMIN — CYCLOBENZAPRINE 10 MG: 10 TABLET, FILM COATED ORAL at 11:10

## 2022-06-28 ASSESSMENT — PAIN SCALES - GENERAL
PAINLEVEL_OUTOF10: 5
PAINLEVEL_OUTOF10: 8

## 2022-06-28 ASSESSMENT — PAIN DESCRIPTION - LOCATION
LOCATION: BACK
LOCATION: BACK

## 2022-06-28 ASSESSMENT — PAIN DESCRIPTION - PAIN TYPE: TYPE: CHRONIC PAIN

## 2022-06-28 NOTE — BH NOTE
PLAN OF CARE:     Start Time: 0900  End Time:  0920    Group Topic:  Daily Goals    Group Type:   Goal Group    Intervention/Goal:  To increase support and identify daily goals    Attendance:  Attended group    Affect:   Brightens with interaction    Behavior:  Pleasant and cooperative     Response:  appropriate    Daily Goal:    \"Practice patience. \"    Progress:  Progressing to goal

## 2022-06-28 NOTE — PROGRESS NOTES
Behavioral Health   Discharge Note    Pt discharged with followings belongings:   Dental Appliances: None  Vision - Corrective Lenses: Eyeglasses  Hearing Aid: None  Jewelry: Body Piercing,Bracelet,Earrings,Ring  Body Piercings Removed: No  Clothing: Footwear,Sweater,Undergarments,Other (Comment) (pant suit)  Other Valuables: At home   Valuables retrieved from safe, security envelope number:  n/a and returned to patient. Patient left department with transport staff via ambulation. Discharged to [facility]. \"An Important Message from Medicare About Your Rights\" (IMM) form photocopy original from admission and provided to pt at least 4 hours prior to discharge yes. If pt left within 4 hours of receiving 2nd delivery of IMM, this is because pt was agreeable with hospital discharge. Patient/guardian education on aftercare instructions: Yes  Bridge appointment completed:  yes. Reviewed Discharge Instructions with patient/family/nursing facility. Patient/family verbalizes understanding and agreement with the discharge plan using the teachback method. Information faxed to follow up provider by staff and [address] [place] [address].  Patient/family verbalize understanding of AVS:Yes    Status EXAM upon discharge:  Mental Status and Behavioral Exam  Normal: No  Level of Assistance: Independent/Self  Facial Expression: Flat  Affect: Blunt  Level of Consciousness: Alert  Frequency of Checks: 4 times per hour, close  Mood:Normal: No  Mood: Anxious,Depressed,Sad  Motor Activity:Normal: Yes  Motor Activity: Decreased  Eye Contact: Good  Observed Behavior: Cooperative,Friendly  Sexual Misconduct History: Current - no  Preception: [place] to person,[place] to time,C[place] to place,[place] to situation  Attention:Normal: Yes  Attention: Distractible  Thought Processes: Circumstantial  Thought Content:Normal: No  Thought Content: Paranoia  Depression Symptoms: Change in energy level  Anxiety Symptoms: Generalized  Leann Symptoms: No problems reported or observed.   Hallucinations: None  Delusions: Yes  Delusions: Paranoid  Memory:Normal: Yes  Memory: Poor recent  Insight and Judgment: No  Insight and Judgment: Poor judgment,Poor insight    Janneth Michel RN

## 2022-06-28 NOTE — BH NOTE
Group Therapy Note    Date: 6/28/2022  Start Time:   0920  End Time:    0945  Number of Participants:   7    Type of Group: Recreational/Self-Esteem    Patient's Goal:  Increase self-esteem and socialization. Notes:    Patient participated in a self-esteem worksheet and shared her answers with others in the group. Patient was social with others.      Status After Intervention:  Improved    Participation Level: Interactive    Participation Quality: Appropriate, Attentive and Sharing      Speech:  normal      Thought Process/Content: Logical      Affective Functioning: Congruent      Mood: euthymic      Level of consciousness:  Oriented x4      Response to Learning: Progressing to goal      Endings: None Reported    Modes of Intervention: Education, Support, Socialization, Exploration and Activity      Discipline Responsible: Psychoeducational Specialist      Signature:  Lauryn Ac

## 2022-06-28 NOTE — PLAN OF CARE
Problem: Pain  Goal: Verbalizes/displays adequate comfort level or baseline comfort level  6/27/2022 2136 by Bro Townsend RN  Outcome: Progressing  Note: Pt was given ultram and flexeril for c/o low back pain  6/27/2022 1326 by Manpreet Landis RN  Outcome: Progressing  Flowsheets (Taken 6/27/2022 0800)  Verbalizes/displays adequate comfort level or baseline comfort level:   Encourage patient to monitor pain and request assistance   Assess pain using appropriate pain scale  Note: Pain Assessment: 0-10  Pain Level: 8   Patient's Stated Pain Goal: 3   Is pain goal met at this time? No   Patient reports chronic lower back, taking PRN medications as scheduled , reports decrease pain. Non-Pharmaceutical Pain Intervention(s): Rest       Problem: Self Harm/Suicidality  Goal: Will have no self-injury during hospital stay  Description: INTERVENTIONS:  1. Q 30 MINUTES: Routine safety checks  2. Q SHIFT & PRN: Assess risk to determine if routine checks are adequate to maintain patient safety  6/27/2022 2136 by Bro Townsend RN  Outcome: Progressing  Note: Pt denies suicidal thoughts, denies self harm thoughts or urges  6/27/2022 1326 by Manpreet Landis RN  Outcome: Progressing  Note: No self harm behaviors were observed or reported so far this shift. Remains on every 15 minutes precautions for safety. Patient denies suicidal ideations so far this shift. Problem: Discharge Planning  Goal: Discharge to home or other facility with appropriate resources  6/27/2022 2136 by Bro Townsend RN  Outcome: Progressing  Note: Pt plans to return to [facility] tomorrow  6/27/2022 1326 by Manpreet Landis RN  Outcome: Progressing  Flowsheets (Taken 6/27/2022 0800)  Discharge to home or other facility with appropriate resources: Identify barriers to discharge with patient and caregiver  Note: Patient voices no needs before discharge. Patient plans to be discharged to [facility].  Discharge planner working with patient to achieve optimal discharge plans, specific to individual needs. Problem: Depression  Goal: Will be euthymic at discharge  Description: INTERVENTIONS:  1. Administer medication as ordered  2. Provide emotional support via 1:1 interaction with staff  3. Encourage involvement in milieu/groups/activities  4. Monitor for social isolation  6/27/2022 2136 by Rene Barnett, RN  Outcome: Progressing  Note: Pt rates mood 7/20, has blunt affect, does brighten some with interaction, reports she is hopeful  6/27/2022 1326 by Rut Bailey RN  Outcome: Progressing  Note: Patient reports mood 7/10 with 10 being normal. Has flat affect, brightens with interaction. Speech clear. Good eye contact. Reports hope for future and identifies \"the girls at the house\" as their support system. Problem: Anxiety  Goal: Will report anxiety at manageable levels  Description: INTERVENTIONS:  1. Administer medication as ordered  2. Teach and rehearse alternative coping skills  3. Provide emotional support with 1:1 interaction with staff  6/27/2022 2136 by Rene Barnett RN  Outcome: Progressing  Note: Pt had atarax for c/o anxiety at bedtime  6/27/2022 1326 by Rut Bailey RN  Outcome: Progressing  Flowsheets (Taken 6/27/2022 0800)  Will report anxiety at manageable levels: Administer medication as ordered  Note: Patient reports \"little\" anxiety. Pt taking Atarax prn. Verbalized medication was effective.        Problem: Chronic Conditions and Co-morbidities  Goal: Patient's chronic conditions and co-morbidity symptoms are monitored and maintained or improved  6/27/2022 2136 by Rene Barnett RN  Outcome: Progressing  Flowsheets (Taken 6/27/2022 2128)  Care Plan - Patient's Chronic Conditions and Co-Morbidity Symptoms are Monitored and Maintained or Improved: Monitor and assess patient's chronic conditions and comorbid symptoms for stability, deterioration, or improvement  6/27/2022 1326 by Rut Bailey RN  Outcome: Progressing  Flowsheets (Taken 6/27/2022 0800)  Care Plan - Patient's Chronic Conditions and Co-Morbidity Symptoms are Monitored and Maintained or Improved: Monitor and assess patient's chronic conditions and comorbid symptoms for stability, deterioration, or improvement     Problem: Coping  Goal: Pt/Family able to verbalize concerns and demonstrate effective coping strategies  Description: INTERVENTIONS:  1. Assist patient/family to identify coping skills, available support systems and cultural and spiritual values  2. Provide emotional support, including active listening and acknowledgement of concerns of patient and caregivers  3. Reduce environmental stimuli, as able  4. Instruct patient/family in relaxation techniques, as appropriate  5. Assess for spiritual pain/suffering and initiate Spiritual Care, Psychosocial Clinical Specialist consults as needed  6/27/2022 2136 by Nena Kessler RN  Outcome: Progressing  Flowsheets (Taken 6/27/2022 2128)  Patient/family able to verbalize anxieties, fears, and concerns, and demonstrate effective coping: Provide emotional support, including active listening and acknowledgement of concerns of patient and caregivers  6/27/2022 1351 by Jean Pierre Ramirez  Outcome: Progressing  6/27/2022 1326 by Sherley Gonzalez RN  Outcome: Progressing  Flowsheets (Taken 6/27/2022 0800)  Patient/family able to verbalize anxieties, fears, and concerns, and demonstrate effective coping: Provide emotional support, including active listening and acknowledgement of concerns of patient and caregivers     Problem: Psychosis  Goal: Will report no hallucinations or delusions  Description: INTERVENTIONS:  1. Administer medication as  ordered  2. Assist with reality testing to support increasing orientation  3.  Assess if patient's hallucinations or delusions are encouraging self harm or harm to others and intervene as appropriate  6/27/2022 2136 by Nena Kessler RN  Outcome: Progressing  Note: Pt denies having hallucinations, pt appears paranoid-believes she is going to care home tomorrow at discharge  6/27/2022 1326 by Manpreet Landis RN  Outcome: Progressing  Note: Patient denies hallucinations, reports feeling paranoid at times. Problem: Gastrointestinal - Adult  Goal: Maintains or returns to baseline bowel function  6/27/2022 2136 by Bro Townsend RN  Outcome: Progressing  Flowsheets (Taken 6/27/2022 2136)  Maintains or returns to baseline bowel function: Encourage oral fluids to ensure adequate hydration  6/27/2022 1326 by Manpreet Landis RN  Outcome: Progressing  Flowsheets (Taken 6/27/2022 0800)  Maintains or returns to baseline bowel function:   Assess bowel function   Encourage oral fluids to ensure adequate hydration   Administer ordered medications as needed  Note: Patient taking milk of magnesium as needed constipation. Care plan reviewed with patient.   Patient does verbalize understanding of the plan of care and does contribute to goal setting

## 2022-06-29 ENCOUNTER — TELEPHONE (OUTPATIENT)
Dept: PSYCHIATRY | Age: 42
End: 2022-06-29

## 2022-06-29 NOTE — TELEPHONE ENCOUNTER
Contacted [facility] for 4E post discharge call back program.  No questions/concerns verbalized about follow-up plan or appointment.
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
## 2022-06-30 ENCOUNTER — TELEPHONE (OUTPATIENT)
Dept: FAMILY MEDICINE CLINIC | Age: 42
End: 2022-06-30

## 2022-08-02 ENCOUNTER — OFFICE VISIT (OUTPATIENT)
Dept: FAMILY MEDICINE CLINIC | Age: 42
End: 2022-08-02
Payer: MEDICARE

## 2022-08-02 VITALS
TEMPERATURE: 98.4 F | BODY MASS INDEX: 32.98 KG/M2 | HEART RATE: 104 BPM | DIASTOLIC BLOOD PRESSURE: 64 MMHG | RESPIRATION RATE: 16 BRPM | SYSTOLIC BLOOD PRESSURE: 118 MMHG | WEIGHT: 186.2 LBS

## 2022-08-02 DIAGNOSIS — F41.9 ANXIETY: ICD-10-CM

## 2022-08-02 DIAGNOSIS — Z13.220 LIPID SCREENING: ICD-10-CM

## 2022-08-02 DIAGNOSIS — F15.21: ICD-10-CM

## 2022-08-02 DIAGNOSIS — K22.70 BARRETT'S ESOPHAGUS WITHOUT DYSPLASIA: ICD-10-CM

## 2022-08-02 DIAGNOSIS — E03.8 SUBCLINICAL HYPOTHYROIDISM: ICD-10-CM

## 2022-08-02 DIAGNOSIS — F31.63 SEVERE MIXED BIPOLAR 1 DISORDER WITHOUT PSYCHOSIS (HCC): Primary | ICD-10-CM

## 2022-08-02 PROCEDURE — 99214 OFFICE O/P EST MOD 30 MIN: CPT | Performed by: NURSE PRACTITIONER

## 2022-08-02 RX ORDER — ARIPIPRAZOLE 400 MG
KIT INTRAMUSCULAR
COMMUNITY
Start: 2022-07-12

## 2022-08-02 RX ORDER — HYDROXYZINE PAMOATE 50 MG/1
50 CAPSULE ORAL 3 TIMES DAILY PRN
COMMUNITY

## 2022-08-02 SDOH — ECONOMIC STABILITY: FOOD INSECURITY: WITHIN THE PAST 12 MONTHS, YOU WORRIED THAT YOUR FOOD WOULD RUN OUT BEFORE YOU GOT MONEY TO BUY MORE.: NEVER TRUE

## 2022-08-02 SDOH — ECONOMIC STABILITY: FOOD INSECURITY: WITHIN THE PAST 12 MONTHS, THE FOOD YOU BOUGHT JUST DIDN'T LAST AND YOU DIDN'T HAVE MONEY TO GET MORE.: NEVER TRUE

## 2022-08-02 ASSESSMENT — ENCOUNTER SYMPTOMS
SORE THROAT: 0
NAUSEA: 0
TROUBLE SWALLOWING: 0
COLOR CHANGE: 0
EYE PAIN: 0
SHORTNESS OF BREATH: 0
VOMITING: 0
COUGH: 0
ABDOMINAL PAIN: 0
FACIAL SWELLING: 0
SINUS PAIN: 0
BACK PAIN: 0
WHEEZING: 0
DIARRHEA: 0
BLOOD IN STOOL: 0

## 2022-08-02 ASSESSMENT — SOCIAL DETERMINANTS OF HEALTH (SDOH): HOW HARD IS IT FOR YOU TO PAY FOR THE VERY BASICS LIKE FOOD, HOUSING, MEDICAL CARE, AND HEATING?: NOT HARD AT ALL

## 2022-08-02 NOTE — PROGRESS NOTES
[facility]  [address] [place] [id number]  Dept: [phone]  Dept Fax: [phone]: [phone]     2022    Daryl Jett (:  1980) is a 43 y.o. female, here for evaluation of the following medical concerns:  Chief Complaint   Patient presents with    New Patient     Here to establish care. Previously seen by [facility] clinic. Pt presents to the office today for new patient appt. Reviewed HX, pt has been treated for hypothyroid in the past, but is not currently taking any medications for this. She is living at a residential setting for [facility] and psych medications are being prescribed per that office. Pt is unhappy with these medications and feel they are making her gain weight. Pt did have back surgery earlier this year with Dr Elena Shaw, but the pain is making it hard to exercise like she would like. Hypothyroidism: Recent symptoms: fatigue, weight gain, and anxiety. She denies cold intolerance, heat intolerance, hair loss, and dry skin. Patient is not taking her medication consistently on an empty stomach. No results found for: [facility]  Lab Results   Component Value Date    TSH 1.050 2021    TSH 5.190 (H) 2021    TSH 2.650 07/15/2020       Dr Elena Shaw- did back surgery earlier this year. No recent follow ups. OBGYN- none. No recent PAP testing. Pt presents to the office today for depression/anxiety. Pt currently taking vistaril, abilify, effexor and trazodone per Rani Lemons. Side effects noted: weight gain    Sleep-OK  Interest- OK  Guilt- OK  Energy- OK  Concentration- OK  Appetite- OK  Psychomotor Retardation- NO  Suicidal/ Homicidal Ideations- NO  Anxiety-OK    Employer? Lost work days? -    Review of Systems   Constitutional:  Negative for chills, fatigue and fever.    HENT:  Negative for congestion, facial swelling, sinus pain, sore throat and trouble L5-S1 Posterior Fusion & TLIF performed by Devonte Jarrell MD at [facility] History    Marital status:      Spouse name: Not on file    Number of children: 1    Years of education: 12    Highest education level: Not on file   Occupational History     Employer: NOT EMPLOYED   Tobacco Use    Smoking status: Every Day     Packs/day: 0.50     Types: Cigarettes    Smokeless tobacco: Never   Vaping Use    Vaping Use: Former   Substance and Sexual Activity    Alcohol use: Yes     Comment: occasionally    Drug use: Yes     Types: Marijuana (Weed)     Comment: marijuana a few days ago    Sexual activity: Yes     Partners: Male   Other Topics Concern    Not on file   Social History Narrative    Not on file     Social Determinants of Health     Financial Resource Strain: Low Risk     Difficulty of Paying Living Expenses: Not hard at all   Food Insecurity: No Food Insecurity    Worried About Running Out of Food in the Last Year: Never true    Ran Out of Food in the Last Year: Never true   Transportation Needs: Not on file   Physical Activity: Not on file   Stress: Not on file   Social Connections: Not on file   Intimate Partner Violence: Not on file   Housing Stability: Not on file        Family History   Problem Relation Age of Onset    Diabetes Mother     Heart Disease Mother     Diabetes Father     Heart Disease Father     Cancer Paternal Aunt        Vitals:    08/02/22 1433   BP: 118/64   Pulse: (!) 104   Resp: 16   Temp: 98.4 °F (36.9 °C)   TempSrc: Oral   Weight: 186 lb 3.2 oz (84.5 kg)     Estimated body mass index is 32.98 kg/m² as calculated from the following:    Height as of 6/23/22: 5' 3\" (1.6 m). Weight as of this encounter: 186 lb 3.2 oz (84.5 kg). Physical Exam  Vitals reviewed. Constitutional:       General: She is not in acute distress. Appearance: She is well-developed. HENT:      Head: Normocephalic and atraumatic.       Right Ear: Tympanic membrane, ear canal and external ear normal.      Left Ear: Tympanic membrane, ear canal and external ear normal.      Nose: Nose normal.      Mouth/Throat:      Mouth: Mucous membranes are moist.      Pharynx: Oropharynx is clear. No oropharyngeal exudate. Eyes:      General:         Right eye: No discharge. Left eye: No discharge. Conjunctiva/sclera: Conjunctivae normal.   Neck:      Vascular: No carotid bruit. Cardiovascular:      Rate and Rhythm: Normal rate and regular rhythm. Heart sounds: Normal heart sounds. Pulmonary:      Effort: Pulmonary effort is normal. No respiratory distress. Breath sounds: Normal breath sounds. Abdominal:      General: Bowel sounds are normal.      Palpations: Abdomen is soft. Tenderness: There is no abdominal tenderness. Musculoskeletal:      Cervical back: Normal range of motion. No rigidity. Right lower leg: No edema. Left lower leg: No edema. Lymphadenopathy:      Cervical: No cervical adenopathy. Skin:     General: Skin is warm and dry. Neurological:      General: No focal deficit present. Mental Status: She is alert and oriented to person, place, and time. Coordination: Coordination normal.   Psychiatric:         Mood and Affect: Mood is anxious. Affect is tearful. Speech: Speech normal.         Behavior: Behavior normal. Behavior is cooperative. Thought Content: Thought content normal.         Judgment: Judgment normal.       ASSESSMENT/PLAN:  1. Severe mixed bipolar 1 disorder without psychosis ([address]    2. Coker's esophagus without dysplasia  - CBC with Auto Differential; Future  - Comprehensive Metabolic Panel; Future    3. Anxiety    4. Subclinical hypothyroidism  - TSH; Future  - T4, Free; Future    5. Lipid screening  - Lipid Panel; Future    6. Amphetamine substance use disorder, severe, in sustained remission ([address]    - Discussed weight gain. Pt to get labs and follow up in 2 weeks.  We can discuss more about weight loss at that time.   - Recommended calling Dr Georgina Ray about her back pain and follow up with that office ASAP.    - Labs after 12 hours fasting  - Work on diet and try to increase physical activity as able. - Continue with [facility] services for psych medication and wrap around care. Pt verbalized understanding. Return in about 2 weeks (around 8/16/2022), or if symptoms worsen or fail to improve, for Routine follow up, Medication check. Patient given educational materials - see patient instructions. Discussed use, benefit, and side effects of prescribed medications. All patient questions answered. Pt voiced understanding. Reviewed health maintenance. An  electronic signature was used to authenticate this note.     --RAMÓN Avendano - CNP on 8/3/2022 at 8:17 AM

## 2022-08-17 ENCOUNTER — HOSPITAL ENCOUNTER (INPATIENT)
Age: 42
LOS: 8 days | Discharge: HOME HEALTH | DRG: 460 | End: 2022-08-25
Attending: PHYSICIAN ASSISTANT | Admitting: PHYSICIAN ASSISTANT
Payer: MEDICARE

## 2022-08-17 ENCOUNTER — APPOINTMENT (OUTPATIENT)
Dept: CT IMAGING | Age: 42
DRG: 460 | End: 2022-08-17
Payer: MEDICARE

## 2022-08-17 ENCOUNTER — APPOINTMENT (OUTPATIENT)
Dept: MRI IMAGING | Age: 42
DRG: 460 | End: 2022-08-17
Payer: MEDICARE

## 2022-08-17 DIAGNOSIS — M54.16 LUMBAR RADICULOPATHY: ICD-10-CM

## 2022-08-17 DIAGNOSIS — M46.26 OSTEOMYELITIS OF LUMBAR SPINE (HCC): Primary | ICD-10-CM

## 2022-08-17 DIAGNOSIS — Z98.1 S/P LUMBAR FUSION: ICD-10-CM

## 2022-08-17 PROBLEM — M86.9 OSTEOMYELITIS (HCC): Status: ACTIVE | Noted: 2022-08-17

## 2022-08-17 LAB
ALBUMIN SERPL-MCNC: 3.6 G/DL (ref 3.5–5.1)
ALP BLD-CCNC: 95 U/L (ref 38–126)
ALT SERPL-CCNC: 12 U/L (ref 11–66)
AMPHETAMINE+METHAMPHETAMINE URINE SCREEN: NEGATIVE
ANION GAP SERPL CALCULATED.3IONS-SCNC: 7 MEQ/L (ref 8–16)
AST SERPL-CCNC: 18 U/L (ref 5–40)
BACTERIA: ABNORMAL
BARBITURATE QUANTITATIVE URINE: NEGATIVE
BASOPHILS # BLD: 0.6 %
BASOPHILS ABSOLUTE: 0 THOU/MM3 (ref 0–0.1)
BENZODIAZEPINE QUANTITATIVE URINE: NEGATIVE
BILIRUB SERPL-MCNC: 0.4 MG/DL (ref 0.3–1.2)
BILIRUBIN DIRECT: < 0.2 MG/DL (ref 0–0.3)
BILIRUBIN URINE: NEGATIVE
BLOOD, URINE: NEGATIVE
BUN BLDV-MCNC: 8 MG/DL (ref 7–22)
C-REACTIVE PROTEIN: < 0.3 MG/DL (ref 0–1)
CALCIUM SERPL-MCNC: 8.9 MG/DL (ref 8.5–10.5)
CANNABINOID QUANTITATIVE URINE: NEGATIVE
CASTS: ABNORMAL /LPF
CASTS: ABNORMAL /LPF
CHARACTER, URINE: CLEAR
CHLORIDE BLD-SCNC: 108 MEQ/L (ref 98–111)
CO2: 24 MEQ/L (ref 23–33)
COCAINE METABOLITE QUANTITATIVE URINE: NEGATIVE
COLOR: YELLOW
CREAT SERPL-MCNC: 0.7 MG/DL (ref 0.4–1.2)
CRYSTALS: ABNORMAL
EOSINOPHIL # BLD: 2.5 %
EOSINOPHILS ABSOLUTE: 0.2 THOU/MM3 (ref 0–0.4)
EPITHELIAL CELLS, UA: ABNORMAL /HPF
ERYTHROCYTE [DISTWIDTH] IN BLOOD BY AUTOMATED COUNT: 13.2 % (ref 11.5–14.5)
ERYTHROCYTE [DISTWIDTH] IN BLOOD BY AUTOMATED COUNT: 43.4 FL (ref 35–45)
GFR SERPL CREATININE-BSD FRML MDRD: > 90 ML/MIN/1.73M2
GLUCOSE BLD-MCNC: 114 MG/DL (ref 70–108)
GLUCOSE, URINE: NEGATIVE MG/DL
HCT VFR BLD CALC: 41.7 % (ref 37–47)
HEMOGLOBIN: 13.8 GM/DL (ref 12–16)
IMMATURE GRANS (ABS): 0.01 THOU/MM3 (ref 0–0.07)
IMMATURE GRANULOCYTES: 0.1 %
KETONES, URINE: NEGATIVE
LACTIC ACID, SEPSIS: 1 MMOL/L (ref 0.5–1.9)
LEUKOCYTE ESTERASE, URINE: ABNORMAL
LIPASE: 32.4 U/L (ref 5.6–51.3)
LYMPHOCYTES # BLD: 28.9 %
LYMPHOCYTES ABSOLUTE: 2.3 THOU/MM3 (ref 1–4.8)
MCH RBC QN AUTO: 29.8 PG (ref 26–33)
MCHC RBC AUTO-ENTMCNC: 33.1 GM/DL (ref 32.2–35.5)
MCV RBC AUTO: 90.1 FL (ref 81–99)
MISCELLANEOUS LAB TEST RESULT: ABNORMAL
MONOCYTES # BLD: 7.3 %
MONOCYTES ABSOLUTE: 0.6 THOU/MM3 (ref 0.4–1.3)
NITRITE, URINE: NEGATIVE
NUCLEATED RED BLOOD CELLS: 0 /100 WBC
OPIATES, URINE: NEGATIVE
OSMOLALITY CALCULATION: 276.7 MOSMOL/KG (ref 275–300)
OXYCODONE: NEGATIVE
PH UA: 6.5 (ref 5–9)
PHENCYCLIDINE QUANTITATIVE URINE: NEGATIVE
PLATELET # BLD: 269 THOU/MM3 (ref 130–400)
PMV BLD AUTO: 9.1 FL (ref 9.4–12.4)
POTASSIUM REFLEX MAGNESIUM: 4.1 MEQ/L (ref 3.5–5.2)
PREGNANCY, SERUM: NEGATIVE
PROCALCITONIN: 0.03 NG/ML (ref 0.01–0.09)
PROTEIN UA: NEGATIVE MG/DL
RBC # BLD: 4.63 MILL/MM3 (ref 4.2–5.4)
RBC URINE: ABNORMAL /HPF
RENAL EPITHELIAL, UA: ABNORMAL
SEDIMENTATION RATE, ERYTHROCYTE: 4 MM/HR (ref 0–20)
SEG NEUTROPHILS: 60.6 %
SEGMENTED NEUTROPHILS ABSOLUTE COUNT: 4.8 THOU/MM3 (ref 1.8–7.7)
SODIUM BLD-SCNC: 139 MEQ/L (ref 135–145)
SPECIFIC GRAVITY UA: 1.02 (ref 1–1.03)
TOTAL PROTEIN: 6.4 G/DL (ref 6.1–8)
TROPONIN T: < 0.01 NG/ML
UROBILINOGEN, URINE: 0.2 EU/DL (ref 0–1)
WBC # BLD: 8 THOU/MM3 (ref 4.8–10.8)
WBC UA: ABNORMAL /HPF
YEAST: ABNORMAL

## 2022-08-17 PROCEDURE — 2580000003 HC RX 258: Performed by: NURSE PRACTITIONER

## 2022-08-17 PROCEDURE — 36415 COLL VENOUS BLD VENIPUNCTURE: CPT

## 2022-08-17 PROCEDURE — 96376 TX/PRO/DX INJ SAME DRUG ADON: CPT

## 2022-08-17 PROCEDURE — 84145 PROCALCITONIN (PCT): CPT

## 2022-08-17 PROCEDURE — 99285 EMERGENCY DEPT VISIT HI MDM: CPT

## 2022-08-17 PROCEDURE — 72195 MRI PELVIS W/O DYE: CPT

## 2022-08-17 PROCEDURE — 96375 TX/PRO/DX INJ NEW DRUG ADDON: CPT

## 2022-08-17 PROCEDURE — 83690 ASSAY OF LIPASE: CPT

## 2022-08-17 PROCEDURE — 99222 1ST HOSP IP/OBS MODERATE 55: CPT

## 2022-08-17 PROCEDURE — 85025 COMPLETE CBC W/AUTO DIFF WBC: CPT

## 2022-08-17 PROCEDURE — 80307 DRUG TEST PRSMV CHEM ANLYZR: CPT

## 2022-08-17 PROCEDURE — 84703 CHORIONIC GONADOTROPIN ASSAY: CPT

## 2022-08-17 PROCEDURE — 96374 THER/PROPH/DIAG INJ IV PUSH: CPT

## 2022-08-17 PROCEDURE — 87040 BLOOD CULTURE FOR BACTERIA: CPT

## 2022-08-17 PROCEDURE — 6360000004 HC RX CONTRAST MEDICATION: Performed by: NURSE PRACTITIONER

## 2022-08-17 PROCEDURE — 72141 MRI NECK SPINE W/O DYE: CPT

## 2022-08-17 PROCEDURE — 72131 CT LUMBAR SPINE W/O DYE: CPT

## 2022-08-17 PROCEDURE — 1200000003 HC TELEMETRY R&B

## 2022-08-17 PROCEDURE — 83605 ASSAY OF LACTIC ACID: CPT

## 2022-08-17 PROCEDURE — 72146 MRI CHEST SPINE W/O DYE: CPT

## 2022-08-17 PROCEDURE — 72158 MRI LUMBAR SPINE W/O & W/DYE: CPT

## 2022-08-17 PROCEDURE — 86140 C-REACTIVE PROTEIN: CPT

## 2022-08-17 PROCEDURE — 85651 RBC SED RATE NONAUTOMATED: CPT

## 2022-08-17 PROCEDURE — 6360000002 HC RX W HCPCS: Performed by: NURSE PRACTITIONER

## 2022-08-17 PROCEDURE — 80076 HEPATIC FUNCTION PANEL: CPT

## 2022-08-17 PROCEDURE — 2580000003 HC RX 258: Performed by: PHYSICIAN ASSISTANT

## 2022-08-17 PROCEDURE — 80048 BASIC METABOLIC PNL TOTAL CA: CPT

## 2022-08-17 PROCEDURE — 81001 URINALYSIS AUTO W/SCOPE: CPT

## 2022-08-17 PROCEDURE — 84484 ASSAY OF TROPONIN QUANT: CPT

## 2022-08-17 PROCEDURE — 6360000002 HC RX W HCPCS: Performed by: PHYSICIAN ASSISTANT

## 2022-08-17 PROCEDURE — 72192 CT PELVIS W/O DYE: CPT

## 2022-08-17 PROCEDURE — A9579 GAD-BASE MR CONTRAST NOS,1ML: HCPCS | Performed by: NURSE PRACTITIONER

## 2022-08-17 RX ORDER — SODIUM CHLORIDE 0.9 % (FLUSH) 0.9 %
10 SYRINGE (ML) INJECTION PRN
Status: DISCONTINUED | OUTPATIENT
Start: 2022-08-17 | End: 2022-08-25 | Stop reason: HOSPADM

## 2022-08-17 RX ORDER — MAGNESIUM SULFATE IN WATER 40 MG/ML
2000 INJECTION, SOLUTION INTRAVENOUS PRN
Status: DISCONTINUED | OUTPATIENT
Start: 2022-08-17 | End: 2022-08-25 | Stop reason: HOSPADM

## 2022-08-17 RX ORDER — TRAZODONE HYDROCHLORIDE 50 MG/1
50 TABLET ORAL NIGHTLY PRN
Status: DISCONTINUED | OUTPATIENT
Start: 2022-08-17 | End: 2022-08-25 | Stop reason: HOSPADM

## 2022-08-17 RX ORDER — FENTANYL CITRATE 50 UG/ML
50 INJECTION, SOLUTION INTRAMUSCULAR; INTRAVENOUS ONCE
Status: COMPLETED | OUTPATIENT
Start: 2022-08-17 | End: 2022-08-17

## 2022-08-17 RX ORDER — ONDANSETRON 4 MG/1
4 TABLET, ORALLY DISINTEGRATING ORAL EVERY 8 HOURS PRN
Status: DISCONTINUED | OUTPATIENT
Start: 2022-08-17 | End: 2022-08-25 | Stop reason: HOSPADM

## 2022-08-17 RX ORDER — MORPHINE SULFATE 2 MG/ML
1 INJECTION, SOLUTION INTRAMUSCULAR; INTRAVENOUS EVERY 4 HOURS PRN
Status: DISCONTINUED | OUTPATIENT
Start: 2022-08-17 | End: 2022-08-18

## 2022-08-17 RX ORDER — SODIUM CHLORIDE 9 MG/ML
INJECTION, SOLUTION INTRAVENOUS PRN
Status: DISCONTINUED | OUTPATIENT
Start: 2022-08-17 | End: 2022-08-25 | Stop reason: HOSPADM

## 2022-08-17 RX ORDER — POLYETHYLENE GLYCOL 3350 17 G/17G
17 POWDER, FOR SOLUTION ORAL DAILY PRN
Status: DISCONTINUED | OUTPATIENT
Start: 2022-08-17 | End: 2022-08-21

## 2022-08-17 RX ORDER — ONDANSETRON 2 MG/ML
4 INJECTION INTRAMUSCULAR; INTRAVENOUS ONCE
Status: COMPLETED | OUTPATIENT
Start: 2022-08-17 | End: 2022-08-17

## 2022-08-17 RX ORDER — POTASSIUM CHLORIDE 7.45 MG/ML
10 INJECTION INTRAVENOUS PRN
Status: DISCONTINUED | OUTPATIENT
Start: 2022-08-17 | End: 2022-08-24

## 2022-08-17 RX ORDER — ONDANSETRON 2 MG/ML
4 INJECTION INTRAMUSCULAR; INTRAVENOUS EVERY 6 HOURS PRN
Status: DISCONTINUED | OUTPATIENT
Start: 2022-08-17 | End: 2022-08-25 | Stop reason: HOSPADM

## 2022-08-17 RX ORDER — POTASSIUM CHLORIDE 20 MEQ/1
40 TABLET, EXTENDED RELEASE ORAL PRN
Status: DISCONTINUED | OUTPATIENT
Start: 2022-08-17 | End: 2022-08-24

## 2022-08-17 RX ORDER — ACETAMINOPHEN 325 MG/1
650 TABLET ORAL EVERY 6 HOURS PRN
Status: DISCONTINUED | OUTPATIENT
Start: 2022-08-17 | End: 2022-08-25 | Stop reason: HOSPADM

## 2022-08-17 RX ORDER — VENLAFAXINE HYDROCHLORIDE 75 MG/1
75 CAPSULE, EXTENDED RELEASE ORAL DAILY
Status: DISCONTINUED | OUTPATIENT
Start: 2022-08-18 | End: 2022-08-25 | Stop reason: HOSPADM

## 2022-08-17 RX ORDER — HYDROXYZINE PAMOATE 25 MG/1
50 CAPSULE ORAL 3 TIMES DAILY PRN
Status: DISCONTINUED | OUTPATIENT
Start: 2022-08-17 | End: 2022-08-25 | Stop reason: HOSPADM

## 2022-08-17 RX ORDER — SODIUM CHLORIDE 0.9 % (FLUSH) 0.9 %
10 SYRINGE (ML) INJECTION EVERY 12 HOURS SCHEDULED
Status: DISCONTINUED | OUTPATIENT
Start: 2022-08-17 | End: 2022-08-25 | Stop reason: HOSPADM

## 2022-08-17 RX ADMIN — HYDROMORPHONE HYDROCHLORIDE 0.5 MG: 1 INJECTION, SOLUTION INTRAMUSCULAR; INTRAVENOUS; SUBCUTANEOUS at 18:26

## 2022-08-17 RX ADMIN — FENTANYL CITRATE 50 MCG: 50 INJECTION, SOLUTION INTRAMUSCULAR; INTRAVENOUS at 15:17

## 2022-08-17 RX ADMIN — MORPHINE SULFATE 1 MG: 2 INJECTION, SOLUTION INTRAMUSCULAR; INTRAVENOUS at 22:52

## 2022-08-17 RX ADMIN — CEFTRIAXONE 2000 MG: 2 INJECTION, POWDER, FOR SOLUTION INTRAMUSCULAR; INTRAVENOUS at 18:15

## 2022-08-17 RX ADMIN — ONDANSETRON 4 MG: 2 INJECTION INTRAMUSCULAR; INTRAVENOUS at 18:26

## 2022-08-17 RX ADMIN — GADOTERIDOL 15 ML: 279.3 INJECTION, SOLUTION INTRAVENOUS at 16:57

## 2022-08-17 RX ADMIN — HYDROMORPHONE HYDROCHLORIDE 0.5 MG: 1 INJECTION, SOLUTION INTRAMUSCULAR; INTRAVENOUS; SUBCUTANEOUS at 17:14

## 2022-08-17 RX ADMIN — VANCOMYCIN HYDROCHLORIDE 2000 MG: 1 INJECTION, POWDER, LYOPHILIZED, FOR SOLUTION INTRAVENOUS at 22:26

## 2022-08-17 RX ADMIN — SODIUM CHLORIDE, PRESERVATIVE FREE 10 ML: 5 INJECTION INTRAVENOUS at 23:21

## 2022-08-17 ASSESSMENT — ENCOUNTER SYMPTOMS
NAUSEA: 1
CHEST TIGHTNESS: 0
DIARRHEA: 1
BACK PAIN: 1
SHORTNESS OF BREATH: 0
CONSTIPATION: 0
COUGH: 0
BLOOD IN STOOL: 0
VOMITING: 1

## 2022-08-17 ASSESSMENT — PAIN SCALES - GENERAL
PAINLEVEL_OUTOF10: 8
PAINLEVEL_OUTOF10: 9
PAINLEVEL_OUTOF10: 9
PAINLEVEL_OUTOF10: 10

## 2022-08-17 ASSESSMENT — PAIN - FUNCTIONAL ASSESSMENT: PAIN_FUNCTIONAL_ASSESSMENT: 0-10

## 2022-08-17 ASSESSMENT — LIFESTYLE VARIABLES
HOW MANY STANDARD DRINKS CONTAINING ALCOHOL DO YOU HAVE ON A TYPICAL DAY: 1 OR 2
HOW OFTEN DO YOU HAVE A DRINK CONTAINING ALCOHOL: MONTHLY OR LESS

## 2022-08-17 ASSESSMENT — PAIN DESCRIPTION - ORIENTATION: ORIENTATION: LOWER

## 2022-08-17 ASSESSMENT — PAIN DESCRIPTION - DESCRIPTORS: DESCRIPTORS: ACHING

## 2022-08-17 ASSESSMENT — PAIN DESCRIPTION - LOCATION: LOCATION: BACK

## 2022-08-17 NOTE — ED NOTES
Attempted to call 6K multiple times to inform of pt coming to floor, no answer at this time.       [facility]  08/17/22 [id number]

## 2022-08-17 NOTE — ED NOTES
Pt and vs reassessed. RR easy and unlabored. Pt resting in bed alert. Pt medicated per MAR. Pt requesting medication for pain and nausea. RN will notify provider. Pt denies any other needs. No distress noted.  Pt stable at this time     Anum RaineyUPMC [facility]  08/17/22 0478

## 2022-08-17 NOTE — ED PROVIDER NOTES
[address] [facility]  EMERGENCY MEDICINE     Pt Name: Pranav Herron  MRN: [id number]  [place] 1980  Date of evaluation: 8/17/2022  PCP:    RAMÓN Roper CNP  Provider: RAMÓN Carcamo CNP    CHIEF COMPLAINT       Chief Complaint   Patient presents with    Back Pain           HISTORY OF PRESENT ILLNESS    Pranav Herron is a 43 y.o. female patient that presents to ER with low back pain. Patient has a history of L5S1 decompression, L5-S1 posterior fusion and TLIF performedby Dr. Bryan Conteh on 5/06/22. States that her back pain has increased over the past three days. Sharp 9/10 pain that radiates to upper back and left lower leg. Patient reports numbness and tingling in both feet. Also reports two episodes of urinary incontinence yesterday. Denies prior history of incontinence. Positive saddle paresthesia. Notes nausea, diarrhea, and vomiting for the past three days. Denies chest pain or shortness of breath. Has been taking tylenol and aspirin at home for pain. Patient does have good rectal tone. Patient has positive pinprick sensation in all areas of the perineum and in the perirectal area. Patient states that it feels the same when she is touched with the sharp object in the perirectal and perineal area as it does when we touch her calf of her leg. Patient states she does feel tingling in the area, but she does have sensation that is intact. Patient had a postvoid residual bladder scan done that showed 0 mL in the bladder. Triage notes and Nursing notes were reviewed by myself. Any discrepancies are addressed above.     PAST MEDICAL HISTORY     Past Medical History:   Diagnosis Date    Anxiety     Back pain     Colitis     Depression     Fibromyalgia     HPV (human papilloma virus) anogenital infection     Hypertension     OCD (obsessive compulsive disorder)     Osteoarthritis (arthritis due to wear and tear of joints)     PONV (postoperative nausea and vomiting)     Seizure ([address]     \" in [facility] in 2021 was checked out by EMS but not taken to hospital\"    Thyroid disease        SURGICAL HISTORY       Past Surgical History:   Procedure Laterality Date    CHOLECYSTECTOMY      INNER EAR SURGERY      LUMBAR FUSION N/A 5/6/2022    L5=S1 Decompression L5-S1 Posterior Fusion & TLIF performed by Florentin Sargent MD at [address]       Current Discharge Medication List        CONTINUE these medications which have NOT CHANGED    Details   hydrOXYzine pamoate (VISTARIL) 50 MG capsule Take 50 mg by mouth 3 times daily as needed for Anxiety      ABILIFY MAINTENA 400 MG PRSY every 30 days      venlafaxine (EFFEXOR XR) 75 MG extended release capsule Take 1 capsule by mouth daily  Qty: 30 capsule, Refills: 0      traZODone (DESYREL) 50 MG tablet Take 1 tablet by mouth nightly as needed for Sleep  Qty: 30 tablet, Refills: 0             ALLERGIES       Allergies   Allergen Reactions    Dairycare [Lactase-Lactobacillus] Nausea And Vomiting       FAMILY HISTORY       Family History   Problem Relation Age of Onset    Diabetes Mother     Heart Disease Mother     Diabetes Father     Heart Disease Father     Cancer Paternal Aunt         SOCIAL HISTORY       Social History     Socioeconomic History    Marital status:     Number of children: 1    Years of education: 12   Occupational History     Employer: NOT EMPLOYED   Tobacco Use    Smoking status: Every Day     Packs/day: 0.50     Types: Cigarettes    Smokeless tobacco: Never   Vaping Use    Vaping Use: Former   Substance and Sexual Activity    Alcohol use: Yes     Comment: occasionally    Drug use: Yes     Types: Marijuana (Weed)     Comment: marijuana a few days ago    Sexual activity: Yes     Partners: Male     Social Determinants of Health     Financial Resource Strain: Low Risk     Difficulty of Paying Living Expenses: Not hard at all   Food Insecurity: No Food Insecurity    Worried About Running Out of Food in the Last Year: Never true    [address] in the Last Year: Never true       REVIEW OF SYSTEMS     Review of Systems   Constitutional:  Negative for appetite change, chills and fever. Respiratory:  Negative for cough, chest tightness and shortness of breath. Cardiovascular:  Negative for chest pain. Gastrointestinal:  Positive for diarrhea, nausea and vomiting. Negative for blood in stool and constipation. Genitourinary:  Positive for enuresis. Negative for dysuria, hematuria and pelvic pain. Musculoskeletal:  Positive for back pain. Negative for neck pain. Neurological:  Positive for numbness. Negative for dizziness and light-headedness. Except as noted above the remainder of the review of systems was reviewed and is negative. SCREENINGS        Tower Coma Scale  Eye Opening: Spontaneous  Best Verbal Response: Oriented  Best Motor Response: Obeys commands  Vivi Coma Scale Score: 15               PHYSICAL EXAM    (up to 7 for level 4, 8 or more for level 5)     ED Triage Vitals [02/19/22 1512]   BP Temp Temp Source Pulse Resp SpO2 Height Weight   (!) 134/95 98.2 °F (36.8 °C) Oral 124 16 100 % -- --       Physical Exam  Vitals and nursing note reviewed. Constitutional:       Appearance: She is well-developed. She is not ill-appearing, toxic-appearing or diaphoretic. HENT:      Head: Normocephalic and atraumatic. Eyes:      Conjunctiva/sclera: Conjunctivae normal.      Pupils: Pupils are equal, round, and reactive to light. Cardiovascular:      Rate and Rhythm: Regular rhythm. Tachycardia present. Heart sounds: Normal heart sounds. No murmur heard. No gallop. Pulmonary:      Effort: Pulmonary effort is normal. No respiratory distress. Breath sounds: Normal breath sounds. No wheezing or rales. Abdominal:      General: Bowel sounds are normal.      Palpations: Abdomen is soft. Musculoskeletal:         General: Normal range of motion. Cervical back: Normal range of motion. Thoracic back: Tenderness present. Lumbar back: Tenderness and bony tenderness present. Back:       Comments: Generalized tenderness in lumbar and lower thoracic region   Skin:     General: Skin is warm and dry. Neurological:      Mental Status: She is alert and oriented to person, place, and time. Sensory: No sensory deficit. Gait: Gait normal.      Deep Tendon Reflexes: Reflexes normal.         DIAGNOSTIC RESULTS     EKG:(none if blank)  All EKGs are interpreted by the Emergency Department Physician who either signs or Co-signs this chart in the absence of a cardiologist.        RADIOLOGY: (none if blank)   I directly visualized the following images and reviewed the radiologist interpretations. Interpretation per the Radiologist below, if available at the time of this note:  MRI THORACIC SPINE WO CONTRAST   Final Result   Impression:   1. No thoracic spine fracture seen. No evidence for osteomyelitis/discitis   2. Minimal degenerative disease as above. 3. Prominent central canal/minimal syrinx seen in the distal spinal cord    at T11. This document has been electronically signed by: Teresa Watson MD on    08/17/2022 09:31 PM      MRI CERVICAL SPINE WO CONTRAST   Final Result   Impression:   1. No evidence for discitis or osteomyelitis. 2. Multilevel degenerative disease of the cervical spine most prominent at    C5-6 and C6-7 where there is moderate to severe canal stenosis      This document has been electronically signed by: Teresa Watson MD on    08/17/2022 09:17 PM      MRI PELVIS WO CONTRAST   Final Result   Impression:   1. Findings at L5-S1 were seen on MRI of the lumbar spine performed    earlier today and are redemonstrated without significant interval change. Abnormal bone marrow edema seen in the L5 and S1 vertebrae is nonspecific    and may be related to postoperative changes, fracture, degenerative    disease or infection/osteomyelitis.  Today's pelvic CT had demonstrated a    probable fracture of the anterior superior right endplate of S1 versus    osteomyelitis. 2. L5-S1 mild canal narrowing with moderate to severe bilateral foraminal    stenosis is stable when compared to earlier MR      This document has been electronically signed by: Stephen Frederick MD on    08/17/2022 09:28 PM      CT Lumbar Spine WO Contrast   Final Result   Impression:   1. Grade 2 anterior subluxation of L5 relative to S1 is new when compared    to prior CT of 10/8/2021. Degree of subluxation appears stable when    compared to MRI of 8/17/2022. Loss of intervertebral disc height at L5-S1    stable since MRI but advanced since prior CT. 2. Posterior fusion at L5-S1 with bilateral pedicle screws is new since    the CT of 2021. Right S1 screw is within the pedicle. Fracture of the    right anterior superior endplate of S1. Lucency around the right S1 screw    is nonspecific is likely from the fracture however osteomyelitis is not    excluded. Please correlate with clinical presentation. Left L5 pedicle    screw is outside the osseous pedicle. This document has been electronically signed by: Stephen Frederick MD on    08/17/2022 08:38 PM      All CTs at this facility use dose modulation techniques and iterative    reconstructions, and/or weight-based dosing   when appropriate to reduce radiation to a low as reasonably achievable. CT PELVIS WO CONTRAST Additional Contrast? None   Final Result   Impression:   1. Grade 2 anterior subluxation of L5 relative to S1 is new when compared    to prior CT of 10/8/2021. Degree of subluxation appears stable when    compared to MRI of 8/17/2022. Loss of intervertebral disc height at L5-S1    stable since MRI but advanced since prior CT. 2. Posterior fusion at L5-S1 with bilateral pedicle screws is new since    the CT of 2021. Right S1 screw is within the pedicle.  Fracture of the    right anterior superior endplate of S1. Lucency around the right S1 screw    is nonspecific is likely from the fracture however osteomyelitis would be    possible. Please correlate with clinical presentation. Left L5 pedicle    screw is outside the osseous pedicle. This document has been electronically signed by: Ronnie Booker MD on    08/17/2022 08:36 PM      All CTs at this facility use dose modulation techniques and iterative    reconstructions, and/or weight-based dosing   when appropriate to reduce radiation to a low as reasonably achievable. MRI LUMBAR SPINE W WO CONTRAST   Final Result       1. The patient has undergone interval surgery with placement of bilateral pedicular screws at L5 and S1 and laminectomy defect at L5.   2. There is 10 mm of anterolisthesis of L5 relative to S1. This results in moderate to severe bilateral foraminal stenosis and mild canal stenosis. 3. There is abnormal signal intensity and enhancement in the S1 vertebral body suspicious for vertebral body osteomyelitis. Please correlate clinically. 4. There is a tiny syrinx in the distal thoracic spinal cord and conus. 5. There is mild canal and mild-to-moderate bilateral foraminal stenosis at L3-4 and L4-5.   6. Otherwise negative MRI scan of the lumbar spine with and without intravenous contrast.               **This report has been created using voice recognition software. It may contain minor errors which are inherent in voice recognition technology. **      Final report electronically signed by DR Gauri Eisenberg on 8/17/2022 5:19 PM          LABS:  Labs Reviewed   CBC WITH AUTO DIFFERENTIAL - Abnormal; Notable for the following components:       Result Value    MPV 9.1 (*)     All other components within normal limits   BASIC METABOLIC PANEL W/ REFLEX TO MG FOR LOW K - Abnormal; Notable for the following components:    Glucose 114 (*)     All other components within normal limits   URINALYSIS WITH MICROSCOPIC - Abnormal; Notable for the following components:    Leukocyte Esterase, Urine SMALL (*)     All other components within normal limits   ANION GAP - Abnormal; Notable for the following components:    Anion Gap 7.0 (*)     All other components within normal limits   CULTURE, BLOOD 2   CULTURE, BLOOD 1   HEPATIC FUNCTION PANEL   LIPASE   TROPONIN   HCG, SERUM, QUALITATIVE   GLOMERULAR FILTRATION RATE, ESTIMATED   OSMOLALITY   LACTATE, SEPSIS   PROCALCITONIN   SEDIMENTATION RATE   C-REACTIVE PROTEIN   URINE DRUG SCREEN   LACTIC ACID   MRSA BY PCR       All other labs were within normal range or not returned as of this dictation. Please note, any cultures that may have been sent were not resulted at the time of this patient visit. EMERGENCY DEPARTMENT COURSE and Medical Decision Making:     Vitals:    Vitals:    08/17/22 1338 08/17/22 1748 08/17/22 1817 08/17/22 2109   BP: (!) 150/101 (!) 143/95 113/75 114/83   Pulse: (!) 108 84 82 92   Resp: 18 18 18 18   Temp: 98 °F (36.7 °C)   98.8 °F (37.1 °C)   TempSrc: Oral   Oral   SpO2: 95% 98% 100% 97%   Weight: 175 lb (79.4 kg)   181 lb 3.2 oz (82.2 kg)       PROCEDURES: (None if blank)  Procedures       MDM  Number of Diagnoses or Management Options  Osteomyelitis of lumbar spine (HCC): new, needed workup     Amount and/or Complexity of Data Reviewed  Clinical lab tests: reviewed  Tests in the radiology section of CPT®: ordered and reviewed  Tests in the medicine section of CPT®: ordered and reviewed  Review and summarize past medical records: yes  Discuss the patient with other providers: yes  Independent visualization of images, tracings, or specimens: yes    Risk of Complications, Morbidity, and/or Mortality  Presenting problems: moderate  Diagnostic procedures: moderate  Management options: moderate  General comments: This patient required multiple reevaluations of patient due to the critical nature of her illness. This provider also had to speak with multiple other providers about patient's care.   Total critical care time was 97 minutes. Critical Care  Total time providing critical care:  minutes      Patient that presents to ER with low back pain. Patient has a history of L5S1 decompression, L5-S1 posterior fusion and TLIF performedby Dr. Ashlyn Morataya on 5/06/22. Differential diagnosis includes but not limited to spinal stenosis, spinal abscess, fracture, osteomyelitis, cauda equina, new herniated disc or hardware displacement. Labs are reassuring. MRI does show possible osteomyelitis of L5. Patient does have good rectal tone. Patient has positive pinprick sensation in all areas of the perineum and in the perirectal area. Patient states that it feels the same when she is touched with the sharp object in the perirectal and perineal area as it does when we touch her calf of her leg. Patient states she does feel tingling in the area, but she does have sensation that is intact. Patient had a postvoid residual bladder scan done that showed 0 mL in the bladder. This provider spoke with Demetrice Nye who is on-call for spine. He requested that this provider contact Dr. Amira Prince. This provider contacted Roselia Carrillo PA-C who is on-call for orthopedics. He was able to reach out to Dr. Cely Caballero who was in the process of getting on a plane as he is leaving [facility]. Dr. Cely Caballero would like patient admitted and if urgent need he states that  Should take the patient surgery. Patient does not need surgery he feels that patient should be treated with IV antibiotics and ID should be consulted. This was relayed to Demetrice Nye who then requests further testing and requests that Ortho be recontacted to discuss further treatment. This provider did reach out to Roselia Carrillo PA-C again and discussed his treatment. He states that Dr. Amira Prince will be back in town late Friday and can assume care of the patient on Saturday if needed. This was relayed back to Dr. Nohemy goodman with rectal and perineal exam for sensation.   He again request that MRI and CT scans be done immediately despite the fact that patient received contrast earlier and it would skew the results. This was relayed to MRI who states that they will talk to the radiologist and do the scans as ordered. Celi Gupta [facility] who works with Dr. Forest Gomes called and case was discussed with him. He states that patient can either be admitted to hospitalist or Dr. Farshad Bowers and Dr. Daily Horton will take over on Friday. April Narrow also states that he is happy to come in and evaluate patient and review her notes from the office if Dr. Farshad Bowers would like. This message was passed on to the hospitalist who states that they will call Dr. Farshad Bowers to determine who is going to admit the patient for definitive care. Discussed plan for admission with patient who is agreeable.     ED Medications administered this visit:    Medications   vancomycin (VANCOCIN) 2,000 mg in dextrose 5 % 500 mL IVPB (has no administration in time range)   venlafaxine (EFFEXOR XR) extended release capsule 75 mg (has no administration in time range)   traZODone (DESYREL) tablet 50 mg (has no administration in time range)   hydrOXYzine pamoate (VISTARIL) capsule 50 mg (has no administration in time range)   sodium chloride flush 0.9 % injection 10 mL (has no administration in time range)   sodium chloride flush 0.9 % injection 10 mL (has no administration in time range)   0.9 % sodium chloride infusion (has no administration in time range)   ondansetron (ZOFRAN-ODT) disintegrating tablet 4 mg (has no administration in time range)     Or   ondansetron (ZOFRAN) injection 4 mg (has no administration in time range)   polyethylene glycol (GLYCOLAX) packet 17 g (has no administration in time range)   acetaminophen (TYLENOL) tablet 650 mg (has no administration in time range)     Or   acetaminophen (TYLENOL) suppository 650 mg (has no administration in time range)   potassium chloride (KLOR-CON M) extended release tablet 40 mEq (has no administration in time range)     Or   potassium bicarb-citric acid (EFFER-K) effervescent tablet 40 mEq (has no administration in time range)     Or   potassium chloride 10 mEq/100 mL IVPB (Peripheral Line) (has no administration in time range)   magnesium sulfate 2000 mg in 50 mL IVPB premix (has no administration in time range)   morphine (PF) injection 1 mg (has no administration in time range)   cefepime (MAXIPIME) 2000 mg IVPB minibag (has no administration in time range)   fentaNYL (SUBLIMAZE) injection 50 mcg (50 mcg IntraVENous Given 8/17/22 1517)   gadoteridol (PROHANCE) injection 15 mL (15 mLs IntraVENous Given 8/17/22 1657)   HYDROmorphone (DILAUDID) injection 0.5 mg (0.5 mg IntraVENous Given 8/17/22 1714)   cefTRIAXone (ROCEPHIN) 2,000 mg in dextrose 5 % 50 mL IVPB mini-bag (2,000 mg IntraVENous New Bag 8/17/22 1815)   HYDROmorphone (DILAUDID) injection 0.5 mg (0.5 mg IntraVENous Given 8/17/22 1826)   ondansetron (ZOFRAN) injection 4 mg (4 mg IntraVENous Given 8/17/22 1826)         FINAL IMPRESSION      1. Osteomyelitis of lumbar spine ([address]          DISPOSITION/PLAN   DISPOSITION Admitted 08/17/2022 07:39:20 PM      PATIENT REFERRED TO:  No follow-up provider specified.     DISCHARGE MEDICATIONS:  Current Discharge Medication List                 RAMÓN Espana CNP (electronically signed)            RAMÓN Espana CNP  08/17/22 7183

## 2022-08-17 NOTE — ED NOTES
Pt to room 17 in stable condition. Assumed care at this time. Pt updated on POC and resting in bed alert. Lab at bedside to draw cultures.  Pt denies any needs and is stable at this time     Celine Stanford RN  08/17/22 8544

## 2022-08-18 PROBLEM — S32.14XK: Status: ACTIVE | Noted: 2022-08-18

## 2022-08-18 PROBLEM — M54.50 BILATERAL LOW BACK PAIN: Status: ACTIVE | Noted: 2022-08-18

## 2022-08-18 PROBLEM — G95.19 NEUROGENIC CLAUDICATION (HCC): Status: ACTIVE | Noted: 2022-08-18

## 2022-08-18 PROBLEM — F10.10 ALCOHOL ABUSE: Status: ACTIVE | Noted: 2022-08-18

## 2022-08-18 PROBLEM — E03.9 HYPOTHYROIDISM: Status: ACTIVE | Noted: 2022-08-18

## 2022-08-18 PROBLEM — R29.818 NEUROGENIC CLAUDICATION: Status: ACTIVE | Noted: 2022-08-18

## 2022-08-18 PROBLEM — M46.26 OSTEOMYELITIS OF LUMBAR SPINE (HCC): Status: ACTIVE | Noted: 2022-08-18

## 2022-08-18 LAB
ANION GAP SERPL CALCULATED.3IONS-SCNC: 7 MEQ/L (ref 8–16)
BASOPHILS # BLD: 0.8 %
BASOPHILS ABSOLUTE: 0.1 THOU/MM3 (ref 0–0.1)
BUN BLDV-MCNC: 8 MG/DL (ref 7–22)
CALCIUM SERPL-MCNC: 8.6 MG/DL (ref 8.5–10.5)
CHLORIDE BLD-SCNC: 108 MEQ/L (ref 98–111)
CO2: 23 MEQ/L (ref 23–33)
CREAT SERPL-MCNC: 0.6 MG/DL (ref 0.4–1.2)
EOSINOPHIL # BLD: 4 %
EOSINOPHILS ABSOLUTE: 0.3 THOU/MM3 (ref 0–0.4)
ERYTHROCYTE [DISTWIDTH] IN BLOOD BY AUTOMATED COUNT: 13.2 % (ref 11.5–14.5)
ERYTHROCYTE [DISTWIDTH] IN BLOOD BY AUTOMATED COUNT: 44.7 FL (ref 35–45)
GFR SERPL CREATININE-BSD FRML MDRD: > 90 ML/MIN/1.73M2
GLUCOSE BLD-MCNC: 85 MG/DL (ref 70–108)
HCT VFR BLD CALC: 40 % (ref 37–47)
HEMOGLOBIN: 13.1 GM/DL (ref 12–16)
IMMATURE GRANS (ABS): 0.02 THOU/MM3 (ref 0–0.07)
IMMATURE GRANULOCYTES: 0.3 %
LACTIC ACID: 0.7 MMOL/L (ref 0.5–2)
LYMPHOCYTES # BLD: 29.9 %
LYMPHOCYTES ABSOLUTE: 2.3 THOU/MM3 (ref 1–4.8)
MCH RBC QN AUTO: 30 PG (ref 26–33)
MCHC RBC AUTO-ENTMCNC: 32.8 GM/DL (ref 32.2–35.5)
MCV RBC AUTO: 91.7 FL (ref 81–99)
MONOCYTES # BLD: 10 %
MONOCYTES ABSOLUTE: 0.8 THOU/MM3 (ref 0.4–1.3)
NUCLEATED RED BLOOD CELLS: 0 /100 WBC
PLATELET # BLD: 232 THOU/MM3 (ref 130–400)
PMV BLD AUTO: 9.1 FL (ref 9.4–12.4)
POTASSIUM SERPL-SCNC: 4.1 MEQ/L (ref 3.5–5.2)
RBC # BLD: 4.36 MILL/MM3 (ref 4.2–5.4)
REASON FOR REJECTION: NORMAL
REJECTED TEST: NORMAL
SEG NEUTROPHILS: 55 %
SEGMENTED NEUTROPHILS ABSOLUTE COUNT: 4.3 THOU/MM3 (ref 1.8–7.7)
SODIUM BLD-SCNC: 138 MEQ/L (ref 135–145)
TSH SERPL DL<=0.05 MIU/L-ACNC: 0.99 UIU/ML (ref 0.4–4.2)
WBC # BLD: 7.8 THOU/MM3 (ref 4.8–10.8)

## 2022-08-18 PROCEDURE — 99232 SBSQ HOSP IP/OBS MODERATE 35: CPT | Performed by: STUDENT IN AN ORGANIZED HEALTH CARE EDUCATION/TRAINING PROGRAM

## 2022-08-18 PROCEDURE — 51798 US URINE CAPACITY MEASURE: CPT

## 2022-08-18 PROCEDURE — 80048 BASIC METABOLIC PNL TOTAL CA: CPT

## 2022-08-18 PROCEDURE — 6360000002 HC RX W HCPCS: Performed by: PHYSICIAN ASSISTANT

## 2022-08-18 PROCEDURE — 99223 1ST HOSP IP/OBS HIGH 75: CPT | Performed by: PSYCHIATRY & NEUROLOGY

## 2022-08-18 PROCEDURE — 1200000003 HC TELEMETRY R&B

## 2022-08-18 PROCEDURE — 83605 ASSAY OF LACTIC ACID: CPT

## 2022-08-18 PROCEDURE — 6370000000 HC RX 637 (ALT 250 FOR IP): Performed by: STUDENT IN AN ORGANIZED HEALTH CARE EDUCATION/TRAINING PROGRAM

## 2022-08-18 PROCEDURE — 6370000000 HC RX 637 (ALT 250 FOR IP): Performed by: PHYSICIAN ASSISTANT

## 2022-08-18 PROCEDURE — 36415 COLL VENOUS BLD VENIPUNCTURE: CPT

## 2022-08-18 PROCEDURE — 85025 COMPLETE CBC W/AUTO DIFF WBC: CPT

## 2022-08-18 PROCEDURE — 84443 ASSAY THYROID STIM HORMONE: CPT

## 2022-08-18 PROCEDURE — 1200000000 HC SEMI PRIVATE

## 2022-08-18 PROCEDURE — 2580000003 HC RX 258: Performed by: PHYSICIAN ASSISTANT

## 2022-08-18 PROCEDURE — 6360000002 HC RX W HCPCS: Performed by: STUDENT IN AN ORGANIZED HEALTH CARE EDUCATION/TRAINING PROGRAM

## 2022-08-18 RX ORDER — OXYCODONE HYDROCHLORIDE AND ACETAMINOPHEN 5; 325 MG/1; MG/1
1 TABLET ORAL EVERY 4 HOURS PRN
Status: DISCONTINUED | OUTPATIENT
Start: 2022-08-18 | End: 2022-08-23

## 2022-08-18 RX ORDER — PHENOBARBITAL 32.4 MG/1
64.8 TABLET ORAL 2 TIMES DAILY
Status: DISCONTINUED | OUTPATIENT
Start: 2022-08-19 | End: 2022-08-18

## 2022-08-18 RX ORDER — PHENOBARBITAL 32.4 MG/1
32.4 TABLET ORAL 2 TIMES DAILY
Status: DISCONTINUED | OUTPATIENT
Start: 2022-08-20 | End: 2022-08-18

## 2022-08-18 RX ORDER — LANOLIN ALCOHOL/MO/W.PET/CERES
100 CREAM (GRAM) TOPICAL DAILY
Status: DISCONTINUED | OUTPATIENT
Start: 2022-08-18 | End: 2022-08-25 | Stop reason: HOSPADM

## 2022-08-18 RX ORDER — ENOXAPARIN SODIUM 100 MG/ML
40 INJECTION SUBCUTANEOUS EVERY 24 HOURS
Status: DISPENSED | OUTPATIENT
Start: 2022-08-18 | End: 2022-08-21

## 2022-08-18 RX ORDER — FOLIC ACID 1 MG/1
1 TABLET ORAL DAILY
Status: DISCONTINUED | OUTPATIENT
Start: 2022-08-18 | End: 2022-08-25 | Stop reason: HOSPADM

## 2022-08-18 RX ADMIN — SODIUM CHLORIDE, PRESERVATIVE FREE 10 ML: 5 INJECTION INTRAVENOUS at 22:14

## 2022-08-18 RX ADMIN — OXYCODONE AND ACETAMINOPHEN 1 TABLET: 5; 325 TABLET ORAL at 21:10

## 2022-08-18 RX ADMIN — MORPHINE SULFATE 1 MG: 2 INJECTION, SOLUTION INTRAMUSCULAR; INTRAVENOUS at 13:51

## 2022-08-18 RX ADMIN — HYDROXYZINE PAMOATE 50 MG: 25 CAPSULE ORAL at 03:11

## 2022-08-18 RX ADMIN — MORPHINE SULFATE 1 MG: 2 INJECTION, SOLUTION INTRAMUSCULAR; INTRAVENOUS at 03:03

## 2022-08-18 RX ADMIN — ENOXAPARIN SODIUM 40 MG: 100 INJECTION SUBCUTANEOUS at 16:01

## 2022-08-18 RX ADMIN — MORPHINE SULFATE 1 MG: 2 INJECTION, SOLUTION INTRAMUSCULAR; INTRAVENOUS at 09:43

## 2022-08-18 RX ADMIN — SODIUM CHLORIDE, PRESERVATIVE FREE 10 ML: 5 INJECTION INTRAVENOUS at 09:44

## 2022-08-18 RX ADMIN — Medication 100 MG: at 16:00

## 2022-08-18 RX ADMIN — FOLIC ACID 1 MG: 1 TABLET ORAL at 16:00

## 2022-08-18 RX ADMIN — OXYCODONE AND ACETAMINOPHEN 1 TABLET: 5; 325 TABLET ORAL at 16:00

## 2022-08-18 RX ADMIN — CEFEPIME 2000 MG: 2 INJECTION, POWDER, FOR SOLUTION INTRAVENOUS at 03:07

## 2022-08-18 RX ADMIN — VENLAFAXINE HYDROCHLORIDE 75 MG: 75 CAPSULE, EXTENDED RELEASE ORAL at 09:43

## 2022-08-18 ASSESSMENT — PAIN DESCRIPTION - LOCATION
LOCATION: BACK
LOCATION: BACK

## 2022-08-18 ASSESSMENT — PAIN SCALES - GENERAL
PAINLEVEL_OUTOF10: 8

## 2022-08-18 ASSESSMENT — PAIN DESCRIPTION - DESCRIPTORS: DESCRIPTORS: ACHING;THROBBING

## 2022-08-18 NOTE — PROGRESS NOTES
Jeramy. Orders with Femi Handley NP about patient's diet. Dr. Deloris Smith stated that pt could have clear liquids, are you okay with this? Also he would like an consult to Psychiarty, I know you had said something earlierin the shift about this, are you okay if I place this order as well?   Read 2:15 AM      8/18/22 2:15 AM   Yes to both      See new orders

## 2022-08-18 NOTE — PLAN OF CARE
Problem: Discharge Planning  Goal: Discharge to home or other facility with appropriate resources  Outcome: Progressing  Flowsheets (Taken 8/18/2022 0331)  Discharge to home or other facility with appropriate resources:   Identify barriers to discharge with patient and caregiver   Arrange for needed discharge resources and transportation as appropriate   Identify discharge learning needs (meds, wound care, etc)   Refer to discharge planning if patient needs post-hospital services based on physician order or complex needs related to functional status, cognitive ability or social support system   Arrange for interpreters to assist at discharge as needed     Problem: Pain  Goal: Verbalizes/displays adequate comfort level or baseline comfort level  Outcome: Progressing  Flowsheets (Taken 8/18/2022 0331)  Verbalizes/displays adequate comfort level or baseline comfort level: Encourage patient to monitor pain and request assistance     Problem: ABCDS Injury Assessment  Goal: Absence of physical injury  Outcome: Progressing  Flowsheets (Taken 8/18/2022 0331)  Absence of Physical Injury: Implement safety measures based on patient assessment     Problem: Safety - Adult  Goal: Free from fall injury  Outcome: Progressing  Flowsheets (Taken 8/18/2022 0331)  Free From Fall Injury:   Instruct family/caregiver on patient safety   Based on caregiver fall risk screen, instruct family/caregiver to ask for assistance with transferring infant if caregiver noted to have fall risk factors     Problem: Chronic Conditions and Co-morbidities  Goal: Patient's chronic conditions and co-morbidity symptoms are monitored and maintained or improved  Outcome: Progressing  Flowsheets (Taken 8/18/2022 0331)  Care Plan - Patient's Chronic Conditions and Co-Morbidity Symptoms are Monitored and Maintained or Improved: Monitor and assess patient's chronic conditions and comorbid symptoms for stability, deterioration, or improvement     Problem: Skin/Tissue Integrity  Goal: Absence of new skin breakdown  Description: 1. Monitor for areas of redness and/or skin breakdown  2. Assess vascular access sites hourly  3. Every 4-6 hours minimum:  Change oxygen saturation probe site  4. Every 4-6 hours:  If on nasal continuous positive airway pressure, respiratory therapy assess nares and determine need for appliance change or resting period.   Outcome: Progressing  Note: Pt self turns while in bed

## 2022-08-18 NOTE — PROGRESS NOTES
Pt was in bed loo   08/18/22 1497   Encounter Summary   Encounter Overview/Reason  Initial Encounter   Service Provided For: Patient   Referral/Consult From: Rounding   Last Encounter  08/18/22   Complexity of Encounter Low   Spiritual/Emotional needs   Type Spiritual Support   Assessment/Intervention/Outcome   Assessment Calm; Hopeful   ginny sleepy and weak. She only wanted prayer and it was done.

## 2022-08-18 NOTE — CARE COORDINATION
8/18/22, 9:09 AM EDT  DISCHARGE PLANNING EVALUATION:    Mingo Jones       Admitted: 8/17/2022/ [address] day: 1   Location: -12/012-A Reason for admit: Osteomyelitis [facility]) [M86.9]  Osteomyelitis of lumbar spine ([facility] [address] [address] [M46.26]   PMH:  has a past medical history of Anxiety, Back pain, Colitis, Depression, Fibromyalgia, HPV (human papilloma virus) anogenital infection, Hypertension, OCD (obsessive compulsive disorder), Osteoarthritis (arthritis due to wear and tear of joints), PONV (postoperative nausea and vomiting), Seizure ([facility] [address] and Thyroid disease. Procedure: none  Barriers to Discharge:  Ortho planning surgery on Monday. IV maxipime, IV vanc, pain control. Psych and ID consults pending. PCP: RAMÓN Shah CNP  Readmission Risk Score: 16.6%  Patient's Healthcare Decision Maker: Legal Next of Kin    Patient Goals/Plan/Treatment Preferences: Home alone. [facility] SW consult placed by RN. Transportation/Food Security/Housekeeping Addressed:  No issues identified.

## 2022-08-18 NOTE — FLOWSHEET NOTE
08/18/22 0959   Safe Environment   Safety Measures   (virtual safety rounds complete)   Patient in bed. Patient states that pain shot hasn't relieved her pain, bedside nurse notified perfect serve. Pt does not appear to be in any distress. Denies any other needs at this time. Call light within reach. No safety concerns.   Will continue to monitor

## 2022-08-18 NOTE — CONSULTS
[address] [place] [address]                                  CONSULTATION    PATIENT NAME: Nenita Barger                 :        1980  MED REC NO:   [id number]                           ROOM:       0012  ACCOUNT NO:   [de-identified]                           ADMIT DATE: 2022  PROVIDER:     Fermin Mcneal MD    CONSULT DATE:  2022    HISTORY OF PRESENT ILLNESS:  Neurosurgery opinion requested regarding  back pain and reportedly sensation of numbness in the lower limbs. Upon  questioning, the patient reports that the area of the numbness had been  present since May, at which time, she had undergone lumbar surgery by  Dr. Rose Bhatia in [facility]. The background history prior to that as  established with the nursing staff present at the time of consultation  at this time is that the patient prior to surgery had left-sided thigh  anterior, lateral and posterior pain on ambulation with limited  ambulation due to pain, radiated to the region of the foot. She reports  that since May she has had similar symptoms affecting the right side on  ambulation as well as the left which was unchanged. This has been  accompanied by numbness on both sides by report, left lower limb greater  than right. She does not report any clear stool incontinence. There is  some question regarding two episodes in the recent days of urgency or  urinary spillage, but with awareness. So, there was no absence of  awareness of the patient as per her own report. Similarly in terms of  the back pain, this pain on ambulation has been similar in character to  what has been experienced since May and there has not been any acute  exacerbation. There has been no stool soiling, no lack of perception in  the perianal area.   Following discussion with the ER, was examined by  the ER nurse and has been documented and confirmed with the patient as  being normal rectal time perception and presence as per the ER nurse as  well as perianal sensation perception as per the patient's report and  the ER nurse record. At this time, I then have access to the patient's  outpatient notes, which are all imaging studies. In view of this and in  view of the patient's presentation, further imaging studies were  recommended and obtained, which is now being reviewed with the [facility]  nursing staff present, with the patient. ALLERGIES:  TO APPARENTLY LACTOBACILLUS. MEDICATIONS:  Include a list of trazodone, venlafaxine, and following  admission under the hospitalist in view of concerns regarding possible  infection, in view of recent history, has also been commenced on  parenteral antibiotics following the collection of specimens that was  carried out in the ER. SOCIAL AND OCCUPATIONAL HISTORY:  She lives in a facility in [facility], is  normally able to self care without difficulties. PAST HEALTH:  History of lumbar surgery, prior gastroesophageal reflux,  additionally a history of possible depression and possible substance  use, history of one normal vaginal delivery approximately 13 years  previously with an uncomplicated gestation by the patient's report and  without episiotomy as per the patient's report. PHYSICAL EXAMINATION:  GENERAL:  The patient was seen in the [facility], bedside nurse present at all  times and with the patient's consent, examined the lower limbs and upper  limbs. The patient is somewhat periodically teary, but had normal  phonation, no neglect or inattention. There appears to be Andrew's on  both right and left which is mild, but can be duplicated and the turn is  symmetric and normal in the upper limbs and lower limbs. Knee jerk is  brisk on the left greater than right.   Ankle jerks appeared to be  equivocal, however, the patient had presenting resistance to testing,  which makes interpretation unclear in view of the patient's mild what  appeared to be periodic tremor involving the left big toe, which the  patient reported was seemed to be made worse by contact with the dorsum  of the right lower limb. Proprioception appeared to be intact on both  right and left. The patient then could not be tested due to lack of  availability of an appropriate tuning fork. Sensory perception was  tested on both upper limbs and lower limbs. In the upper limbs, the  patient reported that the perception was similar to the perception on  other areas. On examination of the lower limb, the patient reports  asymmetric, diminished perception with touch over the both lower limbs. No sensory level is present. However, the patient reports asymmetry  with diminished perception over the left thigh, anterolateral aspect,  leg and dorsum of the foot, but more so the thigh and knee region. Additionally, this is similarly diminished over the right in a similar  anatomical distribution, however, not as greatly as the left. Motor  examination for extensor hallucis longus, ankle flexion could not be  clearly established because to the patient's tremor and discomfort, but  ankle flexion extension, knee flexion extension appears to be 5/5, all  ranging to 4+/5 on right and left. However, examination is compromised  by the patient's lumbar discomfort. Similarly, hip flexion extension  could not be accurately graded in view of the patient's lumbar  discomfort. With the assistance of the bedside nursing staff, the  patient turned to the right side and this limited examination and  inspection of the region of the May incision, this appears to have  healed satisfactorily on examining to palpation on adjacent areas of the  paravertebral area. There did not appear to be any discomfort or  distress caused to the patient. The patient is returned to the supine  position and following this, the examination was completed in terms of  sensory perception.   As the ER has documented the presence of intact  rectal turn and perianal sensation, this was noted and the patient  agreed based on this had been her perception when the testing was done. A bladder scan was just done and this demonstrated a postvoid residual  of under 50 mL. The patient is afebrile at this time with a respiratory  rate of about 20. LABORATORY RESULTS:  From the specimens collected in the emergency room  following presentation demonstrated serum sodium of 139, creatinine of  0.7. C-reactive protein of under 0.3, procalcitonin of 0.03. Glucose  is 114. White cell count of 8.0, hemoglobin of 13.8, hematocrit of  41.7. IMAGING STUDIES:  The imaging studies obtained here following  presentation demonstrate the region of the lumbar spine which  demonstrates the presence of L5-S1 instrumentation and additionally  there appears to be an irregularity of the S1 superior endplate as  reported by Radiology with report of lucency adjacent to the region of the S1  Screw and hyperintensity of vertebral body. There is also the presence of the anterolisthesis of L5 on S1. Additionally, by way of imaging studies of the thoracic and  cervical MRIs were obtained and this was reviewed and demonstrates the  presence of loss of normal cervical lordosis present, multilevel disk  degeneration, facet arthropathy and cervical stenosis particularly in  the subaxial spine. The MR of the thoracic spine demonstrates what is  reported as an area of central canal dilatation suggestive of a mild  syrinx in the inferior portion of the spinal cord. IMPRESSION:  1.  Multiple medical comorbidities including prior depression, prior  lumbar surgery in 05/2022 and presentation with back pain and numbness  of the lower limbs, which is being present since 05/2022.  2.  Findings of abnormal radiological imaging as well as the lumbosacral  CT and MRI as well as thoracic and cervical MRI.     RECOMMENDATIONS:  I reviewed the entire history and findings with

## 2022-08-18 NOTE — PROGRESS NOTES
[facility] Pharmacokinetic Monitoring Service - Vancomycin     Nabila Wise is a 43 y.o. female starting on vancomycin therapy for osteomyelitis lumbar spine. Pharmacy consulted by Zeb Vazquez for monitoring and adjustment. Target Concentration: Goal AUC/MAYELIN 400-600 mg*hr/L    Additional Antimicrobials: cefepime    Pertinent Laboratory Values: Wt Readings from Last 1 Encounters:   08/17/22 181 lb 3.2 oz (82.2 kg)     Temp Readings from Last 1 Encounters:   08/17/22 98.8 °F (37.1 °C) (Oral)     Estimated Creatinine Clearance: 106 mL/min (based on SCr of 0.7 mg/dL). Recent Labs     08/17/22  1420   CREATININE 0.7   WBC 8.0     Procalcitonin: 0.03 ng/ml    Pertinent Cultures:  Culture Date Source Results   8/17/22 BCx2 --   MRSA Nasal Swab: N/A. Non-respiratory infection. Plan:  Dosing recommendations based on Bayesian software  Received vancomycin 2000 mg at 2226 today.   Start vancomycin 1250 mg every 12 hours  Anticipated AUC of 501 and trough concentration of 14.9 at steady state  Renal labs as indicated   Pharmacy will continue to monitor patient and adjust therapy as indicated    Thank you for the consult,  Esther Wallace [facility]  8/17/2022 10:24 PM

## 2022-08-18 NOTE — CONSULTS
Dictated note to follow  In addition to Management implemented by Primary team suggest inpatient psychiatry to assist with patient care  Also Strict fall precautions/ PVR. and q4 neuro checks - notify primary team and neurosurg if any change  EMG NCS

## 2022-08-18 NOTE — PROGRESS NOTES
Hospitalist Progress Note    Patient:  Yogesh Gomez    YOB: 1980  Unit/Bed:6K-12/012-A  MRN: [id number]    Acct: [de-identified]   PCP: RAMÓN Cannon CNP    Date of Admission: 8/17/2022      Assessment/Plan:  Postop complication status post L5-S1 decompression and fusion- fracture of right anterior superior endplate of S1 with a screw losing. Plan for surgery on Monday L5-S1 hardware removal with L5 S2 revision fusion and extension. NPO at midnight. Concern for osteomyelitis of S1 vertebral body. Inflammatory markers negative. Likely secondary to postop changes. Continue to monitor. Keep off of antibiotics. Appreciate ID assistance. Will need cultures from tissue sample at the time of surgery on Monday. Alcohol use disorder. Continue CIWA protocol. Continue thiamine folate. Check electrolytes. Watch for refeeding syndrome. History of major depressive disorder. Resume home medications. Denied suicidal ideation. Appreciate psychiatry recommendations. History of hypothyroidism. Currently off of Synthroid TSH within normal limits. Outpatient follow-up        Expected discharge date:  8/22/2022    Disposition: depend on the clinical course  [] Home  [] TCU  [x] Rehab  [] Psych  [x] SNF  [] [facility]  [] Other-    ===================================================================      Chief Complaint: lower back pain. Hospital Course: 41-year-old lady with past medical history of alcohol abuse disorder, hypothyroidism, chronic back pain issues came to the hospital complaining of worsening of lower back pain associated with saddle anesthesia and 2 times involuntary loss of urine. Of note patient underwent L5-S1 decompression and fusion with transforaminal lumbar interbody fusion secondary to spondylolisthesis associated with neurogenic claudication.   According to the patient the pain get worse when she lays down it gets better when she leans forward. Patient was able to walk after the surgery but lately she has been having excruciating pain. Patient said that for last few days she has been having paresthesias numbness and tingling associated with involuntary movements of toes on left side. Imaging of the lumbar spine was done which showed abnormal signal intensity and enhancement in the S1 vertebral body suspicious for vertebral body osteomyelitis. CT scan showed grade 2 anterior subluxation of L5 relative to S1 with possible fracture versus osteomyelitis. Patient was evaluated by orthopedic surgery as fracture of right anterior superior endplate of S1 with right S1 screw loosening. Left L5 screws outside of the pedicle. Vitals in /101 with pulse 108, saturating well on room air with respiratory rate 18. Lab work done in ED showed sodium 139, potassium 4.1, CRP less than 0.3, troponin less than 0.01, albumin 3.6, drug tox negative, , WBC 8, hemoglobin 13.2, UA negative. Subjective (past 24 hours):   Patient was seen and examined  No overnight events  Case was discussed with nursing staff  Patient continues to have bilateral back pain more on the left side. Unremarkable CBC and BMP, negative inflammatory markers. Hemodynamically and vitally stable remained afebrile. ROS: reviewed complete ROS unchanged unless otherwise stated in hospital course/subjective portion.        Medications:  Reviewed    Infusion Medications    sodium chloride       Scheduled Medications    venlafaxine  75 mg Oral Daily    sodium chloride flush  10 mL IntraVENous 2 times per day     PRN Meds: oxyCODONE-acetaminophen, traZODone, hydrOXYzine pamoate, sodium chloride flush, sodium chloride, ondansetron **OR** ondansetron, polyethylene glycol, acetaminophen **OR** acetaminophen, potassium chloride **OR** potassium alternative oral replacement **OR** potassium chloride, magnesium sulfate      No intake or output data in the 24 hours ending 08/18/22 1432    Exam:  BP 97/69   Pulse 89   Temp 97.9 °F (36.6 °C) (Oral)   Resp 16   Wt 181 lb 3.2 oz (82.2 kg)   SpO2 95%   BMI 32.10 kg/m²     General: No distress, appears stated age. Eyes:  PERRL. Conjunctivae/corneas clear. HENT: Head normal appearing. Nares normal. Oral mucosa moist.  Hearing intact. Neck: Supple, with full range of motion. Trachea midline. No gross JVD appreciated. Respiratory:  Normal effort. Clear to auscultation, without rales or wheezes or rhonchi. Cardiovascular: Normal rate, regular rhythm with normal S1/S2 without murmurs. No lower extremity edema. Abdomen: Soft, non-tender, non-distended with normal bowel sounds. Musculoskeletal: Tone muscle and strength equal bilaterally no abnormal movements noted. Skin: Warm and dry. No rashes or lesions. Neurologic: Bilateral lower extremity weakness, dorsiflexion is weak on the left side. Sensation intact. Psychiatric: Alert and oriented, normal insight and thought content. Capillary Refill: Brisk,< 3 seconds. Peripheral Pulses: +2 palpable, equal bilaterally. Labs:   Recent Labs     08/17/22  1420 08/18/22  0733   WBC 8.0 7.8   HGB 13.8 13.1   HCT 41.7 40.0    232     Recent Labs     08/17/22  1420 08/18/22  0826    138   K 4.1 4.1    108   CO2 24 23   BUN 8 8   CREATININE 0.7 0.6   CALCIUM 8.9 8.6     Recent Labs     08/17/22  1420   AST 18   ALT 12   BILIDIR <0.2   BILITOT 0.4   ALKPHOS 95     No results for input(s): INR in the last 72 hours. No results for input(s): Gabbi Mould in the last 72 hours.   Recent Labs     08/17/22  1420   PROCAL 0.03      Lab Results   Component Value Date/Time    NITRU NEGATIVE 08/17/2022 02:20 PM    [address] 08/17/2022 02:20 PM    BACTERIA FEW 08/17/2022 02:20 PM    RBCUA 0-2 08/17/2022 02:20 PM    BLOODU NEGATIVE 08/17/2022 02:20 PM    SPECGRAV 1.016 08/17/2022 02:20 PM    GLUCOSEU NEGATIVE 10/16/2021 05:00 PM       Radiology (48 hours):  CT Lumbar Spine WO Contrast    Result Date: 8/17/2022  Impression: 1. Grade 2 anterior subluxation of L5 relative to S1 is new when compared to prior CT of 10/8/2021. Degree of subluxation appears stable when compared to MRI of 8/17/2022. Loss of intervertebral disc height at L5-S1 stable since MRI but advanced since prior CT. 2. Posterior fusion at L5-S1 with bilateral pedicle screws is new since the CT of 2021. Right S1 screw is within the pedicle. Fracture of the right anterior superior endplate of S1. Lucency around the right S1 screw is nonspecific is likely from the fracture however osteomyelitis is not excluded. Please correlate with clinical presentation. Left L5 pedicle screw is outside the osseous pedicle. This document has been electronically signed by: Chelsey Mendes MD on 08/17/2022 08:38 PM All CTs at this facility use dose modulation techniques and iterative reconstructions, and/or weight-based dosing when appropriate to reduce radiation to a low as reasonably achievable. CT PELVIS WO CONTRAST Additional Contrast? None    Result Date: 8/17/2022  Impression: 1. Grade 2 anterior subluxation of L5 relative to S1 is new when compared to prior CT of 10/8/2021. Degree of subluxation appears stable when compared to MRI of 8/17/2022. Loss of intervertebral disc height at L5-S1 stable since MRI but advanced since prior CT. 2. Posterior fusion at L5-S1 with bilateral pedicle screws is new since the CT of 2021. Right S1 screw is within the pedicle. Fracture of the right anterior superior endplate of S1. Lucency around the right S1 screw is nonspecific is likely from the fracture however osteomyelitis would be possible. Please correlate with clinical presentation. Left L5 pedicle screw is outside the osseous pedicle.  This document has been electronically signed by: Chelsey Mendes MD on 08/17/2022 08:36 PM All CTs at this facility use dose modulation techniques and iterative reconstructions, and/or weight-based dosing when appropriate to reduce radiation to a low as reasonably achievable. MRI CERVICAL SPINE WO CONTRAST    Result Date: 8/17/2022  Impression: 1. No evidence for discitis or osteomyelitis. 2. Multilevel degenerative disease of the cervical spine most prominent at C5-6 and C6-7 where there is moderate to severe canal stenosis This document has been electronically signed by: Chelsey Mendes MD on 08/17/2022 09:17 PM    MRI THORACIC SPINE WO CONTRAST    Result Date: 8/17/2022  Impression: 1. No thoracic spine fracture seen. No evidence for osteomyelitis/discitis 2. Minimal degenerative disease as above. 3. Prominent central canal/minimal syrinx seen in the distal spinal cord at T11. This document has been electronically signed by: Chelsey Mendes MD on 08/17/2022 09:31 PM    MRI LUMBAR SPINE W WO CONTRAST    Result Date: 8/17/2022   1. The patient has undergone interval surgery with placement of bilateral pedicular screws at L5 and S1 and laminectomy defect at L5. 2. There is 10 mm of anterolisthesis of L5 relative to S1. This results in moderate to severe bilateral foraminal stenosis and mild canal stenosis. 3. There is abnormal signal intensity and enhancement in the S1 vertebral body suspicious for vertebral body osteomyelitis. Please correlate clinically. 4. There is a tiny syrinx in the distal thoracic spinal cord and conus. 5. There is mild canal and mild-to-moderate bilateral foraminal stenosis at L3-4 and L4-5. 6. Otherwise negative MRI scan of the lumbar spine with and without intravenous contrast. **This report has been created using voice recognition software. It may contain minor errors which are inherent in voice recognition technology. ** Final report electronically signed by DR Deepti Avendaño on 8/17/2022 5:19 PM    MRI PELVIS WO CONTRAST    Result Date: 8/17/2022  Impression: 1.  Findings at L5-S1 were seen on MRI of the lumbar spine performed earlier today and are redemonstrated without significant interval change. Abnormal bone marrow edema seen in the L5 and S1 vertebrae is nonspecific and may be related to postoperative changes, fracture, degenerative disease or infection/osteomyelitis. Today's pelvic CT had demonstrated a probable fracture of the anterior superior right endplate of S1 versus osteomyelitis. 2. L5-S1 mild canal narrowing with moderate to severe bilateral foraminal stenosis is stable when compared to earlier MR This document has been electronically signed by: Pamela Mejia MD on 08/17/2022 09:28 PM       DVT prophylaxis:    [x] Lovenox  [] SCDs  [] SQ Heparin  [] Encourage ambulation   [] Already on Anticoagulation       Diet: ADULT DIET; Regular  Code Status: Full Code  PT/OT: We will order after procedure currently unable to work with them due to the severe pain  Tele: Reviewed  IVF: Patient eating and drinking.     Electronically signed by Ana Sauceda MD on 8/18/2022 at 2:32 PM

## 2022-08-18 NOTE — CONSULTS
Department of Psychiatry   Psychiatric Assessment      Thank you very much for allowing us to participate in the care of this patient. Reason for Consult:  Depression    HISTORY OF PRESENT ILLNESS:      The patient is a 43 y.o. female with significant history of schizoaffective disorder, anxiety, and depression who is admitted medically for osteomyelitis of S1. Patient has long history of mental illness. History of suicidal ideations, lack of sleep, sending money to unknown males over the phone, auditory and visual hallucinations, prior history of responding to internal stimuli. Patient was admitted to  in June 2022. At that time patient was admitted due to suicidal ideations and hallucinations. At that time patient stated that Willodean Ken would make her see and hear things. At that time Abilify was discontinued and Risperdal was started. States is currently receiving Abilify Maintena, last dose was last month through [facility]. States she has had episodes of paranoia and hallucinations on Abilify like previously, states last night she was having paranoia involving her sister and she called her on the phone and was talking about \"crazy things. \" States does not like Abilify because of weight gain with it along with these hallucinations. According to patient she was told by outpatient provider to continue Abilify Maintena and not Risperdal. Will plan to contact [facility] and see exactly what the patient was receiving. Patient denies SI/HI, states she will still have occasional audiotory/visual hallucinations. States her current situation and the pain she is in is really causing her to feel down with her mood. PSYCHIATRIC HISTORY:      Outpatient psychiatric provider:  Dr. Yana Wall at [address] attempts: One past attempt by downing self in bath tub  Inpatient psychiatric admissions: Multiple.  Was last admitted to  in June 2022, September 2021 and December 2019    Past tablet 40 mEq, 40 mEq, Oral, PRN **OR** potassium chloride 10 mEq/100 mL IVPB (Peripheral Line), 10 mEq, IntraVENous, PRN  magnesium sulfate 2000 mg in 50 mL IVPB premix, 2,000 mg, IntraVENous, PRN  morphine (PF) injection 1 mg, 1 mg, IntraVENous, Q4H PRN  cefepime (MAXIPIME) 2000 mg IVPB minibag, 2,000 mg, IntraVENous, Q12H  vancomycin (VANCOCIN) intermittent dosing (placeholder), , Other, RX Placeholder  vancomycin (VANCOCIN) 1250 mg in dextrose 5 % 250 mL IVPB, 1,250 mg, IntraVENous, Q12H     Past Medical History:        Diagnosis Date    Anxiety     Back pain     Colitis     Depression     Fibromyalgia     HPV (human papilloma virus) anogenital infection     Hypertension     OCD (obsessive compulsive disorder)     Osteoarthritis (arthritis due to wear and tear of joints)     PONV (postoperative nausea and vomiting)     Seizure ([address]     \" in [facility] in 2021 was checked out by EMS but not taken to hospital\"    Thyroid disease        Past Surgical History:        Procedure Laterality Date    CHOLECYSTECTOMY      INNER EAR SURGERY      LUMBAR FUSION N/A 5/6/2022    L5=S1 Decompression L5-S1 Posterior Fusion & TLIF performed by Juan Koo MD at [address]: [facility] [lactase-lactobacillus]      Social History:    [address] by parents. She was in foster care from age 11 until she was adopted at 5years old. She has a brother and 3 sisters. She is not in contact with her adoptive or biological parents. RESIDENCE:  Patient currently resides at the [facility] in [facility]. LEVEL OF EDUCATION: one year of college  MARITAL STATUS:  twice. Was  for 1 year and then 4 years. CHILDREN: 1 daughter who is 15years old   OCCUPATION: On disability due to back and mental health issues  PATIENT ASSETS: stable housing, connection to outpatient services    SUBSTANCE USE HISTORY:   History of methamphetamine, cocaine, marijuana, and alcohol use.    Has been in residential before for 50 days for meth, hx of DUI    Family Medical and Psychiatric History: Mom with paranoid Schizophrenia. Dad with depression. Aunts and uncles with mental health illness. Mother with alcoholism. Her father also had history of alcoholism. Sister use  alcohol and cannabis. She believed that her sister may have some type  of mental illness        Problem Relation Age of Onset    Diabetes Mother     Heart Disease Mother     Diabetes Father     Heart Disease Father     Cancer Paternal Aunt        Physical  BP 97/69   Pulse 89   Temp 97.9 °F (36.6 °C) (Oral)   Resp 16   Wt 181 lb 3.2 oz (82.2 kg)   SpO2 95%   BMI 32.10 kg/m²     Mental Status Examination:    Level of consciousness:  Within normal limits  Appearance: hospital attire, lying in bed, fair grooming, seems in pain  Behavior/Motor:  no abnormalities noted  Attitude toward examiner:  cooperative, attentive and good eye contact  Speech:  Spontaneous, normal rate and volume  Mood:  depressed  Affect: flat  Thought processes:  Linear, goal directed, coherent  Thought content:   Denies suicidal ideations   Denies homicidal ideations    Denies hallucinations currently, did have last night    Denies delusions  Cognition:  Oriented to self, situation, location, date  Concentration clinically adequate  Memory intact  Insight & Judgment:  limited    DSM-5 DIAGNOSIS:  Schizoaffective Disorder, Bipolar Type      PLAN:    - Current mood is stable  - Will contact [facility] to see what patient was receiving Abilify Maintena vs Risperdal and make recommendation based off this information   - Okay with current regimen  - No admission to [address] [place] [address] at this time    Thank you very much for allowing us to participate in the care of this patient. Time spent 35 min.       Electronically signed by Yara Ramirez DO on 8/18/22 at 12:32 PM EDT

## 2022-08-18 NOTE — PROCEDURES
A Bladder scan was performed at [address]  . The residual amount was measured to be 42 ML. Report of results was given to  RN.

## 2022-08-18 NOTE — H&P
History & Physical    Patient:  Chad Coronado  YOB: 1980  Date of Service: 8/17/2022  MRN: [id number]   Acct:  [de-identified]   Primary Care Physician: RAMÓN Martinez CNP    Chief Complaint: Lower back pain      Assessment and plan:      Osteomyelitis of S1: MRI lumbar spine showing abnormal signal intensity and enhancement in the S1 vertebral body suspicious for vertebral body osteomyelitis. History of L5S1 decompression, L5-S1 posterior fusion and TLIF performed by Dr. Felipe Cuevas on 5/06/22. Patient is afebrile. WBC, ESR, CRP within normal limits. Lactic acid 1.0. Procalcitonin 0.03. Blood cultures pending. Vanco and Cefepime ordered empirically. Check MRSA swab and deescalate as able. Consult to infectious disease and orthopedic surgery placed. Neurovascular checks q4H. Pain control. Depression: Home medications resumed. Patient is very tearful on exam.  Reports not having any support. Denies suicidal ideation. Consult to psychiatry placed. History of hypothyroidism: Patient reports that she has not taken her Synthroid over the last 2 months. We will check TSH with reflex T4. History of Present Illness:   History obtained from chart review and the patient. The patient is a 43 y.o. female who presents with complaints of lower back pain. Patient underwent L5-S1 posterior fusion in May. Reports that she had been doing well after surgery. Over the last couple weeks patient has had worsening lower back pain which has significantly increased over the last 3 days. Patient is rating her pain as a 9 on a 0-to-10 scale. Pain radiates into her upper back and bilateral lower extremities. Patient is reporting numbness and tingling in bilateral feet as well as saddle paresthesia. Sensation is intact bilaterally. Patient reports two episodes of urinary incontinence yesterday which has not happened in the past. She denies fevers, chest pain, shortness of breath. On-call orthopedic surgery consulted from ER. Additional MRIs ordered per their request.      Past Medical History:        Diagnosis Date    Anxiety     Back pain     Colitis     Depression     Fibromyalgia     HPV (human papilloma virus) anogenital infection     Hypertension     OCD (obsessive compulsive disorder)     Osteoarthritis (arthritis due to wear and tear of joints)     PONV (postoperative nausea and vomiting)     Seizure ([address]     \" in [facility] in 2021 was checked out by EMS but not taken to hospital\"    Thyroid disease        Past Surgical History:        Procedure Laterality Date    CHOLECYSTECTOMY      INNER EAR SURGERY      LUMBAR FUSION N/A 5/6/2022    L5=S1 Decompression L5-S1 Posterior Fusion & TLIF performed by Jud Faustin MD at [facility] Medications:   No current facility-administered medications on file prior to encounter. Current Outpatient Medications on File Prior to Encounter   Medication Sig Dispense Refill    hydrOXYzine pamoate (VISTARIL) 50 MG capsule Take 50 mg by mouth 3 times daily as needed for Anxiety      ABILIFY MAINTENA 400 MG PRSY every 30 days      venlafaxine (EFFEXOR XR) 75 MG extended release capsule Take 1 capsule by mouth daily 30 capsule 0    traZODone (DESYREL) 50 MG tablet Take 1 tablet by mouth nightly as needed for Sleep 30 tablet 0       Allergies:  [employer] [lactase-lactobacillus]    Social History:    reports that she has been smoking cigarettes. She has been smoking an average of .5 packs per day. She has never used smokeless tobacco. She reports current alcohol use. She reports current drug use. Drug: Marijuana [facility] [employer]). Family History:       Problem Relation Age of Onset    Diabetes Mother     Heart Disease Mother     Diabetes Father     Heart Disease Father     Cancer Paternal Aunt        Review of systems:  Constitutional: no fever, no night sweats, no fatigue  Head: no headache, no head injury, no migranes.   Eye: no blurring of vision, no double vision. Ears: no hearing difficulty, no tinnitus  Mouth/throat: no ulceration, dental caries, dysphagia  Lungs: no cough, no shortness of breath, no wheeze  CVS: no palpitation, no chest pain, no shortness of breath  GI: no abdominal pain, no nausea , no vomiting, no constipation  MARCIA: no dysuria, frequency and urgency, no hematuria, no kidney stones, positive for bladder incontinence  Musculoskeletal: Positive for lower back pain  Endocrine: no polyuria, polydypsia, no cold or heat intolerence  Hematology: no anemia, no easy brusing or bleeding, no hx of clotting disorder  Dermatology: no skin rash, no eczema, no prurities,  Psychiatry: no depression, no anxiety,no panic attacks, no suicide ideation  Neurology: no syncope, no seizures, positive for saddle paresthesia, bilateral numbness and tingling in feet    10 point review of systems completed, all other than noted above are negative. Vitals:   Vitals:    08/17/22 1817   BP: 113/75   Pulse: 82   Resp: 18   Temp:    SpO2: 100%      BMI: Body mass index is 31 kg/m².                 Exam:  Physical Examination: General appearance - crying  Mental status - alert, oriented to person, place, and time  Neck - supple, no significant adenopathy, no JVD, or carotid bruits  Chest - clear to auscultation, no wheezes, rales or rhonchi, symmetric air entry  Heart - normal rate, regular rhythm, normal S1, S2, no murmurs, rubs, clicks or gallops  Abdomen - soft, nontender, nondistended, no masses or organomegaly  Neurological - alert, oriented, normal speech, no focal findings or movement disorder noted  Musculoskeletal - lower back tenderness  Extremities - peripheral pulses normal, no pedal edema, no clubbing or cyanosis  Skin - normal coloration and turgor, no rashes, no suspicious skin lesions noted      Review of Labs and Diagnostic Testing:    Recent Results (from the past 24 hour(s))   CBC with Auto Differential    Collection Time: 08/17/22  2:20 PM Result Value Ref Range    WBC 8.0 4.8 - 10.8 thou/mm3    RBC 4.63 4.20 - 5.40 mill/mm3    Hemoglobin 13.8 12.0 - 16.0 gm/dl    Hematocrit 41.7 37.0 - 47.0 %    MCV 90.1 81.0 - 99.0 fL    MCH 29.8 26.0 - 33.0 pg    MCHC 33.1 32.2 - 35.5 gm/dl    RDW-CV 13.2 11.5 - 14.5 %    RDW-SD 43.4 35.0 - 45.0 fL    Platelets 994 107 - 724 thou/mm3    MPV 9.1 (L) 9.4 - 12.4 fL    Seg Neutrophils 60.6 %    Lymphocytes 28.9 %    Monocytes 7.3 %    Eosinophils 2.5 %    Basophils 0.6 %    Immature Granulocytes 0.1 %    Segs Absolute 4.8 1.8 - 7.7 thou/mm3    Lymphocytes Absolute 2.3 1.0 - 4.8 thou/mm3    Monocytes Absolute 0.6 0.4 - 1.3 thou/mm3    Eosinophils Absolute 0.2 0.0 - 0.4 thou/mm3    Basophils Absolute 0.0 0.0 - 0.1 thou/mm3    Immature Grans (Abs) 0.01 0.00 - 0.07 thou/mm3    nRBC 0 /100 wbc   Basic Metabolic Panel w/ Reflex to MG    Collection Time: 08/17/22  2:20 PM   Result Value Ref Range    Sodium 139 135 - 145 meq/L    Potassium reflex Magnesium 4.1 3.5 - 5.2 meq/L    Chloride 108 98 - 111 meq/L    CO2 24 23 - 33 meq/L    Glucose 114 (H) 70 - 108 mg/dL    BUN 8 7 - 22 mg/dL    Creatinine 0.7 0.4 - 1.2 mg/dL    Calcium 8.9 8.5 - 10.5 mg/dL   Hepatic Function Panel    Collection Time: 08/17/22  2:20 PM   Result Value Ref Range    Albumin 3.6 3.5 - 5.1 g/dL    Total Bilirubin 0.4 0.3 - 1.2 mg/dL    Bilirubin, Direct <0.2 0.0 - 0.3 mg/dL    Alkaline Phosphatase 95 38 - 126 U/L    AST 18 5 - 40 U/L    ALT 12 11 - 66 U/L    Total Protein 6.4 6.1 - 8.0 g/dL   Lipase    Collection Time: 08/17/22  2:20 PM   Result Value Ref Range    Lipase 32.4 5.6 - 51.3 U/L   Troponin    Collection Time: 08/17/22  2:20 PM   Result Value Ref Range    Troponin T < 0.010 ng/ml   Urinalysis with Microscopic    Collection Time: 08/17/22  2:20 PM   Result Value Ref Range    Glucose, Urine NEGATIVE NEGATIVE mg/dl    Bilirubin Urine NEGATIVE NEGATIVE    Ketones, Urine NEGATIVE NEGATIVE    Specific Gravity, UA 1.016 1.002 - 1.030    Blood, Urine NEGATIVE NEGATIVE    pH, UA 6.5 5.0 - 9.0    Protein, UA NEGATIVE NEGATIVE mg/dl    Urobilinogen, Urine 0.2 0.0 - 1.0 eu/dl    Nitrite, Urine NEGATIVE NEGATIVE    Leukocyte Esterase, Urine SMALL (A) NEGATIVE    Color, UA YELLOW YELLOW-STRAW    Character, Urine CLEAR [id number]    RBC, UA 0-2 0-2/hpf /hpf    WBC, UA 5-9 0-4/hpf /hpf    Epithelial Cells, UA 5-10 3-5/hpf /hpf    Bacteria, UA FEW FEW/NONE SEEN    Casts NONE SEEN NONE SEEN /lpf    Crystals NONE SEEN NONE SEEN    Renal Epithelial, UA NONE SEEN NONE SEEN    Yeast, UA NONE SEEN NONE SEEN    Casts NONE SEEN /lpf    Miscellaneous Lab Test Result NONE SEEN    HCG Qualitative, Serum    Collection Time: 08/17/22  2:20 PM   Result Value Ref Range    Preg, Serum NEGATIVE NEGATIVE   Anion Gap    Collection Time: 08/17/22  2:20 PM   Result Value Ref Range    Anion Gap 7.0 (L) 8.0 - 16.0 meq/L   Glomerular Filtration Rate, Estimated    Collection Time: 08/17/22  2:20 PM   Result Value Ref Range    Est, Glom Filt Rate >90 ml/min/1.73m2   Osmolality    Collection Time: 08/17/22  2:20 PM   Result Value Ref Range    Osmolality Calc 276.7 275.0 - 300.0 mOsmol/kg   Procalcitonin    Collection Time: 08/17/22  2:20 PM   Result Value Ref Range    Procalcitonin 0.03 0.01 - 0.09 ng/mL   C-Reactive Protein    Collection Time: 08/17/22  2:20 PM   Result Value Ref Range    CRP < 0.30 0.00 - 1.00 mg/dl   Urine Drug Screen    Collection Time: 08/17/22  2:20 PM   Result Value Ref Range    AMPHETAMINE+METHAMPHETAMINE URINE SCREEN Negative NEGATIVE    Barbiturate Quant, Ur Negative NEGATIVE    Benzodiazepine Quant, Ur Negative NEGATIVE    Cannabinoid Quant, Ur Negative NEGATIVE    Cocaine Metab Quant, Ur Negative NEGATIVE    Opiates, Urine Negative NEGATIVE    Oxycodone Negative NEGATIVE    PCP Quant, Ur Negative NEGATIVE   Lactate, Sepsis    Collection Time: 08/17/22  5:58 PM   Result Value Ref Range    Lactic Acid, Sepsis 1.0 0.5 - 1.9 mmol/L   Sedimentation Rate    Collection Time: 08/17/22  5:58 PM   Result Value Ref Range    Sed Rate 4 0 - 20 mm/hr       Radiology:     CT Lumbar Spine WO Contrast    Result Date: 8/17/2022  Exam: CT of lumbar spine without contrast Comparison: MRI 8/17/2022, [place] [address] Clinical history: Osteomyelitis Findings: Grade 2 anterior subluxation of L5 relative to S1 is new when compared to prior CT of 10/8/2021. Degree of subluxation appears stable when compared to MRI of 8/17/2022. Loss of intervertebral disc height at L5-S1 stable since MRI but advanced since prior CT. Posterior fusion at L5-S1 with bilateral pedicle screws is new since the CT of 2021. Graft material seen around the posterior aspect of the L5 screws. Left L5 pedicle screw is outside of the osseous pedicle. Right L5 pedicle screw is within the pedicle and does not demonstrate surrounding lucency. Left S1 pedicle screw is within the osseous pedicle. No surrounding lucency. Right S1 screw is within the pedicle. Fracture of the right anterior superior endplate of S1 seen on axial images 60-69. Lucency around the right S1 screw is is likely from the fracture however osteomyelitis is not excluded. L1-L2, L2-3, L3-4 and L4-5 levels have a normal appearance. Impression: 1. Grade 2 anterior subluxation of L5 relative to S1 is new when compared to prior CT of 10/8/2021. Degree of subluxation appears stable when compared to MRI of 8/17/2022. Loss of intervertebral disc height at L5-S1 stable since MRI but advanced since prior CT. 2. Posterior fusion at L5-S1 with bilateral pedicle screws is new since the CT of 2021. Right S1 screw is within the pedicle. Fracture of the right anterior superior endplate of S1. Lucency around the right S1 screw is nonspecific is likely from the fracture however osteomyelitis is not excluded. Please correlate with clinical presentation. Left L5 pedicle screw is outside the osseous pedicle.  This document has been electronically signed by: Judie Conti MD on 08/17/2022 08:38 PM All CTs at this facility use dose modulation techniques and iterative reconstructions, and/or weight-based dosing when appropriate to reduce radiation to a low as reasonably achievable. CT PELVIS WO CONTRAST Additional Contrast? None    Result Date: 8/17/2022  Exam: Pelvis without contrast Comparison: MRI 8/17/2022, CT 10/8/2021 Clinical history: Sacrum instrumentation, osteomyelitis Findings: Grade 2 anterior subluxation of L5 relative to S1 is new when compared to prior CT of 10/8/2021. Degree of subluxation appears stable when compared to MRI of 8/17/2022. Loss of intervertebral disc height at L5-S1 stable since MRI but advanced since prior CT. Posterior fusion at L5-S1 with bilateral pedicle screws is new since the CT of 2021. Graft material seen around the posterior aspect of the L5 screws. Left L5 pedicle screw is outside of the osseous pedicle. Right L5 pedicle screw is within the pedicle and does not demonstrate surrounding lucency. Left S1 pedicle screw is within the osseous pedicle. No surrounding lucency. Right S1 screw is within the pedicle. Fracture of the right anterior superior endplate of S1 seen on coronal series 9 image 70-72 and axial images 21-24. Lucency around the right S1 screw is is likely from the fracture however osteomyelitis is not excluded. Mild arthritis at the SI joints and symphysis pubis. No acute pelvic fracture identified outside the S1 endplate. Impression: 1. Grade 2 anterior subluxation of L5 relative to S1 is new when compared to prior CT of 10/8/2021. Degree of subluxation appears stable when compared to MRI of 8/17/2022. Loss of intervertebral disc height at L5-S1 stable since MRI but advanced since prior CT. 2. Posterior fusion at L5-S1 with bilateral pedicle screws is new since the CT of 2021. Right S1 screw is within the pedicle.  Fracture of the right anterior superior endplate of S1. Lucency around the right S1 screw is nonspecific is likely from the fracture however osteomyelitis would be possible. Please correlate with clinical presentation. Left L5 pedicle screw is outside the osseous pedicle. This document has been electronically signed by: Tee Carrington MD on 08/17/2022 08:36 PM All CTs at this facility use dose modulation techniques and iterative reconstructions, and/or weight-based dosing when appropriate to reduce radiation to a low as reasonably achievable. MRI LUMBAR SPINE W WO CONTRAST    Result Date: 8/17/2022  PROCEDURE: MRI LUMBAR SPINE W WO CONTRAST CLINICAL INFORMATION: Saddle parasthesia and loss of bladder control. Had L5 s1 decompression and TLIF 5/6/22. COMPARISON: Plain radiographs dated 6 May 2022. CT scan dated 8 October 2021. . TECHNIQUE: Sagittal and axial T1 and T2-weighted images were obtained to the lumbar spine. Postcontrast axial and sagittal T1-weighted images were also obtained. FINDINGS: There is 10 mm anterolisthesis of L5 relative to S1. There is abnormal signal intensity and enhancement in the S1 vertebral body suspicious for vertebral body osteomyelitis. There are bilateral pedicular screws at L5 and S1. There is a laminectomy defect  at L5. [place] There are no compression fractures. No pars defects are noted. .  There is a tiny syrinx in the distal thoracic spinal cord and conus. . The nerve roots of the cauda equina and the tip of the conus are normal. There are no gross abnormalities in the distal thoracic spine. On the axial images, at T12-L1, there is no disc herniation, canal or foraminal stenosis. At L1-L2, there is no disc herniation, canal or foraminal stenosis. At L2-3, there is no disc herniation, canal or foraminal stenosis. At L3-4, there is a 1.6 mm bulging disc and facet hypertrophy. This causes mild canal and mild-to-moderate bilateral foraminal stenosis. At L4-5, there is a 1.8 mm bulging disc and facet hypertrophy. This results in mild canal and mild-to-moderate bilateral foraminal stenosis.  At L5-S1, there is moderate to severe bilateral foraminal stenosis and mild canal stenosis. There is no other abnormal enhancement. There are no suspicious findings in the visualized aspects of the retroperitoneum and paraspinal soft tissues. 1. The patient has undergone interval surgery with placement of bilateral pedicular screws at L5 and S1 and laminectomy defect at L5. 2. There is 10 mm of anterolisthesis of L5 relative to S1. This results in moderate to severe bilateral foraminal stenosis and mild canal stenosis. 3. There is abnormal signal intensity and enhancement in the S1 vertebral body suspicious for vertebral body osteomyelitis. Please correlate clinically. 4. There is a tiny syrinx in the distal thoracic spinal cord and conus. 5. There is mild canal and mild-to-moderate bilateral foraminal stenosis at L3-4 and L4-5. 6. Otherwise negative MRI scan of the lumbar spine with and without intravenous contrast. **This report has been created using voice recognition software. It may contain minor errors which are inherent in voice recognition technology. ** Final report electronically signed by DR Romi Yun on 8/17/2022 5:19 PM        EKG: none        DVT prophylaxis: [] Lovenox                                 [x] SCDs                                 [] SQ Heparin                                 [] Encourage ambulation, low risk for DVT, no chemical or mechanical prophylaxis necessary              [] Already on Anticoagulation                Anticipated Disposition upon discharge: [x] Home                                                                         [] Home with Home Health                                                                         [] [facility]                                                                         [] [address] [place] [address],[address]          Electronically signed by RAMÓN Madrid CNP on 8/17/2022 at 9:08 PM

## 2022-08-18 NOTE — PROGRESS NOTES
Virtual nurse attempted to complete admission x2. Upon initial attempt, was able to introduce self to patient and begin admission questions as requested by bedside nurse, but then asked to complete at later time.  Upon second attempt to complete admission, patient was not able to hear or communicate with virtual nurse after several attempts and [employer] made aware of issue as well as bedside nurse

## 2022-08-18 NOTE — FLOWSHEET NOTE
08/18/22 1350   Safe Environment   Safety Measures   (virtual safety rounds complete)   Virtual nurse rounds. Staff member at bedside. Camera not turned on.

## 2022-08-18 NOTE — PROGRESS NOTES
Consult for Infectious Diease: Dr. Mariann Mcnamara consulted via [address] [place] [address]       [phone] From: [address] [facility] rn [facility] 6A RE: Jaime Douglass consult forpatient for Osteomyeltitis of S1  Read 4:50 AM    8/18/22 4:50 AM   Ty

## 2022-08-18 NOTE — PLAN OF CARE
Problem: Discharge Planning  Goal: Discharge to home or other facility with appropriate resources  8/18/2022 1109 by NEENA Beltran, ALEJANDROW  Outcome: Progressing  Flowsheets (Taken 8/18/2022 1109)  Discharge to home or other facility with appropriate resources: Identify barriers to discharge with patient and caregiver  Return home vs ECF pending PT/OT evaluations after surgery. See SW notes 8/18/22.

## 2022-08-18 NOTE — PROGRESS NOTES
Pt reports  feeling \"itchy\" after morphine given. [phone] From: Page Flor RN [facility] 6A RE: Jackie Stubbs Gave the morphine at 2250, pt is stating it is making her \"itchy\" when I just took herto the bathroom. Read 1:07 AM      8/18/22 1:08 AM   Ok she does have vistaril that [facility] ordered which can be given and should helpwith itching.      Will give medication after new dose of IV morphine is given

## 2022-08-18 NOTE — CONSULTS
[address] [place] [address]                                  CONSULTATION    PATIENT NAME: Amelia Frazier                 :        1980  MED REC NO:   [id number]                           ROOM:       0012  ACCOUNT NO:   [de-identified]                           ADMIT DATE: 2022  PROVIDER:     Usha Menon M.D.    Doug Batres:  2022    HISTORY OF PRESENT ILLNESS:  She is a 54-year-old female patient  admitted to hospital due to pain. We were asked to see this patient for  possible osteomyelitis of the S1. She is a 54-year-old female patient  who had a recent back surgery in 2022 L5-S1 fusion. She has been  doing very well; however, recently she is having more pain radiating to  her lower extremities. She was admitted for further investigation and  treatment. She is very anxious. She reports that the pain was  radiating to her lower extremities. She felt numb; however, she is  continent of stool and urine. She has no any fever or chills. She had  an MRI which shows loosening of the screw with fracture. There was no  any abscess. Her C-reactive protein and ESR are normal.  She denies any  chills. PAST MEDICAL HISTORY:  Significant for anxiety, degenerative back  disease, history of depression, colitis, fibromyalgia, history of human  papillomavirus, obsessive compulsive disorder, thyroid disease. PAST SURGICAL HISTORY:  Cholecystectomy, inner ear surgery, lumbar  fusion. ALLERGIES:  She has allergy to DIARY PRODUCTS. CURRENT MEDICATIONS:  Include Cefepime, hydroxyzine, morphine, Zofran,  polyethylene glycol, vancomycin and venlafaxine. REVIEW OF SYSTEMS:  Noncontributory. PHYSICAL EXAMINATION:  VITAL SIGNS:  Temperature 98.3, respirations 16, pulse 87, blood  pressure 111/82. HEENT:  She has slightly pale conjunctivae, anicteric sclerae. CHEST:  Bilateral air entry.   CARDIOVASCULAR: Regular. ABDOMEN:  Soft. EXTREMITIES:  She has a well-healed lumbar wound. No laterality exam  was noted. NEUROLOGIC:  Awake, alert, and oriented to person, place and time. Appears anxious. LABORATORY DATA:  C-reactive protein of less than 0.3. Drug screen was  negative. WBC of 7.8, hemoglobin 13. 1. MRI report was noted. Abnormal bone marrow edema at the L5-S1 vertebra  may be related to postoperative change, fracture, or degenerative  disease or infection. However, fracture of the anterior superior right  endplate of S1, narrowing of the bilateral foramina L5-S1 which is  stable. IMPRESSION:  She is a 42-year-old female patient admitted with a lower  back pain. She has history of degenerative back disease with recent  surgery. Her pain likely related to mechanical factors. Her C-reactive  protein and ESR are in the normal range. At the present time, there is  no convincing to suggest infection and continue IV antibiotics. PLAN:  To hold the antibiotics. The patient will be scheduled for  surgery on Monday. We will send specimen to guide therapy in case she  has any infection at this time. No need to continue empiric antibiotic  treatment. We will stop her cefepime and vancomycin and we will  continue to follow patient.         Samuel Dumont M.D.    D: 08/18/2022 10:34:23       T: 08/18/2022 13:17:57     [email or web address]  Job#: [id number]     Doc#: [id number]    CC:

## 2022-08-18 NOTE — CONSULTS
Inpatient Consultation    Anne Whitman (1980)  8/18/2022    Reason for Consult:  lumbar pain    CHIEF COMPLAINT:  Lumbar pain    History Obtained From:  patient    HISTORY OF PRESENT ILLNESS:                The patient is a 43 y.o. female who presents with above chief complaint. Patient had an L5-S1 decompression and fusion done by Dr. Vini Angulo in May. Reports that she had been doing well after surgery. Last 3 days she reports increased lumbar pain. Patient was reporting saddle paresthesia in the ED. Denies saddle paresthesia at this time. Patient reports two episodes of urinary incontinence yesterday, but has had no issues since being at the hospital. She denies fevers, chills. MRI of the entire spine and CT scans of the pelvis and lumbar were performed. Patient has a fracture of the right anterior superior endplate of S1 with right S1 screw looseing. Left L5 screw is outside of the pedicle. MRI showed concern for possible L5-S1 osteomyelitis.        Past Medical History:        Diagnosis Date    Anxiety     Back pain     Colitis     Depression     Fibromyalgia     HPV (human papilloma virus) anogenital infection     Hypertension     OCD (obsessive compulsive disorder)     Osteoarthritis (arthritis due to wear and tear of joints)     PONV (postoperative nausea and vomiting)     Seizure ([address]     \" in [facility] in 2021 was checked out by EMS but not taken to hospital\"    Thyroid disease      Past Surgical History:        Procedure Laterality Date    CHOLECYSTECTOMY      INNER EAR SURGERY      LUMBAR FUSION N/A 5/6/2022    L5=S1 Decompression L5-S1 Posterior Fusion & TLIF performed by Whit Rdz MD at [facility]     Current Medications:   Current Facility-Administered Medications: venlafaxine (EFFEXOR XR) extended release capsule 75 mg, 75 mg, Oral, Daily  traZODone (DESYREL) tablet 50 mg, 50 mg, Oral, Nightly PRN  hydrOXYzine pamoate (VISTARIL) capsule 50 mg, 50 mg, Oral, TID PRN  sodium chloride flush 0.9 % injection 10 mL, 10 mL, IntraVENous, 2 times per day  sodium chloride flush 0.9 % injection 10 mL, 10 mL, IntraVENous, PRN  0.9 % sodium chloride infusion, , IntraVENous, PRN  ondansetron (ZOFRAN-ODT) disintegrating tablet 4 mg, 4 mg, Oral, Q8H PRN **OR** ondansetron (ZOFRAN) injection 4 mg, 4 mg, IntraVENous, Q6H PRN  polyethylene glycol (GLYCOLAX) packet 17 g, 17 g, Oral, Daily PRN  acetaminophen (TYLENOL) tablet 650 mg, 650 mg, Oral, Q6H PRN **OR** acetaminophen (TYLENOL) suppository 650 mg, 650 mg, Rectal, Q6H PRN  potassium chloride (KLOR-CON M) extended release tablet 40 mEq, 40 mEq, Oral, PRN **OR** potassium bicarb-citric acid (EFFER-K) effervescent tablet 40 mEq, 40 mEq, Oral, PRN **OR** potassium chloride 10 mEq/100 mL IVPB (Peripheral Line), 10 mEq, IntraVENous, PRN  magnesium sulfate 2000 mg in 50 mL IVPB premix, 2,000 mg, IntraVENous, PRN  morphine (PF) injection 1 mg, 1 mg, IntraVENous, Q4H PRN  cefepime (MAXIPIME) 2000 mg IVPB minibag, 2,000 mg, IntraVENous, Q12H  vancomycin (VANCOCIN) intermittent dosing (placeholder), , Other, RX Placeholder  vancomycin (VANCOCIN) 1250 mg in dextrose 5 % 250 mL IVPB, 1,250 mg, IntraVENous, Q12H  Allergies:  Dairycare [lactase-lactobacillus]    Social History:   TOBACCO:   reports that she has been smoking cigarettes. She has been smoking an average of .5 packs per day. She has never used smokeless tobacco.  ETOH:   reports current alcohol use. DRUGS:   reports current drug use. Drug: Marijuana Hilda Mustard).   Family History:       Problem Relation Age of Onset    Diabetes Mother     Heart Disease Mother     Diabetes Father     Heart Disease Father     Cancer Paternal Aunt        REVIEW OF SYSTEMS:    CONSTITUTIONAL:  negative for  fevers, chills, fatigue and weight loss  RESPIRATORY:  negative for  cough with sputum and dyspnea  CARDIOVASCULAR:  negative for  chest pain, dyspnea, exertional chest pressure/discomfort  GASTROINTESTINAL:  negative for nausea, vomiting, diarrhea and abdominal pain  GENITOURINARY:  positive urinary incontinence  MUSCULOSKELETAL:  positive for back Pain  NEUROLOGICAL:  negative for headaches, dizziness and syncope  BEHAVIOR/PSYCH:  negative for depressed mood, increased agitation and anxiety    PHYSICAL EXAM:      CONSTITUTIONAL:  awake, alert, cooperative, no apparent distress, and appears stated age  BACK:  Tenderness lumbar spine  LUNGS:  No increased work of breathing, good air exchange, clear to auscultation bilaterally, no crackles or wheezing  CARDIOVASCULAR:  Normal apical impulse, regular rate and rhythm, normal S1 and S2,   ABDOMEN: normal bowel sounds, soft, non-distended, non-tender,  MUSCULOSKELETAL:  There is no redness, warmth, or swelling of the joints. Full range of motion noted. Motor strength is 5 out of 5 all extremities bilaterally. Tone is normal.  NEUROLOGIC:  Awake, alert, oriented to name, place and time. Motor is 5 out of 5 bilaterally. Sensory is intact.       DATA:    CBC:   Lab Results   Component Value Date/Time    WBC 8.0 08/17/2022 02:20 PM    RBC 4.63 08/17/2022 02:20 PM    RBC 0-2 11/19/2011 06:25 PM    HGB 13.8 08/17/2022 02:20 PM    HCT 41.7 08/17/2022 02:20 PM    MCV 90.1 08/17/2022 02:20 PM    MCH 29.8 08/17/2022 02:20 PM    MCHC 33.1 08/17/2022 02:20 PM    RDW 12.4 01/27/2013 09:39 PM     08/17/2022 02:20 PM    MPV 9.1 08/17/2022 02:20 PM     WBC:    Lab Results   Component Value Date/Time    WBC 8.0 08/17/2022 02:20 PM     Hemoglobin/Hematocrit:    Lab Results   Component Value Date/Time    HGB 13.8 08/17/2022 02:20 PM    HCT 41.7 08/17/2022 02:20 PM     CMP:    Lab Results   Component Value Date/Time     08/17/2022 02:20 PM    K 4.1 08/17/2022 02:20 PM     08/17/2022 02:20 PM    CO2 24 08/17/2022 02:20 PM    BUN 8 08/17/2022 02:20 PM    CREATININE 0.7 08/17/2022 02:20 PM    LABGLOM >90 08/17/2022 02:20 PM    GLUCOSE 114 08/17/2022 02:20 PM    GLUCOSE NEGATIVE 11/19/2011 06:25 PM    PROT 6.4 08/17/2022 02:20 PM    LABALBU 3.6 08/17/2022 02:20 PM    CALCIUM 8.9 08/17/2022 02:20 PM    BILITOT 0.4 08/17/2022 02:20 PM    ALKPHOS 95 08/17/2022 02:20 PM    AST 18 08/17/2022 02:20 PM    ALT 12 08/17/2022 02:20 PM         Radiology:   CT Lumbar  Impression   Impression:   1. Grade 2 anterior subluxation of L5 relative to S1 is new when compared    to prior CT of 10/8/2021. Degree of subluxation appears stable when    compared to MRI of 8/17/2022. Loss of intervertebral disc height at L5-S1    stable since MRI but advanced since prior CT. 2. Posterior fusion at L5-S1 with bilateral pedicle screws is new since    the CT of 2021. Right S1 screw is within the pedicle. Fracture of the    right anterior superior endplate of S1. Lucency around the right S1 screw    is nonspecific is likely from the fracture however osteomyelitis would be    possible. Please correlate with clinical presentation. Left L5 pedicle    screw is outside the osseous pedicle. MRI Lumbar      Impression       1. The patient has undergone interval surgery with placement of bilateral pedicular screws at L5 and S1 and laminectomy defect at L5.   2. There is 10 mm of anterolisthesis of L5 relative to S1. This results in moderate to severe bilateral foraminal stenosis and mild canal stenosis. 3. There is abnormal signal intensity and enhancement in the S1 vertebral body suspicious for vertebral body osteomyelitis. Please correlate clinically. 4. There is a tiny syrinx in the distal thoracic spinal cord and conus.    5. There is mild canal and mild-to-moderate bilateral foraminal stenosis at L3-4 and L4-5.   6. Otherwise negative MRI scan of the lumbar spine with and without intravenous contrast.       IMPRESSION/RECOMMENDATIONS:    Assessment:   3 months s/p L5-S1 decompression and fusion with Right S1 fracture causing right S1 screw loosening and Left lateral L5 screw  Intermittent episodes of urinary incontinence and saddle paresthesia    Plan:  Discussed with Dr. Zach Sinha. Recommends patient have a L5-S1 hardware removal with L5-S2 revision fusion and extension. Plan for surgery will be Monday. ID and Isaiah consulted. Patient to wear brace when OOB. Kayla Bloch Palte, PA-C

## 2022-08-18 NOTE — CARE COORDINATION
DISCHARGE/PLANNING EVALUATION  8/18/22, 10:48 AM EDT    Reason for Referral: PT/OT outpt  Mental Status: Patient is alert and oriented  Decision Making: Patient is making own decisions. Family/Social/Home Environment: Assessment completed with Patient. She resides in an apartment alone on [address] and does have 2 friends that reside close by. Patient has a flight of steps to enter. Patient only has a back brace and no other equipment. Patient shared that she discharged to [facility] after her last surgery and did not have a positive experience she said. CM with [employer] is [place]. Current Services including food security, transportation and housekeeping: assisted by [employer] and friends when needed. Current Equipment: back brace  Payment Source: Medicare, Medicaid  Concerns or Barriers to Discharge: not at this time   Post acute provider list with quality measures, geographic area and applicable managed care information provided. Questions regarding selection process answered: Unsure of needs at this time pending PT/OT evals after surgery. Teach Back Method used with Patient regarding care plan and discharge plan. Patient verbalized understanding of the plan of care and contribute to goal setting. Patient goals, treatment preferences and discharge plan: Patient is hopeful to return home but will do what is needed and recommended.      Electronically signed by NEENA Reza LSW on 8/18/2022 at 10:48 AM

## 2022-08-19 LAB
ANION GAP SERPL CALCULATED.3IONS-SCNC: 8 MEQ/L (ref 8–16)
BASOPHILS # BLD: 0.9 %
BASOPHILS ABSOLUTE: 0.1 THOU/MM3 (ref 0–0.1)
BUN BLDV-MCNC: 12 MG/DL (ref 7–22)
CALCIUM SERPL-MCNC: 8.7 MG/DL (ref 8.5–10.5)
CHLORIDE BLD-SCNC: 107 MEQ/L (ref 98–111)
CO2: 24 MEQ/L (ref 23–33)
CREAT SERPL-MCNC: 0.6 MG/DL (ref 0.4–1.2)
EOSINOPHIL # BLD: 5.2 %
EOSINOPHILS ABSOLUTE: 0.3 THOU/MM3 (ref 0–0.4)
ERYTHROCYTE [DISTWIDTH] IN BLOOD BY AUTOMATED COUNT: 12.9 % (ref 11.5–14.5)
ERYTHROCYTE [DISTWIDTH] IN BLOOD BY AUTOMATED COUNT: 42.5 FL (ref 35–45)
GFR SERPL CREATININE-BSD FRML MDRD: > 90 ML/MIN/1.73M2
GLUCOSE BLD-MCNC: 95 MG/DL (ref 70–108)
HCT VFR BLD CALC: 40 % (ref 37–47)
HEMOGLOBIN: 13.1 GM/DL (ref 12–16)
IMMATURE GRANS (ABS): 0.01 THOU/MM3 (ref 0–0.07)
IMMATURE GRANULOCYTES: 0.2 %
LYMPHOCYTES # BLD: 45.1 %
LYMPHOCYTES ABSOLUTE: 2.8 THOU/MM3 (ref 1–4.8)
MCH RBC QN AUTO: 29.6 PG (ref 26–33)
MCHC RBC AUTO-ENTMCNC: 32.8 GM/DL (ref 32.2–35.5)
MCV RBC AUTO: 90.5 FL (ref 81–99)
MONOCYTES # BLD: 8.8 %
MONOCYTES ABSOLUTE: 0.6 THOU/MM3 (ref 0.4–1.3)
NUCLEATED RED BLOOD CELLS: 0 /100 WBC
PLATELET # BLD: 246 THOU/MM3 (ref 130–400)
PMV BLD AUTO: 9.3 FL (ref 9.4–12.4)
POTASSIUM REFLEX MAGNESIUM: 4.1 MEQ/L (ref 3.5–5.2)
RBC # BLD: 4.42 MILL/MM3 (ref 4.2–5.4)
SEG NEUTROPHILS: 39.8 %
SEGMENTED NEUTROPHILS ABSOLUTE COUNT: 2.5 THOU/MM3 (ref 1.8–7.7)
SODIUM BLD-SCNC: 139 MEQ/L (ref 135–145)
WBC # BLD: 6.3 THOU/MM3 (ref 4.8–10.8)

## 2022-08-19 PROCEDURE — 36415 COLL VENOUS BLD VENIPUNCTURE: CPT

## 2022-08-19 PROCEDURE — 1200000000 HC SEMI PRIVATE

## 2022-08-19 PROCEDURE — 99231 SBSQ HOSP IP/OBS SF/LOW 25: CPT | Performed by: STUDENT IN AN ORGANIZED HEALTH CARE EDUCATION/TRAINING PROGRAM

## 2022-08-19 PROCEDURE — 6360000002 HC RX W HCPCS: Performed by: STUDENT IN AN ORGANIZED HEALTH CARE EDUCATION/TRAINING PROGRAM

## 2022-08-19 PROCEDURE — 2580000003 HC RX 258: Performed by: PHYSICIAN ASSISTANT

## 2022-08-19 PROCEDURE — 1200000003 HC TELEMETRY R&B

## 2022-08-19 PROCEDURE — 6370000000 HC RX 637 (ALT 250 FOR IP): Performed by: STUDENT IN AN ORGANIZED HEALTH CARE EDUCATION/TRAINING PROGRAM

## 2022-08-19 PROCEDURE — 6370000000 HC RX 637 (ALT 250 FOR IP): Performed by: PHYSICIAN ASSISTANT

## 2022-08-19 PROCEDURE — 85025 COMPLETE CBC W/AUTO DIFF WBC: CPT

## 2022-08-19 PROCEDURE — 80048 BASIC METABOLIC PNL TOTAL CA: CPT

## 2022-08-19 RX ADMIN — ENOXAPARIN SODIUM 40 MG: 100 INJECTION SUBCUTANEOUS at 16:48

## 2022-08-19 RX ADMIN — SODIUM CHLORIDE, PRESERVATIVE FREE 10 ML: 5 INJECTION INTRAVENOUS at 09:02

## 2022-08-19 RX ADMIN — OXYCODONE AND ACETAMINOPHEN 1 TABLET: 5; 325 TABLET ORAL at 15:28

## 2022-08-19 RX ADMIN — SODIUM CHLORIDE, PRESERVATIVE FREE 10 ML: 5 INJECTION INTRAVENOUS at 20:12

## 2022-08-19 RX ADMIN — OXYCODONE AND ACETAMINOPHEN 1 TABLET: 5; 325 TABLET ORAL at 20:11

## 2022-08-19 RX ADMIN — FOLIC ACID 1 MG: 1 TABLET ORAL at 09:02

## 2022-08-19 RX ADMIN — Medication 100 MG: at 09:02

## 2022-08-19 RX ADMIN — VENLAFAXINE HYDROCHLORIDE 75 MG: 75 CAPSULE, EXTENDED RELEASE ORAL at 09:02

## 2022-08-19 RX ADMIN — OXYCODONE AND ACETAMINOPHEN 1 TABLET: 5; 325 TABLET ORAL at 06:36

## 2022-08-19 RX ADMIN — OXYCODONE AND ACETAMINOPHEN 1 TABLET: 5; 325 TABLET ORAL at 11:31

## 2022-08-19 RX ADMIN — OXYCODONE AND ACETAMINOPHEN 1 TABLET: 5; 325 TABLET ORAL at 01:41

## 2022-08-19 ASSESSMENT — PAIN SCALES - GENERAL
PAINLEVEL_OUTOF10: 9
PAINLEVEL_OUTOF10: 8
PAINLEVEL_OUTOF10: 6
PAINLEVEL_OUTOF10: 8
PAINLEVEL_OUTOF10: 8

## 2022-08-19 ASSESSMENT — PAIN DESCRIPTION - LOCATION
LOCATION: BACK;LEG
LOCATION: BACK;LEG
LOCATION: BACK

## 2022-08-19 ASSESSMENT — PAIN DESCRIPTION - DESCRIPTORS: DESCRIPTORS: THROBBING

## 2022-08-19 ASSESSMENT — PAIN DESCRIPTION - PAIN TYPE: TYPE: ACUTE PAIN

## 2022-08-19 ASSESSMENT — PAIN DESCRIPTION - ONSET: ONSET: ON-GOING

## 2022-08-19 ASSESSMENT — PAIN DESCRIPTION - ORIENTATION
ORIENTATION: LEFT
ORIENTATION: RIGHT;LEFT;LOWER

## 2022-08-19 ASSESSMENT — PAIN DESCRIPTION - FREQUENCY: FREQUENCY: CONTINUOUS

## 2022-08-19 ASSESSMENT — PAIN - FUNCTIONAL ASSESSMENT: PAIN_FUNCTIONAL_ASSESSMENT: PREVENTS OR INTERFERES SOME ACTIVE ACTIVITIES AND ADLS

## 2022-08-19 NOTE — PROGRESS NOTES
MCV 90.5        BMP:   Recent Labs     08/19/22  0348      K 4.1      CO2 24   BUN 12   CREATININE 0.6   GLUCOSE 95     LIVER PROFILE   Recent Labs     08/17/22  1420   AST 18   ALT 12   LIPASE 32.4   BILIDIR <0.2   BILITOT 0.4   ALKPHOS 95     INR No results for input(s): INR in the last 72 hours. PTT No results found for: APTT    CULTURES:   UA:   Recent Labs     08/17/22  1420   SPECGRAV 1.016   PHUR 6.5   COLORU YELLOW   PROTEINU NEGATIVE   BLOODU NEGATIVE   RBCUA 0-2   WBCUA 5-9   BACTERIA FEW   NITRU NEGATIVE   BILIRUBINUR NEGATIVE   UROBILINOGEN 0.2   KETUA NEGATIVE   LABCAST NONE SEEN  NONE SEEN     Micro:   Lab Results   Component Value Date/Time    BC No growth 24 hours. 08/17/2022 05:58 PM     Patient problem list:     Patient Active Problem List   Diagnosis Code    Coker's esophagus without dysplasia K22.70    Moderate episode of recurrent major depressive disorder ([address] F33.1    Bipolar I disorder, current or most recent episode depressed, with psychotic features ([address] F31.5    Schizoaffective disorder, bipolar type ([address] F25.0    Severe mixed bipolar 1 disorder without psychosis ([address] F31.63    Alcohol use disorder, severe, dependence ([address] F10.20    Amphetamine substance use disorder, severe, in sustained remission ([address] F15.21    Bipolar I disorder with mixed features ([address] F31.9    Class 1 obesity in adult E66.9    Subclinical hypothyroidism E03.8    Anxiety F41.9    Lumbar stenosis without neurogenic claudication M48.061    Lumbar stenosis with neurogenic claudication M48.062    Unspecified orthopedic aftercare Z47.89    Depression F32. A    Osteomyelitis (HCC) M86.9    Bilateral low back pain M54.50    Hypothyroidism E03.9    Osteomyelitis of lumbar spine (HCC) M46.26    Closed type I fracture of sacrum with nonunion S32. 14XK    Alcohol abuse F10.10    Neurogenic claudication (HCC) G95.19       Assessment and Plan:   Low back pain s/p L5-S1 decompression and posterior fusion: MRI showed fracture of anterior superior right endplate of S1 causing right screw loosening and left lateral L5 scrrew. Orthopedic surgery following. Plan for surgery on Monday (8/22/22) for LF-S1 hardware removal with L5-S2 revision fusion and extension. Concern for osteomyelitis of S1 vertebral body: CRP and ESR within normal limits. No signs of infection therefore antibiotics were stopped on 8/18. Will send specimen to guide therapy if there is an infection at that time. Hx of major depressive disorder and anxiety: Psychiatry following  Hx of degenerative back disease  Hx of hypothyroidism       Rod Steward DO  PGY-2 Internal Medicine Resident 8/19/2022 9:46 AM      Discussed with Dr. Caridad Lee MD.  Patient was seen and examined face-to-face by me  The chart, progress notes, labs and radiographs were reviewed. Case discussed with the nurse. Questions and concerns were addressed. I agree with the progress note.

## 2022-08-19 NOTE — PLAN OF CARE
Problem: Discharge Planning  Goal: Discharge to home or other facility with appropriate resources  Outcome: Progressing  Flowsheets (Taken 8/18/2022 1109 by Samia Gagnon MSW, LSW)  Discharge to home or other facility with appropriate resources: Identify barriers to discharge with patient and caregiver     Problem: ABCDS Injury Assessment  Goal: Absence of physical injury  Outcome: Progressing  Flowsheets (Taken 8/18/2022 0331)  Absence of Physical Injury: Implement safety measures based on patient assessment     Problem: Safety - Adult  Goal: Free from fall injury  Outcome: Progressing  Flowsheets (Taken 8/18/2022 0331)  Free From Fall Injury:   Instruct family/caregiver on patient safety   Based on caregiver fall risk screen, instruct family/caregiver to ask for assistance with transferring infant if caregiver noted to have fall risk factors     Problem: Skin/Tissue Integrity  Goal: Absence of new skin breakdown  Description: 1. Monitor for areas of redness and/or skin breakdown  2. Assess vascular access sites hourly  3. Every 4-6 hours minimum:  Change oxygen saturation probe site  4. Every 4-6 hours:  If on nasal continuous positive airway pressure, respiratory therapy assess nares and determine need for appliance change or resting period.   Outcome: Progressing  Note: Pt self turns while in bed

## 2022-08-19 NOTE — PLAN OF CARE

## 2022-08-19 NOTE — CARE COORDINATION
8/19/22, 10:39 AM EDT    DISCHARGE ON GOING EVALUATION    [address] day: 2  Location: -12/012-A Reason for admit: Osteomyelitis [facility]) [M86.9]  Osteomyelitis of lumbar spine ([facility] [M46.26]   Barriers to Discharge: Antibiotics stopped per Dr. Mariann Warren Psych following, no need for admission at this time. Ortho planning L5-S1 HW removal with L5-S2 revision fusion and extension on Monday. PCP: RAMÓN Santiago CNP  Readmission Risk Score: 15.9%  Patient Goals/Plan/Treatment Preferences: Home with HH vs ECF. SW following.

## 2022-08-19 NOTE — PROGRESS NOTES
Hospitalist Progress Note    Patient:  Mile Franz    YOB: 1980  Unit/Bed:6K-12/012-A  MRN: [id number]    Acct: [de-identified]   PCP: RAMÓN De León CNP    Date of Admission: 8/17/2022      Assessment/Plan:  Postop complication status post L5-S1 decompression and fusion- fracture of right anterior superior endplate of S1 with a screw losing. Plan for surgery on Monday L5-S1 hardware removal with L5 S2 revision fusion and extension. NPO at midnight. Concern for osteomyelitis of S1 vertebral body. Inflammatory markers negative. Likely secondary to postop changes. Continue to monitor. Keep off of antibiotics. Appreciate ID assistance. Will need cultures from tissue sample at the time of surgery on Monday. Alcohol use disorder. Continue thiamine folate. Check electrolytes. Watch for refeeding syndrome. History of major depressive disorder. Resume home medications. Denied suicidal ideation. Appreciate psychiatry recommendations. History of hypothyroidism. Currently off of Synthroid TSH within normal limits. Outpatient follow-up        Expected discharge date:  8/22/2022    Disposition: depend on the clinical course  [] Home  [] TCU  [x] Rehab  [] Psych  [x] SNF  [] [facility]  [] Other-    ===================================================================      Chief Complaint: lower back pain. Hospital Course: 51-year-old lady with past medical history of alcohol abuse disorder, hypothyroidism, chronic back pain issues came to the hospital complaining of worsening of lower back pain associated with saddle anesthesia and 2 times involuntary loss of urine. Of note patient underwent L5-S1 decompression and fusion with transforaminal lumbar interbody fusion secondary to spondylolisthesis associated with neurogenic claudication. According to the patient the pain get worse when she lays down it gets better when she leans forward.   Patient was able to walk after the surgery but lately she has been having excruciating pain. Patient said that for last few days she has been having paresthesias numbness and tingling associated with involuntary movements of toes on left side. Imaging of the lumbar spine was done which showed abnormal signal intensity and enhancement in the S1 vertebral body suspicious for vertebral body osteomyelitis. CT scan showed grade 2 anterior subluxation of L5 relative to S1 with possible fracture versus osteomyelitis. Patient was evaluated by orthopedic surgery as fracture of right anterior superior endplate of S1 with right S1 screw loosening. Left L5 screws outside of the pedicle. Vitals in /101 with pulse 108, saturating well on room air with respiratory rate 18. Lab work done in ED showed sodium 139, potassium 4.1, CRP less than 0.3, troponin less than 0.01, albumin 3.6, drug tox negative, , WBC 8, hemoglobin 13.2, UA negative. 8/19  No events, awaiting surgery on Monday      Subjective (past 24 hours):   Patient was seen and examined  No overnight events  Case was discussed with nursing staff  Patient sleeping comfortably complaining of mild back pain. Hemodynamically and vitally stable remained afebrile. Labs reviewed. Denies any new symptoms. ROS: reviewed complete ROS unchanged unless otherwise stated in hospital course/subjective portion.        Medications:  Reviewed    Infusion Medications    sodium chloride       Scheduled Medications    enoxaparin  40 mg SubCUTAneous Q24H    thiamine  100 mg Oral Daily    folic acid  1 mg Oral Daily    venlafaxine  75 mg Oral Daily    sodium chloride flush  10 mL IntraVENous 2 times per day     PRN Meds: oxyCODONE-acetaminophen, traZODone, hydrOXYzine pamoate, sodium chloride flush, sodium chloride, ondansetron **OR** ondansetron, polyethylene glycol, acetaminophen **OR** acetaminophen, potassium chloride **OR** potassium alternative oral replacement **OR** potassium chloride, magnesium sulfate        Intake/Output Summary (Last 24 hours) at 8/19/2022 1502  Last data filed at 8/19/2022 1146  Gross per 24 hour   Intake 320 ml   Output --   Net 320 ml       Exam:  /76   Pulse 84   Temp 98 °F (36.7 °C) (Oral)   Resp 16   Ht 5' 3\" (1.6 m) Comment: per ED note  Wt 203 lb (92.1 kg)   SpO2 97%   BMI 35.96 kg/m²     General: No distress, appears stated age. Eyes:  PERRL. Conjunctivae/corneas clear. HENT: Head normal appearing. Nares normal. Oral mucosa moist.  Hearing intact. Neck: Supple, with full range of motion. Trachea midline. No gross JVD appreciated. Respiratory:  Normal effort. Clear to auscultation, without rales or wheezes or rhonchi. Cardiovascular: Normal rate, regular rhythm with normal S1/S2 without murmurs. No lower extremity edema. Abdomen: Soft, non-tender, non-distended with normal bowel sounds. Musculoskeletal: Tone muscle and strength equal bilaterally no abnormal movements noted. Skin: Warm and dry. No rashes or lesions. Neurologic: Bilateral lower extremity weakness, dorsiflexion is weak on the left side. Sensation intact. Psychiatric: Alert and oriented, normal insight and thought content. Capillary Refill: Brisk,< 3 seconds. Peripheral Pulses: +2 palpable, equal bilaterally. Labs:   Recent Labs     08/17/22  1420 08/18/22  0733 08/19/22  0348   WBC 8.0 7.8 6.3   HGB 13.8 13.1 13.1   HCT 41.7 40.0 40.0    232 246       Recent Labs     08/17/22  1420 08/18/22  0826 08/19/22  0348    138 139   K 4.1 4.1 4.1    108 107   CO2 24 23 24   BUN 8 8 12   CREATININE 0.7 0.6 0.6   CALCIUM 8.9 8.6 8.7       Recent Labs     08/17/22  1420   AST 18   ALT 12   BILIDIR <0.2   BILITOT 0.4   ALKPHOS 95       No results for input(s): INR in the last 72 hours. No results for input(s): Keshia De La Cruz in the last 72 hours.   Recent Labs     08/17/22  1420   PROCAL 0.03        Lab Results   Component Value Date/Time    NITRU NEGATIVE 08/17/2022 02:20 PM    WBCUA 5-9 08/17/2022 02:20 PM    BACTERIA FEW 08/17/2022 02:20 PM    RBCUA 0-2 08/17/2022 02:20 PM    BLOODU NEGATIVE 08/17/2022 02:20 PM    SPECGRAV 1.016 08/17/2022 02:20 PM    GLUCOSEU NEGATIVE 10/16/2021 05:00 PM       Radiology (48 hours):  CT Lumbar Spine WO Contrast    Result Date: 8/17/2022  Impression: 1. Grade 2 anterior subluxation of L5 relative to S1 is new when compared to prior CT of 10/8/2021. Degree of subluxation appears stable when compared to MRI of 8/17/2022. Loss of intervertebral disc height at L5-S1 stable since MRI but advanced since prior CT. 2. Posterior fusion at L5-S1 with bilateral pedicle screws is new since the CT of 2021. Right S1 screw is within the pedicle. Fracture of the right anterior superior endplate of S1. Lucency around the right S1 screw is nonspecific is likely from the fracture however osteomyelitis is not excluded. Please correlate with clinical presentation. Left L5 pedicle screw is outside the osseous pedicle. This document has been electronically signed by: Vernon Prado MD on 08/17/2022 08:38 PM All CTs at this facility use dose modulation techniques and iterative reconstructions, and/or weight-based dosing when appropriate to reduce radiation to a low as reasonably achievable. CT PELVIS WO CONTRAST Additional Contrast? None    Result Date: 8/17/2022  Impression: 1. Grade 2 anterior subluxation of L5 relative to S1 is new when compared to prior CT of 10/8/2021. Degree of subluxation appears stable when compared to MRI of 8/17/2022. Loss of intervertebral disc height at L5-S1 stable since MRI but advanced since prior CT. 2. Posterior fusion at L5-S1 with bilateral pedicle screws is new since the CT of 2021. Right S1 screw is within the pedicle. Fracture of the right anterior superior endplate of S1. Lucency around the right S1 screw is nonspecific is likely from the fracture however osteomyelitis would be possible.  Please voice recognition technology. ** Final report electronically signed by DR Kerry Nelson on 8/17/2022 5:19 PM    MRI PELVIS WO CONTRAST    Result Date: 8/17/2022  Impression: 1. Findings at L5-S1 were seen on MRI of the lumbar spine performed earlier today and are redemonstrated without significant interval change. Abnormal bone marrow edema seen in the L5 and S1 vertebrae is nonspecific and may be related to postoperative changes, fracture, degenerative disease or infection/osteomyelitis. Today's pelvic CT had demonstrated a probable fracture of the anterior superior right endplate of S1 versus osteomyelitis. 2. L5-S1 mild canal narrowing with moderate to severe bilateral foraminal stenosis is stable when compared to earlier MR This document has been electronically signed by: Mina Mitchell MD on 08/17/2022 09:28 PM       DVT prophylaxis:    [x] Lovenox  [] SCDs  [] SQ Heparin  [] Encourage ambulation   [] Already on Anticoagulation       Diet: ADULT DIET; Regular  Code Status: Full Code  PT/OT: We will order after procedure currently unable to work with them due to the severe pain  Tele: Reviewed  IVF: Patient eating and drinking.     Electronically signed by Gisell Burnette MD on 8/19/2022 at 3:02 PM

## 2022-08-19 NOTE — PROGRESS NOTES
Department of Orthopedic Surgery  Spine Service  Attending Progress Note        Subjective:  Pt c/o LBP that radiates into L buttock, posterior thigh with N/T in same distribution. C/o mild N/T in groin area, denies labial or perianal numbness. Denies bowel or urinary incontinence or retention since admission. Vitals  VITALS:  /72   Pulse 82   Temp 98.1 °F (36.7 °C) (Oral)   Resp 16   Ht 5' 3\" (1.6 m) Comment: per ED note  Wt 203 lb (92.1 kg)   SpO2 96%   BMI 35.96 kg/m²   24HR INTAKE/OUTPUT:    Intake/Output Summary (Last 24 hours) at 8/19/2022 1013  Last data filed at 8/19/2022 0746  Gross per 24 hour   Intake 480 ml   Output --   Net 480 ml     URINARY CATHETER OUTPUT (Bowen):     DRAIN/TUBE OUTPUT:       PHYSICAL EXAM:    Orientation:  alert and oriented to person, place and time    Lower Extremity Motor :  quadriceps, extensor hallucis longus, dorsiflexion, plantarflexion 5/5 bilaterally  Lower Extremity Sensory:  Intact L1-S1, except decreased sensation L posterior thigh/buttock and groin     Flatus:  positive    LABS:    HgB:    Lab Results   Component Value Date/Time    HGB 13.1 08/19/2022 03:48 AM     Hemoglobin/Hematocrit:    Lab Results   Component Value Date/Time    HGB 13.1 08/19/2022 03:48 AM    HCT 40.0 08/19/2022 03:48 AM     BMP:    Lab Results   Component Value Date/Time     08/19/2022 03:48 AM    K 4.1 08/19/2022 03:48 AM     08/19/2022 03:48 AM    CO2 24 08/19/2022 03:48 AM    BUN 12 08/19/2022 03:48 AM    LABALBU 3.6 08/17/2022 02:20 PM    CREATININE 0.6 08/19/2022 03:48 AM    CALCIUM 8.7 08/19/2022 03:48 AM    LABGLOM >90 08/19/2022 03:48 AM    GLUCOSE 95 08/19/2022 03:48 AM    GLUCOSE NEGATIVE 11/19/2011 06:25 PM     Radiology:   CT Lumbar  Impression   Impression:   1. Grade 2 anterior subluxation of L5 relative to S1 is new when compared   to prior CT of 10/8/2021. Degree of subluxation appears stable when   compared to MRI of 8/17/2022.  Loss of intervertebral disc height at L5-S1   stable since MRI but advanced since prior CT. 2. Posterior fusion at L5-S1 with bilateral pedicle screws is new since   the CT of 2021. Right S1 screw is within the pedicle. Fracture of the   right anterior superior endplate of S1. Lucency around the right S1 screw   is nonspecific is likely from the fracture however osteomyelitis would be   possible. Please correlate with clinical presentation. Left L5 pedicle   screw is outside the osseous pedicle. MRI Lumbar      Impression       1. The patient has undergone interval surgery with placement of bilateral pedicular screws at L5 and S1 and laminectomy defect at L5.   2. There is 10 mm of anterolisthesis of L5 relative to S1. This results in moderate to severe bilateral foraminal stenosis and mild canal stenosis. 3. There is abnormal signal intensity and enhancement in the S1 vertebral body suspicious for vertebral body osteomyelitis. Please correlate clinically. 4. There is a tiny syrinx in the distal thoracic spinal cord and conus.    5. There is mild canal and mild-to-moderate bilateral foraminal stenosis at L3-4 and L4-5.   6. Otherwise negative MRI scan of the lumbar spine with and without intravenous contrast.     ASSESSMENT AND PLAN:    3 months s/p L5-S1 decompression and fusion with Right S1 fracture causing right S1 screw loosening and lateral displacement of left L5 screw  Intermittent episodes of urinary incontinence and saddle paresthesia    1:  Monitor labs and drain output  2:  Activity Level:  as tolerated, back brace when out of bed   3:  Pain Control:  okay  4:  Discharge Planning:  pending  5:  Plan for surgery Monday afternoon: L5-S1 HW removal with L5-S2 revision fusion and extension     Wilmer Jj PA-C

## 2022-08-20 LAB
ANION GAP SERPL CALCULATED.3IONS-SCNC: 9 MEQ/L (ref 8–16)
BASOPHILS # BLD: 0.8 %
BASOPHILS ABSOLUTE: 0 THOU/MM3 (ref 0–0.1)
BUN BLDV-MCNC: 12 MG/DL (ref 7–22)
CALCIUM SERPL-MCNC: 8.9 MG/DL (ref 8.5–10.5)
CHLORIDE BLD-SCNC: 104 MEQ/L (ref 98–111)
CO2: 26 MEQ/L (ref 23–33)
CREAT SERPL-MCNC: 0.7 MG/DL (ref 0.4–1.2)
EOSINOPHIL # BLD: 5.2 %
EOSINOPHILS ABSOLUTE: 0.3 THOU/MM3 (ref 0–0.4)
ERYTHROCYTE [DISTWIDTH] IN BLOOD BY AUTOMATED COUNT: 12.9 % (ref 11.5–14.5)
ERYTHROCYTE [DISTWIDTH] IN BLOOD BY AUTOMATED COUNT: 42.4 FL (ref 35–45)
GFR SERPL CREATININE-BSD FRML MDRD: > 90 ML/MIN/1.73M2
GLUCOSE BLD-MCNC: 88 MG/DL (ref 70–108)
HCT VFR BLD CALC: 41.4 % (ref 37–47)
HEMOGLOBIN: 13.4 GM/DL (ref 12–16)
IMMATURE GRANS (ABS): 0.02 THOU/MM3 (ref 0–0.07)
IMMATURE GRANULOCYTES: 0.3 %
LYMPHOCYTES # BLD: 40.4 %
LYMPHOCYTES ABSOLUTE: 2.4 THOU/MM3 (ref 1–4.8)
MCH RBC QN AUTO: 29.5 PG (ref 26–33)
MCHC RBC AUTO-ENTMCNC: 32.4 GM/DL (ref 32.2–35.5)
MCV RBC AUTO: 91 FL (ref 81–99)
MONOCYTES # BLD: 8.8 %
MONOCYTES ABSOLUTE: 0.5 THOU/MM3 (ref 0.4–1.3)
NUCLEATED RED BLOOD CELLS: 0 /100 WBC
PLATELET # BLD: 237 THOU/MM3 (ref 130–400)
PMV BLD AUTO: 9.2 FL (ref 9.4–12.4)
POTASSIUM REFLEX MAGNESIUM: 3.8 MEQ/L (ref 3.5–5.2)
RBC # BLD: 4.55 MILL/MM3 (ref 4.2–5.4)
SEG NEUTROPHILS: 44.5 %
SEGMENTED NEUTROPHILS ABSOLUTE COUNT: 2.6 THOU/MM3 (ref 1.8–7.7)
SODIUM BLD-SCNC: 139 MEQ/L (ref 135–145)
WBC # BLD: 5.9 THOU/MM3 (ref 4.8–10.8)

## 2022-08-20 PROCEDURE — 36415 COLL VENOUS BLD VENIPUNCTURE: CPT

## 2022-08-20 PROCEDURE — 80048 BASIC METABOLIC PNL TOTAL CA: CPT

## 2022-08-20 PROCEDURE — 1200000000 HC SEMI PRIVATE

## 2022-08-20 PROCEDURE — 99231 SBSQ HOSP IP/OBS SF/LOW 25: CPT | Performed by: STUDENT IN AN ORGANIZED HEALTH CARE EDUCATION/TRAINING PROGRAM

## 2022-08-20 PROCEDURE — 2580000003 HC RX 258: Performed by: PHYSICIAN ASSISTANT

## 2022-08-20 PROCEDURE — 6370000000 HC RX 637 (ALT 250 FOR IP): Performed by: PHYSICIAN ASSISTANT

## 2022-08-20 PROCEDURE — 6360000002 HC RX W HCPCS: Performed by: STUDENT IN AN ORGANIZED HEALTH CARE EDUCATION/TRAINING PROGRAM

## 2022-08-20 PROCEDURE — 6370000000 HC RX 637 (ALT 250 FOR IP): Performed by: STUDENT IN AN ORGANIZED HEALTH CARE EDUCATION/TRAINING PROGRAM

## 2022-08-20 PROCEDURE — 85025 COMPLETE CBC W/AUTO DIFF WBC: CPT

## 2022-08-20 RX ADMIN — OXYCODONE AND ACETAMINOPHEN 1 TABLET: 5; 325 TABLET ORAL at 08:28

## 2022-08-20 RX ADMIN — ENOXAPARIN SODIUM 40 MG: 100 INJECTION SUBCUTANEOUS at 15:37

## 2022-08-20 RX ADMIN — SODIUM CHLORIDE, PRESERVATIVE FREE 10 ML: 5 INJECTION INTRAVENOUS at 08:18

## 2022-08-20 RX ADMIN — VENLAFAXINE HYDROCHLORIDE 75 MG: 75 CAPSULE, EXTENDED RELEASE ORAL at 08:18

## 2022-08-20 RX ADMIN — FOLIC ACID 1 MG: 1 TABLET ORAL at 08:18

## 2022-08-20 RX ADMIN — Medication 100 MG: at 08:18

## 2022-08-20 RX ADMIN — OXYCODONE AND ACETAMINOPHEN 1 TABLET: 5; 325 TABLET ORAL at 16:52

## 2022-08-20 RX ADMIN — SODIUM CHLORIDE, PRESERVATIVE FREE 10 ML: 5 INJECTION INTRAVENOUS at 21:06

## 2022-08-20 RX ADMIN — OXYCODONE AND ACETAMINOPHEN 1 TABLET: 5; 325 TABLET ORAL at 13:32

## 2022-08-20 RX ADMIN — POLYETHYLENE GLYCOL 3350 17 G: 17 POWDER, FOR SOLUTION ORAL at 21:06

## 2022-08-20 RX ADMIN — OXYCODONE AND ACETAMINOPHEN 1 TABLET: 5; 325 TABLET ORAL at 21:06

## 2022-08-20 RX ADMIN — OXYCODONE AND ACETAMINOPHEN 1 TABLET: 5; 325 TABLET ORAL at 00:51

## 2022-08-20 ASSESSMENT — PAIN DESCRIPTION - ORIENTATION
ORIENTATION: LOWER
ORIENTATION: MID
ORIENTATION: LOWER
ORIENTATION: MID

## 2022-08-20 ASSESSMENT — PAIN DESCRIPTION - DESCRIPTORS
DESCRIPTORS: ACHING
DESCRIPTORS: THROBBING;SHOOTING
DESCRIPTORS: CRAMPING;THROBBING
DESCRIPTORS: ACHING

## 2022-08-20 ASSESSMENT — PAIN DESCRIPTION - FREQUENCY: FREQUENCY: CONTINUOUS

## 2022-08-20 ASSESSMENT — PAIN SCALES - GENERAL
PAINLEVEL_OUTOF10: 8
PAINLEVEL_OUTOF10: 7
PAINLEVEL_OUTOF10: 8
PAINLEVEL_OUTOF10: 8
PAINLEVEL_OUTOF10: 0
PAINLEVEL_OUTOF10: 9
PAINLEVEL_OUTOF10: 7
PAINLEVEL_OUTOF10: 9
PAINLEVEL_OUTOF10: 8

## 2022-08-20 ASSESSMENT — PAIN DESCRIPTION - LOCATION
LOCATION: BACK
LOCATION: BACK;LEG
LOCATION: BACK
LOCATION: ABDOMEN;BACK
LOCATION: BACK

## 2022-08-20 ASSESSMENT — PAIN DESCRIPTION - ONSET: ONSET: ON-GOING

## 2022-08-20 ASSESSMENT — PAIN - FUNCTIONAL ASSESSMENT
PAIN_FUNCTIONAL_ASSESSMENT: PREVENTS OR INTERFERES SOME ACTIVE ACTIVITIES AND ADLS

## 2022-08-20 ASSESSMENT — PAIN DESCRIPTION - PAIN TYPE: TYPE: ACUTE PAIN

## 2022-08-20 NOTE — FLOWSHEET NOTE
08/20/22 1058   Safe Environment   Safety Measures Other (comment)  (Virtual safety check complete)   Patient resting in bed. Discussed upcoming surgery planned for Monday and getting up to walk while wearing brace later today. Denies further needs/questions at this time. Call light in reach. No safety concerns at this time.

## 2022-08-20 NOTE — PLAN OF CARE
Improved:   Monitor and assess patient's chronic conditions and comorbid symptoms for stability, deterioration, or improvement   Collaborate with multidisciplinary team to address chronic and comorbid conditions and prevent exacerbation or deterioration   Update acute care plan with appropriate goals if chronic or comorbid symptoms are exacerbated and prevent overall improvement and discharge     Problem: Skin/Tissue Integrity  Goal: Absence of new skin breakdown  Description: 1. Monitor for areas of redness and/or skin breakdown  2. Assess vascular access sites hourly  3. Every 4-6 hours minimum:  Change oxygen saturation probe site  4. Every 4-6 hours:  If on nasal continuous positive airway pressure, respiratory therapy assess nares and determine need for appliance change or resting period. 8/19/2022 2026 by Jason Benavidez RN  Outcome: Progressing  Note: No skin break down noted at this time. Encouraged patient to reposition self in bed. Care plan reviewed with patient. Patient verbalizes understanding of plan of care and contributes to goal setting.

## 2022-08-20 NOTE — PROGRESS NOTES
Hospitalist Progress Note    Patient:  John Carlos    YOB: 1980  Unit/Bed:6K-12/012-A  MRN: [id number]    Acct: [de-identified]   PCP: RAMÓN George CNP    Date of Admission: 8/17/2022      Assessment/Plan:  Postop complication status post L5-S1 decompression and fusion- fracture of right anterior superior endplate of S1 with a screw losing. Plan for surgery on Monday L5-S1 hardware removal with L5 S2 revision fusion and extension. NPO at midnight. Concern for osteomyelitis of S1 vertebral body. Inflammatory markers negative. Likely secondary to postop changes. Continue to monitor. Keep off of antibiotics. Appreciate ID assistance. Will need cultures from tissue sample at the time of surgery on Monday. Alcohol use disorder. Continue thiamine folate. Check electrolytes. Watch for refeeding syndrome. History of major depressive disorder. Resume home medications. Denied suicidal ideation. Appreciate psychiatry recommendations. History of hypothyroidism. Currently off of Synthroid TSH within normal limits. Outpatient follow-up        Expected discharge date:  8/22/2022    Disposition: depend on the clinical course  [] Home  [] TCU  [x] Rehab  [] Psych  [x] SNF  [] [facility]  [] Other-    ===================================================================      Chief Complaint: lower back pain. Hospital Course: 51-year-old lady with past medical history of alcohol abuse disorder, hypothyroidism, chronic back pain issues came to the hospital complaining of worsening of lower back pain associated with saddle anesthesia and 2 times involuntary loss of urine. Of note patient underwent L5-S1 decompression and fusion with transforaminal lumbar interbody fusion secondary to spondylolisthesis associated with neurogenic claudication. According to the patient the pain get worse when she lays down it gets better when she leans forward.   Patient was able to walk after the surgery but lately she has been having excruciating pain. Patient said that for last few days she has been having paresthesias numbness and tingling associated with involuntary movements of toes on left side. Imaging of the lumbar spine was done which showed abnormal signal intensity and enhancement in the S1 vertebral body suspicious for vertebral body osteomyelitis. CT scan showed grade 2 anterior subluxation of L5 relative to S1 with possible fracture versus osteomyelitis. Patient was evaluated by orthopedic surgery as fracture of right anterior superior endplate of S1 with right S1 screw loosening. Left L5 screws outside of the pedicle. Vitals in /101 with pulse 108, saturating well on room air with respiratory rate 18. Lab work done in ED showed sodium 139, potassium 4.1, CRP less than 0.3, troponin less than 0.01, albumin 3.6, drug tox negative, , WBC 8, hemoglobin 13.2, UA negative. 8/19  No events, awaiting surgery on Monday 8/20  No events    Subjective (past 24 hours):   Patient was seen and examined  No overnight events  Sleeping comfortably  Denied any new complaints. Has mild discomfort on the right side of the back. ROS: reviewed complete ROS unchanged unless otherwise stated in hospital course/subjective portion.        Medications:  Reviewed    Infusion Medications    sodium chloride       Scheduled Medications    enoxaparin  40 mg SubCUTAneous Q24H    thiamine  100 mg Oral Daily    folic acid  1 mg Oral Daily    venlafaxine  75 mg Oral Daily    sodium chloride flush  10 mL IntraVENous 2 times per day     PRN Meds: oxyCODONE-acetaminophen, traZODone, hydrOXYzine pamoate, sodium chloride flush, sodium chloride, ondansetron **OR** ondansetron, polyethylene glycol, acetaminophen **OR** acetaminophen, potassium chloride **OR** potassium alternative oral replacement **OR** potassium chloride, magnesium sulfate        Intake/Output Summary (Last 24 hours) at 8/20/2022 [address] [facility] filed at 8/20/2022 1509  Gross per 24 hour   Intake 580 ml   Output --   Net 580 ml         Exam:  /82   Pulse 78   Temp 98.4 °F (36.9 °C) (Oral)   Resp 18   Ht 5' 3\" (1.6 m) Comment: per ED note  Wt 203 lb (92.1 kg)   SpO2 97%   BMI 35.96 kg/m²     General: No distress, appears stated age. Eyes:  PERRL. Conjunctivae/corneas clear. HENT: Head normal appearing. Nares normal. Oral mucosa moist.  Hearing intact. Neck: Supple, with full range of motion. Trachea midline. No gross JVD appreciated. Respiratory:  Normal effort. Clear to auscultation, without rales or wheezes or rhonchi. Cardiovascular: Normal rate, regular rhythm with normal S1/S2 without murmurs. No lower extremity edema. Abdomen: Soft, non-tender, non-distended with normal bowel sounds. Musculoskeletal: Tone muscle and strength equal bilaterally no abnormal movements noted. Skin: Warm and dry. No rashes or lesions. Neurologic: Bilateral lower extremity weakness, dorsiflexion is weak on the left side. Sensation intact. Psychiatric: Alert and oriented, normal insight and thought content. Capillary Refill: Brisk,< 3 seconds. Peripheral Pulses: +2 palpable, equal bilaterally. Labs:   Recent Labs     08/18/22  0733 08/19/22  0348 08/20/22  0548   WBC 7.8 6.3 5.9   HGB 13.1 13.1 13.4   HCT 40.0 40.0 41.4    246 237       Recent Labs     08/18/22  0826 08/19/22  0348 08/20/22  0548    139 139   K 4.1 4.1 3.8    107 104   CO2 23 24 26   BUN 8 12 12   CREATININE 0.6 0.6 0.7   CALCIUM 8.6 8.7 8.9       No results for input(s): AST, ALT, BILIDIR, BILITOT, ALKPHOS in the last 72 hours. No results for input(s): INR in the last 72 hours. No results for input(s): Nanda Fuchs in the last 72 hours. No results for input(s): PROCAL in the last 72 hours.      Lab Results   Component Value Date/Time    NITRU NEGATIVE 08/17/2022 02:20 PM    WBCUA 5-9 08/17/2022 02:20 PM    BACTERIA FEW 08/17/2022 02:20 PM    RBCUA 0-2 08/17/2022 02:20 PM    BLOODU NEGATIVE 08/17/2022 02:20 PM    SPECGRAV 1.016 08/17/2022 02:20 PM    GLUCOSEU NEGATIVE 10/16/2021 05:00 PM       Radiology (48 hours):  CT Lumbar Spine WO Contrast    Result Date: 8/17/2022  Impression: 1. Grade 2 anterior subluxation of L5 relative to S1 is new when compared to prior CT of 10/8/2021. Degree of subluxation appears stable when compared to MRI of 8/17/2022. Loss of intervertebral disc height at L5-S1 stable since MRI but advanced since prior CT. 2. Posterior fusion at L5-S1 with bilateral pedicle screws is new since the CT of 2021. Right S1 screw is within the pedicle. Fracture of the right anterior superior endplate of S1. Lucency around the right S1 screw is nonspecific is likely from the fracture however osteomyelitis is not excluded. Please correlate with clinical presentation. Left L5 pedicle screw is outside the osseous pedicle. This document has been electronically signed by: Isabella Cardona MD on 08/17/2022 08:38 PM All CTs at this facility use dose modulation techniques and iterative reconstructions, and/or weight-based dosing when appropriate to reduce radiation to a low as reasonably achievable. CT PELVIS WO CONTRAST Additional Contrast? None    Result Date: 8/17/2022  Impression: 1. Grade 2 anterior subluxation of L5 relative to S1 is new when compared to prior CT of 10/8/2021. Degree of subluxation appears stable when compared to MRI of 8/17/2022. Loss of intervertebral disc height at L5-S1 stable since MRI but advanced since prior CT. 2. Posterior fusion at L5-S1 with bilateral pedicle screws is new since the CT of 2021. Right S1 screw is within the pedicle. Fracture of the right anterior superior endplate of S1. Lucency around the right S1 screw is nonspecific is likely from the fracture however osteomyelitis would be possible. Please correlate with clinical presentation.  Left L5 pedicle screw is outside the osseous on 8/17/2022 5:19 PM    MRI PELVIS WO CONTRAST    Result Date: 8/17/2022  Impression: 1. Findings at L5-S1 were seen on MRI of the lumbar spine performed earlier today and are redemonstrated without significant interval change. Abnormal bone marrow edema seen in the L5 and S1 vertebrae is nonspecific and may be related to postoperative changes, fracture, degenerative disease or infection/osteomyelitis. Today's pelvic CT had demonstrated a probable fracture of the anterior superior right endplate of S1 versus osteomyelitis. 2. L5-S1 mild canal narrowing with moderate to severe bilateral foraminal stenosis is stable when compared to earlier MR This document has been electronically signed by: Destiny Judge MD on 08/17/2022 09:28 PM       DVT prophylaxis:    [x] Lovenox  [] SCDs  [] SQ Heparin  [] Encourage ambulation   [] Already on Anticoagulation       Diet: ADULT DIET; Regular  Code Status: Full Code  PT/OT: We will order after procedure currently unable to work with them due to the severe pain  Tele: Reviewed  IVF: Patient eating and drinking.     Electronically signed by Calli Contreras MD on 8/20/2022 at 3:17 PM

## 2022-08-20 NOTE — PROGRESS NOTES
Department of Orthopedic Surgery  Spine Service  Attending Progress Note        Subjective:    8/20/22 No new worsening/symptoms. Pt did not ambulate at all yesterday, States she will try to walk in halls today. 8/19/22 Pt c/o LBP that radiates into L buttock, posterior thigh with N/T in same distribution. C/o mild N/T in groin area, denies labial or perianal numbness. Denies bowel or urinary incontinence or retention since admission. Vitals  VITALS:  /60   Pulse 85   Temp 98 °F (36.7 °C) (Oral)   Resp 16   Ht 5' 3\" (1.6 m) Comment: per ED note  Wt 203 lb (92.1 kg)   SpO2 93%   BMI 35.96 kg/m²   24HR INTAKE/OUTPUT:    Intake/Output Summary (Last 24 hours) at 8/20/2022 1013  Last data filed at 8/20/2022 0909  Gross per 24 hour   Intake 360 ml   Output --   Net 360 ml       URINARY CATHETER OUTPUT (Bowen):     DRAIN/TUBE OUTPUT:       PHYSICAL EXAM:    Orientation:  alert and oriented to person, place and time    Lower Extremity Motor :  quadriceps, extensor hallucis longus, dorsiflexion, plantarflexion 5/5 bilaterally  Lower Extremity Sensory:  Intact L1-S1, except decreased sensation L posterior thigh/buttock and groin     Flatus:  positive    LABS:    HgB:    Lab Results   Component Value Date/Time    HGB 13.4 08/20/2022 05:48 AM     Hemoglobin/Hematocrit:    Lab Results   Component Value Date/Time    HGB 13.4 08/20/2022 05:48 AM    HCT 41.4 08/20/2022 05:48 AM     BMP:    Lab Results   Component Value Date/Time     08/20/2022 05:48 AM    K 3.8 08/20/2022 05:48 AM     08/20/2022 05:48 AM    CO2 26 08/20/2022 05:48 AM    BUN 12 08/20/2022 05:48 AM    LABALBU 3.6 08/17/2022 02:20 PM    CREATININE 0.7 08/20/2022 05:48 AM    CALCIUM 8.9 08/20/2022 05:48 AM    LABGLOM >90 08/20/2022 05:48 AM    GLUCOSE 88 08/20/2022 05:48 AM    GLUCOSE NEGATIVE 11/19/2011 06:25 PM     Radiology:   CT Lumbar  Impression   Impression:   1.  Grade 2 anterior subluxation of L5 relative to S1 is new when compared to prior CT of 10/8/2021. Degree of subluxation appears stable when   compared to MRI of 8/17/2022. Loss of intervertebral disc height at L5-S1   stable since MRI but advanced since prior CT. 2. Posterior fusion at L5-S1 with bilateral pedicle screws is new since   the CT of 2021. Right S1 screw is within the pedicle. Fracture of the   right anterior superior endplate of S1. Lucency around the right S1 screw   is nonspecific is likely from the fracture however osteomyelitis would be   possible. Please correlate with clinical presentation. Left L5 pedicle   screw is outside the osseous pedicle. MRI Lumbar      Impression       1. The patient has undergone interval surgery with placement of bilateral pedicular screws at L5 and S1 and laminectomy defect at L5.   2. There is 10 mm of anterolisthesis of L5 relative to S1. This results in moderate to severe bilateral foraminal stenosis and mild canal stenosis. 3. There is abnormal signal intensity and enhancement in the S1 vertebral body suspicious for vertebral body osteomyelitis. Please correlate clinically. 4. There is a tiny syrinx in the distal thoracic spinal cord and conus.    5. There is mild canal and mild-to-moderate bilateral foraminal stenosis at L3-4 and L4-5.   6. Otherwise negative MRI scan of the lumbar spine with and without intravenous contrast.     ASSESSMENT AND PLAN:    3 months s/p L5-S1 decompression and fusion with Right S1 fracture causing right S1 screw loosening and lateral displacement of left L5 screw  Intermittent episodes of urinary incontinence and saddle paresthesia    1:  Monitor labs and drain output  2:  Activity Level:  as tolerated, back brace when out of bed   3:  Pain Control:  okay  4:  Discharge Planning:  pending  5:  Plan for surgery Monday afternoon: L5-S1 HW removal with L5-S2 revision fusion and extension     Josué Meneses PA-C

## 2022-08-20 NOTE — PLAN OF CARE
Problem: Discharge Planning  Goal: Discharge to home or other facility with appropriate resources  8/20/2022 0928 by Darvin Maradiaga RN  Outcome: Progressing  8/19/2022 2026 by Gerardo Colin RN  Outcome: Progressing  Flowsheets (Taken 8/19/2022 2009)  Discharge to home or other facility with appropriate resources:   Identify barriers to discharge with patient and caregiver   Arrange for needed discharge resources and transportation as appropriate   Identify discharge learning needs (meds, wound care, etc)  Note: Patient plans to return home when medically stable. Problem: Pain  Goal: Verbalizes/displays adequate comfort level or baseline comfort level  8/20/2022 0928 by Darvin Maradiaga RN  Outcome: Progressing  8/19/2022 2026 by Gerardo Colin RN  Outcome: Progressing  Flowsheets (Taken 8/19/2022 0430 by Devang Alfred RN)  Verbalizes/displays adequate comfort level or baseline comfort level: Encourage patient to monitor pain and request assistance  Note: Patient complaining of pain this shift. Prn pain medication available. Will monitor.       Problem: ABCDS Injury Assessment  Goal: Absence of physical injury  8/20/2022 6521 by Darvin Maradiaga RN  Outcome: Progressing  8/19/2022 2026 by Gerardo Colin RN  Outcome: Progressing  Flowsheets (Taken 8/19/2022 2025)  Absence of Physical Injury: Implement safety measures based on patient assessment     Problem: ABCDS Injury Assessment  Goal: Absence of physical injury  8/20/2022 0928 by Darvin Maradiaga RN  Outcome: Progressing  8/19/2022 2026 by Gerardo Colin RN  Outcome: Progressing  Flowsheets (Taken 8/19/2022 2025)  Absence of Physical Injury: Implement safety measures based on patient assessment     Problem: Pain  Goal: Verbalizes/displays adequate comfort level or baseline comfort level  8/20/2022 0928 by Darvin Maradiaga RN  Outcome: Progressing  8/19/2022 2026 by Gerardo Colin RN  Outcome: Progressing  Flowsheets (Taken 8/19/2022 0430 by Devang Alfred

## 2022-08-21 LAB
ANION GAP SERPL CALCULATED.3IONS-SCNC: 6 MEQ/L (ref 8–16)
BASOPHILS # BLD: 0.8 %
BASOPHILS ABSOLUTE: 0 THOU/MM3 (ref 0–0.1)
BUN BLDV-MCNC: 6 MG/DL (ref 7–22)
CALCIUM SERPL-MCNC: 8.8 MG/DL (ref 8.5–10.5)
CHLORIDE BLD-SCNC: 105 MEQ/L (ref 98–111)
CO2: 27 MEQ/L (ref 23–33)
CREAT SERPL-MCNC: 0.7 MG/DL (ref 0.4–1.2)
EOSINOPHIL # BLD: 6 %
EOSINOPHILS ABSOLUTE: 0.4 THOU/MM3 (ref 0–0.4)
ERYTHROCYTE [DISTWIDTH] IN BLOOD BY AUTOMATED COUNT: 12.8 % (ref 11.5–14.5)
ERYTHROCYTE [DISTWIDTH] IN BLOOD BY AUTOMATED COUNT: 43.8 FL (ref 35–45)
GFR SERPL CREATININE-BSD FRML MDRD: > 90 ML/MIN/1.73M2
GLUCOSE BLD-MCNC: 109 MG/DL (ref 70–108)
HCT VFR BLD CALC: 40.1 % (ref 37–47)
HEMOGLOBIN: 13 GM/DL (ref 12–16)
IMMATURE GRANS (ABS): 0.01 THOU/MM3 (ref 0–0.07)
IMMATURE GRANULOCYTES: 0.2 %
LYMPHOCYTES # BLD: 47.7 %
LYMPHOCYTES ABSOLUTE: 3 THOU/MM3 (ref 1–4.8)
MCH RBC QN AUTO: 30.1 PG (ref 26–33)
MCHC RBC AUTO-ENTMCNC: 32.4 GM/DL (ref 32.2–35.5)
MCV RBC AUTO: 92.8 FL (ref 81–99)
MONOCYTES # BLD: 11.8 %
MONOCYTES ABSOLUTE: 0.7 THOU/MM3 (ref 0.4–1.3)
NUCLEATED RED BLOOD CELLS: 0 /100 WBC
PLATELET # BLD: 210 THOU/MM3 (ref 130–400)
PMV BLD AUTO: 9.1 FL (ref 9.4–12.4)
POTASSIUM REFLEX MAGNESIUM: 4.2 MEQ/L (ref 3.5–5.2)
RBC # BLD: 4.32 MILL/MM3 (ref 4.2–5.4)
SEG NEUTROPHILS: 33.5 %
SEGMENTED NEUTROPHILS ABSOLUTE COUNT: 2.1 THOU/MM3 (ref 1.8–7.7)
SODIUM BLD-SCNC: 138 MEQ/L (ref 135–145)
WBC # BLD: 6.2 THOU/MM3 (ref 4.8–10.8)

## 2022-08-21 PROCEDURE — 36415 COLL VENOUS BLD VENIPUNCTURE: CPT

## 2022-08-21 PROCEDURE — 85025 COMPLETE CBC W/AUTO DIFF WBC: CPT

## 2022-08-21 PROCEDURE — 6370000000 HC RX 637 (ALT 250 FOR IP)

## 2022-08-21 PROCEDURE — 2580000003 HC RX 258: Performed by: PHYSICIAN ASSISTANT

## 2022-08-21 PROCEDURE — 80048 BASIC METABOLIC PNL TOTAL CA: CPT

## 2022-08-21 PROCEDURE — 99231 SBSQ HOSP IP/OBS SF/LOW 25: CPT | Performed by: STUDENT IN AN ORGANIZED HEALTH CARE EDUCATION/TRAINING PROGRAM

## 2022-08-21 PROCEDURE — 1200000000 HC SEMI PRIVATE

## 2022-08-21 PROCEDURE — 6370000000 HC RX 637 (ALT 250 FOR IP): Performed by: PHYSICIAN ASSISTANT

## 2022-08-21 PROCEDURE — 6370000000 HC RX 637 (ALT 250 FOR IP): Performed by: STUDENT IN AN ORGANIZED HEALTH CARE EDUCATION/TRAINING PROGRAM

## 2022-08-21 RX ORDER — POLYETHYLENE GLYCOL 3350 17 G/17G
17 POWDER, FOR SOLUTION ORAL DAILY
Status: DISCONTINUED | OUTPATIENT
Start: 2022-08-21 | End: 2022-08-24

## 2022-08-21 RX ADMIN — OXYCODONE AND ACETAMINOPHEN 1 TABLET: 5; 325 TABLET ORAL at 16:48

## 2022-08-21 RX ADMIN — SODIUM CHLORIDE, PRESERVATIVE FREE 10 ML: 5 INJECTION INTRAVENOUS at 19:55

## 2022-08-21 RX ADMIN — OXYCODONE AND ACETAMINOPHEN 1 TABLET: 5; 325 TABLET ORAL at 02:18

## 2022-08-21 RX ADMIN — VENLAFAXINE HYDROCHLORIDE 75 MG: 75 CAPSULE, EXTENDED RELEASE ORAL at 09:24

## 2022-08-21 RX ADMIN — BISACODYL 5 MG: 5 TABLET, COATED ORAL at 09:25

## 2022-08-21 RX ADMIN — FOLIC ACID 1 MG: 1 TABLET ORAL at 09:24

## 2022-08-21 RX ADMIN — OXYCODONE AND ACETAMINOPHEN 1 TABLET: 5; 325 TABLET ORAL at 09:24

## 2022-08-21 RX ADMIN — POLYETHYLENE GLYCOL 3350 17 G: 17 POWDER, FOR SOLUTION ORAL at 09:27

## 2022-08-21 RX ADMIN — Medication 100 MG: at 09:24

## 2022-08-21 RX ADMIN — OXYCODONE AND ACETAMINOPHEN 1 TABLET: 5; 325 TABLET ORAL at 21:20

## 2022-08-21 RX ADMIN — SODIUM CHLORIDE, PRESERVATIVE FREE 10 ML: 5 INJECTION INTRAVENOUS at 09:24

## 2022-08-21 RX ADMIN — ONDANSETRON 4 MG: 4 TABLET, ORALLY DISINTEGRATING ORAL at 16:14

## 2022-08-21 ASSESSMENT — PAIN SCALES - GENERAL
PAINLEVEL_OUTOF10: 9
PAINLEVEL_OUTOF10: 8
PAINLEVEL_OUTOF10: 8
PAINLEVEL_OUTOF10: 7

## 2022-08-21 ASSESSMENT — PAIN DESCRIPTION - LOCATION
LOCATION: BACK;BUTTOCKS
LOCATION: BACK

## 2022-08-21 ASSESSMENT — PAIN DESCRIPTION - DESCRIPTORS
DESCRIPTORS: NUMBNESS
DESCRIPTORS: ACHING

## 2022-08-21 ASSESSMENT — PAIN DESCRIPTION - ORIENTATION
ORIENTATION: LOWER

## 2022-08-21 ASSESSMENT — PAIN - FUNCTIONAL ASSESSMENT: PAIN_FUNCTIONAL_ASSESSMENT: PREVENTS OR INTERFERES SOME ACTIVE ACTIVITIES AND ADLS

## 2022-08-21 NOTE — FLOWSHEET NOTE
08/21/22 0958   Safe Environment   Safety Measures Other (comment)  (Virtual safety check complete)   Patient resting in bed. Encouraged mobility and discussed plans to get up and walk in brace with assistance this afternoon. Denies further needs at this time. Call light in reach. No safety concerns.

## 2022-08-21 NOTE — PROGRESS NOTES
Department of Orthopedic Surgery  Spine Service  Attending Progress Note        Subjective:    8/21/22 No new/worsening symptoms. Pt did not ambulate yesterday. C/o constipation, likely 2/2 pain meds. 8/20/22 No new worsening/symptoms. Pt did not ambulate at all yesterday, States she will try to walk in halls today. 8/19/22 Pt c/o LBP that radiates into L buttock, posterior thigh with N/T in same distribution. C/o mild N/T in groin area, denies labial or perianal numbness. Denies bowel or urinary incontinence or retention since admission.      Vitals  VITALS:  /78   Pulse 84   Temp 98.6 °F (37 °C) (Oral)   Resp 16   Ht 5' 3\" (1.6 m) Comment: per ED note  Wt 181 lb 1.6 oz (82.1 kg)   SpO2 97%   BMI 32.08 kg/m²   24HR INTAKE/OUTPUT:    Intake/Output Summary (Last 24 hours) at 8/21/2022 0739  Last data filed at 8/20/2022 2328  Gross per 24 hour   Intake 780 ml   Output 0 ml   Net 780 ml       URINARY CATHETER OUTPUT (Bowen):     DRAIN/TUBE OUTPUT:       PHYSICAL EXAM:    Orientation:  alert and oriented to person, place and time    Lower Extremity Motor :  quadriceps, extensor hallucis longus, dorsiflexion, plantarflexion 5/5 bilaterally  Lower Extremity Sensory:  Intact L1-S1, except decreased sensation L posterior thigh/buttock and groin     Flatus:  positive    LABS:    HgB:    Lab Results   Component Value Date/Time    HGB 13.0 08/21/2022 05:52 AM     Hemoglobin/Hematocrit:    Lab Results   Component Value Date/Time    HGB 13.0 08/21/2022 05:52 AM    HCT 40.1 08/21/2022 05:52 AM     BMP:    Lab Results   Component Value Date/Time     08/21/2022 05:52 AM    K 4.2 08/21/2022 05:52 AM     08/21/2022 05:52 AM    CO2 27 08/21/2022 05:52 AM    BUN 6 08/21/2022 05:52 AM    LABALBU 3.6 08/17/2022 02:20 PM    CREATININE 0.7 08/21/2022 05:52 AM    CALCIUM 8.8 08/21/2022 05:52 AM    LABGLOM >90 08/21/2022 05:52 AM    GLUCOSE 109 08/21/2022 05:52 AM    GLUCOSE NEGATIVE 11/19/2011 06:25 PM Radiology:   CT Lumbar  Impression   Impression:   1. Grade 2 anterior subluxation of L5 relative to S1 is new when compared   to prior CT of 10/8/2021. Degree of subluxation appears stable when   compared to MRI of 8/17/2022. Loss of intervertebral disc height at L5-S1   stable since MRI but advanced since prior CT. 2. Posterior fusion at L5-S1 with bilateral pedicle screws is new since   the CT of 2021. Right S1 screw is within the pedicle. Fracture of the   right anterior superior endplate of S1. Lucency around the right S1 screw   is nonspecific is likely from the fracture however osteomyelitis would be   possible. Please correlate with clinical presentation. Left L5 pedicle   screw is outside the osseous pedicle. MRI Lumbar      Impression       1. The patient has undergone interval surgery with placement of bilateral pedicular screws at L5 and S1 and laminectomy defect at L5.   2. There is 10 mm of anterolisthesis of L5 relative to S1. This results in moderate to severe bilateral foraminal stenosis and mild canal stenosis. 3. There is abnormal signal intensity and enhancement in the S1 vertebral body suspicious for vertebral body osteomyelitis. Please correlate clinically. 4. There is a tiny syrinx in the distal thoracic spinal cord and conus.    5. There is mild canal and mild-to-moderate bilateral foraminal stenosis at L3-4 and L4-5.   6. Otherwise negative MRI scan of the lumbar spine with and without intravenous contrast.     ASSESSMENT AND PLAN:    3 months s/p L5-S1 decompression and fusion with Right S1 fracture causing right S1 screw loosening and lateral displacement of left L5 screw  Intermittent episodes of urinary incontinence and saddle paresthesia    1:  Monitor labs and drain output  2:  Activity Level:  as tolerated, back brace when out of bed   3:  Pain Control:  okay  4:  Discharge Planning:  pending  5:  Plan for surgery Monday afternoon: L5-S1 HW removal with L5-S1 revision fusion and fusion extension to S2  6:  NPO midnight. Hold lovenox.      Consuela Lanes, PA-C

## 2022-08-21 NOTE — PLAN OF CARE
Problem: Discharge Planning  Goal: Discharge to home or other facility with appropriate resources  8/20/2022 2247 by Mike Moreno RN  Outcome: Progressing  Flowsheets (Taken 8/20/2022 2030)  Discharge to home or other facility with appropriate resources:   Identify barriers to discharge with patient and caregiver   Arrange for needed discharge resources and transportation as appropriate   Identify discharge learning needs (meds, wound care, etc)  Note: Patient plans to return home when medically stable. Problem: Pain  Goal: Verbalizes/displays adequate comfort level or baseline comfort level  8/20/2022 2247 by Mike Moreno RN  Outcome: Progressing  Flowsheets (Taken 8/19/2022 0430 by Adeline Faye RN)  Verbalizes/displays adequate comfort level or baseline comfort level: Encourage patient to monitor pain and request assistance  Note: Patient complaining of pain this shift. Prn pain medication available. Will monitor. Problem: ABCDS Injury Assessment  Goal: Absence of physical injury  8/20/2022 2247 by Mike Moreno RN  Outcome: Progressing  Flowsheets (Taken 8/19/2022 2025)  Absence of Physical Injury: Implement safety measures based on patient assessment     Problem: Safety - Adult  Goal: Free from fall injury  8/20/2022 2247 by Mike Moreno RN  Outcome: Progressing  Flowsheets (Taken 8/19/2022 2025)  Free From Fall Injury: Instruct family/caregiver on patient safety  Note: No falls noted this shift. Continue falling star program. Bed alarm on, bed in low position. Call light and personal belongings in reach. Patient uses call light appropriately.        Problem: Chronic Conditions and Co-morbidities  Goal: Patient's chronic conditions and co-morbidity symptoms are monitored and maintained or improved  8/20/2022 2247 by Mike Moreno RN  Outcome: Progressing  Flowsheets (Taken 8/20/2022 2030)  Care Plan - Patient's Chronic Conditions and Co-Morbidity Symptoms are Monitored and Maintained or Improved:   Monitor and assess patient's chronic conditions and comorbid symptoms for stability, deterioration, or improvement   Collaborate with multidisciplinary team to address chronic and comorbid conditions and prevent exacerbation or deterioration   Update acute care plan with appropriate goals if chronic or comorbid symptoms are exacerbated and prevent overall improvement and discharge     Problem: Skin/Tissue Integrity  Goal: Absence of new skin breakdown  Description: 1. Monitor for areas of redness and/or skin breakdown  2. Assess vascular access sites hourly  3. Every 4-6 hours minimum:  Change oxygen saturation probe site  4. Every 4-6 hours:  If on nasal continuous positive airway pressure, respiratory therapy assess nares and determine need for appliance change or resting period. 8/20/2022 2247 by Jake Moss RN  Outcome: Progressing  Note: No skin break down noted at this time. Encouraged patient to reposition self in bed. Care plan reviewed with patient. Patient verbalizes understanding of plan of care and contributes to goal setting.

## 2022-08-21 NOTE — PROGRESS NOTES
Hospitalist Progress Note    Patient:  Chad Coronado    YOB: 1980  Unit/Bed:6K-12/012-A  MRN: [id number]    Acct: [de-identified]   PCP: RAMÓN Martinez CNP    Date of Admission: 8/17/2022      Assessment/Plan:  Postop complication status post L5-S1 decompression and fusion- fracture of right anterior superior endplate of S1 with a screw losing. Plan for surgery tomorrow afternoon. L5-S1 hardware removal with L5 S2 revision fusion and extension. NPO at midnight. Hold Lovenox. Concern for osteomyelitis of S1 vertebral body. Inflammatory markers negative. Likely secondary to postop changes. Continue to monitor. Keep off of antibiotics. Appreciate ID assistance. Will need cultures from tissue sample at the time of surgery on Monday. Alcohol use disorder. Continue thiamine folate. Check electrolytes. Watch for refeeding syndrome. History of major depressive disorder. Resume home medications. Denied suicidal ideation. Appreciate psychiatry recommendations. History of hypothyroidism. Currently off of Synthroid TSH within normal limits. Outpatient follow-up        Expected discharge date:  8/22/2022    Disposition: depend on the clinical course  [] Home  [] TCU  [x] Rehab  [] Psych  [x] SNF  [] [facility]  [] Other-    ===================================================================      Chief Complaint: lower back pain. Hospital Course: 41-year-old lady with past medical history of alcohol abuse disorder, hypothyroidism, chronic back pain issues came to the hospital complaining of worsening of lower back pain associated with saddle anesthesia and 2 times involuntary loss of urine. Of note patient underwent L5-S1 decompression and fusion with transforaminal lumbar interbody fusion secondary to spondylolisthesis associated with neurogenic claudication.   According to the patient the pain get worse when she lays down it gets better when she leans forward. Patient was able to walk after the surgery but lately she has been having excruciating pain. Patient said that for last few days she has been having paresthesias numbness and tingling associated with involuntary movements of toes on left side. Imaging of the lumbar spine was done which showed abnormal signal intensity and enhancement in the S1 vertebral body suspicious for vertebral body osteomyelitis. CT scan showed grade 2 anterior subluxation of L5 relative to S1 with possible fracture versus osteomyelitis. Patient was evaluated by orthopedic surgery as fracture of right anterior superior endplate of S1 with right S1 screw loosening. Left L5 screws outside of the pedicle. Vitals in /101 with pulse 108, saturating well on room air with respiratory rate 18. Lab work done in ED showed sodium 139, potassium 4.1, CRP less than 0.3, troponin less than 0.01, albumin 3.6, drug tox negative, , WBC 8, hemoglobin 13.2, UA negative. 8/19  No events, awaiting surgery on Monday 8/20  No events    Subjective (past 24 hours):   Patient was seen and examined  No overnight events  Sleeping comfortably  Denied any new complaints. Has mild discomfort on the right side of the back. ROS: reviewed complete ROS unchanged unless otherwise stated in hospital course/subjective portion.        Medications:  Reviewed    Infusion Medications    sodium chloride       Scheduled Medications    bisacodyl  5 mg Oral Daily    polyethylene glycol  17 g Oral Daily    enoxaparin  40 mg SubCUTAneous Q24H    thiamine  100 mg Oral Daily    folic acid  1 mg Oral Daily    venlafaxine  75 mg Oral Daily    sodium chloride flush  10 mL IntraVENous 2 times per day     PRN Meds: oxyCODONE-acetaminophen, traZODone, hydrOXYzine pamoate, sodium chloride flush, sodium chloride, ondansetron **OR** ondansetron, acetaminophen **OR** acetaminophen, potassium chloride **OR** potassium alternative oral replacement **OR** potassium chloride, magnesium sulfate        Intake/Output Summary (Last 24 hours) at 8/21/2022 1332  Last data filed at 8/20/2022 2328  Gross per 24 hour   Intake 440 ml   Output 0 ml   Net 440 ml         Exam:  /77   Pulse 84   Temp 98 °F (36.7 °C) (Oral)   Resp 16   Ht 5' 3\" (1.6 m) Comment: per ED note  Wt 181 lb 1.6 oz (82.1 kg)   SpO2 98%   BMI 32.08 kg/m²     General: No distress, appears stated age. Eyes:  PERRL. Conjunctivae/corneas clear. HENT: Head normal appearing. Nares normal. Oral mucosa moist.  Hearing intact. Neck: Supple, with full range of motion. Trachea midline. No gross JVD appreciated. Respiratory:  Normal effort. Clear to auscultation, without rales or wheezes or rhonchi. Cardiovascular: Normal rate, regular rhythm with normal S1/S2 without murmurs. No lower extremity edema. Abdomen: Soft, non-tender, non-distended with normal bowel sounds. Musculoskeletal: Tone muscle and strength equal bilaterally no abnormal movements noted. Skin: Warm and dry. No rashes or lesions. Neurologic: Bilateral lower extremity weakness, dorsiflexion is weak on the left side. Sensation intact. Psychiatric: Alert and oriented, normal insight and thought content. Capillary Refill: Brisk,< 3 seconds. Peripheral Pulses: +2 palpable, equal bilaterally. Labs:   Recent Labs     08/19/22  0348 08/20/22  0548 08/21/22  0552   WBC 6.3 5.9 6.2   HGB 13.1 13.4 13.0   HCT 40.0 41.4 40.1    237 210       Recent Labs     08/19/22  0348 08/20/22  0548 08/21/22  0552    139 138   K 4.1 3.8 4.2    104 105   CO2 24 26 27   BUN 12 12 6*   CREATININE 0.6 0.7 0.7   CALCIUM 8.7 8.9 8.8       No results for input(s): AST, ALT, BILIDIR, BILITOT, ALKPHOS in the last 72 hours. No results for input(s): INR in the last 72 hours. No results for input(s): [place] in the last 72 hours. No results for input(s): PROCAL in the last 72 hours.      Lab Results   Component Value Date/Time    NITRU NEGATIVE 08/17/2022 02:20 PM    WBCUA 5-9 08/17/2022 02:20 PM    BACTERIA FEW 08/17/2022 02:20 PM    RBCUA 0-2 08/17/2022 02:20 PM    BLOODU NEGATIVE 08/17/2022 02:20 PM    SPECGRAV 1.016 08/17/2022 02:20 PM    GLUCOSEU NEGATIVE 10/16/2021 05:00 PM       Radiology (48 hours):  CT Lumbar Spine WO Contrast    Result Date: 8/17/2022  Impression: 1. Grade 2 anterior subluxation of L5 relative to S1 is new when compared to prior CT of 10/8/2021. Degree of subluxation appears stable when compared to MRI of 8/17/2022. Loss of intervertebral disc height at L5-S1 stable since MRI but advanced since prior CT. 2. Posterior fusion at L5-S1 with bilateral pedicle screws is new since the CT of 2021. Right S1 screw is within the pedicle. Fracture of the right anterior superior endplate of S1. Lucency around the right S1 screw is nonspecific is likely from the fracture however osteomyelitis is not excluded. Please correlate with clinical presentation. Left L5 pedicle screw is outside the osseous pedicle. This document has been electronically signed by: Dakotah Reyes MD on 08/17/2022 08:38 PM All CTs at this facility use dose modulation techniques and iterative reconstructions, and/or weight-based dosing when appropriate to reduce radiation to a low as reasonably achievable. CT PELVIS WO CONTRAST Additional Contrast? None    Result Date: 8/17/2022  Impression: 1. Grade 2 anterior subluxation of L5 relative to S1 is new when compared to prior CT of 10/8/2021. Degree of subluxation appears stable when compared to MRI of 8/17/2022. Loss of intervertebral disc height at L5-S1 stable since MRI but advanced since prior CT. 2. Posterior fusion at L5-S1 with bilateral pedicle screws is new since the CT of 2021. Right S1 screw is within the pedicle. Fracture of the right anterior superior endplate of S1. Lucency around the right S1 screw is nonspecific is likely from the fracture however osteomyelitis would be possible.  Please correlate with clinical presentation. Left L5 pedicle screw is outside the osseous pedicle. This document has been electronically signed by: Vernon Prado MD on 08/17/2022 08:36 PM All CTs at this facility use dose modulation techniques and iterative reconstructions, and/or weight-based dosing when appropriate to reduce radiation to a low as reasonably achievable. MRI CERVICAL SPINE WO CONTRAST    Result Date: 8/17/2022  Impression: 1. No evidence for discitis or osteomyelitis. 2. Multilevel degenerative disease of the cervical spine most prominent at C5-6 and C6-7 where there is moderate to severe canal stenosis This document has been electronically signed by: Vernon Prado MD on 08/17/2022 09:17 PM    MRI THORACIC SPINE WO CONTRAST    Result Date: 8/17/2022  Impression: 1. No thoracic spine fracture seen. No evidence for osteomyelitis/discitis 2. Minimal degenerative disease as above. 3. Prominent central canal/minimal syrinx seen in the distal spinal cord at T11. This document has been electronically signed by: Vernon Prado MD on 08/17/2022 09:31 PM    MRI LUMBAR SPINE W WO CONTRAST    Result Date: 8/17/2022   1. The patient has undergone interval surgery with placement of bilateral pedicular screws at L5 and S1 and laminectomy defect at L5. 2. There is 10 mm of anterolisthesis of L5 relative to S1. This results in moderate to severe bilateral foraminal stenosis and mild canal stenosis. 3. There is abnormal signal intensity and enhancement in the S1 vertebral body suspicious for vertebral body osteomyelitis. Please correlate clinically. 4. There is a tiny syrinx in the distal thoracic spinal cord and conus. 5. There is mild canal and mild-to-moderate bilateral foraminal stenosis at L3-4 and L4-5. 6. Otherwise negative MRI scan of the lumbar spine with and without intravenous contrast. **This report has been created using voice recognition software.  It may contain minor errors which are inherent in

## 2022-08-22 ENCOUNTER — ANESTHESIA EVENT (OUTPATIENT)
Dept: OPERATING ROOM | Age: 42
DRG: 460 | End: 2022-08-22
Payer: MEDICARE

## 2022-08-22 LAB
BLOOD CULTURE, ROUTINE: NORMAL
BLOOD CULTURE, ROUTINE: NORMAL

## 2022-08-22 PROCEDURE — 2580000003 HC RX 258: Performed by: PHYSICIAN ASSISTANT

## 2022-08-22 PROCEDURE — 6370000000 HC RX 637 (ALT 250 FOR IP): Performed by: STUDENT IN AN ORGANIZED HEALTH CARE EDUCATION/TRAINING PROGRAM

## 2022-08-22 PROCEDURE — 1200000000 HC SEMI PRIVATE

## 2022-08-22 PROCEDURE — 6370000000 HC RX 637 (ALT 250 FOR IP)

## 2022-08-22 PROCEDURE — 6370000000 HC RX 637 (ALT 250 FOR IP): Performed by: PHYSICIAN ASSISTANT

## 2022-08-22 PROCEDURE — 99232 SBSQ HOSP IP/OBS MODERATE 35: CPT | Performed by: STUDENT IN AN ORGANIZED HEALTH CARE EDUCATION/TRAINING PROGRAM

## 2022-08-22 RX ADMIN — Medication 100 MG: at 07:59

## 2022-08-22 RX ADMIN — OXYCODONE AND ACETAMINOPHEN 1 TABLET: 5; 325 TABLET ORAL at 13:13

## 2022-08-22 RX ADMIN — BISACODYL 5 MG: 5 TABLET, COATED ORAL at 07:59

## 2022-08-22 RX ADMIN — VENLAFAXINE HYDROCHLORIDE 75 MG: 75 CAPSULE, EXTENDED RELEASE ORAL at 07:59

## 2022-08-22 RX ADMIN — OXYCODONE AND ACETAMINOPHEN 1 TABLET: 5; 325 TABLET ORAL at 08:12

## 2022-08-22 RX ADMIN — SODIUM CHLORIDE, PRESERVATIVE FREE 10 ML: 5 INJECTION INTRAVENOUS at 07:59

## 2022-08-22 RX ADMIN — OXYCODONE AND ACETAMINOPHEN 1 TABLET: 5; 325 TABLET ORAL at 17:44

## 2022-08-22 RX ADMIN — FOLIC ACID 1 MG: 1 TABLET ORAL at 07:59

## 2022-08-22 RX ADMIN — POLYETHYLENE GLYCOL 3350 17 G: 17 POWDER, FOR SOLUTION ORAL at 16:11

## 2022-08-22 RX ADMIN — OXYCODONE AND ACETAMINOPHEN 1 TABLET: 5; 325 TABLET ORAL at 01:30

## 2022-08-22 RX ADMIN — OXYCODONE AND ACETAMINOPHEN 1 TABLET: 5; 325 TABLET ORAL at 21:49

## 2022-08-22 RX ADMIN — SODIUM CHLORIDE, PRESERVATIVE FREE 10 ML: 5 INJECTION INTRAVENOUS at 19:44

## 2022-08-22 ASSESSMENT — PAIN DESCRIPTION - DESCRIPTORS
DESCRIPTORS: SQUEEZING;TIGHTNESS
DESCRIPTORS: SQUEEZING
DESCRIPTORS: ACHING
DESCRIPTORS: ACHING
DESCRIPTORS: ACHING;DISCOMFORT

## 2022-08-22 ASSESSMENT — PAIN SCALES - GENERAL
PAINLEVEL_OUTOF10: 8
PAINLEVEL_OUTOF10: 6
PAINLEVEL_OUTOF10: 7
PAINLEVEL_OUTOF10: 8
PAINLEVEL_OUTOF10: 7

## 2022-08-22 ASSESSMENT — PAIN DESCRIPTION - LOCATION
LOCATION: BACK

## 2022-08-22 ASSESSMENT — PAIN DESCRIPTION - ORIENTATION
ORIENTATION: LOWER

## 2022-08-22 NOTE — PLAN OF CARE
Problem: Discharge Planning  Goal: Discharge to home or other facility with appropriate resources  8/22/2022 1017 by Renzo Luther RN  Outcome: Progressing  Patient plans to be discharged home with support when medically stable. Problem: Pain  Goal: Verbalizes/displays adequate comfort level or baseline comfort level  8/22/2022 1017 by Renzo Luther RN  Outcome: Progressing  Patient complains of pain in her lower back throughout the shift. Pain rated on the 0-10 pain scale. PRN medications given per MAR. Pain goal 2/10. Will continue to assess. Problem: ABCDS Injury Assessment  Goal: Absence of physical injury  8/22/2022 1017 by Renzo Luther RN  Outcome: Progressing    Problem: Safety - Adult  Goal: Free from fall injury  8/22/2022 1017 by Renzo Luther RN  Outcome: Progressing  Call light within reach. Side rails up x2. Bed alarm on. Non skid slippers available. Problem: Chronic Conditions and Co-morbidities  Goal: Patient's chronic conditions and co-morbidity symptoms are monitored and maintained or improved  8/22/2022 1017 by Renzo Luther RN  Outcome: Progressing    Problem: Skin/Tissue Integrity  Goal: Absence of new skin breakdown  Description: 1. Monitor for areas of redness and/or skin breakdown  2. Assess vascular access sites hourly  3. Every 4-6 hours minimum:  Change oxygen saturation probe site  4. Every 4-6 hours:  If on nasal continuous positive airway pressure, respiratory therapy assess nares and determine need for appliance change or resting period. 8/22/2022 1017 by Renzo Luther RN  Outcome: Progressing  Patient free from new skin breakdown. Patient turns self and makes frequent positional changes. Will continue to monitor. Care plan reviewed with patient and family. Patient and family verbalize understanding of the plan of care and contribute to goal setting.

## 2022-08-22 NOTE — PROGRESS NOTES
Hospitalist Progress Note    Patient:  Jacqueline You    YOB: 1980  Unit/Bed:6K-12/012-A  MRN: [id number]    Acct: [de-identified]   PCP: RAMÓN Tinoco CNP    Date of Admission: 8/17/2022      Assessment/Plan:  Postop complication status post L5-S1 decompression and fusion- fracture of right anterior superior endplate of S1 with a screw losing. Plan for surgery tomorrow morning. L5-S1 hardware removal with L5 S2 revision fusion and extension. NPO at midnight. Keep lovenox off, work with PT OT, put EPC cuff. Concern for osteomyelitis of S1 vertebral body. Inflammatory markers negative. Likely secondary to postop changes. Continue to monitor. Keep off of antibiotics. Appreciate ID assistance. Will need cultures from tissue sample at the time of surgery on Monday. Alcohol use disorder. Continue thiamine folate. Check electrolytes. History of major depressive disorder. Resumed home medications. Denied suicidal ideation. History of hypothyroidism. Currently off of Synthroid TSH within normal limits. Outpatient follow-up        Expected discharge date:  8/22/2022    Disposition: depend on the clinical course  [] Home  [] TCU  [x] Rehab  [] Psych  [x] SNF  [] [facility]  [] Other-    ===================================================================      Chief Complaint: lower back pain. Hospital Course: 22-year-old lady with past medical history of alcohol abuse disorder, hypothyroidism, chronic back pain issues came to the hospital complaining of worsening of lower back pain associated with saddle anesthesia and 2 times involuntary loss of urine. Of note patient underwent L5-S1 decompression and fusion with transforaminal lumbar interbody fusion secondary to spondylolisthesis associated with neurogenic claudication. According to the patient the pain get worse when she lays down it gets better when she leans forward.   Patient was able to walk after the surgery but lately she has been having excruciating pain. Patient said that for last few days she has been having paresthesias numbness and tingling associated with involuntary movements of toes on left side. Imaging of the lumbar spine was done which showed abnormal signal intensity and enhancement in the S1 vertebral body suspicious for vertebral body osteomyelitis. CT scan showed grade 2 anterior subluxation of L5 relative to S1 with possible fracture versus osteomyelitis. Patient was evaluated by orthopedic surgery as fracture of right anterior superior endplate of S1 with right S1 screw loosening. Left L5 screws outside of the pedicle. Vitals in /101 with pulse 108, saturating well on room air with respiratory rate 18. Lab work done in ED showed sodium 139, potassium 4.1, CRP less than 0.3, troponin less than 0.01, albumin 3.6, drug tox negative, , WBC 8, hemoglobin 13.2, UA negative. Subjective (past 24 hours):   Patient was seen and examined  No overnight events  Working with PT/OT  Back pain is better. Denied any new complaints. Eating and drinking well. Most recent lab Na 138, K 4.2, WBC 6.2, Hgb 13      ROS: reviewed complete ROS unchanged unless otherwise stated in hospital course/subjective portion.        Medications:  Reviewed    Infusion Medications    sodium chloride       Scheduled Medications    ceFAZolin  2,000 mg IntraVENous 60 Min Pre-Op    bisacodyl  5 mg Oral Daily    polyethylene glycol  17 g Oral Daily    thiamine  100 mg Oral Daily    folic acid  1 mg Oral Daily    venlafaxine  75 mg Oral Daily    sodium chloride flush  10 mL IntraVENous 2 times per day     PRN Meds: oxyCODONE-acetaminophen, traZODone, hydrOXYzine pamoate, sodium chloride flush, sodium chloride, ondansetron **OR** ondansetron, acetaminophen **OR** acetaminophen, potassium chloride **OR** potassium alternative oral replacement **OR** potassium chloride, magnesium sulfate        Intake/Output Summary (Last 24 hours) at 8/22/2022 1442  Last data filed at 8/21/2022 1955  Gross per 24 hour   Intake 10 ml   Output --   Net 10 ml         Exam:  /62   Pulse 83   Temp 97.9 °F (36.6 °C) (Oral)   Resp 18   Ht 5' 3\" (1.6 m) Comment: per ED note  Wt 181 lb 1.6 oz (82.1 kg)   SpO2 97%   BMI 32.08 kg/m²     General: No distress, appears stated age. Eyes:  PERRL. Conjunctivae/corneas clear. HENT: Head normal appearing. Nares normal. Oral mucosa moist.  Hearing intact. Neck: Supple, with full range of motion. Trachea midline. No gross JVD appreciated. Respiratory:  Normal effort. Clear to auscultation, without rales or wheezes or rhonchi. Cardiovascular: Normal rate, regular rhythm with normal S1/S2 without murmurs. No lower extremity edema. Abdomen: Soft, non-tender, non-distended with normal bowel sounds. Musculoskeletal: Tone muscle and strength equal bilaterally no abnormal movements noted. Skin: Warm and dry. No rashes or lesions. Neurologic: Bilateral lower extremity weakness, dorsiflexion is weak on the left side. Sensation intact. Psychiatric: Alert and oriented, normal insight and thought content. Capillary Refill: Brisk,< 3 seconds. Peripheral Pulses: +2 palpable, equal bilaterally. Labs:   Recent Labs     08/20/22  0548 08/21/22  0552   WBC 5.9 6.2   HGB 13.4 13.0   HCT 41.4 40.1    210       Recent Labs     08/20/22  0548 08/21/22  0552    138   K 3.8 4.2    105   CO2 26 27   BUN 12 6*   CREATININE 0.7 0.7   CALCIUM 8.9 8.8       No results for input(s): AST, ALT, BILIDIR, BILITOT, ALKPHOS in the last 72 hours. No results for input(s): INR in the last 72 hours. No results for input(s): Patrisha Meigs in the last 72 hours. No results for input(s): PROCAL in the last 72 hours.      Lab Results   Component Value Date/Time    NITRU NEGATIVE 08/17/2022 02:20 PM    [address] 08/17/2022 02:20 PM    BACTERIA FEW 08/17/2022 02:20 PM    RBCUA 0-2 08/17/2022 02:20 PM    BLOODU NEGATIVE 08/17/2022 02:20 PM    SPECGRAV 1.016 08/17/2022 02:20 PM    GLUCOSEU NEGATIVE 10/16/2021 05:00 PM       Radiology (48 hours):  CT Lumbar Spine WO Contrast    Result Date: 8/17/2022  Impression: 1. Grade 2 anterior subluxation of L5 relative to S1 is new when compared to prior CT of 10/8/2021. Degree of subluxation appears stable when compared to MRI of 8/17/2022. Loss of intervertebral disc height at L5-S1 stable since MRI but advanced since prior CT. 2. Posterior fusion at L5-S1 with bilateral pedicle screws is new since the CT of 2021. Right S1 screw is within the pedicle. Fracture of the right anterior superior endplate of S1. Lucency around the right S1 screw is nonspecific is likely from the fracture however osteomyelitis is not excluded. Please correlate with clinical presentation. Left L5 pedicle screw is outside the osseous pedicle. This document has been electronically signed by: Vernon Prado MD on 08/17/2022 08:38 PM All CTs at this facility use dose modulation techniques and iterative reconstructions, and/or weight-based dosing when appropriate to reduce radiation to a low as reasonably achievable. CT PELVIS WO CONTRAST Additional Contrast? None    Result Date: 8/17/2022  Impression: 1. Grade 2 anterior subluxation of L5 relative to S1 is new when compared to prior CT of 10/8/2021. Degree of subluxation appears stable when compared to MRI of 8/17/2022. Loss of intervertebral disc height at L5-S1 stable since MRI but advanced since prior CT. 2. Posterior fusion at L5-S1 with bilateral pedicle screws is new since the CT of 2021. Right S1 screw is within the pedicle. Fracture of the right anterior superior endplate of S1. Lucency around the right S1 screw is nonspecific is likely from the fracture however osteomyelitis would be possible. Please correlate with clinical presentation. Left L5 pedicle screw is outside the osseous pedicle.  This document has been electronically signed by: Regino Kamara MD on 08/17/2022 08:36 PM All CTs at this facility use dose modulation techniques and iterative reconstructions, and/or weight-based dosing when appropriate to reduce radiation to a low as reasonably achievable. MRI CERVICAL SPINE WO CONTRAST    Result Date: 8/17/2022  Impression: 1. No evidence for discitis or osteomyelitis. 2. Multilevel degenerative disease of the cervical spine most prominent at C5-6 and C6-7 where there is moderate to severe canal stenosis This document has been electronically signed by: Regino Kamara MD on 08/17/2022 09:17 PM    MRI THORACIC SPINE WO CONTRAST    Result Date: 8/17/2022  Impression: 1. No thoracic spine fracture seen. No evidence for osteomyelitis/discitis 2. Minimal degenerative disease as above. 3. Prominent central canal/minimal syrinx seen in the distal spinal cord at T11. This document has been electronically signed by: Regino Kamara MD on 08/17/2022 09:31 PM    MRI LUMBAR SPINE W WO CONTRAST    Result Date: 8/17/2022   1. The patient has undergone interval surgery with placement of bilateral pedicular screws at L5 and S1 and laminectomy defect at L5. 2. There is 10 mm of anterolisthesis of L5 relative to S1. This results in moderate to severe bilateral foraminal stenosis and mild canal stenosis. 3. There is abnormal signal intensity and enhancement in the S1 vertebral body suspicious for vertebral body osteomyelitis. Please correlate clinically. 4. There is a tiny syrinx in the distal thoracic spinal cord and conus. 5. There is mild canal and mild-to-moderate bilateral foraminal stenosis at L3-4 and L4-5. 6. Otherwise negative MRI scan of the lumbar spine with and without intravenous contrast. **This report has been created using voice recognition software. It may contain minor errors which are inherent in voice recognition technology. ** Final report electronically signed by DR Graham Santana on 8/17/2022 5:19 PM    MRI PELVIS WO

## 2022-08-22 NOTE — PLAN OF CARE
Problem: Discharge Planning  Goal: Discharge to home or other facility with appropriate resources  Outcome: Progressing  Flowsheets (Taken 8/21/2022 1948)  Discharge to home or other facility with appropriate resources:   Identify barriers to discharge with patient and caregiver   Arrange for needed discharge resources and transportation as appropriate   Identify discharge learning needs (meds, wound care, etc)     Problem: Pain  Goal: Verbalizes/displays adequate comfort level or baseline comfort level  Outcome: Progressing  Flowsheets (Taken 8/21/2022 2308)  Verbalizes/displays adequate comfort level or baseline comfort level:   Encourage patient to monitor pain and request assistance   Assess pain using appropriate pain scale   Administer analgesics based on type and severity of pain and evaluate response   Implement non-pharmacological measures as appropriate and evaluate response  Note: Patient complaining of pain this shift. Prn pain medication available. Will monitor. Problem: ABCDS Injury Assessment  Goal: Absence of physical injury  Outcome: Progressing  Flowsheets (Taken 8/21/2022 2305)  Absence of Physical Injury: Implement safety measures based on patient assessment     Problem: Safety - Adult  Goal: Free from fall injury  Outcome: Progressing  Flowsheets (Taken 8/21/2022 2305)  Free From Fall Injury:   Instruct family/caregiver on patient safety   Based on caregiver fall risk screen, instruct family/caregiver to ask for assistance with transferring infant if caregiver noted to have fall risk factors  Note: No falls noted this shift. Continue falling star program. Bed alarm on, bed in low position. Call light and personal belongings in reach. Patient uses call light appropriately.       Problem: Chronic Conditions and Co-morbidities  Goal: Patient's chronic conditions and co-morbidity symptoms are monitored and maintained or improved  Outcome: Progressing  Flowsheets (Taken 8/21/2022 1948)  Care Plan - Patient's Chronic Conditions and Co-Morbidity Symptoms are Monitored and Maintained or Improved:   Monitor and assess patient's chronic conditions and comorbid symptoms for stability, deterioration, or improvement   Collaborate with multidisciplinary team to address chronic and comorbid conditions and prevent exacerbation or deterioration   Update acute care plan with appropriate goals if chronic or comorbid symptoms are exacerbated and prevent overall improvement and discharge     Problem: Skin/Tissue Integrity  Goal: Absence of new skin breakdown  Description: 1. Monitor for areas of redness and/or skin breakdown  2. Assess vascular access sites hourly  3. Every 4-6 hours minimum:  Change oxygen saturation probe site  4. Every 4-6 hours:  If on nasal continuous positive airway pressure, respiratory therapy assess nares and determine need for appliance change or resting period. Outcome: Progressing  Note: No new skin break down noted at this time. Encouraged patient to reposition self in bed. Care plan reviewed with patient. Patient verbalizes understanding of plan of care and contributes to goal setting.

## 2022-08-22 NOTE — PROGRESS NOTES
Surgery cancelled for today. Allograft interbody graft needed for her case is not in town, but will be here tomorrow. Case is moved to 0715 tomorrow. NPO midnight.      Magaly [facility]

## 2022-08-22 NOTE — PROGRESS NOTES
ID Progress Note    Patient - Yogesh Gomez,  Age - 43 y.o.    - 1980      Room Number - 6K-12/012-A   MRN -  [id number]   Acct # - [de-identified]  Date of Admission -  2022  1:34 PM    SUBJECTIVE:    No new issues. she is scheduled for surgery later today    OBJECTIVE   VITALS    height is 5' 3\" (1.6 m) and weight is 181 lb 1.6 oz (82.1 kg). Her oral temperature is 98.4 °F (36.9 °C). Her blood pressure is 97/71 and her pulse is 88. Her respiration is 16 and oxygen saturation is 97%.        Wt Readings from Last 3 Encounters:   22 181 lb 1.6 oz (82.1 kg)   22 186 lb 3.2 oz (84.5 kg)   22 181 lb 6.4 oz (82.3 kg)       I/O (24 Hours)    Intake/Output Summary (Last 24 hours) at 2022 1634  Last data filed at 2022 1955  Gross per 24 hour   Intake 130 ml   Output --   Net 130 ml         General Appearance:  Awake, alert, oriented,  not  In acute distress  HEENT - normocephalic, atraumatic, pink conjunctiva, anicteric sclera  Neck - Supple, no mass  Lungs -  Bilateral good air entry, no rhonchi, no wheeze  Cardiovascular - Heart sounds are normal.     Abdomen - soft, not distended, nontender,   Skin - No bruising or bleeding  Extremities - No edema, no cyanosis, clubbing     MEDICATIONS:      bisacodyl  5 mg Oral Daily    polyethylene glycol  17 g Oral Daily    thiamine  100 mg Oral Daily    folic acid  1 mg Oral Daily    venlafaxine  75 mg Oral Daily    sodium chloride flush  10 mL IntraVENous 2 times per day      sodium chloride       oxyCODONE-acetaminophen, traZODone, hydrOXYzine pamoate, sodium chloride flush, sodium chloride, ondansetron **OR** ondansetron, acetaminophen **OR** acetaminophen, potassium chloride **OR** potassium alternative oral replacement **OR** potassium chloride, magnesium sulfate  LABS:   CBC   Recent Labs     22  0552   WBC 6.2   HGB 13.0   HCT 40.1   MCV 92.8          BMP:   Recent Labs     22  0552      K 4.2    CO2 27   BUN 6*   CREATININE 0.7   GLUCOSE 109*        CULTURES:   UA:   No results for input(s): SPECGRAV, PHUR, COLORU, CLARITYU, MUCUS, PROTEINU, BLOODU, RBCUA, WBCUA, BACTERIA, NITRU, GLUCOSEU, BILIRUBINUR, UROBILINOGEN, KETUA, LABCAST, LABCASTTY, AMORPHOS in the last 72 hours. Invalid input(s): CRYSTALS    Micro:   Lab Results   Component Value Date/Time    BC No growth 24 hours. No growth 48 hours. 08/17/2022 05:58 PM     Patient problem list:     Patient Active Problem List   Diagnosis Code    Coker's esophagus without dysplasia K22.70    Moderate episode of recurrent major depressive disorder ([address] F33.1    Bipolar I disorder, current or most recent episode depressed, with psychotic features ([address] F31.5    Schizoaffective disorder, bipolar type ([address] F25.0    Severe mixed bipolar 1 disorder without psychosis ([address] F31.63    Alcohol use disorder, severe, dependence ([address] F10.20    Amphetamine substance use disorder, severe, in sustained remission ([address] F15.21    Bipolar I disorder with mixed features ([address] F31.9    Class 1 obesity in adult E66.9    Subclinical hypothyroidism E03.8    Anxiety F41.9    Lumbar stenosis without neurogenic claudication M48.061    Lumbar stenosis with neurogenic claudication M48.062    Unspecified orthopedic aftercare Z47.89    Depression F32. A    Osteomyelitis ([facility]) M86.9    Bilateral low back pain M54.50    Hypothyroidism E03.9    Osteomyelitis of lumbar spine ([facility]) M46.26    Closed type I fracture of sacrum with nonunion S32. 14XK    Alcohol abuse F10.10    Neurogenic claudication ([facility]) G95.19       Assessment and Plan:   Low back pain s/p L5-S1 decompression and posterior fusion: MRI showed fracture of anterior superior right endplate of S1 causing right screw loosening and left lateral L5 scrrew. Orthopedic surgery following. Plan for surgery today LF-S1 hardware removal with L5-S2 revision fusion and extension.     Hx of major depressive disorder and anxiety: Psychiatry following  Hx of degenerative back disease  Hx of hypothyroidism   Continue current treatment      Che Ferrari MD

## 2022-08-22 NOTE — CARE COORDINATION
8/22/22, 2:56 PM EDT    DISCHARGE ON GOING EVALUATION    [address] day: 5  Location: -12/012-A Reason for admit: Osteomyelitis [facility]) [M86.9]  Osteomyelitis of lumbar spine ([facility] [M46.26]   Barriers to Discharge: OR rescheduled for tomorrow due to surgical graft not being available. PCP: RAMÓN Bowman CNP  Readmission Risk Score: 15.5%  Patient Goals/Plan/Treatment Preferences: Home with HH vs ECF. SW following.

## 2022-08-22 NOTE — FLOWSHEET NOTE
08/22/22 1100   Safe Environment   Safety Measures Other (comment)  (Virtual safety check complete)   Patient responded to virtual doorbell. Stated she was resting. Denied needs. Requested virtual RN to back in the afternoon. Did not turn on camera. Rodney light in reach per patient.

## 2022-08-23 ENCOUNTER — APPOINTMENT (OUTPATIENT)
Dept: GENERAL RADIOLOGY | Age: 42
DRG: 460 | End: 2022-08-23
Payer: MEDICARE

## 2022-08-23 ENCOUNTER — ANESTHESIA (OUTPATIENT)
Dept: OPERATING ROOM | Age: 42
DRG: 460 | End: 2022-08-23
Payer: MEDICARE

## 2022-08-23 LAB
ANION GAP SERPL CALCULATED.3IONS-SCNC: 8 MEQ/L (ref 8–16)
BASOPHILS # BLD: 1 %
BASOPHILS ABSOLUTE: 0.1 THOU/MM3 (ref 0–0.1)
BUN BLDV-MCNC: 7 MG/DL (ref 7–22)
CALCIUM SERPL-MCNC: 8.8 MG/DL (ref 8.5–10.5)
CHLORIDE BLD-SCNC: 105 MEQ/L (ref 98–111)
CO2: 28 MEQ/L (ref 23–33)
CREAT SERPL-MCNC: 0.6 MG/DL (ref 0.4–1.2)
EOSINOPHIL # BLD: 7.3 %
EOSINOPHILS ABSOLUTE: 0.4 THOU/MM3 (ref 0–0.4)
ERYTHROCYTE [DISTWIDTH] IN BLOOD BY AUTOMATED COUNT: 13 % (ref 11.5–14.5)
ERYTHROCYTE [DISTWIDTH] IN BLOOD BY AUTOMATED COUNT: 43.1 FL (ref 35–45)
GFR SERPL CREATININE-BSD FRML MDRD: > 90 ML/MIN/1.73M2
GLUCOSE BLD-MCNC: 92 MG/DL (ref 70–108)
HCT VFR BLD CALC: 42.6 % (ref 37–47)
HEMOGLOBIN: 13.8 GM/DL (ref 12–16)
IMMATURE GRANS (ABS): 0.01 THOU/MM3 (ref 0–0.07)
IMMATURE GRANULOCYTES: 0.2 %
LYMPHOCYTES # BLD: 48.2 %
LYMPHOCYTES ABSOLUTE: 2.5 THOU/MM3 (ref 1–4.8)
MCH RBC QN AUTO: 29.7 PG (ref 26–33)
MCHC RBC AUTO-ENTMCNC: 32.4 GM/DL (ref 32.2–35.5)
MCV RBC AUTO: 91.8 FL (ref 81–99)
MONOCYTES # BLD: 9.8 %
MONOCYTES ABSOLUTE: 0.5 THOU/MM3 (ref 0.4–1.3)
NUCLEATED RED BLOOD CELLS: 0 /100 WBC
PLATELET # BLD: 232 THOU/MM3 (ref 130–400)
PMV BLD AUTO: 9.1 FL (ref 9.4–12.4)
POTASSIUM REFLEX MAGNESIUM: 4.2 MEQ/L (ref 3.5–5.2)
RBC # BLD: 4.64 MILL/MM3 (ref 4.2–5.4)
SEG NEUTROPHILS: 33.5 %
SEGMENTED NEUTROPHILS ABSOLUTE COUNT: 1.7 THOU/MM3 (ref 1.8–7.7)
SODIUM BLD-SCNC: 141 MEQ/L (ref 135–145)
WBC # BLD: 5.1 THOU/MM3 (ref 4.8–10.8)

## 2022-08-23 PROCEDURE — 0QP104Z REMOVAL OF INTERNAL FIXATION DEVICE FROM SACRUM, OPEN APPROACH: ICD-10-PCS | Performed by: INTERNAL MEDICINE

## 2022-08-23 PROCEDURE — 85025 COMPLETE CBC W/AUTO DIFF WBC: CPT

## 2022-08-23 PROCEDURE — 6360000002 HC RX W HCPCS

## 2022-08-23 PROCEDURE — 0SG0071 FUSION OF LUMBAR VERTEBRAL JOINT WITH AUTOLOGOUS TISSUE SUBSTITUTE, POSTERIOR APPROACH, POSTERIOR COLUMN, OPEN APPROACH: ICD-10-PCS | Performed by: ORTHOPAEDIC SURGERY

## 2022-08-23 PROCEDURE — 2500000003 HC RX 250 WO HCPCS: Performed by: NURSE ANESTHETIST, CERTIFIED REGISTERED

## 2022-08-23 PROCEDURE — 2580000003 HC RX 258

## 2022-08-23 PROCEDURE — 0QP004Z REMOVAL OF INTERNAL FIXATION DEVICE FROM LUMBAR VERTEBRA, OPEN APPROACH: ICD-10-PCS | Performed by: INTERNAL MEDICINE

## 2022-08-23 PROCEDURE — 36415 COLL VENOUS BLD VENIPUNCTURE: CPT

## 2022-08-23 PROCEDURE — 2720000010 HC SURG SUPPLY STERILE: Performed by: ORTHOPAEDIC SURGERY

## 2022-08-23 PROCEDURE — 7100000000 HC PACU RECOVERY - FIRST 15 MIN: Performed by: ORTHOPAEDIC SURGERY

## 2022-08-23 PROCEDURE — 80048 BASIC METABOLIC PNL TOTAL CA: CPT

## 2022-08-23 PROCEDURE — 2709999900 HC NON-CHARGEABLE SUPPLY: Performed by: ORTHOPAEDIC SURGERY

## 2022-08-23 PROCEDURE — 3209999900 FLUORO FOR SURGICAL PROCEDURES

## 2022-08-23 PROCEDURE — 2580000003 HC RX 258: Performed by: PHYSICIAN ASSISTANT

## 2022-08-23 PROCEDURE — 6370000000 HC RX 637 (ALT 250 FOR IP)

## 2022-08-23 PROCEDURE — 6360000002 HC RX W HCPCS: Performed by: ORTHOPAEDIC SURGERY

## 2022-08-23 PROCEDURE — 72100 X-RAY EXAM L-S SPINE 2/3 VWS: CPT

## 2022-08-23 PROCEDURE — 99232 SBSQ HOSP IP/OBS MODERATE 35: CPT | Performed by: STUDENT IN AN ORGANIZED HEALTH CARE EDUCATION/TRAINING PROGRAM

## 2022-08-23 PROCEDURE — 3700000000 HC ANESTHESIA ATTENDED CARE: Performed by: ORTHOPAEDIC SURGERY

## 2022-08-23 PROCEDURE — 6360000002 HC RX W HCPCS: Performed by: NURSE ANESTHETIST, CERTIFIED REGISTERED

## 2022-08-23 PROCEDURE — 3700000001 HC ADD 15 MINUTES (ANESTHESIA): Performed by: ORTHOPAEDIC SURGERY

## 2022-08-23 PROCEDURE — 1200000000 HC SEMI PRIVATE

## 2022-08-23 PROCEDURE — 3E0U0GB INTRODUCTION OF RECOMBINANT BONE MORPHOGENETIC PROTEIN INTO JOINTS, OPEN APPROACH: ICD-10-PCS | Performed by: INTERNAL MEDICINE

## 2022-08-23 PROCEDURE — 3600000004 HC SURGERY LEVEL 4 BASE: Performed by: ORTHOPAEDIC SURGERY

## 2022-08-23 PROCEDURE — 7100000001 HC PACU RECOVERY - ADDTL 15 MIN: Performed by: ORTHOPAEDIC SURGERY

## 2022-08-23 PROCEDURE — 0SG3071 FUSION OF LUMBOSACRAL JOINT WITH AUTOLOGOUS TISSUE SUBSTITUTE, POSTERIOR APPROACH, POSTERIOR COLUMN, OPEN APPROACH: ICD-10-PCS | Performed by: ORTHOPAEDIC SURGERY

## 2022-08-23 PROCEDURE — 6370000000 HC RX 637 (ALT 250 FOR IP): Performed by: STUDENT IN AN ORGANIZED HEALTH CARE EDUCATION/TRAINING PROGRAM

## 2022-08-23 PROCEDURE — 3600000014 HC SURGERY LEVEL 4 ADDTL 15MIN: Performed by: ORTHOPAEDIC SURGERY

## 2022-08-23 PROCEDURE — C1713 ANCHOR/SCREW BN/BN,TIS/BN: HCPCS | Performed by: ORTHOPAEDIC SURGERY

## 2022-08-23 DEVICE — GRAFT BONE SUB 30ML CANC CUBE FRZ DRY: Type: IMPLANTABLE DEVICE | Site: BACK | Status: FUNCTIONAL

## 2022-08-23 DEVICE — SCREW SPNL L40MM DIA6.5MM THORLUM TI ALLY POLYAX STREAMLINE: Type: IMPLANTABLE DEVICE | Site: BACK | Status: FUNCTIONAL

## 2022-08-23 DEVICE — BONE GRAFT KIT 7510800 INFUSE LARGE II
Type: IMPLANTABLE DEVICE | Site: BACK | Status: FUNCTIONAL
Brand: INFUSE® BONE GRAFT

## 2022-08-23 DEVICE — SCREW SPNL L50MM DIA6.5MM THORLUM TI ALLY POLYAX STREAMLINE: Type: IMPLANTABLE DEVICE | Site: BACK | Status: FUNCTIONAL

## 2022-08-23 DEVICE — SCREW SPNL L45MM DIA6.5MM 60DEG THORLUM PEDCL TI ALLY: Type: IMPLANTABLE DEVICE | Site: BACK | Status: FUNCTIONAL

## 2022-08-23 DEVICE — IMPLANTABLE DEVICE: Type: IMPLANTABLE DEVICE | Site: BACK | Status: FUNCTIONAL

## 2022-08-23 DEVICE — SCREW SPNL L45MM DIA7.5MM THORLUM TI ALLY POLYAX STREAMLINE: Type: IMPLANTABLE DEVICE | Site: BACK | Status: FUNCTIONAL

## 2022-08-23 DEVICE — SET SCR SPNL TI STREAMLINE: Type: IMPLANTABLE DEVICE | Status: FUNCTIONAL

## 2022-08-23 RX ORDER — MORPHINE SULFATE 2 MG/ML
2 INJECTION, SOLUTION INTRAMUSCULAR; INTRAVENOUS
Status: DISCONTINUED | OUTPATIENT
Start: 2022-08-23 | End: 2022-08-24

## 2022-08-23 RX ORDER — MORPHINE SULFATE 2 MG/ML
4 INJECTION, SOLUTION INTRAMUSCULAR; INTRAVENOUS
Status: DISCONTINUED | OUTPATIENT
Start: 2022-08-23 | End: 2022-08-24

## 2022-08-23 RX ORDER — OXYCODONE HYDROCHLORIDE 5 MG/1
5 TABLET ORAL EVERY 4 HOURS PRN
Status: DISCONTINUED | OUTPATIENT
Start: 2022-08-23 | End: 2022-08-24

## 2022-08-23 RX ORDER — ROCURONIUM BROMIDE 10 MG/ML
INJECTION, SOLUTION INTRAVENOUS PRN
Status: DISCONTINUED | OUTPATIENT
Start: 2022-08-23 | End: 2022-08-23 | Stop reason: SDUPTHER

## 2022-08-23 RX ORDER — ONDANSETRON 2 MG/ML
4 INJECTION INTRAMUSCULAR; INTRAVENOUS
Status: DISCONTINUED | OUTPATIENT
Start: 2022-08-23 | End: 2022-08-23 | Stop reason: HOSPADM

## 2022-08-23 RX ORDER — OXYCODONE HYDROCHLORIDE 5 MG/1
10 TABLET ORAL EVERY 4 HOURS PRN
Status: DISCONTINUED | OUTPATIENT
Start: 2022-08-23 | End: 2022-08-24

## 2022-08-23 RX ORDER — DEXAMETHASONE SODIUM PHOSPHATE 10 MG/ML
INJECTION, EMULSION INTRAMUSCULAR; INTRAVENOUS PRN
Status: DISCONTINUED | OUTPATIENT
Start: 2022-08-23 | End: 2022-08-23 | Stop reason: SDUPTHER

## 2022-08-23 RX ORDER — SODIUM CHLORIDE 0.9 % (FLUSH) 0.9 %
5-40 SYRINGE (ML) INJECTION PRN
Status: DISCONTINUED | OUTPATIENT
Start: 2022-08-23 | End: 2022-08-23 | Stop reason: HOSPADM

## 2022-08-23 RX ORDER — LIDOCAINE HYDROCHLORIDE 20 MG/ML
INJECTION, SOLUTION EPIDURAL; INFILTRATION; INTRACAUDAL; PERINEURAL PRN
Status: DISCONTINUED | OUTPATIENT
Start: 2022-08-23 | End: 2022-08-23 | Stop reason: SDUPTHER

## 2022-08-23 RX ORDER — HYDROMORPHONE HCL 110MG/55ML
PATIENT CONTROLLED ANALGESIA SYRINGE INTRAVENOUS PRN
Status: DISCONTINUED | OUTPATIENT
Start: 2022-08-23 | End: 2022-08-23 | Stop reason: SDUPTHER

## 2022-08-23 RX ORDER — SODIUM CHLORIDE 9 MG/ML
INJECTION, SOLUTION INTRAVENOUS PRN
Status: DISCONTINUED | OUTPATIENT
Start: 2022-08-23 | End: 2022-08-23 | Stop reason: HOSPADM

## 2022-08-23 RX ORDER — CYCLOBENZAPRINE HCL 10 MG
10 TABLET ORAL 3 TIMES DAILY PRN
Status: DISCONTINUED | OUTPATIENT
Start: 2022-08-23 | End: 2022-08-25 | Stop reason: HOSPADM

## 2022-08-23 RX ORDER — DIPHENHYDRAMINE HYDROCHLORIDE 50 MG/ML
25 INJECTION INTRAMUSCULAR; INTRAVENOUS EVERY 6 HOURS PRN
Status: DISCONTINUED | OUTPATIENT
Start: 2022-08-23 | End: 2022-08-25 | Stop reason: HOSPADM

## 2022-08-23 RX ORDER — HYDRALAZINE HYDROCHLORIDE 20 MG/ML
10 INJECTION INTRAMUSCULAR; INTRAVENOUS
Status: DISCONTINUED | OUTPATIENT
Start: 2022-08-23 | End: 2022-08-23 | Stop reason: HOSPADM

## 2022-08-23 RX ORDER — MIDAZOLAM HYDROCHLORIDE 1 MG/ML
INJECTION INTRAMUSCULAR; INTRAVENOUS PRN
Status: DISCONTINUED | OUTPATIENT
Start: 2022-08-23 | End: 2022-08-23 | Stop reason: SDUPTHER

## 2022-08-23 RX ORDER — PROPOFOL 10 MG/ML
INJECTION, EMULSION INTRAVENOUS PRN
Status: DISCONTINUED | OUTPATIENT
Start: 2022-08-23 | End: 2022-08-23 | Stop reason: SDUPTHER

## 2022-08-23 RX ORDER — SODIUM CHLORIDE 9 MG/ML
INJECTION, SOLUTION INTRAVENOUS CONTINUOUS
Status: DISCONTINUED | OUTPATIENT
Start: 2022-08-23 | End: 2022-08-24

## 2022-08-23 RX ORDER — LABETALOL 20 MG/4 ML (5 MG/ML) INTRAVENOUS SYRINGE
10
Status: DISCONTINUED | OUTPATIENT
Start: 2022-08-23 | End: 2022-08-23 | Stop reason: HOSPADM

## 2022-08-23 RX ORDER — SODIUM CHLORIDE 0.9 % (FLUSH) 0.9 %
5-40 SYRINGE (ML) INJECTION EVERY 12 HOURS SCHEDULED
Status: DISCONTINUED | OUTPATIENT
Start: 2022-08-23 | End: 2022-08-23 | Stop reason: HOSPADM

## 2022-08-23 RX ORDER — GLYCOPYRROLATE 1 MG/5 ML
SYRINGE (ML) INTRAVENOUS PRN
Status: DISCONTINUED | OUTPATIENT
Start: 2022-08-23 | End: 2022-08-23 | Stop reason: SDUPTHER

## 2022-08-23 RX ORDER — DIPHENHYDRAMINE HCL 25 MG
25 TABLET ORAL EVERY 6 HOURS PRN
Status: DISCONTINUED | OUTPATIENT
Start: 2022-08-23 | End: 2022-08-25 | Stop reason: HOSPADM

## 2022-08-23 RX ORDER — VANCOMYCIN HYDROCHLORIDE 1 G/20ML
INJECTION, POWDER, LYOPHILIZED, FOR SOLUTION INTRAVENOUS PRN
Status: DISCONTINUED | OUTPATIENT
Start: 2022-08-23 | End: 2022-08-23 | Stop reason: ALTCHOICE

## 2022-08-23 RX ORDER — BISACODYL 10 MG
10 SUPPOSITORY, RECTAL RECTAL DAILY PRN
Status: DISCONTINUED | OUTPATIENT
Start: 2022-08-23 | End: 2022-08-25 | Stop reason: HOSPADM

## 2022-08-23 RX ORDER — ONDANSETRON 2 MG/ML
INJECTION INTRAMUSCULAR; INTRAVENOUS PRN
Status: DISCONTINUED | OUTPATIENT
Start: 2022-08-23 | End: 2022-08-23 | Stop reason: SDUPTHER

## 2022-08-23 RX ORDER — SODIUM PHOSPHATE, DIBASIC AND SODIUM PHOSPHATE, MONOBASIC 7; 19 G/133ML; G/133ML
1 ENEMA RECTAL DAILY PRN
Status: DISCONTINUED | OUTPATIENT
Start: 2022-08-23 | End: 2022-08-25 | Stop reason: HOSPADM

## 2022-08-23 RX ORDER — SENNA AND DOCUSATE SODIUM 50; 8.6 MG/1; MG/1
1 TABLET, FILM COATED ORAL 2 TIMES DAILY
Status: DISCONTINUED | OUTPATIENT
Start: 2022-08-23 | End: 2022-08-24

## 2022-08-23 RX ORDER — FENTANYL CITRATE 50 UG/ML
INJECTION, SOLUTION INTRAMUSCULAR; INTRAVENOUS PRN
Status: DISCONTINUED | OUTPATIENT
Start: 2022-08-23 | End: 2022-08-23 | Stop reason: SDUPTHER

## 2022-08-23 RX ORDER — PHENYLEPHRINE HYDROCHLORIDE 10 MG/ML
INJECTION INTRAVENOUS PRN
Status: DISCONTINUED | OUTPATIENT
Start: 2022-08-23 | End: 2022-08-23 | Stop reason: SDUPTHER

## 2022-08-23 RX ADMIN — MIDAZOLAM 2 MG: 1 INJECTION INTRAMUSCULAR; INTRAVENOUS at 07:20

## 2022-08-23 RX ADMIN — ROCURONIUM BROMIDE 10 MG: 10 INJECTION, SOLUTION INTRAVENOUS at 08:18

## 2022-08-23 RX ADMIN — BISACODYL 5 MG: 5 TABLET, COATED ORAL at 13:47

## 2022-08-23 RX ADMIN — DEXAMETHASONE SODIUM PHOSPHATE 10 MG: 10 INJECTION, EMULSION INTRAMUSCULAR; INTRAVENOUS at 07:20

## 2022-08-23 RX ADMIN — Medication 0.2 MG: at 07:20

## 2022-08-23 RX ADMIN — OXYCODONE AND ACETAMINOPHEN 1 TABLET: 5; 325 TABLET ORAL at 05:57

## 2022-08-23 RX ADMIN — CYCLOBENZAPRINE 10 MG: 10 TABLET, FILM COATED ORAL at 10:25

## 2022-08-23 RX ADMIN — HYDROMORPHONE HYDROCHLORIDE 1.5 MG: 2 INJECTION INTRAMUSCULAR; INTRAVENOUS; SUBCUTANEOUS at 09:57

## 2022-08-23 RX ADMIN — SODIUM CHLORIDE: 9 INJECTION, SOLUTION INTRAVENOUS at 07:20

## 2022-08-23 RX ADMIN — SODIUM CHLORIDE: 9 INJECTION, SOLUTION INTRAVENOUS at 13:43

## 2022-08-23 RX ADMIN — CEFAZOLIN 2000 MG: 10 INJECTION, POWDER, FOR SOLUTION INTRAVENOUS at 15:46

## 2022-08-23 RX ADMIN — HYDROMORPHONE HYDROCHLORIDE 0.5 MG: 2 INJECTION INTRAMUSCULAR; INTRAVENOUS; SUBCUTANEOUS at 09:31

## 2022-08-23 RX ADMIN — Medication 100 MG: at 13:47

## 2022-08-23 RX ADMIN — DOCUSATE SODIUM 50 MG AND SENNOSIDES 8.6 MG 1 TABLET: 8.6; 5 TABLET, FILM COATED ORAL at 13:47

## 2022-08-23 RX ADMIN — Medication 100 MG: at 07:20

## 2022-08-23 RX ADMIN — SODIUM CHLORIDE, PRESERVATIVE FREE 10 ML: 5 INJECTION INTRAVENOUS at 13:36

## 2022-08-23 RX ADMIN — OXYCODONE 10 MG: 5 TABLET ORAL at 22:25

## 2022-08-23 RX ADMIN — FENTANYL CITRATE 50 MCG: 50 INJECTION, SOLUTION INTRAMUSCULAR; INTRAVENOUS at 07:20

## 2022-08-23 RX ADMIN — DOCUSATE SODIUM 50 MG AND SENNOSIDES 8.6 MG 1 TABLET: 8.6; 5 TABLET, FILM COATED ORAL at 20:28

## 2022-08-23 RX ADMIN — PROPOFOL 160 MG: 10 INJECTION, EMULSION INTRAVENOUS at 07:20

## 2022-08-23 RX ADMIN — FOLIC ACID 1 MG: 1 TABLET ORAL at 13:47

## 2022-08-23 RX ADMIN — PHENYLEPHRINE HYDROCHLORIDE 100 MCG: 10 INJECTION INTRAVENOUS at 08:39

## 2022-08-23 RX ADMIN — PHENYLEPHRINE HYDROCHLORIDE 50 MCG: 10 INJECTION INTRAVENOUS at 08:25

## 2022-08-23 RX ADMIN — FENTANYL CITRATE 50 MCG: 50 INJECTION, SOLUTION INTRAMUSCULAR; INTRAVENOUS at 07:55

## 2022-08-23 RX ADMIN — CEFAZOLIN 2000 MG: 10 INJECTION, POWDER, FOR SOLUTION INTRAVENOUS at 07:27

## 2022-08-23 RX ADMIN — CEFAZOLIN 2000 MG: 10 INJECTION, POWDER, FOR SOLUTION INTRAVENOUS at 23:41

## 2022-08-23 RX ADMIN — ONDANSETRON 4 MG: 2 INJECTION INTRAMUSCULAR; INTRAVENOUS at 09:31

## 2022-08-23 RX ADMIN — POLYETHYLENE GLYCOL 3350 17 G: 17 POWDER, FOR SOLUTION ORAL at 13:47

## 2022-08-23 RX ADMIN — OXYCODONE 10 MG: 5 TABLET ORAL at 10:25

## 2022-08-23 RX ADMIN — ROCURONIUM BROMIDE 50 MG: 10 INJECTION, SOLUTION INTRAVENOUS at 07:27

## 2022-08-23 RX ADMIN — PHENYLEPHRINE HYDROCHLORIDE 100 MCG: 10 INJECTION INTRAVENOUS at 09:00

## 2022-08-23 RX ADMIN — OXYCODONE 10 MG: 5 TABLET ORAL at 17:52

## 2022-08-23 RX ADMIN — SODIUM CHLORIDE, PRESERVATIVE FREE 10 ML: 5 INJECTION INTRAVENOUS at 20:28

## 2022-08-23 RX ADMIN — VENLAFAXINE HYDROCHLORIDE 75 MG: 75 CAPSULE, EXTENDED RELEASE ORAL at 13:47

## 2022-08-23 RX ADMIN — SUGAMMADEX 200 MG: 100 INJECTION, SOLUTION INTRAVENOUS at 09:49

## 2022-08-23 ASSESSMENT — PAIN DESCRIPTION - LOCATION
LOCATION: BACK

## 2022-08-23 ASSESSMENT — PAIN DESCRIPTION - ORIENTATION
ORIENTATION: MID;LOWER
ORIENTATION: LOWER

## 2022-08-23 ASSESSMENT — PAIN SCALES - GENERAL
PAINLEVEL_OUTOF10: 10
PAINLEVEL_OUTOF10: 8
PAINLEVEL_OUTOF10: 10
PAINLEVEL_OUTOF10: 8
PAINLEVEL_OUTOF10: 9
PAINLEVEL_OUTOF10: 9
PAINLEVEL_OUTOF10: 10

## 2022-08-23 ASSESSMENT — PAIN DESCRIPTION - DESCRIPTORS
DESCRIPTORS: SHOOTING;STABBING
DESCRIPTORS: JABBING
DESCRIPTORS: SPASM;SHARP;SHOOTING
DESCRIPTORS: SHARP;SPASM;SHOOTING
DESCRIPTORS: JABBING
DESCRIPTORS: PENETRATING
DESCRIPTORS: JABBING

## 2022-08-23 ASSESSMENT — PAIN DESCRIPTION - PAIN TYPE
TYPE: SURGICAL PAIN
TYPE: SURGICAL PAIN;CHRONIC PAIN

## 2022-08-23 ASSESSMENT — LIFESTYLE VARIABLES: SMOKING_STATUS: 1

## 2022-08-23 ASSESSMENT — PAIN - FUNCTIONAL ASSESSMENT: PAIN_FUNCTIONAL_ASSESSMENT: ACTIVITIES ARE NOT PREVENTED

## 2022-08-23 ASSESSMENT — PAIN DESCRIPTION - FREQUENCY: FREQUENCY: CONTINUOUS

## 2022-08-23 NOTE — PROGRESS NOTES
Hospitalist Progress Note    Patient:  Evaristo Lubin    YOB: 1980  Unit/Bed:6K-12/012-A  MRN: [id number]    Acct: [de-identified]   PCP: RAMÓN Rogers CNP    Date of Admission: 8/17/2022      Assessment/Plan:  3 months s/p L5-S1 decompression and fusion with Right S1 fracture causing right S1 screw loosening and lateral displacement of left L5 screw work. POD #day1,   Has a left-sided drain. Orthopedics following. Postop care, CBC and BMP tomorrow, continue incentive spirometry, work with PT OT. Concern for osteomyelitis of S1 vertebral body. Inflammatory markers negative. Likely secondary to postop changes. Continue to monitor. Keep off of antibiotics. Appreciate ID assistance. Alcohol use disorder. Continue thiamine folate. Check electrolytes. History of major depressive disorder. Resumed home medications. Denied suicidal ideation. History of hypothyroidism. Currently off of Synthroid TSH within normal limits. Outpatient follow-up    Patient will follow up in the office in 6 weeks. At that time, AP and lateral x-rays of the lumbar spine will be obtained to assess instrumentation and fusion. Expected discharge date:  8/23/2022    Disposition:  Once the drainage is low and pain is under control, patient will be discharged home per clinical indication. [] Home  [] TCU  [x] Rehab  [] Psych  [x] SNF  [] [facility]  [] Other-    ===================================================================      Chief Complaint: lower back pain. Hospital Course: 26-year-old lady with past medical history of alcohol abuse disorder, hypothyroidism, chronic back pain issues came to the hospital complaining of worsening of lower back pain associated with saddle anesthesia and 2 times involuntary loss of urine.   Of note patient underwent L5-S1 decompression and fusion with transforaminal lumbar interbody fusion secondary to spondylolisthesis associated with neurogenic claudication. According to the patient the pain get worse when she lays down it gets better when she leans forward. Patient was able to walk after the surgery but lately she has been having excruciating pain. Patient said that for last few days she has been having paresthesias numbness and tingling associated with involuntary movements of toes on left side. Imaging of the lumbar spine was done which showed abnormal signal intensity and enhancement in the S1 vertebral body suspicious for vertebral body osteomyelitis. CT scan showed grade 2 anterior subluxation of L5 relative to S1 with possible fracture versus osteomyelitis. Patient was evaluated by orthopedic surgery as fracture of right anterior superior endplate of S1 with right S1 screw loosening. Left L5 screws outside of the pedicle. Vitals in /101 with pulse 108, saturating well on room air with respiratory rate 18. Lab work done in ED showed sodium 139, potassium 4.1, CRP less than 0.3, troponin less than 0.01, albumin 3.6, drug tox negative, , WBC 8, hemoglobin 13.2, UA negative. Subjective (past 24 hours):   Patient was seen and examined  No overnight events  S/p surgery she looks slightly drowsy complaining of mild discomfort. Has a drain on the left side. Remained hemodynamically and vitally stable      Ortho Note OPERATION PERFORMED:  1. L5-S1 exploration of fusion  2. L5-S1 removal of instrumentation; bilateral L5 screws and right S1 screw  3. L4-S2 posterior spinal fusion extension. 4. L4-S2 posterior spinal instrumentation, Streamline, SurgAlign instrumentation. 5.  Use of local autograft bone, (L4 spinous process), crushed cancellous chips and large INFUSE BMP      ROS: reviewed complete ROS unchanged unless otherwise stated in hospital course/subjective portion.        Medications:  Reviewed    Infusion Medications    sodium chloride 125 mL/hr at 08/23/22 1343    sodium chloride       Scheduled Medications ceFAZolin (ANCEF) IVPB  2,000 mg IntraVENous Q8H    sennosides-docusate sodium  1 tablet Oral BID    bisacodyl  5 mg Oral Daily    polyethylene glycol  17 g Oral Daily    thiamine  100 mg Oral Daily    folic acid  1 mg Oral Daily    venlafaxine  75 mg Oral Daily    sodium chloride flush  10 mL IntraVENous 2 times per day     PRN Meds: oxyCODONE **OR** oxyCODONE, morphine **OR** morphine, cyclobenzaprine, diphenhydramine **OR** diphenhydrAMINE, bisacodyl, fleet, traZODone, hydrOXYzine pamoate, sodium chloride flush, sodium chloride, ondansetron **OR** ondansetron, acetaminophen **OR** [DISCONTINUED] acetaminophen, potassium chloride **OR** potassium alternative oral replacement **OR** potassium chloride, magnesium sulfate        Intake/Output Summary (Last 24 hours) at 8/23/2022 1616  Last data filed at 8/23/2022 1117  Gross per 24 hour   Intake 1260 ml   Output 500 ml   Net 760 ml         Exam:  /73   Pulse 90   Temp 98.2 °F (36.8 °C)   Resp 12   Ht 5' 3\" (1.6 m) Comment: per ED note  Wt 181 lb 1.6 oz (82.1 kg)   SpO2 91%   BMI 32.08 kg/m²     General: No distress, appears stated age. Eyes:  PERRL. Conjunctivae/corneas clear. HENT: Head normal appearing. Nares normal. Oral mucosa moist.  Hearing intact. Neck: Supple, with full range of motion. Trachea midline. No gross JVD appreciated. Respiratory:  Normal effort. Clear to auscultation, without rales or wheezes or rhonchi. Cardiovascular: Normal rate, regular rhythm with normal S1/S2 without murmurs. No lower extremity edema. Abdomen: Soft, non-tender, non-distended with normal bowel sounds. Musculoskeletal: Tone muscle and strength equal bilaterally no abnormal movements noted. Skin: Warm and dry. No rashes or lesions. Neurologic: Bilateral lower extremity weakness, dorsiflexion is weak on the left side. Sensation intact. Psychiatric: Alert and oriented, normal insight and thought content.    Capillary Refill: Brisk,< 3 seconds. Peripheral Pulses: +2 palpable, equal bilaterally. Labs:   Recent Labs     08/21/22  0552 08/23/22  0528   WBC 6.2 5.1   HGB 13.0 13.8   HCT 40.1 42.6    232       Recent Labs     08/21/22  0552 08/23/22  0528    141   K 4.2 4.2    105   CO2 27 28   BUN 6* 7   CREATININE 0.7 0.6   CALCIUM 8.8 8.8       No results for input(s): AST, ALT, BILIDIR, BILITOT, ALKPHOS in the last 72 hours. No results for input(s): INR in the last 72 hours. No results for input(s): Adonica Hoose in the last 72 hours. No results for input(s): PROCAL in the last 72 hours. Lab Results   Component Value Date/Time    NITRU NEGATIVE 08/17/2022 02:20 PM    WBCUA 5-9 08/17/2022 02:20 PM    BACTERIA FEW 08/17/2022 02:20 PM    RBCUA 0-2 08/17/2022 02:20 PM    BLOODU NEGATIVE 08/17/2022 02:20 PM    SPECGRAV 1.016 08/17/2022 02:20 PM    GLUCOSEU NEGATIVE 10/16/2021 05:00 PM       Radiology (48 hours):  CT Lumbar Spine WO Contrast    Result Date: 8/17/2022  Impression: 1. Grade 2 anterior subluxation of L5 relative to S1 is new when compared to prior CT of 10/8/2021. Degree of subluxation appears stable when compared to MRI of 8/17/2022. Loss of intervertebral disc height at L5-S1 stable since MRI but advanced since prior CT. 2. Posterior fusion at L5-S1 with bilateral pedicle screws is new since the CT of 2021. Right S1 screw is within the pedicle. Fracture of the right anterior superior endplate of S1. Lucency around the right S1 screw is nonspecific is likely from the fracture however osteomyelitis is not excluded. Please correlate with clinical presentation. Left L5 pedicle screw is outside the osseous pedicle. This document has been electronically signed by: Johanne Kapadia MD on 08/17/2022 08:38 PM All CTs at this facility use dose modulation techniques and iterative reconstructions, and/or weight-based dosing when appropriate to reduce radiation to a low as reasonably achievable.     CT PELVIS WO CONTRAST Additional Contrast? None    Result Date: 8/17/2022  Impression: 1. Grade 2 anterior subluxation of L5 relative to S1 is new when compared to prior CT of 10/8/2021. Degree of subluxation appears stable when compared to MRI of 8/17/2022. Loss of intervertebral disc height at L5-S1 stable since MRI but advanced since prior CT. 2. Posterior fusion at L5-S1 with bilateral pedicle screws is new since the CT of 2021. Right S1 screw is within the pedicle. Fracture of the right anterior superior endplate of S1. Lucency around the right S1 screw is nonspecific is likely from the fracture however osteomyelitis would be possible. Please correlate with clinical presentation. Left L5 pedicle screw is outside the osseous pedicle. This document has been electronically signed by: Mina Mitchell MD on 08/17/2022 08:36 PM All CTs at this facility use dose modulation techniques and iterative reconstructions, and/or weight-based dosing when appropriate to reduce radiation to a low as reasonably achievable. MRI CERVICAL SPINE WO CONTRAST    Result Date: 8/17/2022  Impression: 1. No evidence for discitis or osteomyelitis. 2. Multilevel degenerative disease of the cervical spine most prominent at C5-6 and C6-7 where there is moderate to severe canal stenosis This document has been electronically signed by: Mina Mitchell MD on 08/17/2022 09:17 PM    MRI THORACIC SPINE WO CONTRAST    Result Date: 8/17/2022  Impression: 1. No thoracic spine fracture seen. No evidence for osteomyelitis/discitis 2. Minimal degenerative disease as above. 3. Prominent central canal/minimal syrinx seen in the distal spinal cord at T11. This document has been electronically signed by: Mina Mitchell MD on 08/17/2022 09:31 PM    MRI LUMBAR SPINE W WO CONTRAST    Result Date: 8/17/2022   1.  The patient has undergone interval surgery with placement of bilateral pedicular screws at L5 and S1 and laminectomy defect at L5. 2. There is 10 mm of anterolisthesis of L5 relative to S1. This results in moderate to severe bilateral foraminal stenosis and mild canal stenosis. 3. There is abnormal signal intensity and enhancement in the S1 vertebral body suspicious for vertebral body osteomyelitis. Please correlate clinically. 4. There is a tiny syrinx in the distal thoracic spinal cord and conus. 5. There is mild canal and mild-to-moderate bilateral foraminal stenosis at L3-4 and L4-5. 6. Otherwise negative MRI scan of the lumbar spine with and without intravenous contrast. **This report has been created using voice recognition software. It may contain minor errors which are inherent in voice recognition technology. ** Final report electronically signed by DR Romi Yun on 8/17/2022 5:19 PM    MRI PELVIS WO CONTRAST    Result Date: 8/17/2022  Impression: 1. Findings at L5-S1 were seen on MRI of the lumbar spine performed earlier today and are redemonstrated without significant interval change. Abnormal bone marrow edema seen in the L5 and S1 vertebrae is nonspecific and may be related to postoperative changes, fracture, degenerative disease or infection/osteomyelitis. Today's pelvic CT had demonstrated a probable fracture of the anterior superior right endplate of S1 versus osteomyelitis. 2. L5-S1 mild canal narrowing with moderate to severe bilateral foraminal stenosis is stable when compared to earlier MR This document has been electronically signed by: Fidel Cancino MD on 08/17/2022 09:28 PM       DVT prophylaxis:    [x] Lovenox  [] SCDs  [] SQ Heparin  [] Encourage ambulation   [] Already on Anticoagulation       Diet: ADULT DIET; Clear Liquid  Code Status: Full Code  PT/OT: ordered  IVF: Patient eating and drinking.     Electronically signed by Felisa Jaimes MD on 8/23/2022 at 4:16 PM

## 2022-08-23 NOTE — BRIEF OP NOTE
Brief Postoperative Note      Patient: Barbara Beck  YOB: 1980  MRN: [id number]    Date of Procedure: 8/23/2022    Pre-Op Diagnosis: 3 months s/p L5-S1 decompression and fusion with Right S1 fracture causing right S1 screw loosening and lateral displacement of left L5 screw    Post-Op Diagnosis: Same       Procedure: L5-S1 HW removal, L5-S1 revision fusion, fusion ext up to L4 and down to S2    Surgeon(s):  Trinity Daniel MD    Assistant:  Physician Assistant: Elpidio Jean PA-C, Jenna Hooks PA-C    Anesthesia: General    Estimated Blood Loss (mL): 548 ml    Complications: None    Specimens:   * No specimens in log *    Implants:  Implant Name Type Inv. Item Serial No.  Lot No. LRB No. Used Action   KIT BNE GRFT L RHBMP-2 12MG INJ 10ML CONTAIN NDL 20GA - [id number]  KIT BNE GRFT L RHBMP-2 12MG INJ 10ML CONTAIN NDL 20GA  [employer] SPINALGRA[employer] TECH- [id number] N/A 1 Implanted   GRAFT BONE SUB 30ML CANC CUBE FRZ DRY - [id number]  GRAFT BONE SUB 30ML CANC CUBE FRZ DRY  [facility] TISSUE SERVICES- [id number] N/A 1 Implanted   SET SCR SPNL TI STREAMLINE - [id number]  SET SCR SPNL TI STREAMLINE  [employer]  N/A 7 Implanted   SCREW SPNL L40MM DIA6.5MM THORLUM TI ALLY POLYAX STREAMLINE - [id number]  SCREW SPNL L40MM DIA6.5MM THORLUM TI ALLY POLYAX STREAMLINE  [employer]  N/A 1 Implanted   SCREW SPNL L45MM DIA6.5MM 60DEG THORLUM PEDCL TI ALLY - [id number]  SCREW SPNL L45MM DIA6.5MM 60DEG THORLUM PEDCL TI ALLY  [employer]  N/A 3 Implanted   SCREW SPNL L50MM DIA6.5MM THORLUM TI ALLY POLYAX STREAMLINE - [id number]  SCREW SPNL L50MM DIA6.5MM THORLUM TI ALLY POLYAX STREAMLINE  [employer]  N/A 1 Implanted   SCREW SPNL L45MM DIA7. 5MM THORLUM TI ALLY POLYAX STREAMLINE - [id number]  SCREW SPNL L45MM DIA7. 5MM THORLUM TI ALLY POLYAX STREAMLINE  [employer]  N/A 1 Implanted   BELLE SPNL L110MM [id number] THORLUM PREBENT STREAMLINE - [id number]  BELLE SPNL L110MM [id number] THORLUM PREBENT STREAMLINE  [employer]  N/A 2 Implanted         Drains: * No LDAs found *    Findings: S1 fracture with screw loosening    Electronically signed by Andrew Chan PA-C on 8/23/2022 at 9:33 AM

## 2022-08-23 NOTE — PLAN OF CARE
Problem: Discharge Planning  Goal: Discharge to home or other facility with appropriate resources  8/22/2022 2155 by Art Carrera RN  Outcome: Progressing  Flowsheets (Taken 8/22/2022 1940)  Discharge to home or other facility with appropriate resources:   Identify barriers to discharge with patient and caregiver   Arrange for needed discharge resources and transportation as appropriate   Identify discharge learning needs (meds, wound care, etc)  Note: Patient plans to discharge home with home health or to an ECF for a period of time once medically stable. 8/22/2022 1017 by Kera Potter RN  Outcome: Progressing     Problem: Pain  Goal: Verbalizes/displays adequate comfort level or baseline comfort level  8/22/2022 2155 by Art Carrera RN  Outcome: Progressing  Flowsheets (Taken 8/21/2022 2308)  Verbalizes/displays adequate comfort level or baseline comfort level:   Encourage patient to monitor pain and request assistance   Assess pain using appropriate pain scale   Administer analgesics based on type and severity of pain and evaluate response   Implement non-pharmacological measures as appropriate and evaluate response  Note: Patient complaining of pain this shift. Prn pain medication available. Will monitor.    8/22/2022 1017 by Kera Potter RN  Outcome: Progressing     Problem: ABCDS Injury Assessment  Goal: Absence of physical injury  8/22/2022 2155 by Art Carrera RN  Outcome: Progressing  Flowsheets (Taken 8/22/2022 2153)  Absence of Physical Injury: Implement safety measures based on patient assessment  8/22/2022 1017 by Kera Potter RN  Outcome: Progressing     Problem: Safety - Adult  Goal: Free from fall injury  8/22/2022 2155 by Art Carrera RN  Outcome: Progressing  Flowsheets (Taken 8/22/2022 2153)  Free From Fall Injury:   Instruct family/caregiver on patient safety   Based on caregiver fall risk screen, instruct family/caregiver to ask for assistance with transferring infant if caregiver noted to have fall risk factors  Note: No falls noted this shift. Continue falling star program. Bed alarm on, bed in low position. Call light and personal belongings in reach. Patient uses call light appropriately. 8/22/2022 1017 by Corine Koyanagi, RN  Outcome: Progressing     Problem: Chronic Conditions and Co-morbidities  Goal: Patient's chronic conditions and co-morbidity symptoms are monitored and maintained or improved  8/22/2022 2155 by Johana Rhoades RN  Outcome: Progressing  Flowsheets (Taken 8/22/2022 1940)  Care Plan - Patient's Chronic Conditions and Co-Morbidity Symptoms are Monitored and Maintained or Improved:   Monitor and assess patient's chronic conditions and comorbid symptoms for stability, deterioration, or improvement   Collaborate with multidisciplinary team to address chronic and comorbid conditions and prevent exacerbation or deterioration   Update acute care plan with appropriate goals if chronic or comorbid symptoms are exacerbated and prevent overall improvement and discharge  8/22/2022 1017 by Corine Koyanagi, RN  Outcome: Progressing     Problem: Skin/Tissue Integrity  Goal: Absence of new skin breakdown  Description: 1. Monitor for areas of redness and/or skin breakdown  2. Assess vascular access sites hourly  3. Every 4-6 hours minimum:  Change oxygen saturation probe site  4. Every 4-6 hours:  If on nasal continuous positive airway pressure, respiratory therapy assess nares and determine need for appliance change or resting period. 8/22/2022 2155 by Johana Rhoades RN  Outcome: Progressing  Note: No new skin break down noted at this time. Encouraged patient to reposition self in bed.   8/22/2022 1017 by Corine Koyanagi, RN  Outcome: Progressing     Care plan reviewed with patient. Patient verbalizes understanding of plan of care and contributes to goal setting.

## 2022-08-23 NOTE — FLOWSHEET NOTE
08/23/22 1402   Safe Environment   Safety Measures   (virtual safety rounds complete)   Virtual nurse rounds, pt did not respond to audio, so camera turned on to visually check safety of patient. pt sleeping, resp unlabored, call light within reach.

## 2022-08-23 NOTE — PROGRESS NOTES
ID Progress Note    Patient - Chapito Cook,  Age - 43 y.o.    - 1980      Room Number - 6K-12/012-A   MRN -  [id number]   Acct # - [de-identified]  Date of Admission -  2022  1:34 PM    SUBJECTIVE:    She had surgery today. OBJECTIVE   VITALS    height is 5' 3\" (1.6 m) and weight is 181 lb 1.6 oz (82.1 kg). Her temperature is 98.2 °F (36.8 °C). Her blood pressure is 119/85 and her pulse is 112 (abnormal). Her respiration is 16 and oxygen saturation is 99%. Wt Readings from Last 3 Encounters:   22 181 lb 1.6 oz (82.1 kg)   22 186 lb 3.2 oz (84.5 kg)   22 181 lb 6.4 oz (82.3 kg)       I/O (24 Hours)    Intake/Output Summary (Last 24 hours) at 2022 1234  Last data filed at 2022 1117  Gross per 24 hour   Intake 1260 ml   Output 500 ml   Net 760 ml       General Appearance:  Awake, but drowsy  HEENT - normocephalic, atraumatic,slighlty pale  conjunctiva, anicteric sclera  Neck - Supple, no mass  Lungs -  Bilateral  air entry, no rhonchi, no wheeze  Cardiovascular - Heart sounds are normal.     Abdomen - soft, not distended, nontender,   Skin - No bruising or bleeding. Extremities - No edema, no cyanosis, clubbing.     MEDICATIONS:      ceFAZolin (ANCEF) IVPB  2,000 mg IntraVENous Q8H    sennosides-docusate sodium  1 tablet Oral BID    bisacodyl  5 mg Oral Daily    polyethylene glycol  17 g Oral Daily    thiamine  100 mg Oral Daily    folic acid  1 mg Oral Daily    venlafaxine  75 mg Oral Daily    sodium chloride flush  10 mL IntraVENous 2 times per day      sodium chloride      sodium chloride       oxyCODONE **OR** oxyCODONE, morphine **OR** morphine, cyclobenzaprine, diphenhydramine **OR** diphenhydrAMINE, bisacodyl, fleet, traZODone, hydrOXYzine pamoate, sodium chloride flush, sodium chloride, ondansetron **OR** ondansetron, acetaminophen **OR** [DISCONTINUED] acetaminophen, potassium chloride **OR** potassium alternative oral replacement **OR** potassium chloride, magnesium sulfate  LABS:   CBC   Recent Labs     08/23/22  0528   WBC 5.1   HGB 13.8   HCT 42.6   MCV 91.8        BMP:   Recent Labs     08/23/22  0528      K 4.2      CO2 28   BUN 7   CREATININE 0.6   GLUCOSE 92      CULTURES:   UA:   No results for input(s): SPECGRAV, PHUR, COLORU, CLARITYU, MUCUS, PROTEINU, BLOODU, RBCUA, WBCUA, BACTERIA, NITRU, GLUCOSEU, BILIRUBINUR, UROBILINOGEN, KETUA, LABCAST, LABCASTTY, AMORPHOS in the last 72 hours. Invalid input(s): CRYSTALS    Micro:   Lab Results   Component Value Date/Time    [facility]  08/17/2022 05:58 PM     No growth 24 hours. No growth 48 hours. No growth at 5 days      Patient problem list:     Patient Active Problem List   Diagnosis Code    Coker's esophagus without dysplasia K22.70    Moderate episode of recurrent major depressive disorder ([address] F33.1    Bipolar I disorder, current or most recent episode depressed, with psychotic features ([address] F31.5    Schizoaffective disorder, bipolar type ([address] F25.0    Severe mixed bipolar 1 disorder without psychosis ([address] F31.63    Alcohol use disorder, severe, dependence ([address] F10.20    Amphetamine substance use disorder, severe, in sustained remission ([address] F15.21    Bipolar I disorder with mixed features ([address] F31.9    Class 1 obesity in adult E66.9    Subclinical hypothyroidism E03.8    Anxiety F41.9    Lumbar stenosis without neurogenic claudication M48.061    Lumbar stenosis with neurogenic claudication M48.062    Unspecified orthopedic aftercare Z47.89    Depression F32. A    Osteomyelitis ([facility]) M86.9    Bilateral low back pain M54.50    Hypothyroidism E03.9    Osteomyelitis of lumbar spine ([facility]) M46.26    Closed type I fracture of sacrum with nonunion S32. 14XK    Alcohol abuse F10.10    Neurogenic claudication ([facility]) G95.19       Assessment and Plan:   Low back pain s/p L5-S1 decompression and posterior fusion: MRI showed fracture of anterior superior right endplate of S1 causing right screw loosening and left lateral L5 scrrew.  She had surgery today, no infection noted  Will continue perioperative antibiotic      Ed Jauregui MD

## 2022-08-23 NOTE — PROGRESS NOTES
Pt arrived to pre-op holding. No family at bedside. Pt had multiple bracelets, ring and nose ring. All jewelry removed and placed in bag and put in chart. Nose ring taped in place, could not remove. Temp 98.5 and bilateral nasal swabs completed.

## 2022-08-23 NOTE — FLOWSHEET NOTE
08/23/22 6527   Safe Environment   Safety Measures   (virtual safety rounds complete)   Virtual nurse rounds, patient in bed. Denies any needs at this time. No safety concerns identified. Call light within reach.  Will continue to monitor

## 2022-08-23 NOTE — OP NOTE
Operative Note      Patient: Evaristo Lubin  YOB: 1980  MRN: [id number]                     Account #: [de-identified]                               Admission Date: 08/17/2022    Provider:  Little Murray M.D.     DATE OF PROCEDURE: 8/23/2022     PREOPERATIVE DIAGNOSES:  1.  3-months status post L5-S1 decompression and fusion with S1 fracture and L5 and S1 screw loosening  2. Malpositioned left L5 screw  3. Obesity with BMI of 32.08     POSTOPERATIVE DIAGNOSES:  1.  3-months status post L5-S1 decompression and fusion with S1 fracture and L5 and S1 screw loosening  2. Malpositioned left L5 screw  3. Obesity with BMI of 32.08     OPERATION PERFORMED:  1. L5-S1 exploration of fusion  2. L5-S1 removal of instrumentation; bilateral L5 screws and right S1 screw  3. L4-S2 posterior spinal fusion extension. 4. L4-S2 posterior spinal instrumentation, Streamline, SurgAlign instrumentation. 5.  Use of local autograft bone, (L4 spinous process), crushed cancellous chips and large INFUSE BMP     SURGEON: Robert Michelle MD    ASSISTANT:  Maine Varela and Terrance Trevino PA-C  They assisted throughout the procedure with positioning, draping, retraction, wound closure, and dressing application. ANESTHESIA:  General.     INDICATIONS:  This is a 43 y.o. female who is status post L5-S1 decompression and fusion done in May who presented to the ED with complaints of low back pain with intermittent saddle paresthesias and urinary incontinence. Patient was found to have a fracture of the S1 endplate with screw loosening and malpositioned left L5 screw. MRI was also concerning for L5-S1 osteomyelitis. Surgery was held in order to obtain allograft cage in the setting of infection. Patient, therefore, understood indications for the surgery as well as its risks, benefits, and alternatives.   These risks include but are not limited to paralysis, infection, hematoma, dural tear, nerve root injury, nonunion, DVT/PE, stroke, MI, death etc.  All questions were answered. Informed consent was obtained. OPERATIVE PROCEDURE:  The patient was taken to the operating room by the Anesthesiology Service and had satisfactory general anesthesia. A first-generation cephalosporin was given within 1 hour of surgical incision. 2 gm of cefazolin was given IV. Venous thromboembolic prophylaxis was performed with sequential devices. The patient was then positioned prone on a standard OSI frame with the abdomen hanging free and all bony prominences well padded. The low back was then prepped and draped in its entirety in the usual sterile fashion. Before incision, a formal timeout was taken per protocol. We next took a midline longitudinal approach and performed subperiosteal dissection out to the tips of the transverse processes of L4 and S1 and fusion mass/instrumentation at L5-S1. Intraoperative localization of level was confirmed using the patient's instrumentation. We then explored the patient's fusion which was immature. We then removed the rods and pedicle screws bilaterally at L5 and S1 on the right, all of the screws removed were found to be grossly loose. The left S1 screw was kept in place. There was no obvious evidence of infection, osteomyelitis or discitis. Satisfied with this, we then turned our attention to perform spinal instrumentation and posterolateral fusion. Using anatomic landmarks and guided by direct visualization of the pedicles from within the canal, pedicle screws were placed bilaterally at L4, L5 and S2 bilaterally. S2 screw was placed starting with a medial projectory aiming superolateral to end in the tip of the anterior ala. The right L5 screw was upsized to a 7.5 x 45. The left L5 screw was repositioned. All the screws were completely interosseous as determined by bony palpation except for the tip of the S1 and S2 screw which remained bicortical by design.   Before the screws were inserted, the transverse processes were decorticated with a high-speed bur at L4, S2 and fusion mass L5-S1. Spinous process of L4 was removed, morselized and used for autograft. Local autograft bone combined with crushed cancellous chips in a large INFUSE BMP was placed over the decorticated elements bilaterally from L4-S2. The screws were then inserted which were under tapped by 1 mm. Two 110-mm rods were placed from L4-S2, set screws engaged and tightened down to their final torque. Everything was tightened down. A very rigid construct was achieved. Final x-ray was taken, demonstrated good position of the spine and all of the implants. Satisfied with this, we then achieved hemostasis. We then copiously irrigated the wound. We then inserted a Hemovac drain through a separate stab incision. The wound was then closed in layer with interrupted 1 and 2-0 Vicryl sutures and dusted with vancomycin powder. A 3-0 Monocryl was used for the skin. The skin edges were sealed with Dermabond. A dry sterile dressing was applied. The patient was then returned to the hospital bed, extubated, and taken to the recovery room in stable condition. Spinal cord monitoring remained stable throughout the operation. Specimens:   * No specimens in log *    Implants:  Implant Name Type Inv.  Item Serial No.  Lot No. LRB No. Used Action   KIT BNE GRFT L RHBMP-2 12MG INJ 10ML CONTAIN NDL 20GA - [id number]  KIT BNE GRFT L RHBMP-2 12MG INJ 10ML CONTAIN NDL 20GA  [employer] SPINALGRA[employer] TE[employer]- [id number] N/A 1 Implanted   GRAFT BONE SUB 30ML CANC CUBE FRZ DRY - [id number]  GRAFT BONE SUB 30ML CANC CUBE FRZ DRY  [facility] [employer]- [id number] N/A 1 Implanted   SET SCR SPNL TI STREAMLINE - [id number]  SET SCR SPNL TI STREAMLINE  [employer]  N/A 7 Implanted   SCREW SPNL L40MM DIA6.5MM THORLUM TI ALLY POLYAX STREAMLINE - [id number]  SCREW SPNL L40MM DIA6.5MM THORLUM TI ALLY POLYAX STREAMLINE  [employer]  N/A 1 Implanted   SCREW SPNL L45MM DIA6.5MM 60DEG THORLUM PEDCL TI ALLY - [id number]  SCREW SPNL L45MM DIA6.5MM 60DEG THORLUM PEDCL TI ALLY  [employer]  N/A 3 Implanted   SCREW SPNL L50MM DIA6.5MM THORLUM TI ALLY POLYAX STREAMLINE - [id number]  SCREW SPNL L50MM DIA6.5MM THORLUM TI ALLY POLYAX STREAMLINE  [employer]  N/A 1 Implanted   SCREW SPNL L45MM DIA7. 5MM THORLUM TI ALLY POLYAX STREAMLINE - [id number]  SCREW SPNL L45MM DIA7. 5MM THORLUM TI ALLY POLYAX STREAMLINE  [employer]  N/A 1 Implanted   BELLE SPNL L110MM [id number] THORLUM PREBENT STREAMLINE - [id number]  BELLE SPNL L110MM [id number] THORLUM PREBENT STREAMLINE  [employer]  N/A 2 Implanted       COMPLICATIONS:  None. SPECIMENS:  None. ESTIMATED BLOOD LOSS:  500 mL. POSTOPERATIVE CARE:  The patient will be recovered in PACU and then a regular nursing floor. Once the drainage is low and pain is under control, patient will be discharged home per clinical indication. Patient will follow up in the office in 6 weeks. At that time, AP and lateral x-rays of the lumbar spine will be obtained to assess instrumentation and fusion. MODIFIER 22:  Due to the patient's BMI greater than 30, modifier 22 will be applied due to the patient's case taking 50% longer due to poor visualization and orientation.     Electronically signed by Henry Geiger MD on 8/23/2022 at 1:06 PM

## 2022-08-23 NOTE — ANESTHESIA POSTPROCEDURE EVALUATION
Department of Anesthesiology  Postprocedure Note    Patient: Daryl Jett  MRN: [id number]  YOB: 1980  Date of evaluation: 8/23/2022      Procedure Summary     Date: 08/23/22 Room / Location: [facility]    Anesthesia Start: 0720 Anesthesia Stop: 9813    Procedure: HARDWARE REMOVAL L5-S1, LUMBAR REVISION FUSION L5-21, FUSION EXTENSION TO L4 and S2 (Back) Diagnosis:       Degenerative lumbar spinal stenosis      (Degenerative lumbar spinal stenosis [M48.061])    Surgeons: Yvan Mayes MD Responsible Provider: Eden Gonzalez MD    Anesthesia Type: General ASA Status: 3          Anesthesia Type: General    Danilo Phase I: Danilo Score: 9    Danilo Phase II:        Anesthesia Post Evaluation    Patient location during evaluation: PACU  Patient participation: complete - patient participated  Level of consciousness: awake and alert  Airway patency: patent  Nausea & Vomiting: no nausea  Complications: no  Cardiovascular status: blood pressure returned to baseline and hemodynamically stable  Respiratory status: acceptable and spontaneous ventilation  Hydration status: euvolemic

## 2022-08-23 NOTE — PROGRESS NOTES
Patient stated she had top dentures still in, CRNA had patient remove dentures and RN placed them in a denture cup with multiple patient sticker on it. Then cup placed in bag with another patient label.

## 2022-08-23 NOTE — ANESTHESIA PRE PROCEDURE
Department of Anesthesiology  Preprocedure Note       Name:  Jacqueline You   Age:  43 y.o.  :  1980                                          MRN:  [id number]         Date:  2022      Surgeon: Aryan Summers):  Jeannette Villalpando MD    Procedure: Procedure(s):  HARDWARE REMOVAL L5-S1, LUMBAR REVISION FUSION L5-21, FUSION EXTENSION TO S2    Medications prior to admission:   Prior to Admission medications    Medication Sig Start Date End Date Taking? Authorizing Provider   hydrOXYzine pamoate (VISTARIL) 50 MG capsule Take 50 mg by mouth 3 times daily as needed for Anxiety    Historical Provider, MD   ABILIFY MAINTENA 400 MG PRSY every 30 days 22   Historical Provider, MD   venlafaxine (EFFEXOR XR) 75 MG extended release capsule Take 1 capsule by mouth daily 22   Maisha Harp MD   traZODone (DESYREL) 50 MG tablet Take 1 tablet by mouth nightly as needed for Sleep 21   Maisha Harp MD       Current medications:    No current facility-administered medications for this visit. No current outpatient medications on file.      Facility-Administered Medications Ordered in Other Visits   Medication Dose Route Frequency Provider Last Rate Last Admin    ceFAZolin (ANCEF) 2000 mg in dextrose 5 % 50 mL IVPB  2,000 mg IntraVENous 60 Min Pre-Op ROMY Winter-ROMA        bisacodyl (DULCOLAX) EC tablet 5 mg  5 mg Oral Daily Luberta Males, PA-C   5 mg at 22 0759    polyethylene glycol (GLYCOLAX) packet 17 g  17 g Oral Daily Luberta Males, PA-C   17 g at 22 1611    oxyCODONE-acetaminophen (PERCOCET) 5-325 MG per tablet 1 tablet  1 tablet Oral Q4H PRN Eber Crooks MD   1 tablet at 22 0557    thiamine tablet 100 mg  100 mg Oral Daily Eber Crooks MD   100 mg at  3610    folic acid (FOLVITE) tablet 1 mg  1 mg Oral Daily Eber Crooks MD   1 mg at 22 0759    venlafaxine (EFFEXOR XR) extended release capsule 75 mg  75 mg Oral Daily Ed Valladares PA-C 75 mg at 08/22/22 0759    traZODone (DESYREL) tablet 50 mg  50 mg Oral Nightly PRN ROMY Barrera-ROMA        hydrOXYzine pamoate (VISTARIL) capsule 50 mg  50 mg Oral TID PRN Kelsi Burdick PA-C   50 mg at 08/18/22 0311    sodium chloride flush 0.9 % injection 10 mL  10 mL IntraVENous 2 times per day ROMY Hess-C   10 mL at 08/22/22 1944    sodium chloride flush 0.9 % injection 10 mL  10 mL IntraVENous PRN Kelsi Burdick PA-C        0.9 % sodium chloride infusion   IntraVENous PRN ROMY Barrera-ROMA        ondansetron (ZOFRAN-ODT) disintegrating tablet 4 mg  4 mg Oral Q8H PRN ROMY Barrera-C   4 mg at 08/21/22 1614    Or    ondansetron (ZOFRAN) injection 4 mg  4 mg IntraVENous Q6H PRN Kelsi Burdick PA-C        acetaminophen (TYLENOL) tablet 650 mg  650 mg Oral Q6H PRN Kelsi Burdick PA-C        Or    acetaminophen (TYLENOL) suppository 650 mg  650 mg Rectal Q6H PRN Kelsi Burdick PA-C        potassium chloride (KLOR-CON M) extended release tablet 40 mEq  40 mEq Oral PRN Kelsi Burdick PA-C        Or    potassium bicarb-citric acid (EFFER-K) effervescent tablet 40 mEq  40 mEq Oral PRN Kelsi Burdick PA-C        Or    potassium chloride 10 mEq/100 mL IVPB (Peripheral Line)  10 mEq IntraVENous PRN Kelsi Burdick, PA-C        magnesium sulfate 2000 mg in 50 mL IVPB premix  2,000 mg IntraVENous PRN Kelsi Gennaro, PA-C           Allergies:     Allergies   Allergen Reactions    Dairycare [Lactase-Lactobacillus] Nausea And Vomiting       Problem List:    Patient Active Problem List   Diagnosis Code    Coker's esophagus without dysplasia K22.70    Moderate episode of recurrent major depressive disorder ([facility] F33.1    Bipolar I disorder, current or most recent episode depressed, with psychotic features ([email or web address] F31.5    Schizoaffective disorder, bipolar type ([facility] [facility] F25.0    Severe mixed bipolar 1 disorder without psychosis ([email or web address] F31.63    Alcohol use Status:                                                                                 BMI:   Wt Readings from Last 3 Encounters:   08/20/22 181 lb 1.6 oz (82.1 kg)   08/02/22 186 lb 3.2 oz (84.5 kg)   05/06/22 181 lb 6.4 oz (82.3 kg)     There is no height or weight on file to calculate BMI.    CBC:   Lab Results   Component Value Date/Time    WBC 5.1 08/23/2022 05:28 AM    RBC 4.64 08/23/2022 05:28 AM    RBC 0-2 11/19/2011 06:25 PM    HGB 13.8 08/23/2022 05:28 AM    HCT 42.6 08/23/2022 05:28 AM    MCV 91.8 08/23/2022 05:28 AM    RDW 12.4 01/27/2013 09:39 PM     08/23/2022 05:28 AM       CMP:   Lab Results   Component Value Date/Time     08/23/2022 05:28 AM    K 4.2 08/23/2022 05:28 AM     08/23/2022 05:28 AM    CO2 28 08/23/2022 05:28 AM    BUN 7 08/23/2022 05:28 AM    CREATININE 0.6 08/23/2022 05:28 AM    LABGLOM >90 08/23/2022 05:28 AM    GLUCOSE 92 08/23/2022 05:28 AM    GLUCOSE NEGATIVE 11/19/2011 06:25 PM    PROT 6.4 08/17/2022 02:20 PM    CALCIUM 8.8 08/23/2022 05:28 AM    BILITOT 0.4 08/17/2022 02:20 PM    ALKPHOS 95 08/17/2022 02:20 PM    AST 18 08/17/2022 02:20 PM    ALT 12 08/17/2022 02:20 PM       POC Tests: No results for input(s): POCGLU, POCNA, POCK, POCCL, POCBUN, POCHEMO, POCHCT in the last 72 hours. Coags: No results found for: PROTIME, INR, APTT    HCG (If Applicable):   Lab Results   Component Value Date    PREGTESTUR NEGATIVE 06/23/2022    PREGSERUM NEGATIVE 08/17/2022        ABGs: No results found for: PHART, PO2ART, XYM9SKA, QSS2YDW, BEART, X6GQGEIY     Type & Screen (If Applicable):  No results found for: LABABO, LABRH    Drug/Infectious Status (If Applicable):  No results found for: HIV, HEPCAB    COVID-19 Screening (If Applicable):   Lab Results   Component Value Date/Time    COVID19 NOT  DETECTED 10/16/2021 06:15 PM           Anesthesia Evaluation  Patient summary reviewed and Nursing notes reviewed   history of anesthetic complications: PONV.   Airway: Mallampati: II  TM distance: >3 FB   Neck ROM: full  Mouth opening: > = 3 FB   Dental:          Pulmonary:normal exam  breath sounds clear to auscultation  (+) current smoker          Patient did not smoke on day of surgery. Cardiovascular:  Exercise tolerance: good (>4 METS),   (+) hypertension:,       ECG reviewed                        Neuro/Psych:   (+) neuromuscular disease:, psychiatric history:            GI/Hepatic/Renal:            ROS comment: obesity. Endo/Other:    (+) hypothyroidism::., .          Pt had no PAT visit       Abdominal:             Vascular: negative vascular ROS. Other Findings:             Anesthesia Plan      general     ASA 3       Induction: intravenous. MIPS: Postoperative opioids intended and Prophylactic antiemetics administered. Anesthetic plan and risks discussed with patient. Plan discussed with CRNA.                     Dread Ng MD   8/23/2022

## 2022-08-23 NOTE — PROGRESS NOTES
[facility]  OCCUPATIONAL THERAPY MISSED TREATMENT NOTE  [facility] RENAL TELEMETRY 6K  6K-12/012-A      Date: 2022  Patient Name: Aaron Cleveland        CSN: [id number]   : 1980  (43 y.o.)  Gender: female                REASON FOR MISSED TREATMENT:  patient off floor for surgery of HARDWARE REMOVAL L5-S1, LUMBAR REVISION FUSION L5-21, FUSION EXTENSION TO L4 and S2 (Back) . OT To check back tomorrow.

## 2022-08-24 PROBLEM — G89.18 POST-OP PAIN: Status: ACTIVE | Noted: 2022-08-24

## 2022-08-24 PROBLEM — Z98.1 S/P LUMBAR FUSION: Status: ACTIVE | Noted: 2022-08-24

## 2022-08-24 PROBLEM — R52 ACUTE PAIN: Status: ACTIVE | Noted: 2022-08-24

## 2022-08-24 PROBLEM — M54.16 LUMBAR RADICULOPATHY: Status: ACTIVE | Noted: 2022-08-24

## 2022-08-24 PROBLEM — G89.4 CHRONIC PAIN SYNDROME: Status: ACTIVE | Noted: 2022-08-24

## 2022-08-24 LAB
ANION GAP SERPL CALCULATED.3IONS-SCNC: 8 MEQ/L (ref 8–16)
BASOPHILS # BLD: 0.1 %
BASOPHILS ABSOLUTE: 0 THOU/MM3 (ref 0–0.1)
BUN BLDV-MCNC: 12 MG/DL (ref 7–22)
CALCIUM SERPL-MCNC: 8.3 MG/DL (ref 8.5–10.5)
CHLORIDE BLD-SCNC: 108 MEQ/L (ref 98–111)
CO2: 23 MEQ/L (ref 23–33)
CREAT SERPL-MCNC: 0.6 MG/DL (ref 0.4–1.2)
EOSINOPHIL # BLD: 0 %
EOSINOPHILS ABSOLUTE: 0 THOU/MM3 (ref 0–0.4)
ERYTHROCYTE [DISTWIDTH] IN BLOOD BY AUTOMATED COUNT: 13.1 % (ref 11.5–14.5)
ERYTHROCYTE [DISTWIDTH] IN BLOOD BY AUTOMATED COUNT: 45.3 FL (ref 35–45)
GFR SERPL CREATININE-BSD FRML MDRD: > 90 ML/MIN/1.73M2
GLUCOSE BLD-MCNC: 127 MG/DL (ref 70–108)
HCT VFR BLD CALC: 34.3 % (ref 37–47)
HEMOGLOBIN: 10.9 GM/DL (ref 12–16)
IMMATURE GRANS (ABS): 0.07 THOU/MM3 (ref 0–0.07)
IMMATURE GRANULOCYTES: 0.5 %
LYMPHOCYTES # BLD: 9.7 %
LYMPHOCYTES ABSOLUTE: 1.5 THOU/MM3 (ref 1–4.8)
MCH RBC QN AUTO: 29.9 PG (ref 26–33)
MCHC RBC AUTO-ENTMCNC: 31.8 GM/DL (ref 32.2–35.5)
MCV RBC AUTO: 94.2 FL (ref 81–99)
MONOCYTES # BLD: 5.5 %
MONOCYTES ABSOLUTE: 0.8 THOU/MM3 (ref 0.4–1.3)
NUCLEATED RED BLOOD CELLS: 0 /100 WBC
PLATELET # BLD: 214 THOU/MM3 (ref 130–400)
PMV BLD AUTO: 9.6 FL (ref 9.4–12.4)
POTASSIUM REFLEX MAGNESIUM: 4.5 MEQ/L (ref 3.5–5.2)
RBC # BLD: 3.64 MILL/MM3 (ref 4.2–5.4)
SEG NEUTROPHILS: 84.2 %
SEGMENTED NEUTROPHILS ABSOLUTE COUNT: 12.6 THOU/MM3 (ref 1.8–7.7)
SODIUM BLD-SCNC: 139 MEQ/L (ref 135–145)
WBC # BLD: 15 THOU/MM3 (ref 4.8–10.8)

## 2022-08-24 PROCEDURE — 6370000000 HC RX 637 (ALT 250 FOR IP)

## 2022-08-24 PROCEDURE — 97535 SELF CARE MNGMENT TRAINING: CPT

## 2022-08-24 PROCEDURE — 1200000000 HC SEMI PRIVATE

## 2022-08-24 PROCEDURE — 97530 THERAPEUTIC ACTIVITIES: CPT

## 2022-08-24 PROCEDURE — 97116 GAIT TRAINING THERAPY: CPT

## 2022-08-24 PROCEDURE — 99223 1ST HOSP IP/OBS HIGH 75: CPT | Performed by: NURSE PRACTITIONER

## 2022-08-24 PROCEDURE — 36415 COLL VENOUS BLD VENIPUNCTURE: CPT

## 2022-08-24 PROCEDURE — 97166 OT EVAL MOD COMPLEX 45 MIN: CPT

## 2022-08-24 PROCEDURE — 85025 COMPLETE CBC W/AUTO DIFF WBC: CPT

## 2022-08-24 PROCEDURE — 80048 BASIC METABOLIC PNL TOTAL CA: CPT

## 2022-08-24 PROCEDURE — 97162 PT EVAL MOD COMPLEX 30 MIN: CPT

## 2022-08-24 PROCEDURE — 6370000000 HC RX 637 (ALT 250 FOR IP): Performed by: NURSE PRACTITIONER

## 2022-08-24 PROCEDURE — 99232 SBSQ HOSP IP/OBS MODERATE 35: CPT | Performed by: INTERNAL MEDICINE

## 2022-08-24 PROCEDURE — 2580000003 HC RX 258

## 2022-08-24 RX ORDER — POLYETHYLENE GLYCOL 3350 17 G/17G
17 POWDER, FOR SOLUTION ORAL DAILY PRN
Status: DISCONTINUED | OUTPATIENT
Start: 2022-08-24 | End: 2022-08-25 | Stop reason: HOSPADM

## 2022-08-24 RX ORDER — CYCLOBENZAPRINE HCL 10 MG
10 TABLET ORAL 3 TIMES DAILY PRN
Qty: 21 TABLET | Refills: 0 | Status: SHIPPED | OUTPATIENT
Start: 2022-08-24 | End: 2022-08-31

## 2022-08-24 RX ORDER — PREGABALIN 75 MG/1
75 CAPSULE ORAL 2 TIMES DAILY
Status: DISCONTINUED | OUTPATIENT
Start: 2022-08-24 | End: 2022-08-25 | Stop reason: HOSPADM

## 2022-08-24 RX ORDER — OXYCODONE HYDROCHLORIDE AND ACETAMINOPHEN 5; 325 MG/1; MG/1
2 TABLET ORAL EVERY 4 HOURS PRN
Status: DISCONTINUED | OUTPATIENT
Start: 2022-08-24 | End: 2022-08-25 | Stop reason: HOSPADM

## 2022-08-24 RX ORDER — DOCUSATE SODIUM 100 MG/1
100 CAPSULE, LIQUID FILLED ORAL 2 TIMES DAILY
Status: DISCONTINUED | OUTPATIENT
Start: 2022-08-24 | End: 2022-08-25 | Stop reason: HOSPADM

## 2022-08-24 RX ORDER — LIDOCAINE 4 G/G
3 PATCH TOPICAL DAILY
Status: DISCONTINUED | OUTPATIENT
Start: 2022-08-24 | End: 2022-08-25 | Stop reason: HOSPADM

## 2022-08-24 RX ORDER — OXYCODONE HYDROCHLORIDE AND ACETAMINOPHEN 5; 325 MG/1; MG/1
1 TABLET ORAL EVERY 4 HOURS PRN
Status: DISCONTINUED | OUTPATIENT
Start: 2022-08-24 | End: 2022-08-25 | Stop reason: HOSPADM

## 2022-08-24 RX ORDER — SENNA PLUS 8.6 MG/1
2 TABLET ORAL NIGHTLY
Status: DISCONTINUED | OUTPATIENT
Start: 2022-08-24 | End: 2022-08-25 | Stop reason: HOSPADM

## 2022-08-24 RX ADMIN — DOCUSATE SODIUM 100 MG: 100 CAPSULE, LIQUID FILLED ORAL at 20:44

## 2022-08-24 RX ADMIN — PREGABALIN 75 MG: 75 CAPSULE ORAL at 20:44

## 2022-08-24 RX ADMIN — VENLAFAXINE HYDROCHLORIDE 75 MG: 75 CAPSULE, EXTENDED RELEASE ORAL at 09:25

## 2022-08-24 RX ADMIN — OXYCODONE 10 MG: 5 TABLET ORAL at 09:24

## 2022-08-24 RX ADMIN — FOLIC ACID 1 MG: 1 TABLET ORAL at 09:25

## 2022-08-24 RX ADMIN — HYDROXYZINE PAMOATE 50 MG: 25 CAPSULE ORAL at 12:07

## 2022-08-24 RX ADMIN — Medication 100 MG: at 09:25

## 2022-08-24 RX ADMIN — OXYCODONE AND ACETAMINOPHEN 2 TABLET: 5; 325 TABLET ORAL at 22:01

## 2022-08-24 RX ADMIN — SODIUM CHLORIDE, PRESERVATIVE FREE 10 ML: 5 INJECTION INTRAVENOUS at 20:47

## 2022-08-24 RX ADMIN — DOCUSATE SODIUM 50 MG AND SENNOSIDES 8.6 MG 1 TABLET: 8.6; 5 TABLET, FILM COATED ORAL at 09:25

## 2022-08-24 RX ADMIN — BISACODYL 5 MG: 5 TABLET, COATED ORAL at 09:25

## 2022-08-24 RX ADMIN — POLYETHYLENE GLYCOL 3350 17 G: 17 POWDER, FOR SOLUTION ORAL at 09:28

## 2022-08-24 RX ADMIN — CYCLOBENZAPRINE 10 MG: 10 TABLET, FILM COATED ORAL at 12:07

## 2022-08-24 RX ADMIN — OXYCODONE AND ACETAMINOPHEN 2 TABLET: 5; 325 TABLET ORAL at 16:38

## 2022-08-24 RX ADMIN — SENNOSIDES 17.2 MG: 8.6 TABLET, FILM COATED ORAL at 20:44

## 2022-08-24 RX ADMIN — CYCLOBENZAPRINE 10 MG: 10 TABLET, FILM COATED ORAL at 20:44

## 2022-08-24 ASSESSMENT — PAIN DESCRIPTION - LOCATION
LOCATION: BACK

## 2022-08-24 ASSESSMENT — PAIN SCALES - GENERAL
PAINLEVEL_OUTOF10: 9
PAINLEVEL_OUTOF10: 8
PAINLEVEL_OUTOF10: 10
PAINLEVEL_OUTOF10: 10
PAINLEVEL_OUTOF10: 8
PAINLEVEL_OUTOF10: 6

## 2022-08-24 ASSESSMENT — PAIN DESCRIPTION - ORIENTATION
ORIENTATION: MID
ORIENTATION: LOWER
ORIENTATION: LOWER
ORIENTATION: MID;LOWER
ORIENTATION: RIGHT;LEFT

## 2022-08-24 ASSESSMENT — PAIN DESCRIPTION - DESCRIPTORS
DESCRIPTORS: ACHING;DISCOMFORT
DESCRIPTORS: ACHING;DISCOMFORT
DESCRIPTORS: ACHING

## 2022-08-24 NOTE — CONSULTS
Pain Consult Note      Admitting Physician: Lilli Bergeron DO    Primary Care Provider: RAMÓN Morales CNP     Reason for Consult:  Uncontrolled pain    History of Present Illness:  Lucas Asencio is a 43 y.o. female admitted to [facility] on 8/17/2022. She has a personal medical history of anxiety, depression, colitis, fibromyalgia, obsessive-compulsive disorder, schizoaffective disorder, bipolar, seizures, thyroid disease, drug abuse, alcohol abuse, chronic back pain. Patient presented to [facility] emergency department with complaints of increased low back pain. She reported she had a L5-S1 fusion completed with Dr. Bindu Olvera on 5/6/2022. She reports that her back pain has been increasing in the last couple days, she states she thinks it was due to her swimming and going down water slides. She states it did radiate into both legs as well. She reported the back pain is sharp, numbness and tingling in both feet, as well as 2 episodes of urinary incontinence. Labs and MRI was completed, MRI did show possible osteomyelitis of L5. Orthopedics was called, and patient was admitted. Patient also did subsequently have psychiatry and infectious disease consult as well. Patient then did undergo on 8/23/2022 a hardware removal L5-S1, with lumbar revision fusion L5-S2 a fusion extension, with Dr. Bindu Olvera. Currently she does have morphine 2-4 mg every 2 hours as needed available on her MAR, of which she has used none in the past 24 hours. She also has available oxycodone immediate release 5-10 mg every 4 hours as needed, of which she has used 3 doses of the 10 mg in the past 24 hours. The patient was seen by the pain team today, and at the time of our evaluation the patient complains of pain at a 10/10 in low back, bilateral legs. Pain is described as burning in the low back that is constant, and occasional shocking, shooting, spasms in the bilateral legs to her toes.   She reports she had had pain for years previous to her fusion that was completed in March with Dr. Mario Mcconnell. She states it was in her low back and into her left leg at that time. She states since then she has not received any relief, feels like it is actually increased her back pain since then. At home previous this admission she states she would typically take naproxen, use icy hot, take Baths with mild relief. She says currently she feels oxycodone is not helping relieve any pain, just makes her feel \"buzzed\". She reports that she feels Flexeril does help a little bit with the muscle spasms, but overall feels like Percocet has helped her pain the most.  She states when she was using Percocet would get her pain down to a 5/10 at best.  She does report that she did use meth about 4 years ago, has not since. She also reports that she has been using alcohol and marijuana to help her pain outpatient. She states she feels like she has used Neurontin in the past, but states she was drunk at the time and does not know if it made any difference to her. Past Medical History:        Diagnosis Date    Anxiety     Back pain     Colitis     Depression     Fibromyalgia     HPV (human papilloma virus) anogenital infection     Hypertension     OCD (obsessive compulsive disorder)     Osteoarthritis (arthritis due to wear and tear of joints)     PONV (postoperative nausea and vomiting)     Seizure ([address]     \" in [place] in 2021 was checked out by EMS but not taken to hospital\"    Thyroid disease        Past Surgical History:        Procedure Laterality Date    CHOLECYSTECTOMY      INNER EAR SURGERY      LUMBAR FUSION N/A 5/6/2022    L5=S1 Decompression L5-S1 Posterior Fusion & TLIF performed by Raman Quinn MD at [address] [facility] 8/23/2022    HARDWARE REMOVAL L5-S1, LUMBAR REVISION FUSION L5-21, FUSION EXTENSION TO L4 and S2 performed by Raman Quinn MD at [facility]:     Allergies   Allergen Reactions    Dairycare [Lactase-Lactobacillus] Nausea And Vomiting        Current Facility-Administered Medications   Medication Dose Route Frequency Provider Last Rate Last Admin    oxyCODONE (ROXICODONE) immediate release tablet 5 mg  5 mg Oral Q4H PRN [facility] MALU [facility]        Or    oxyCODONE (ROXICODONE) immediate release tablet 10 mg  [facility]   10 mg at 08/24/22 5557    morphine (PF) injection 2 mg  2 mg IntraVENous [facility]        Or    morphine (PF) injection 4 mg  4 mg Intra[facility] Q2H [facility]        cyclobenzaprine (FLEXERIL) tablet 10 mg  10 mg Oral TID [facility]   10 mg at 08/24/22 1207    diphenhydrAMINE (BENADRYL) tablet 25 mg  25 mg [facility]        Or    diphenhydrAMINE (BENADRYL) injection 25 mg  25 mg IntraVENous Q6H [facility]        sennosides-docusate sodium (SENOKOT-S) 8.6-50 MG tablet 1 tablet  1 tablet Oral BID Risa MALU   1 tablet at 08/24/22 6221    bisacodyl (DULCOLAX) suppository 10 mg  10 mg Rectal Daily [facility]        fleet rectal enema 1 enema  1 enema Rectal Daily [facility]        bisacodyl (DULCOLAX) EC tablet 5 mg  5 mg [facility]   5 mg at 08/24/22 7414    polyethylene glycol (GLYCOLAX) packet 17 g  17 g [facility]   17 g at 08/24/22 0172    thiamine tablet 100 mg  100 mg [facility]   100 mg at 01/83/87 1599    folic acid (FOLVITE) tablet 1 mg  1 mg [facility]   1 mg at 08/24/22 3396    venlafaxine (EFFEXOR XR) extended release capsule 75 mg  75 mg [facility]   75 mg at 08/24/22 2138    traZODone (DESYREL) tablet 50 mg  50 mg Oral Nightly PRN Risa Gr PA-C        hydrOXYzine pamoate (VISTARIL) capsule 50 mg  50 mg Oral TID PRN Risa Gr PA-C   50 mg at 08/24/22 1207    sodium chloride flush 0.9 % injection 10 mL  10 mL IntraVENous 2 times per day Geoff Toure Merlin Russell, PA-C   10 mL at 08/23/22 2028    sodium chloride flush 0.9 % injection 10 mL  10 mL IntraVENous PRN Patricia Chime, PA-C        0.9 % sodium chloride infusion   IntraVENous PRN Patricia Chime, PA-C   New Bag at 08/23/22 0720    ondansetron (ZOFRAN-ODT) disintegrating tablet 4 mg  4 mg Oral Q8H PRN Patricia Chime, PA-C   4 mg at 08/21/22 1614    Or    ondansetron (ZOFRAN) injection 4 mg  4 mg IntraVENous Q6H PRN Patricia Chime, PA-C        acetaminophen (TYLENOL) tablet 650 mg  650 mg Oral Q6H PRN Patricia Chime, PA-C        magnesium sulfate 2000 mg in 50 mL IVPB premix  2,000 mg IntraVENous PRN Patricia Chime, PA-C             Social History:  Social History     Socioeconomic History    Marital status:      Spouse name: Not on file    Number of children: 1    Years of education: 12    Highest education level: Not on file   Occupational History     Employer: NOT EMPLOYED   Tobacco Use    Smoking status: Every Day     Packs/day: 0.50     Types: Cigarettes    Smokeless tobacco: Never   Vaping Use    Vaping Use: Former   Substance and Sexual Activity    Alcohol use: Yes     Comment: occasionally    Drug use: Yes     Types: Marijuana Yuki Randall)     Comment: every now and than    Sexual activity: Yes     Partners: Male   Other Topics Concern    Not on file   Social History Narrative    Not on file     Social Determinants of Health     Financial Resource Strain: Low Risk     Difficulty of Paying Living Expenses: Not hard at all   Food Insecurity: No Food Insecurity    Worried About Running Out of Food in the Last Year: Never true    Ran Out of Food in the Last Year: Never true   Transportation Needs: Not on file   Physical Activity: Not on file   Stress: Not on file   Social Connections: Not on file   Intimate Partner Violence: Not on file   Housing Stability: Not on file       Family History:       Problem Relation Age of Onset    Diabetes Mother     Heart Disease Mother     Diabetes Father     Heart Disease Father     Cancer Paternal Aunt        Review of Systems:  CONSTITUTIONAL:  positive for  fatigue  EYES:  negative  HEENT:  negative  RESPIRATORY:  negative  CARDIOVASCULAR:  negative  GASTROINTESTINAL:  positive for constipation  GENITOURINARY:  negative  SKIN:  surgical incision, covered with dressing  HEMATOLOGIC/LYMPHATIC:  negative  MUSCULOSKELETAL:  positive for  myalgias, arthralgias, and pain  NEUROLOGICAL:  negative  BEHAVIOR/PSYCH:  negative  System review otherwise negative      Physical Exam:  /74   Pulse 94   Temp 98.6 °F (37 °C) (Oral)   Resp 18   Ht 5' 3\" (1.6 m) Comment: per ED note  Wt 181 lb 14.1 oz (82.5 kg)   SpO2 99%   BMI 32.22 kg/m²   awake  Orientation:   person, place, time  Mood: labile  Affect: labile  General appearance: no acute distress    Memory:   normal,   Attention/Concentration: abnormal - difficulty concentrating  Language:  normal    Cranial Nerves:  cranial nerves II-XII are grossly intact  ROM:  normal  Motor Exam:  Motor exam is symmetrical 5 out of 5 all extremities bilaterally  Tone:  normal  Muscle bulk: within normal limits  Sensory:  Sensory intact  + tenderness along paraspinals bilateral SI joints    Heart: normal rate and regular rhythm  Lungs: normal effort  Abdomen: non-distended    Skin: warm and dry and surgical dressing in place, with drain   Peripheral vascular: Pulses: Normal upper and lower extremity pulses; Edema: no      Diagnostics:  Recent Results (from the past 24 hour(s))   CBC with Auto Differential    Collection Time: 08/24/22  5:42 AM   Result Value Ref Range    WBC 15.0 (H) 4.8 - 10.8 thou/mm3    RBC 3.64 (L) 4.20 - 5.40 mill/mm3    Hemoglobin 10.9 (L) 12.0 - 16.0 gm/dl    Hematocrit 34.3 (L) 37.0 - 47.0 %    MCV 94.2 81.0 - 99.0 fL    MCH 29.9 26.0 - 33.0 pg    MCHC 31.8 (L) 32.2 - 35.5 gm/dl    RDW-CV 13.1 11.5 - 14.5 %    RDW-SD 45.3 (H) 35.0 - 45.0 fL    Platelets 513 683 - 176 thou/mm3    MPV 9.6 9.4 - 12.4 fL    Seg Neutrophils 84.2 %    Lymphocytes 9.7 %    Monocytes 5.5 %    Eosinophils 0.0 %    Basophils 0.1 %    Immature Granulocytes 0.5 %    Segs Absolute 12.6 (H) 1.8 - 7.7 thou/mm3    Lymphocytes Absolute 1.5 1.0 - 4.8 thou/mm3    Monocytes Absolute 0.8 0.4 - 1.3 thou/mm3    Eosinophils Absolute 0.0 0.0 - 0.4 thou/mm3    Basophils Absolute 0.0 0.0 - 0.1 thou/mm3    Immature Grans (Abs) 0.07 0.00 - 0.07 thou/mm3    nRBC 0 /100 wbc   Basic Metabolic Panel w/ Reflex to MG    Collection Time: 08/24/22  5:42 AM   Result Value Ref Range    Sodium 139 135 - 145 meq/L    Potassium reflex Magnesium 4.5 3.5 - 5.2 meq/L    Chloride 108 98 - 111 meq/L    CO2 23 23 - 33 meq/L    Glucose 127 (H) 70 - 108 mg/dL    BUN 12 7 - 22 mg/dL    Creatinine 0.6 0.4 - 1.2 mg/dL    Calcium 8.3 (L) 8.5 - 10.5 mg/dL   Anion Gap    Collection Time: 08/24/22  5:42 AM   Result Value Ref Range    Anion Gap 8.0 8.0 - 16.0 meq/L   Glomerular Filtration Rate, Estimated    Collection Time: 08/24/22  5:42 AM   Result Value Ref Range    Est, Glom Filt Rate >90 ml/min/1.73m2     XR LUMBAR SPINE (2-3 VIEWS) [[id number]] Resulted: 08/23/22 1603      Order Status: Completed Updated: 08/23/22 1605     Narrative:       PROCEDURE: XR LUMBAR SPINE (2-3 VIEWS)     CLINICAL INFORMATION: Intraoperative assessment. Extended effusion. COMPARISON: Lumbar spine radiographs 5/6/2022. TECHNIQUE: 4 fluoroscopic images from lumbar fusion. FINDINGS:   Posterior fusion hardware spans L3-S1. Please see surgical dictation for additional assessment and procedural findings. MRI THORACIC SPINE WO CONTRAST [[id number]] Collected: 08/17/22 2031      Order Status: Completed Updated: 08/17/22 2131     Narrative:       Exam: MRI of the thoracic spine without contrast     Comparison: None     Clinical history: Possible spinal osteomyelitis     Findings:   Alignment of the thoracic spine is anatomic. No thoracic spine fractures are identified.    Vertebral body height is normal.   Intervertebral disc height is maintained. No evidence for osteomyelitis/discitis   Prominent central canal/minimal syrinx seen at the distal spinal cord seen   at T11. Otherwise no abnormal cord signal.     At T1-2, no disc bulge. No canal or foraminal stenosis. At T2-3, no disc bulge. No canal or foraminal stenosis. At T3-4, no disc bulge. No canal or foraminal stenosis. At T4-5, no disc bulge. No canal or foraminal stenosis. At T5-6, no disc bulge. No canal or foraminal stenosis. At T6-7, no disc bulge. No canal or foraminal stenosis. At T7-8, no disc bulge. No canal or foraminal stenosis. At T8-9 minimal disc bulge. No significant canal stenosis. No foraminal   narrowing. At T9-10, mild disc bulge with very mild canal narrowing. No foraminal   narrowing. At T10-11, no disc bulge. No canal or foraminal stenosis. At T11-12, no disc bulge. No canal or foraminal stenosis. At T12-L1, no disc bulge. No canal or foraminal stenosis     At T12-L1, no disc bulge. No canal or foraminal stenosis      Impression:       Impression:   1. No thoracic spine fracture seen. No evidence for osteomyelitis/discitis   2. Minimal degenerative disease as above. 3. Prominent central canal/minimal syrinx seen in the distal spinal cord   at T11. MRI PELVIS WO CONTRAST [[id number]] Collected: 08/17/22 2028      Order Status: Completed Updated: 08/17/22 2129     Narrative:       Exam: MRI of the pelvis without contrast     Comparison: 8/17/2022, 10/18/2021     Clinical history: Osteomyelitis     Findings:   Please see results from today's CT and MRI of the lumbar spine as well as   CT pelvis. MRI pelvis field of view includes the sacrum and SI joints but not the   more anterior pelvis, including hips. Findings at L5-S1 are redemonstrated with posterior fusion, grade 2   subluxation, loss of intervertebral disc height, endplate osteophytosis,   abnormal bone marrow edema within the L5 and S1 vertebrae. Today's CT had   demonstrated probable fracture of the anterior superior right endplate of   the S1 vertebra versus osteomyelitis. Abnormal bone marrow edema seen in   the L5 and S1 vertebrae on today's MR is nonspecific and may be related to   postoperative changes, fracture, degenerative disease or infection. Postoperative fluid collection seen along the left L5 lamina is unchanged   when compared to MR L-spine. L5-S1 mild canal narrowing with moderate to   severe bilateral foraminal narrowing is unchanged since recent MR L-spine. Mild SI joint degenerative arthritis. No SI joint effusions or erosions. No evidence for inflammatory or septic arthritis at the SI joints. Impression:       Impression:   1. Findings at L5-S1 were seen on MRI of the lumbar spine performed   earlier today and are redemonstrated without significant interval change. Abnormal bone marrow edema seen in the L5 and S1 vertebrae is nonspecific   and may be related to postoperative changes, fracture, degenerative   disease or infection/osteomyelitis. Today's pelvic CT had demonstrated a   probable fracture of the anterior superior right endplate of S1 versus   osteomyelitis. 2. L5-S1 mild canal narrowing with moderate to severe bilateral foraminal   stenosis is stable when compared to earlier MR      MRI [address] [place] [address] [[id number]] Collected: 08/17/22 2017      Order Status: Completed Updated: 08/17/22 2117     Narrative:       Exam: MRI of the cervical spine without contrast     Comparison: 10/8/2021     Clinical history: Possible spinal osteomyelitis     Findings:   Straightening to the cervical spine with loss of normal cervical lordosis. Alignment is anatomic. Vertebral height is normal. Endplate osteophytosis   with loss of intervertebral disc height at C5-6 and C6-7. Congenital   narrowing of the spinal canal. No abnormal cord signal identified. No   evidence for discitis osteomyelitis.      At C2-3, no disc bulge. No canal or foraminal stenosis. At C3-4, mild disc and endplate bulging. Mild canal narrowing with AP   dimension of thecal sac at 9 mm. Mild left foraminal narrowing. No right   foraminal narrowing. At C4-5, diffuse disc and endplate bulging. Moderate canal narrowing with   AP dimension of thecal sac at 7 mm. Mild bilateral foraminal narrowing. At C5-6 left paracentral disc protrusion with endplate osteophytosis. Moderate to severe canal stenosis with AP dimension of thecal sac at 6 mm. Mild right and moderate left foraminal narrowing. At C6-C7, left paracentral disc bulge. Moderate to severe canal stenosis   with AP dimension of thecal sac at 6 mm. Mild right and moderate left   foraminal narrowing. At C7-T1, no disc bulge. No canal or foraminal stenosis      Impression:       Impression:   1. No evidence for discitis or osteomyelitis. 2. Multilevel degenerative disease of the cervical spine most prominent at   C5-6 and C6-7 where there is moderate to severe canal stenosis      CT Lumbar Spine WO Contrast [[id number]] Collected: 08/17/22 1938      Order Status: Completed Updated: 08/17/22 2038     Narrative:       Exam: CT of lumbar spine without contrast     Comparison: MRI 8/17/2022, [place] [address] 21     Clinical history: Osteomyelitis     Findings:   Grade 2 anterior subluxation of L5 relative to S1 is new when compared to   prior CT of 10/8/2021. Degree of subluxation appears stable when compared   to MRI of 8/17/2022. Loss of intervertebral disc height at L5-S1 stable   since MRI but advanced since prior CT. Posterior fusion at L5-S1 with   bilateral pedicle screws is new since the CT of 2021. Graft material seen   around the posterior aspect of the L5 screws. Left L5 pedicle screw is outside of the osseous pedicle. Right L5 pedicle screw is within the pedicle and does not demonstrate   surrounding lucency. Left S1 pedicle screw is within the osseous pedicle.  No surrounding   lucency. Right S1 screw is within the pedicle. Fracture of the right anterior   superior endplate of S1 seen on axial images 60-69. Lucency around the   right S1 screw is is likely from the fracture however osteomyelitis is not   excluded. L1-L2, L2-3, L3-4 and L4-5 levels have a normal appearance. Impression:       Impression:   1. Grade 2 anterior subluxation of L5 relative to S1 is new when compared   to prior CT of 10/8/2021. Degree of subluxation appears stable when   compared to MRI of 8/17/2022. Loss of intervertebral disc height at L5-S1   stable since MRI but advanced since prior CT. 2. Posterior fusion at L5-S1 with bilateral pedicle screws is new since   the CT of 2021. Right S1 screw is within the pedicle. Fracture of the   right anterior superior endplate of S1. Lucency around the right S1 screw   is nonspecific is likely from the fracture however osteomyelitis is not   excluded. Please correlate with clinical presentation. Left L5 pedicle   screw is outside the osseous pedicle. Impression:  Acute pain  Post surgical pain  S/P hardware removal and extension of fusion L4-S2 8/23/2022  Chronic pain syndrome  Lumbar stenosis  ETOH abuse  Drug Abuse      Met with today's pain team as above. Discussed patient symptoms, reviewed medications and possible drug interactions, medication side effect profiles, previous medications and their efficacies, medical concerns regarding each pain management option (ie blood pressure, GI concerns, etc). Also reviewed labs (including creatinine clearance and LFT's regarding medication metabolism). Also reviewed all work-up studies including radiologic and other consultants. OARRS was obtained and reviewed. Recommendations:  Discussed and educated on pain management alternatives, such as; repositioning, distraction, meditation, ice, heat  Stop Morphine IV  Stop Oxycodone 5-10 mg Q4H PRN.    Start Percocet 5/325, 1-2 tabs, every 4 hours as needed. 1 tab for moderate pain (4-6/10), 2 tabs for severe pain (7-10/10). Hold for over sedation, confusion, SBP <100  Start Lyrica 75 mg twice daily for neuropathic pain  Add lidocaine patches, 12 hours on, 12 hours off. Do not use with ice, heat or over open areas. Can be utilized around surgical dressing  Continue flexeril TID  Apply ice to affected areas  Continue working with therapies   Pain management will continue to follow while admitted. Plans will be to decrease narcotics as pain becomes more tolerable. She is not a candidate for long term narcotics with her ETOH use, history of drug abuse, co morbidities. Long discussion regarding pain scales, history of ETOH and drug use, and that pain scale may be elevated. It was my pleasure to evaluate Daryl Jett today. I spent over 70 minutes evaluating this patient and completing documentation. Please call with questions.     London Lynch, APRN - CNP

## 2022-08-24 NOTE — DISCHARGE INSTRUCTIONS
Until first postoperative visit: Wear back brace while ambulating, limit lifting to < 10-15 lbs, limit bending and twisting of lumbar spine. Do not take a shower or change dressing until hemovac drain has been removed. Once removed, okay to shower and let water run over incision. Do not submerge until after first postoperative visit. Change dressing daily until there is no drainage from incision site, then leave open to air.

## 2022-08-24 NOTE — PROGRESS NOTES
[facility]  INPATIENT OCCUPATIONAL THERAPY  STRZ RENAL TELEMETRY 6K  EVALUATION    Time:   Time In:   Time Out: 1416  Timed Code Treatment Minutes: 23 Minutes  Minutes: 33          Date: 2022  Patient Name: Daily Randle,   Gender: female      MRN: [id number]  : 1980  (43 y.o.)  Referring Practitioner: Wilmer Jj PA-C  Diagnosis: closed type I fracture of sacrum with non union  Additional Pertinent Hx: per chart review; The patient is a 43 y.o. female who presents with complaints of lower back pain. Patient underwent L5-S1 posterior fusion in May. Reports that she had been doing well after surgery. Over the last couple weeks patient has had worsening lower back pain which has significantly increased over the last 3 days. Patient is rating her pain as a 9 on a 0-to-10 scale. Pain radiates into her upper back and bilateral lower extremities. Patient is reporting numbness and tingling in bilateral feet as well as saddle paresthesia. Sensation is intact bilaterally. Patient reports two episodes of urinary incontinence yesterday which has not happened in the past. She denies fevers, chest pain, shortness of breath. On-call orthopedic surgery consulted from ER. Additional MRIs ordered per their request. Pt s/p L5-S1 HW removal, L5-S1 revision fusion, fusion ext up to L4 and down to S2 DOS .     Restrictions/Precautions:  Restrictions/Precautions: Surgical Protocols, Fall Risk, General Precautions  Required Braces or Orthoses  Spinal: Lumbar Corset  Position Activity Restriction  Spinal Precautions: No Bending, No Lifting, No Twisting  Other position/activity restrictions: significant psych history, try to see after psych meds, drain    Subjective  Chart Reviewed: Yes, Orders, Progress Notes, History and Physical, Operative Notes  Patient assessed for rehabilitation services?: Yes  Family / Caregiver Present: No    Subjective: RN approved session, first attempt to eval safely and no c/o fatigue/SOB  Lower Extremity Dressing: Stand By Assistance. With cues for spinal precautions tech to reach feet with patient able to complete while seated EOB  Toilet Transfer: [address] With use of 2 w/w for support and cues for sequencing . MIN A to don lumbar brace and cues for orientation due to initially upside down and turned backwards    BALANCE:  Sitting Balance:  Stand By Assistance. Seated EOB due to impulsiveness  Standing Balance: Contact Guard Assistance. With use of 2 w/w    BED MOBILITY:  Supine to Sit: Stand By Assistance cues for log roll tech  Sit to Supine: Stand By Assistance with cues to initiate in task and spinal precautions    TRANSFERS:  Sit to Stand:  Stand By Assistance. Cues for hand placement    FUNCTIONAL MOBILITY:  Assistive Device: Rolling Walker  Assist Level:  Contact Guard Assistance. Distance: To and from bathroom       Activity Tolerance:  Patient tolerance of  treatment: good. Assessment:  Assessment: patient is de-conditioned and in pain secondary to closed type I fracture of sacrum with spinal sx performed on 8/23/22. patient demonstrates decreased activity tolerance and incrased assist for ADLs and functional transfers due to pain and spinal precautions. patient would benefit from continued, skilled OT to increase activity tolerance, ease and (I) with ADLs and functional transfers with AE PRN. Performance deficits / Impairments: Decreased functional mobility , Decreased ADL status, Decreased endurance, Decreased cognition, Decreased safe awareness  Prognosis: Good  REQUIRES OT FOLLOW-UP: Yes  Decision Making: Medium Complexity    Treatment Initiated: Treatment and education initiated within context of evaluation.   Evaluation time included review of current medical information, gathering information related to past medical, social and functional history, completion of standardized testing, formal and informal observation of tasks, assessment of data and development of plan of care and goals. Treatment time included skilled education and facilitation of tasks to increase safety and independence with ADL's for improved functional independence and quality of life. Discharge Recommendations:  Continue to assess pending progress, Patient would benefit from continued therapy after discharge    Patient Education:     Patient Education  Education Given To: Patient  Education Provided: Role of Therapy, Transfer Training, Plan of Care, Fall Prevention Strategies, Precautions, ADL Adaptive Strategies  Education Provided Comments: importance of increasing activity, orientation of lumbar corset when donning  Barriers to Learning: Cognition    Equipment Recommendations:  Equipment Needed: Yes  Mobility Devices: ADL Assistive Devices    Plan:  Times per Week: 6x  Times per Day: Daily  Current Treatment Recommendations: Balance training, Functional mobility training, Endurance training, Safety education & training, Neuromuscular re-education, Patient/Caregiver education & training, Equipment evaluation, education, & procurement, Self-Care / ADL. See long-term goal time frame for expected duration of plan of care. If no long-term goals established, a short length of stay is anticipated. Goals:  Patient goals : return home at [facility]  Short Term Goals  Time Frame for Short term goals: by discharge  Short Term Goal 1: patient will tolerate 6 min functional standing with two hand release with (S) to increase activity tolerance for toileting and grooming. Short Term Goal 2: patient will functionally ambulate house hold distances with least restrictive device with (S) and 0-1 verbal/tactile cues for safety and sequencing. Short Term Goal 3: patient will complete ADL routine with (S) and 0-1 verbal/tactile cues for recalling spinal precautions t/o. Following session, patient left in safe position with all fall risk precautions in place.

## 2022-08-24 NOTE — PROGRESS NOTES
Hospitalist Progress Note    Patient:  Lucas Asencio    YOB: 1980  Unit/Bed:6K-12/012-A  MRN: [id number]    Acct: [de-identified]   PCP: RAMÓN Morales CNP    Date of Admission: 8/17/2022      Assessment/Plan:  3 months s/p L5-S1 decompression and fusion with Right S1 fracture causing right S1 screw loosening and lateral displacement of left L5 screw work. POD #day1,   Has a left-sided drain. Orthopedics following. Postop care, CBC and BMP tomorrow, continue incentive spirometry, work with PT OT. Concern for osteomyelitis of S1 vertebral body. Inflammatory markers negative. Likely secondary to postop changes. Continue to monitor. Keep off of antibiotics. Appreciate ID assistance. Alcohol use disorder. Continue thiamine folate. Check electrolytes. History of major depressive disorder. Resumed home medications. Denied suicidal ideation. History of hypothyroidism. Currently off of Synthroid TSH within normal limits. Outpatient follow-up    Patient will follow up in the office in 6 weeks. At that time, AP and lateral x-rays of the lumbar spine will be obtained to assess instrumentation and fusion. Expected discharge date:     Disposition:  Once the drainage is low and pain is under control, patient will be discharged home per clinical indication. [] Home  [] TCU  [x] Rehab  [] Psych  [x] SNF  [] [facility]  [] Other-    ===================================================================      Chief Complaint: lower back pain. Hospital Course: 27-year-old lady with past medical history of alcohol abuse disorder, hypothyroidism, chronic back pain issues came to the hospital complaining of worsening of lower back pain associated with saddle anesthesia and 2 times involuntary loss of urine.   Of note patient underwent L5-S1 decompression and fusion with transforaminal lumbar interbody fusion secondary to spondylolisthesis associated with neurogenic claudication. According to the patient the pain get worse when she lays down it gets better when she leans forward. Patient was able to walk after the surgery but lately she has been having excruciating pain. Patient said that for last few days she has been having paresthesias numbness and tingling associated with involuntary movements of toes on left side. Imaging of the lumbar spine was done which showed abnormal signal intensity and enhancement in the S1 vertebral body suspicious for vertebral body osteomyelitis. CT scan showed grade 2 anterior subluxation of L5 relative to S1 with possible fracture versus osteomyelitis. Patient was evaluated by orthopedic surgery as fracture of right anterior superior endplate of S1 with right S1 screw loosening. Left L5 screws outside of the pedicle. Vitals in /101 with pulse 108, saturating well on room air with respiratory rate 18. Lab work done in ED showed sodium 139, potassium 4.1, CRP less than 0.3, troponin less than 0.01, albumin 3.6, drug tox negative, , WBC 8, hemoglobin 13.2, UA negative. 8.23.2022    Patient was seen and examined  No overnight events  S/p surgery she looks slightly drowsy complaining of mild discomfort. Has a drain on the left side. Remained hemodynamically and vitally stable    8.24.2022 seen this a.m. laying flat doing well with physical therapy, will most likely require home PT OT, home health. Will DC IV fluids patient taking p.o. without difficulty. Discharge home when okay with orthopedics and infectious disease      Ortho Note OPERATION PERFORMED:  1. L5-S1 exploration of fusion  2. L5-S1 removal of instrumentation; bilateral L5 screws and right S1 screw  3. L4-S2 posterior spinal fusion extension. 4. L4-S2 posterior spinal instrumentation, Streamline, SurgAlign instrumentation.   5.  Use of local autograft bone, (L4 spinous process), crushed cancellous chips and large INFUSE BMP      ROS: reviewed complete ROS unchanged unless otherwise stated in hospital course/subjective portion. Medications:  Reviewed    Infusion Medications    sodium chloride       Scheduled Medications    sennosides-docusate sodium  1 tablet Oral BID    bisacodyl  5 mg Oral Daily    polyethylene glycol  17 g Oral Daily    thiamine  100 mg Oral Daily    folic acid  1 mg Oral Daily    venlafaxine  75 mg Oral Daily    sodium chloride flush  10 mL IntraVENous 2 times per day     PRN Meds: oxyCODONE **OR** oxyCODONE, morphine **OR** morphine, cyclobenzaprine, diphenhydramine **OR** diphenhydrAMINE, bisacodyl, fleet, traZODone, hydrOXYzine pamoate, sodium chloride flush, sodium chloride, ondansetron **OR** ondansetron, acetaminophen **OR** [DISCONTINUED] acetaminophen, magnesium sulfate        Intake/Output Summary (Last 24 hours) at 8/24/2022 1103  Last data filed at 8/24/2022 1004  Gross per 24 hour   Intake 1110 ml   Output 1045 ml   Net 65 ml       Exam:  /85   Pulse 92   Temp 98.7 °F (37.1 °C) (Oral)   Resp 18   Ht 5' 3\" (1.6 m) Comment: per ED note  Wt 181 lb 14.1 oz (82.5 kg)   SpO2 94%   BMI 32.22 kg/m²     General: No distress, appears stated age. Eyes:  PERRL. Conjunctivae/corneas clear. HENT: Head normal appearing. Nares normal. Oral mucosa moist.  Hearing intact. Neck: Supple, with full range of motion. Trachea midline. No gross JVD appreciated. Respiratory:  Normal effort. Clear to auscultation, without rales or wheezes or rhonchi. Cardiovascular: Normal rate, regular rhythm with normal S1/S2 without murmurs. No lower extremity edema. Abdomen: Soft, non-tender, non-distended with normal bowel sounds. Musculoskeletal: Tone muscle and strength equal bilaterally no abnormal movements noted. Skin: Warm and dry. No rashes or lesions. Neurologic: Bilateral lower extremity weakness, dorsiflexion is weak on the left side. Sensation intact. Psychiatric: Alert and oriented, normal insight and thought content. reasonably achievable. CT PELVIS WO CONTRAST Additional Contrast? None    Result Date: 8/17/2022  Impression: 1. Grade 2 anterior subluxation of L5 relative to S1 is new when compared to prior CT of 10/8/2021. Degree of subluxation appears stable when compared to MRI of 8/17/2022. Loss of intervertebral disc height at L5-S1 stable since MRI but advanced since prior CT. 2. Posterior fusion at L5-S1 with bilateral pedicle screws is new since the CT of 2021. Right S1 screw is within the pedicle. Fracture of the right anterior superior endplate of S1. Lucency around the right S1 screw is nonspecific is likely from the fracture however osteomyelitis would be possible. Please correlate with clinical presentation. Left L5 pedicle screw is outside the osseous pedicle. This document has been electronically signed by: Orlin Bach MD on 08/17/2022 08:36 PM All CTs at this facility use dose modulation techniques and iterative reconstructions, and/or weight-based dosing when appropriate to reduce radiation to a low as reasonably achievable. MRI CERVICAL SPINE WO CONTRAST    Result Date: 8/17/2022  Impression: 1. No evidence for discitis or osteomyelitis. 2. Multilevel degenerative disease of the cervical spine most prominent at C5-6 and C6-7 where there is moderate to severe canal stenosis This document has been electronically signed by: Orlin Bach MD on 08/17/2022 09:17 PM    MRI THORACIC SPINE WO CONTRAST    Result Date: 8/17/2022  Impression: 1. No thoracic spine fracture seen. No evidence for osteomyelitis/discitis 2. Minimal degenerative disease as above. 3. Prominent central canal/minimal syrinx seen in the distal spinal cord at T11. This document has been electronically signed by: Orlin Bach MD on 08/17/2022 09:31 PM    MRI LUMBAR SPINE W WO CONTRAST    Result Date: 8/17/2022   1.  The patient has undergone interval surgery with placement of bilateral pedicular screws at L5 and S1 and laminectomy

## 2022-08-24 NOTE — PLAN OF CARE
Problem: Discharge Planning  Goal: Discharge to home or other facility with appropriate resources  8/23/2022 2252 by Alana Estrada RN  Outcome: Progressing  Flowsheets (Taken 8/23/2022 2009)  Discharge to home or other facility with appropriate resources:   Identify barriers to discharge with patient and caregiver   Arrange for needed discharge resources and transportation as appropriate   Identify discharge learning needs (meds, wound care, etc)  Note: Patient plans to discharge home with home health or to an ECF for a period of time once medically stable. 8/23/2022 1609 by Segundo Camp RN  Outcome: Progressing  Flowsheets (Taken 8/23/2022 1133)  Discharge to home or other facility with appropriate resources: Identify barriers to discharge with patient and caregiver     Problem: Pain  Goal: Verbalizes/displays adequate comfort level or baseline comfort level  8/23/2022 2252 by Alana Estrada RN  Outcome: Progressing  Flowsheets (Taken 8/21/2022 2308)  Verbalizes/displays adequate comfort level or baseline comfort level:   Encourage patient to monitor pain and request assistance   Assess pain using appropriate pain scale   Administer analgesics based on type and severity of pain and evaluate response   Implement non-pharmacological measures as appropriate and evaluate response  Note: Patient complaining of pain this shift. Prn pain medication available. Will monitor.    8/23/2022 1609 by Segundo Camp RN  Outcome: Progressing     Problem: ABCDS Injury Assessment  Goal: Absence of physical injury  8/23/2022 2252 by Alana Estrada RN  Outcome: Progressing  Flowsheets (Taken 8/23/2022 2251)  Absence of Physical Injury: Implement safety measures based on patient assessment  8/23/2022 1609 by Segundo Camp RN  Outcome: Progressing     Problem: Safety - Adult  Goal: Free from fall injury  8/23/2022 2252 by Alana Estrada RN  Outcome: Progressing  Flowsheets (Taken 8/23/2022 2251)  Free From Fall Injury: Instruct family/caregiver on patient safety   Based on caregiver fall risk screen, instruct family/caregiver to ask for assistance with transferring infant if caregiver noted to have fall risk factors  Note: No falls noted this shift. Continue falling star program. Bed alarm on, bed in low position. Call light and personal belongings in reach. Patient uses call light appropriately. 8/23/2022 1609 by Vasu Pratt RN  Outcome: Progressing     Problem: Chronic Conditions and Co-morbidities  Goal: Patient's chronic conditions and co-morbidity symptoms are monitored and maintained or improved  8/23/2022 2252 by Kathleen Reyes RN  Outcome: Progressing  Flowsheets (Taken 8/23/2022 2009)  Care Plan - Patient's Chronic Conditions and Co-Morbidity Symptoms are Monitored and Maintained or Improved:   Monitor and assess patient's chronic conditions and comorbid symptoms for stability, deterioration, or improvement   Collaborate with multidisciplinary team to address chronic and comorbid conditions and prevent exacerbation or deterioration   Update acute care plan with appropriate goals if chronic or comorbid symptoms are exacerbated and prevent overall improvement and discharge  8/23/2022 1609 by Vasu Pratt RN  Outcome: Progressing  Flowsheets (Taken 8/23/2022 1133)  Care Plan - Patient's Chronic Conditions and Co-Morbidity Symptoms are Monitored and Maintained or Improved: Monitor and assess patient's chronic conditions and comorbid symptoms for stability, deterioration, or improvement     Problem: Skin/Tissue Integrity  Goal: Absence of new skin breakdown  Description: 1. Monitor for areas of redness and/or skin breakdown  2. Assess vascular access sites hourly  3. Every 4-6 hours minimum:  Change oxygen saturation probe site  4. Every 4-6 hours:  If on nasal continuous positive airway pressure, respiratory therapy assess nares and determine need for appliance change or resting period.   8/23/2022 2252 by Risa Chambers RN  Outcome: Progressing  Note: No new skin break down noted at this time. Encouraged patient to reposition self in bed.   8/23/2022 1609 by Matilda Sanchez RN  Outcome: Progressing     Care plan reviewed with patient. Patient verbalizes understanding of plan of care and contributes to goal setting.

## 2022-08-24 NOTE — CARE COORDINATION
8/24/22, 11:36 AM EDT    DISCHARGE ON GOING EVALUATION    Lesly Brown [facility] day: 7  Location: -12/012-A Reason for admit: Osteomyelitis [facility]) [M86.9]  Osteomyelitis of lumbar spine ([facility] [address] [M46.26]   Procedure: POD 1 s/p lumbar HW removal and L4-S2 fusion extension  Barriers to Discharge: WBC up to 15.0. Drain had 175 ml output. Pain management consult pending. PCP: RAMÓN Mcgrath - CNP  Readmission Risk Score: 17.2%  Patient Goals/Plan/Treatment Preferences: Carla amb well with therapy, plan is for home with [facility] services.

## 2022-08-24 NOTE — PROGRESS NOTES
Department of Orthopedic Surgery  Spine Service  Attending Progress Note        Subjective:  POD#1 s/p lumbar HW removal and L4-S2 fusion extension. Doing well, denies any leg pain. Lumbar pain improved. No BM    Vitals  VITALS:  /66   Pulse 82   Temp 96.9 °F (36.1 °C) (Oral)   Resp 18   Ht 5' 3\" (1.6 m) Comment: per ED note  Wt 181 lb 14.1 oz (82.5 kg)   SpO2 98%   BMI 32.22 kg/m²   24HR INTAKE/OUTPUT:    Intake/Output Summary (Last 24 hours) at 8/24/2022 0644  Last data filed at 8/24/2022 0406  Gross per 24 hour   Intake 1760 ml   Output 1445 ml   Net 315 ml     URINARY CATHETER OUTPUT (Bowen):     DRAIN/TUBE OUTPUT:  Closed/Suction Drain Inferior; Left Back Accordion-Output (ml): 175 ml      PHYSICAL EXAM:    Orientation:  alert and oriented to person, place and time    Incision:  dressing in place, clean, dry, intact      Lower Extremity Motor :  quadriceps, extensor hallucis longus, dorsiflexion, plantarflexion 5/5 bilaterally  Lower Extremity Sensory:  Intact L1-S1    Flatus:  positive    ABNORMAL EXAM FINDINGS:  none    LABS:    HgB:    Lab Results   Component Value Date/Time    HGB 10.9 08/24/2022 05:42 AM       ASSESSMENT AND PLAN:    Post operative day 1 status post lumbar HW removal and L4-S2 fusion extension    1:  Monitor labs and drain output  2:  Activity Level:  as tolerated, brace at all times when out ambulating  3:  Pain Control:  good  4:  Discharge Planning:  pending    Dinorah Munguia [facility]

## 2022-08-24 NOTE — PROGRESS NOTES
[address] [address]  INPATIENT PHYSICAL THERAPY  EVALUATION  [facility] RENAL TELEMETRY 6K - 6K-12/012-A    Time In: 6469  Time Out: 4757  Timed Code Treatment Minutes: 17 Minutes  Minutes: 25          Date: 2022  Patient Name: Calli Dickens,  Gender:  female        MRN: [id number]  : 1980  (43 y.o.)      Referring Practitioner: Patricia Akhtar PA-C  Diagnosis: Osteomyelitis  Additional Pertinent Hx: Per H&P : The patient is a 43 y.o. female who presents with complaints of lower back pain. Patient underwent L5-S1 posterior fusion in May. Reports that she had been doing well after surgery. Over the last couple weeks patient has had worsening lower back pain which has significantly increased over the last 3 days. Patient is rating her pain as a 9 on a 0-to-10 scale. Pain radiates into her upper back and bilateral lower extremities. Patient is reporting numbness and tingling in bilateral feet as well as saddle paresthesia. Sensation is intact bilaterally. Patient reports two episodes of urinary incontinence yesterday which has not happened in the past. She denies fevers, chest pain, shortness of breath. On-call orthopedic surgery consulted from ER. Additional MRIs ordered per their request. Pt s/p L5-S1 HW removal, L5-S1 revision fusion, fusion ext up to L4 and down to S2 DOS . Restrictions/Precautions:  Restrictions/Precautions: Surgical Protocols, Fall Risk, General Precautions  Required Braces or Orthoses  Spinal: Lumbar Corset  Position Activity Restriction  Spinal Precautions: No Bending, No Lifting, No Twisting  Other position/activity restrictions: significant psych history, try to see after psych meds    Subjective:  Chart Reviewed: Yes  Patient assessed for rehabilitation services?: Yes  Family / Caregiver Present: No  Subjective: RN approved session, pt noted to be up and preparing to ambulate with RN.  Both the pt and RN agreeable for this PT to assist the pt with ambulation at that time. Pt noted to have persisent increased emotions during session. Multiple episodes of being tearful, and stating self deprecating comments. Increased time for encouragement, active listening, and attempts to redirect. Pt able to state 50% of her spinal precautions. Pt also vocal that she struggles with depression and anxiety. RN aware that this PT concerned with pt's ability to compelte multiple stairs for home entry, however, held this this date secondary to persistent tearful episodes. General:  Overall Orientation Status: Within Functional Limits  Vision: Within Functional Limits  Hearing: Within functional limits     Pain: 9/10: low back and L Leg, RN states she will be in with pain medication at end of session     Vitals: Vitals not assessed per clinical judgement, see nursing flowsheet    Social/Functional History:    Lives With: Other (comment) (assisted living with [facility])  Type of Home: House  Home Layout: Able to Live on Main level with bedroom/bathroom, Two level  Home Access: Stairs to enter with rails  Entrance Stairs - Number of Steps: 15  Entrance Stairs - Rails: Both  Home Equipment: Dany Aucasey          Receives Help From: Other (comment) ([employer])        Ambulation Assistance: Independent  Transfer Assistance: Independent    Active : No     Additional Comments: Pt states she lives in assisted living with [facility] to ensure she takes her medicine for depression and anxiety. Pt has assist from [employer] for medication, cooking, cleaning, etc. Pt unclear with her subjective history, but seems as if she has several roomates. OBJECTIVE:  Range of Motion:  Bilateral Lower Extremity: WFL    Strength:  Bilateral Lower Extremity: [facility]  *Not formally assessed but noted to be a min of 3/5 with functional mobility assessment.      Balance:  Static Sitting Balance:  Stand By Assistance, Contact Guard Assistance  Dynamic Sitting Balance: Stand By Assistance, Contact Guard Assistance  Static Standing Balance: Stand By Assistance, Contact Guard Assistance  Pt noted to stand up impulsively while this PT stepped out to ask the pt's RN a question. Bed Mobility:  Rolling to Left: Stand By Assistance   Sit to Supine: Stand By Assistance   Pt completed spontaneously. Good demo with log roll technique. Transfers:  Sit to Stand: Stand By Assistance, Contact Guard Assistance  Stand to Sit:Stand By Assistance, Contact Guard Assistance  Cues to wait to initiate transfers until this PT ready     Ambulation:  Stand By Assistance, [employer], with verbal cues , with increased time for completion  Distance: 80'x2  Surface: Level Tile  Device:Rolling Walker  Gait Deviations:  Slow Karyn, Decreased Step Length Bilaterally, Decreased Heel Strike Bilaterally, Wide Base of Support, Mild Path Deviations, Unsteady Gait, and Decreased Terminal Knee Extension  Cues for path finding and to redirect to mobility task. Pt noted to ambulate with slow karyn and guarded gait pattern. Pt froze mid ambulation distance to vocalize frustration with her situation. Increased time to redirect back to task and to take a rest in her room. Exercise: none this session    Functional Outcome Measures: Completed  AM-PAC Inpatient Mobility without Stair Climbing Raw Score : 15  AM-PAC Inpatient without Stair Climbing T-Scale Score : 43.03    ASSESSMENT:  Activity Tolerance:  Patient tolerance of  treatment: fair. Pt with increased tearfulness this session. Required much increased time for communication and redirection back to task. Pt highly distracted, unsafe to assess steps this date but need to do so before her discharge as this could be a barrier. Treatment Initiated: Treatment and education initiated within context of evaluation.   Evaluation time included review of current medical information, gathering information related to past medical, social and functional history, completion of standardized testing, formal and informal observation of tasks, assessment of data and development of plan of care and goals. Treatment time included skilled education and facilitation of tasks to increase safety and independence with functional mobility for improved independence and quality of life. Assessment: Body Structures, Functions, Activity Limitations Requiring Skilled Therapeutic Intervention: Decreased functional mobility , Decreased strength, Decreased safe awareness, Decreased endurance, Decreased balance, Decreased posture  Assessment: This patient is a 43 y.o. who presents with s/p L5-S1 HW removal, L5-S1 revision fusion, fusion ext up to L4 and down to S2. This is a decline from the patient's baseline status of mod I and living in independent living with assist of techs. The patient is observed to have deficits in strength, balance, activity tolerance, and safety awareness and would benefit from skilled PT services to progress functional mobility, safety awareness, and to decrease overall risk of falls. Pt would benefit from continued PT and increased assistance. Therapy Prognosis: Good    Requires PT Follow-Up: Yes    Discharge Recommendations:  Discharge Recommendations: Continue to assess pending progress, 24 hour supervision or assist, Patient would benefit from continued therapy after discharge    Patient Education:      .     Patient Education  Education Given To: Patient  Education Provided: Role of Therapy, Plan of Care, Precautions, Transfer Training, Equipment  Education Method: Demonstration, Verbal  Education Outcome: Continued education needed       Equipment Recommendations:  Equipment Needed: No (RW- however needs to contact her  to get this)    Plan:  Current Treatment Recommendations: Strengthening, Balance training, Functional mobility training, Transfer training, Endurance training, Neuromuscular re-education, Stair training, Gait training, Home exercise program, Safety education & training, Patient/Caregiver education & training, Therapeutic activities, Equipment evaluation, education, & procurement  Plan:  (6x O)    Goals:  Patient goals : to try the steps  Short Term Goals  Time Frame for Short term goals: by hospital discharge  Short term goal 1: Pt to demo supine <->sit mod I with log roll technique for ease with getting in and out of bed  Short term goal 2: Pt to complete sit <->Stand mod I with RW for safety with transfers  Short term goal 3: Pt to ambulate >= 150' mod I with RW for improved mobility in her environment  Short term goal 4: Pt to ascend/descend 15 steps with B rail and S for home entry  Long Term Goals  Time Frame for Long term goals : NA due to short ELOS    Following session, patient left in safe position with all fall risk precautions in place.

## 2022-08-24 NOTE — PROGRESS NOTES
Comprehensive Nutrition Assessment    Type and Reason for Visit:  Initial, RD Nutrition Re-Screen/LOS    Nutrition Recommendations/Plan:   Nutrition ileus prevention protocol: Activia at breakfast daily and Hot Beverage TID  Hard boiled eggs at breakfast and cottage cheese and applesauce at dinner daily; patient declines traditional ONS  Consider MVI  Continue current diet     Malnutrition Assessment:  Malnutrition Status: At risk for malnutrition (Comment) (08/24/22 9231)    Context:  Acute Illness     Findings of the 6 clinical characteristics of malnutrition:  Energy Intake:  75% or less of estimated energy requirements for 7 or more days  Weight Loss:  No significant weight loss (weight stable past 3 months per EMR)     Body Fat Loss:  No significant body fat loss     Muscle Mass Loss:  No significant muscle mass loss    Fluid Accumulation:  Unable to assess     Strength:  Not Performed    Nutrition Assessment:     Pt. nutritionally compromised AEB intake 75% or less of meals since admission, report of poor appetite/ intake due to feeling depressed. At risk for further nutrition compromise r/t increased nutrient needs for surgical healing and underlying medical condition (hx alcohol use disorder, anxiety, colitis, depression, fibromyalgia, HTN, OCD).        Nutrition Related Findings:    Pt. Report/Treatments/Miscellaneous: Patient seen earlier today, tearful and reports hurts everywhere and poor appetite due to feeling depressed, declined traditional ONS on past admission, encouraged protein sources, informed RN of patient's report of pain and feeling depressed  GI Status: last BM 8/18, patient reports no BM since admit   Pertinent Labs: Glucose 127, BUN 12, Creatinine 0.6  Pertinent Meds: dulcolax, colace, folic acid, senokot, thiamine, effexor, prn vistaril       Wound Type: Surgical Incision (8/23/22: lumbar hardware removal and L4-S2 fusion extension (5/6/22 lumbar fusion))       Current Nutrition Intake & Therapies:    Average Meal Intake: 1-25%, 51-75%     ADULT DIET; Regular  ADULT ORAL NUTRITION SUPPLEMENT; Breakfast, Dinner; Other Oral Supplement; Two Hard Boiled Eggs at breakfast daily. Cottage Cheese and Applesauce at dinner daily. ADULT ORAL NUTRITION SUPPLEMENT; Breakfast, Lunch, Dinner; Other Oral Supplement; Activia at breakfast daily and Hot Beverage TID    Anthropometric Measures:  Height: 5' 3\" (160 cm) (per ED note)  Ideal Body Weight (IBW): 115 lbs (52 kg)    Admission Body Weight: 181 lb 3.2 oz (82.2 kg) (8/17; no edema)  Current Body Weight: 181 lb 14.1 oz (82.5 kg) (8/24; no edema),     Current BMI (kg/m2): 32.2  Usual Body Weight:  (per EMR: 9/8/21 197# 3oz, 10/26/21 204# 3oz, 5/6/22 181# 6oz)                       BMI Categories: Obese Class 1 (BMI 30.0-34. 9)    Estimated Daily Nutrient Needs:  Energy Requirements Based On: Kcal/kg  Weight Used for Energy Requirements: Current (83kgm on 8/24)  Energy (kcal/day): 7182-4794 kcals (18-20)  Weight Used for Protein Requirements: Ideal (52kgm)  Protein (g/day):  grams (1.2-2)         Nutrition Diagnosis:   Inadequate oral intake related to psychological cause or life stress as evidenced by intake 51-75% (since admit)    Nutrition Interventions:   Food and/or Nutrient Delivery: Continue Current Diet  Nutrition Education/Counseling: No recommendation at this time  Coordination of Nutrition Care: Continue to monitor while inpatient       Goals:     Goals: PO intake 75% or greater, by next RD assessment       Nutrition Monitoring and Evaluation:      Food/Nutrient Intake Outcomes: Food and Nutrient Intake  Physical Signs/Symptoms Outcomes: Biochemical Data, Fluid Status or Edema, Meal Time Behavior, Nutrition Focused Physical Findings, Skin, Weight, GI Status    Discharge Planning:     Too soon to determine     Lyndon Garcia RD, LD  Contact: [phone]

## 2022-08-24 NOTE — PROGRESS NOTES
08/24/22  0542      K 4.5      CO2 23   BUN 12   CREATININE 0.6   GLUCOSE 127*         Patient problem list:     Patient Active Problem List   Diagnosis Code    Coker's esophagus without dysplasia K22.70    Moderate episode of recurrent major depressive disorder ([address] F33.1    Bipolar I disorder, current or most recent episode depressed, with psychotic features ([address] F31.5    Schizoaffective disorder, bipolar type ([address] F25.0    Severe mixed bipolar 1 disorder without psychosis ([address] F31.63    Alcohol use disorder, severe, dependence ([address] F10.20    Amphetamine substance use disorder, severe, in sustained remission ([address] F15.21    Bipolar I disorder with mixed features ([address] F31.9    Class 1 obesity in adult E66.9    Subclinical hypothyroidism E03.8    Anxiety F41.9    Lumbar stenosis without neurogenic claudication M48.061    Lumbar stenosis with neurogenic claudication M48.062    Unspecified orthopedic aftercare Z47.89    Depression F32. A    Osteomyelitis ([facility]) M86.9    Bilateral low back pain M54.50    Hypothyroidism E03.9    Osteomyelitis of lumbar spine ([facility]) M46.26    Closed type I fracture of sacrum with nonunion S32. 14XK    Alcohol abuse F10.10    Neurogenic claudication ([facility]) G95.19       Assessment and Plan:   Low back pain s/p L5-S1 decompression and posterior fusion: MRI showed fracture of anterior superior right endplate of S1 causing right screw loosening and left lateral L5 scrrew. She had surgery    No sign of infection  ID will sign off, call if there are issues.       Anita Quintana MD

## 2022-08-24 NOTE — FLOWSHEET NOTE
08/24/22 1123   Safe Environment   Safety Measures   (Virtual safety round complete)     Patient sitting in bed. Upset that my name is Jessee Mcardle as it is her ex husbands name.  Call light within reach

## 2022-08-25 VITALS
DIASTOLIC BLOOD PRESSURE: 82 MMHG | BODY MASS INDEX: 35.08 KG/M2 | HEIGHT: 63 IN | TEMPERATURE: 98 F | SYSTOLIC BLOOD PRESSURE: 120 MMHG | RESPIRATION RATE: 18 BRPM | WEIGHT: 197.97 LBS | HEART RATE: 95 BPM | OXYGEN SATURATION: 98 %

## 2022-08-25 LAB
ANION GAP SERPL CALCULATED.3IONS-SCNC: 7 MEQ/L (ref 8–16)
BASOPHILS # BLD: 0.4 %
BASOPHILS ABSOLUTE: 0 THOU/MM3 (ref 0–0.1)
BUN BLDV-MCNC: 11 MG/DL (ref 7–22)
CALCIUM SERPL-MCNC: 8.2 MG/DL (ref 8.5–10.5)
CHLORIDE BLD-SCNC: 106 MEQ/L (ref 98–111)
CO2: 26 MEQ/L (ref 23–33)
CREAT SERPL-MCNC: 0.5 MG/DL (ref 0.4–1.2)
EOSINOPHIL # BLD: 1.2 %
EOSINOPHILS ABSOLUTE: 0.1 THOU/MM3 (ref 0–0.4)
ERYTHROCYTE [DISTWIDTH] IN BLOOD BY AUTOMATED COUNT: 13.2 % (ref 11.5–14.5)
ERYTHROCYTE [DISTWIDTH] IN BLOOD BY AUTOMATED COUNT: 44.8 FL (ref 35–45)
GFR SERPL CREATININE-BSD FRML MDRD: > 90 ML/MIN/1.73M2
GLUCOSE BLD-MCNC: 100 MG/DL (ref 70–108)
HCT VFR BLD CALC: 33.8 % (ref 37–47)
HEMOGLOBIN: 10.8 GM/DL (ref 12–16)
IMMATURE GRANS (ABS): 0.03 THOU/MM3 (ref 0–0.07)
IMMATURE GRANULOCYTES: 0.3 %
LYMPHOCYTES # BLD: 31.1 %
LYMPHOCYTES ABSOLUTE: 3.5 THOU/MM3 (ref 1–4.8)
MCH RBC QN AUTO: 29.9 PG (ref 26–33)
MCHC RBC AUTO-ENTMCNC: 32 GM/DL (ref 32.2–35.5)
MCV RBC AUTO: 93.6 FL (ref 81–99)
MONOCYTES # BLD: 8.5 %
MONOCYTES ABSOLUTE: 1 THOU/MM3 (ref 0.4–1.3)
NUCLEATED RED BLOOD CELLS: 0 /100 WBC
PLATELET # BLD: 203 THOU/MM3 (ref 130–400)
PMV BLD AUTO: 9.6 FL (ref 9.4–12.4)
POTASSIUM REFLEX MAGNESIUM: 4.4 MEQ/L (ref 3.5–5.2)
RBC # BLD: 3.61 MILL/MM3 (ref 4.2–5.4)
SEG NEUTROPHILS: 58.5 %
SEGMENTED NEUTROPHILS ABSOLUTE COUNT: 6.6 THOU/MM3 (ref 1.8–7.7)
SODIUM BLD-SCNC: 139 MEQ/L (ref 135–145)
WBC # BLD: 11.3 THOU/MM3 (ref 4.8–10.8)

## 2022-08-25 PROCEDURE — 99233 SBSQ HOSP IP/OBS HIGH 50: CPT | Performed by: NURSE PRACTITIONER

## 2022-08-25 PROCEDURE — 85025 COMPLETE CBC W/AUTO DIFF WBC: CPT

## 2022-08-25 PROCEDURE — 97535 SELF CARE MNGMENT TRAINING: CPT

## 2022-08-25 PROCEDURE — 6370000000 HC RX 637 (ALT 250 FOR IP)

## 2022-08-25 PROCEDURE — 97110 THERAPEUTIC EXERCISES: CPT

## 2022-08-25 PROCEDURE — 2580000003 HC RX 258

## 2022-08-25 PROCEDURE — 97116 GAIT TRAINING THERAPY: CPT

## 2022-08-25 PROCEDURE — 6370000000 HC RX 637 (ALT 250 FOR IP): Performed by: NURSE PRACTITIONER

## 2022-08-25 PROCEDURE — 36415 COLL VENOUS BLD VENIPUNCTURE: CPT

## 2022-08-25 PROCEDURE — 99232 SBSQ HOSP IP/OBS MODERATE 35: CPT | Performed by: INTERNAL MEDICINE

## 2022-08-25 PROCEDURE — 80048 BASIC METABOLIC PNL TOTAL CA: CPT

## 2022-08-25 PROCEDURE — 97530 THERAPEUTIC ACTIVITIES: CPT

## 2022-08-25 RX ORDER — PREGABALIN 75 MG/1
75 CAPSULE ORAL 2 TIMES DAILY
Qty: 14 CAPSULE | Refills: 0 | Status: SHIPPED | OUTPATIENT
Start: 2022-08-25 | End: 2022-09-01

## 2022-08-25 RX ORDER — OXYCODONE HYDROCHLORIDE AND ACETAMINOPHEN 5; 325 MG/1; MG/1
1 TABLET ORAL EVERY 6 HOURS PRN
Qty: 28 TABLET | Refills: 0 | Status: SHIPPED | OUTPATIENT
Start: 2022-08-25 | End: 2022-09-01

## 2022-08-25 RX ADMIN — DOCUSATE SODIUM 100 MG: 100 CAPSULE, LIQUID FILLED ORAL at 08:46

## 2022-08-25 RX ADMIN — PREGABALIN 75 MG: 75 CAPSULE ORAL at 08:46

## 2022-08-25 RX ADMIN — CYCLOBENZAPRINE 10 MG: 10 TABLET, FILM COATED ORAL at 11:08

## 2022-08-25 RX ADMIN — VENLAFAXINE HYDROCHLORIDE 75 MG: 75 CAPSULE, EXTENDED RELEASE ORAL at 08:46

## 2022-08-25 RX ADMIN — FOLIC ACID 1 MG: 1 TABLET ORAL at 08:46

## 2022-08-25 RX ADMIN — OXYCODONE AND ACETAMINOPHEN 2 TABLET: 5; 325 TABLET ORAL at 03:24

## 2022-08-25 RX ADMIN — OXYCODONE AND ACETAMINOPHEN 2 TABLET: 5; 325 TABLET ORAL at 17:39

## 2022-08-25 RX ADMIN — SODIUM CHLORIDE, PRESERVATIVE FREE 10 ML: 5 INJECTION INTRAVENOUS at 08:48

## 2022-08-25 RX ADMIN — BISACODYL 10 MG: 10 SUPPOSITORY RECTAL at 08:48

## 2022-08-25 RX ADMIN — Medication 100 MG: at 08:46

## 2022-08-25 RX ADMIN — OXYCODONE AND ACETAMINOPHEN 2 TABLET: 5; 325 TABLET ORAL at 08:46

## 2022-08-25 ASSESSMENT — PAIN DESCRIPTION - LOCATION
LOCATION: BACK

## 2022-08-25 ASSESSMENT — PAIN - FUNCTIONAL ASSESSMENT
PAIN_FUNCTIONAL_ASSESSMENT: ACTIVITIES ARE NOT PREVENTED

## 2022-08-25 ASSESSMENT — PAIN DESCRIPTION - DESCRIPTORS
DESCRIPTORS: SHOOTING
DESCRIPTORS: ACHING
DESCRIPTORS: THROBBING
DESCRIPTORS: SHOOTING

## 2022-08-25 ASSESSMENT — PAIN DESCRIPTION - ORIENTATION
ORIENTATION: MID
ORIENTATION: MID;LOWER
ORIENTATION: MID
ORIENTATION: MID

## 2022-08-25 ASSESSMENT — PAIN SCALES - GENERAL
PAINLEVEL_OUTOF10: 7
PAINLEVEL_OUTOF10: 9
PAINLEVEL_OUTOF10: 6

## 2022-08-25 NOTE — PROGRESS NOTES
[address] [address]  INPATIENT PHYSICAL THERAPY  DAILY NOTE  [facility] RENAL TELEMETRY 6K - 6K-12/012-A    Time In:   Time Out: 1020  Timed Code Treatment Minutes: 25 Minutes  Minutes: 25          Date: 2022  Patient Name: Daily Randle,  Gender:  female        MRN: [id number]  : 1980  (43 y.o.)     Referring Practitioner: Wilmer Jj PA-C  Diagnosis: Osteomyelitis  Additional Pertinent Hx: Per H&P : The patient is a 43 y.o. female who presents with complaints of lower back pain. Patient underwent L5-S1 posterior fusion in May. Reports that she had been doing well after surgery. Over the last couple weeks patient has had worsening lower back pain which has significantly increased over the last 3 days. Patient is rating her pain as a 9 on a 0-to-10 scale. Pain radiates into her upper back and bilateral lower extremities. Patient is reporting numbness and tingling in bilateral feet as well as saddle paresthesia. Sensation is intact bilaterally. Patient reports two episodes of urinary incontinence yesterday which has not happened in the past. She denies fevers, chest pain, shortness of breath. On-call orthopedic surgery consulted from ER. Additional MRIs ordered per their request. Pt s/p L5-S1 HW removal, L5-S1 revision fusion, fusion ext up to L4 and down to S2 DOS .      Prior Level of Function:  Lives With: Other (comment)  Type of Home: House  Home Layout: Able to Live on Main level with bedroom/bathroom, Two level  Home Access: Stairs to enter with rails  Entrance Stairs - Number of Steps: 15  Entrance Stairs - Rails: Both  Home Equipment: Mundi, rolling   Bathroom Shower/Tub: Walk-in shower  Bathroom Toilet: Standard  Bathroom Equipment: Shower chair, Grab bars in shower  Bathroom Accessibility: Accessible    Receives Help From: Other (comment)  ADL Assistance: Independent  Homemaking Assistance: Independent  Ambulation Assistance: Independent  Transfer Assistance: Independent  Active : No  Additional Comments: Pt states she lives in assisted living with [facility] to ensure she takes her medicine for depression and anxiety. Pt has assist from [employer] for medication, cooking, cleaning, etc. Pt unclear with her subjective history, but seems as if she has several roomates. Restrictions/Precautions:  Restrictions/Precautions: Surgical Protocols, Fall Risk, General Precautions  Required Braces or Orthoses  Spinal: Lumbar Corset  Position Activity Restriction  Spinal Precautions: No Bending, No Lifting, No Twisting  Other position/activity restrictions: significant psych history, try to see after psych meds, drain     SUBJECTIVE: RN approved session. Pt pleasantly agrees. Pt able to state her spinal precautions and doff her lumbar corset. Pt denies concern with return home with [facility] and receptive to PT recommendations for increased assist, hands on physical assist with stairs, and for continued PT. Discussed safety with stairs and having her helper bring the RW up the stairs into her house.      PAIN: 5/10: requesting flexeril, RN aware    Vitals: Vitals not assessed per clinical judgement, see nursing flowsheet    OBJECTIVE:  Bed Mobility:  Sit to Supine: Stand By Assistance, with increased time for completion   Demonstrated log roll with good technique     Transfers:  Sit to Stand: Stand By Assistance, [address], with increased time for completion, cues for hand placement  Stand to Sit:Stand By Assistance, [address], with increased time for completion, cues for hand placement  Cues provided x2 time for safe hand placement, CGA provided with these two transfers, SBA with all others     Ambulation:  Stand By Assistance, with verbal cues , with increased time for completion  Distance: 70', 140'  Surface: Level Tile  Device:Rolling Walker  Gait Deviations:  Slow Dana, Decreased Step Length Bilaterally, and Mild Path Deviations  Pt ambulated very slowed with guarded gait pattern, and minor decreased step length. Pt noted to pause and begin to initiate a twist to conversate with ambulation, cues to remind of spinal precautions and orient back to mobility task. Stairs:  Contact Guard Assistance, with verbal cues , with increased time for completion  Number of Steps: 12  Height: 6\" step with One Handrail  Cues to prompt completion with non-reciprocal pattern. Pt very slow to complete but no loses in stability. Educated on need for assistance with ascending and descending stairs, pt receptive to this and listed many people able to help. Pt did pause and state she felt \"overwhelmed\" at the top of the stairs, cues provided for reassurance with this    Balance:  Static Sitting Balance:  Supervision  Dynamic Sitting Balance: Supervision  Static Standing Balance: Stand By Assistance    Exercise:none this session    Functional Outcome Measures: Completed  -PAC Inpatient Mobility without Stair Climbing Raw Score : 15  AM-PAC Inpatient without Stair Climbing T-Scale Score : 43.03    ASSESSMENT:  Assessment: Patient progressing toward established goals. Activity Tolerance:  Patient tolerance of  treatment: good. Pt tolerated mobility well, she does state feeling \"overwhelmed\" at the top of the stairs, increased time for reassurance. Pt required SBA with majority of mobility and CGA for stairs. Pt requires occasional cues for spinal precautions.       Equipment Recommendations:Equipment Needed: No (RW- however needs to contact her  to get this)  Discharge Recommendations: Continue to assess pending progress, Home with Home Health PT, and Patient would benefit from continued PT at discharge  Plan: Current Treatment Recommendations: Strengthening, Balance training, Functional mobility training, Transfer training, Endurance training, Neuromuscular re-education, Stair training, Gait training, Home exercise program, Safety education & training, Patient/Caregiver education & training, Therapeutic activities, Equipment evaluation, education, & procurement  Plan:  (6x O)    Patient Education  Patient Education: Plan of Care, Precautions/Restrictions, Transfers, Reviewed Prior Education, Gait, Stairs, Activity Pacing    Goals:  Patient goals : to try the steps  Short Term Goals  Time Frame for Short term goals: by hospital discharge  Short term goal 1: Pt to demo supine <->sit mod I with log roll technique for ease with getting in and out of bed  Short term goal 2: Pt to complete sit <->Stand mod I with RW for safety with transfers  Short term goal 3: Pt to ambulate >= 150' mod I with RW for improved mobility in her environment  Short term goal 4: Pt to ascend/descend 15 steps with B rail and S for home entry  [address] [facility]  Time Frame for Long term goals : NA due to short ELOS    Following session, patient left in safe position with all fall risk precautions in place.

## 2022-08-25 NOTE — PROGRESS NOTES
she leans forward. Patient was able to walk after the surgery but lately she has been having excruciating pain. Patient said that for last few days she has been having paresthesias numbness and tingling associated with involuntary movements of toes on left side. Imaging of the lumbar spine was done which showed abnormal signal intensity and enhancement in the S1 vertebral body suspicious for vertebral body osteomyelitis. CT scan showed grade 2 anterior subluxation of L5 relative to S1 with possible fracture versus osteomyelitis. Patient was evaluated by orthopedic surgery as fracture of right anterior superior endplate of S1 with right S1 screw loosening. Left L5 screws outside of the pedicle. Vitals in /101 with pulse 108, saturating well on room air with respiratory rate 18. Lab work done in ED showed sodium 139, potassium 4.1, CRP less than 0.3, troponin less than 0.01, albumin 3.6, drug tox negative, , WBC 8, hemoglobin 13.2, UA negative. 8.23.2022    Patient was seen and examined  No overnight events  S/p surgery she looks slightly drowsy complaining of mild discomfort. Has a drain on the left side. Remained hemodynamically and vitally stable    8.24.2022 seen this a.m. laying flat doing well with physical therapy, will most likely require home PT OT, home health. Will DC IV fluids patient taking p.o. without difficulty. Discharge home when okay with orthopedics and infectious disease    8.25.2022 patient seen this a.m. laying flat in bed no complaints, vital signs stable laboratory data BMP within normal limits creatinine 0.5, WBC 11.3 hemoglobin 10.8 platelets 153. Patient states she has not had a bowel movement will hold discharge until patient has bowel movement. Ortho Note OPERATION PERFORMED:  1. L5-S1 exploration of fusion  2. L5-S1 removal of instrumentation; bilateral L5 screws and right S1 screw  3. L4-S2 posterior spinal fusion extension. 4.   L4-S2 posterior spinal instrumentation, Streamline, SurgAlign instrumentation. 5.  Use of local autograft bone, (L4 spinous process), crushed cancellous chips and large INFUSE BMP      ROS: reviewed complete ROS unchanged unless otherwise stated in hospital course/subjective portion. Medications:  Reviewed    Infusion Medications    sodium chloride       Scheduled Medications    docusate sodium  100 mg Oral BID    senna  2 tablet Oral Nightly    lidocaine  3 patch TransDERmal Daily    pregabalin  75 mg Oral BID    thiamine  100 mg Oral Daily    folic acid  1 mg Oral Daily    venlafaxine  75 mg Oral Daily    sodium chloride flush  10 mL IntraVENous 2 times per day     PRN Meds: polyethylene glycol, oxyCODONE-acetaminophen **OR** oxyCODONE-acetaminophen, cyclobenzaprine, diphenhydramine **OR** diphenhydrAMINE, bisacodyl, fleet, traZODone, hydrOXYzine pamoate, sodium chloride flush, sodium chloride, ondansetron **OR** ondansetron, acetaminophen **OR** [DISCONTINUED] acetaminophen, magnesium sulfate        Intake/Output Summary (Last 24 hours) at 8/25/2022 1146  Last data filed at 8/25/2022 1127  Gross per 24 hour   Intake 980 ml   Output 300 ml   Net 680 ml       Exam:  /77   Pulse (!) 105   Temp 98.1 °F (36.7 °C) (Oral)   Resp 18   Ht 5' 3\" (1.6 m) Comment: per ED note  Wt 197 lb 15.6 oz (89.8 kg)   SpO2 92%   BMI 35.07 kg/m²     General: No distress, appears stated age. Eyes:  PERRL. Conjunctivae/corneas clear. HENT: Head normal appearing. Nares normal. Oral mucosa moist.  Hearing intact. Neck: Supple, with full range of motion. Trachea midline. No gross JVD appreciated. Respiratory:  Normal effort. Clear to auscultation, without rales or wheezes or rhonchi. Cardiovascular: Normal rate, regular rhythm with normal S1/S2 without murmurs. No lower extremity edema. Abdomen: Soft, non-tender, non-distended with normal bowel sounds.   Musculoskeletal: Tone muscle and strength equal bilaterally no abnormal movements noted.  Skin: Warm and dry. No rashes or lesions. Neurologic: Bilateral lower extremity weakness, dorsiflexion is weak on the left side. Sensation intact. Psychiatric: Alert and oriented, normal insight and thought content. Capillary Refill: Brisk,< 3 seconds. Peripheral Pulses: +2 palpable, equal bilaterally. Labs:   Recent Labs     08/23/22 0528 08/24/22  0542 08/25/22  0542   WBC 5.1 15.0* 11.3*   HGB 13.8 10.9* 10.8*   HCT 42.6 34.3* 33.8*    214 203     Recent Labs     08/23/22  0528 08/24/22  0542 08/25/22  0542    139 139   K 4.2 4.5 4.4    108 106   CO2 28 23 26   BUN 7 12 11   CREATININE 0.6 0.6 0.5   CALCIUM 8.8 8.3* 8.2*     No results for input(s): AST, ALT, BILIDIR, BILITOT, ALKPHOS in the last 72 hours. No results for input(s): INR in the last 72 hours. No results for input(s): Trice Campbell in the last 72 hours. No results for input(s): PROCAL in the last 72 hours. Lab Results   Component Value Date/Time    NITRU NEGATIVE 08/17/2022 02:20 PM    WBCUA 5-9 08/17/2022 02:20 PM    BACTERIA FEW 08/17/2022 02:20 PM    RBCUA 0-2 08/17/2022 02:20 PM    BLOODU NEGATIVE 08/17/2022 02:20 PM    SPECGRAV 1.016 08/17/2022 02:20 PM    GLUCOSEU NEGATIVE 10/16/2021 05:00 PM       Radiology (48 hours):  CT Lumbar Spine WO Contrast    Result Date: 8/17/2022  Impression: 1. Grade 2 anterior subluxation of L5 relative to S1 is new when compared to prior CT of 10/8/2021. Degree of subluxation appears stable when compared to MRI of 8/17/2022. Loss of intervertebral disc height at L5-S1 stable since MRI but advanced since prior CT. 2. Posterior fusion at L5-S1 with bilateral pedicle screws is new since the CT of 2021. Right S1 screw is within the pedicle. Fracture of the right anterior superior endplate of S1. Lucency around the right S1 screw is nonspecific is likely from the fracture however osteomyelitis is not excluded. Please correlate with clinical presentation.  Left L5 pedicle screw is outside the osseous pedicle. This document has been electronically signed by: Diana Michelle MD on 08/17/2022 08:38 PM All CTs at this facility use dose modulation techniques and iterative reconstructions, and/or weight-based dosing when appropriate to reduce radiation to a low as reasonably achievable. CT PELVIS WO CONTRAST Additional Contrast? None    Result Date: 8/17/2022  Impression: 1. Grade 2 anterior subluxation of L5 relative to S1 is new when compared to prior CT of 10/8/2021. Degree of subluxation appears stable when compared to MRI of 8/17/2022. Loss of intervertebral disc height at L5-S1 stable since MRI but advanced since prior CT. 2. Posterior fusion at L5-S1 with bilateral pedicle screws is new since the CT of 2021. Right S1 screw is within the pedicle. Fracture of the right anterior superior endplate of S1. Lucency around the right S1 screw is nonspecific is likely from the fracture however osteomyelitis would be possible. Please correlate with clinical presentation. Left L5 pedicle screw is outside the osseous pedicle. This document has been electronically signed by: Diana Michelle MD on 08/17/2022 08:36 PM All CTs at this facility use dose modulation techniques and iterative reconstructions, and/or weight-based dosing when appropriate to reduce radiation to a low as reasonably achievable. MRI CERVICAL SPINE WO CONTRAST    Result Date: 8/17/2022  Impression: 1. No evidence for discitis or osteomyelitis. 2. Multilevel degenerative disease of the cervical spine most prominent at C5-6 and C6-7 where there is moderate to severe canal stenosis This document has been electronically signed by: Diana Michelle MD on 08/17/2022 09:17 PM    MRI THORACIC SPINE WO CONTRAST    Result Date: 8/17/2022  Impression: 1. No thoracic spine fracture seen. No evidence for osteomyelitis/discitis 2. Minimal degenerative disease as above.  3. Prominent central canal/minimal syrinx seen in the distal spinal cord at T11. This document has been electronically signed by: Kushal Gillis MD on 08/17/2022 09:31 PM    MRI LUMBAR SPINE W WO CONTRAST    Result Date: 8/17/2022   1. The patient has undergone interval surgery with placement of bilateral pedicular screws at L5 and S1 and laminectomy defect at L5. 2. There is 10 mm of anterolisthesis of L5 relative to S1. This results in moderate to severe bilateral foraminal stenosis and mild canal stenosis. 3. There is abnormal signal intensity and enhancement in the S1 vertebral body suspicious for vertebral body osteomyelitis. Please correlate clinically. 4. There is a tiny syrinx in the distal thoracic spinal cord and conus. 5. There is mild canal and mild-to-moderate bilateral foraminal stenosis at L3-4 and L4-5. 6. Otherwise negative MRI scan of the lumbar spine with and without intravenous contrast. **This report has been created using voice recognition software. It may contain minor errors which are inherent in voice recognition technology. ** Final report electronically signed by DR Valerie Jeff on 8/17/2022 5:19 PM    MRI PELVIS WO CONTRAST    Result Date: 8/17/2022  Impression: 1. Findings at L5-S1 were seen on MRI of the lumbar spine performed earlier today and are redemonstrated without significant interval change. Abnormal bone marrow edema seen in the L5 and S1 vertebrae is nonspecific and may be related to postoperative changes, fracture, degenerative disease or infection/osteomyelitis. Today's pelvic CT had demonstrated a probable fracture of the anterior superior right endplate of S1 versus osteomyelitis. 2.  L5-S1 mild canal narrowing with moderate to severe bilateral foraminal stenosis is stable when compared to earlier MR This document has been electronically signed by: Kushal Gillis MD on 08/17/2022 09:28 PM       DVT prophylaxis:    [x] Lovenox  [] SCDs  [] SQ Heparin  [] Encourage ambulation   [] Already on Anticoagulation       Diet: ADULT DIET; Regular  ADULT ORAL NUTRITION SUPPLEMENT; Breakfast, Dinner; Other Oral Supplement; Two Hard Boiled Eggs at breakfast daily. Cottage Cheese and Applesauce at dinner daily. ADULT ORAL NUTRITION SUPPLEMENT; Breakfast, Lunch, Dinner; Other Oral Supplement; Activia at breakfast daily and Hot Beverage TID  Code Status: Full Code  PT/OT: ordered  IVF: Patient eating and drinking.     Electronically signed by Bimal Frazier DO on 8/25/2022 at 11:46 AM
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
## 2022-08-25 NOTE — PROGRESS NOTES
Department of Orthopedic Surgery  Spine Service  Attending Progress Note        Subjective:  POD#2 s/p lumbar HW removal and L4-S2 fusion extension. Doing well, no new complaints. Lumbar pain improved. No BM. Labs pending. Vitals  VITALS:  /87   Pulse 92   Temp 98.1 °F (36.7 °C) (Oral)   Resp 18   Ht 5' 3\" (1.6 m) Comment: per ED note  Wt 197 lb 15.6 oz (89.8 kg)   SpO2 92%   BMI 35.07 kg/m²   24HR INTAKE/OUTPUT:    Intake/Output Summary (Last 24 hours) at 8/25/2022 3196  Last data filed at 8/25/2022 0344  Gross per 24 hour   Intake 740 ml   Output 400 ml   Net 340 ml       URINARY CATHETER OUTPUT (Bowen):     DRAIN/TUBE OUTPUT:  Closed/Suction Drain Inferior; Left Back Accordion-Output (ml): 100 ml      PHYSICAL EXAM:    Orientation:  alert and oriented to person, place and time    Incision:  dressing in place, clean, dry, intact      Lower Extremity Motor :  quadriceps, extensor hallucis longus, dorsiflexion, plantarflexion 5/5 bilaterally  Lower Extremity Sensory:  Intact L1-S1    Flatus:  positive    ABNORMAL EXAM FINDINGS:  none    LABS:    HgB:    Lab Results   Component Value Date/Time    HGB 10.8 08/25/2022 05:42 AM       ASSESSMENT AND PLAN:    Post operative day 2 status post lumbar HW removal and L4-S2 fusion extension    1:  Monitor labs and drain output  2:  Activity Level:  as tolerated, brace at all times when out ambulating  3:  Pain Control:  good  4:  Discharge Planning:  pending BM - Home with [facility] for drain management   5:  Continue bowel regimen: give suppository    Kj Gil PA-C

## 2022-08-25 NOTE — PLAN OF CARE
Problem: Discharge Planning  Goal: Discharge to home or other facility with appropriate resources  Outcome: Progressing  Flowsheets (Taken 8/23/2022 2009 by Lluvia Lizarraga, RN)  Discharge to home or other facility with appropriate resources:   Identify barriers to discharge with patient and caregiver   Arrange for needed discharge resources and transportation as appropriate   Identify discharge learning needs (meds, wound care, etc)     Problem: Pain  Goal: Verbalizes/displays adequate comfort level or baseline comfort level  Outcome: Progressing  Flowsheets (Taken 8/24/2022 2236)  Verbalizes/displays adequate comfort level or baseline comfort level:   Encourage patient to monitor pain and request assistance   Assess pain using appropriate pain scale   Administer analgesics based on type and severity of pain and evaluate response   Implement non-pharmacological measures as appropriate and evaluate response     Problem: ABCDS Injury Assessment  Goal: Absence of physical injury  Outcome: Progressing  Flowsheets (Taken 8/24/2022 2235)  Absence of Physical Injury: Implement safety measures based on patient assessment     Problem: Safety - Adult  Goal: Free from fall injury  Outcome: Progressing  Flowsheets (Taken 8/24/2022 2235)  Free From Fall Injury: Instruct family/caregiver on patient safety     Problem: Chronic Conditions and Co-morbidities  Goal: Patient's chronic conditions and co-morbidity symptoms are monitored and maintained or improved  Outcome: Progressing  Flowsheets (Taken 8/23/2022 2009 by Lluvia Lizarraga RN)  Care Plan - Patient's Chronic Conditions and Co-Morbidity Symptoms are Monitored and Maintained or Improved:   Monitor and assess patient's chronic conditions and comorbid symptoms for stability, deterioration, or improvement   Collaborate with multidisciplinary team to address chronic and comorbid conditions and prevent exacerbation or deterioration   Update acute care plan with appropriate goals if chronic or comorbid symptoms are exacerbated and prevent overall improvement and discharge     Problem: Skin/Tissue Integrity  Goal: Absence of new skin breakdown  Description: 1. Monitor for areas of redness and/or skin breakdown  2. Assess vascular access sites hourly  3. Every 4-6 hours minimum:  Change oxygen saturation probe site  4. Every 4-6 hours:  If on nasal continuous positive airway pressure, respiratory therapy assess nares and determine need for appliance change or resting period. Outcome: Progressing  Note: No new skin problems. Will continue to monitor. Problem: Nutrition Deficit:  Goal: Optimize nutritional status  Outcome: Progressing  Flowsheets (Taken 8/24/2022 2236)  Nutrient intake appropriate for improving, restoring, or maintaining nutritional needs:   Assess nutritional status and recommend course of action   Monitor oral intake, labs, and treatment plans   Care plan reviewed with patient . Patient verbalizes understanding of the plan of care and contribute to goal setting.

## 2022-08-25 NOTE — CARE COORDINATION
8/25/22, 8:18 AM EDT    DISCHARGE ON GOING EVALUATION    Lesly Brown [facility] day: 8  Location: -12/012-A Reason for admit: Osteomyelitis [facility]) [M86.9]  Osteomyelitis of lumbar spine ([facility] [address] [M46.26]   Procedure: 8/23 1 s/p lumbar HW removal and L4-S2 fusion extension  Barriers to Discharge: Drain had 100 mL output. Needs BM, suppository ordered. PCP: RAMÓN Puckett CNP  Readmission Risk Score: 16.9%  Patient Goals/Plan/Treatment Preferences: Home with [facility]. Spoke with Marcelo Urrutia, they will tentatively plan her as a Saturday start of care for drain management, PT/OT.

## 2022-08-25 NOTE — PROGRESS NOTES
Pain Management   Progress Note      8/25/2022 2:28 PM     Chief Complaint: Post op pain    SUBJECTIVE:  Patient resting in bed with eyes closed, she awakens to her name but is drowsy. She states that she is tired and has not been sleeping well. She reports today she is feeling much better, pain is more controlled today. She states that her pain currently is 7/10, and at best is 5/10. She states she is tolerating therapy with good pain control. She states she feel the percocet and Lyrica and helping her low back and leg pain. She also is very happy with the use of the flexeril and lidocaine patches. Overall she is pleased with pain control. Constipation: Yes, unsure when last BM was.  denies abdominal pain    Current Facility-Administered Medications   Medication Dose Route Frequency Provider Last Rate Last Admin    [START ON 8/26/2022] naloxegol (MOVANTIK) tablet 12.5 mg  12.5 mg Oral QA RAMÓN Smith CNP        docusate sodium (COLACE) capsule 100 mg  100 mg Oral BID RAMÓN Persaud - CNP   100 mg at 08/25/22 0846    polyethylene glycol (GLYCOLAX) packet 17 g  17 g Oral Daily PRN RAMÓN Persaud CNP        senna (SENOKOT) tablet 17.2 mg  2 tablet Oral Nightly RAMÓN Persaud - CNP   17.2 mg at 08/24/22 2044    oxyCODONE-acetaminophen (PERCOCET) 5-325 MG per tablet 1 tablet  1 tablet Oral Q4H PRN RAMÓN Persaud CNP        Or    oxyCODONE-acetaminophen (PERCOCET) 5-325 MG per tablet 2 tablet  2 tablet Oral Q4H PRN RAMÓN Persaud - CNP   2 tablet at 08/25/22 0846    lidocaine 4 % external patch 3 patch  3 patch TransDERmal Daily RAMÓN Persaud CNP   3 patch at 08/25/22 0847    pregabalin (LYRICA) capsule 75 mg  75 mg Oral BID RAMÓN Persaud CNP   75 mg at 08/25/22 0846    cyclobenzaprine (FLEXERIL) tablet 10 mg  10 mg Oral TID PRN Terrance Trevino PA-C   10 mg at 08/25/22 1108    diphenhydrAMINE (BENADRYL) tablet 25 mg 25 mg Oral Q6H PRN Balinda Flurry, PA-C        Or    diphenhydrAMINE (BENADRYL) injection 25 mg  25 mg IntraVENous Q6H PRN Balinda Flurry, PA-C        bisacodyl (DULCOLAX) suppository 10 mg  10 mg Rectal Daily PRN Balinda Flurry, PA-C   10 mg at 08/25/22 0848    fleet rectal enema 1 enema  1 enema Rectal Daily PRN Balinda Flurry, PA-C        thiamine tablet 100 mg  100 mg Oral Daily Balinda Flurry, PA-C   100 mg at 25/97/26 8475    folic acid (FOLVITE) tablet 1 mg  1 mg Oral Daily Balinda Flurry, PA-C   1 mg at 08/25/22 0846    venlafaxine (EFFEXOR XR) extended release capsule 75 mg  75 mg Oral Daily Balinda Flurry, PA-C   75 mg at 08/25/22 0846    traZODone (DESYREL) tablet 50 mg  50 mg Oral Nightly PRN Balinda Flurry, PA-C        hydrOXYzine pamoate (VISTARIL) capsule 50 mg  50 mg Oral TID PRN Balinda Flurry, PA-C   50 mg at 08/24/22 1207    sodium chloride flush 0.9 % injection 10 mL  10 mL IntraVENous 2 times per day Balinda Flurry, PA-C   10 mL at 08/25/22 0848    sodium chloride flush 0.9 % injection 10 mL  10 mL IntraVENous PRN Balinda Flurry, PA-C        0.9 % sodium chloride infusion   IntraVENous PRN Balinda Flurry, PA-C   New Bag at 08/23/22 0720    ondansetron (ZOFRAN-ODT) disintegrating tablet 4 mg  4 mg Oral Q8H PRN Balinda Flurry, PA-C   4 mg at 08/21/22 1614    Or    ondansetron (ZOFRAN) injection 4 mg  4 mg IntraVENous Q6H PRN Balinda Flurry, PA-C        acetaminophen (TYLENOL) tablet 650 mg  650 mg Oral Q6H PRN Balinda Flurry, PA-C        magnesium sulfate 2000 mg in 50 mL IVPB premix  2,000 mg IntraVENous PRN Balinda Flurry, PA-C             REVIEW OF SYSTEMS:    CONSTITUTIONAL:  positive for  fatigue  EYES:  negative  HEENT:  negative  RESPIRATORY:  negative  CARDIOVASCULAR:  negative  GASTROINTESTINAL:  positive for constipation  GENITOURINARY:  negative  SKIN:  surgical incision, covered with dressing  HEMATOLOGIC/LYMPHATIC:  negative  MUSCULOSKELETAL:  positive for  myalgias, arthralgias, and pain  NEUROLOGICAL:  negative  BEHAVIOR/PSYCH: negative  System review otherwise negative    PHYSICAL EXAM:  /77   Pulse (!) 105   Temp 98.1 °F (36.7 °C) (Oral)   Resp 18   Ht 5' 3\" (1.6 m) Comment: per ED note  Wt 197 lb 15.6 oz (89.8 kg)   SpO2 92%   BMI 35.07 kg/m²  I Body mass index is 35.07 kg/m². I   Wt Readings from Last 1 Encounters:   08/25/22 197 lb 15.6 oz (89.8 kg)      awake  Orientation:   person, place, time  Mood: WNL  Affect: calm  General appearance: no acute distress     Memory:   normal,   Attention/Concentration: normal  Language:  normal     Cranial Nerves:  cranial nerves II-XII are grossly intact  ROM:  normal  Motor Exam:  Motor exam is symmetrical 5 out of 5 all extremities bilaterally  Tone:  normal  Muscle bulk: within normal limits  Sensory:  Sensory intact  + tenderness along paraspinals bilateral SI joints     Heart: normal rate and regular rhythm  Lungs: normal effort  Abdomen: non-distended     Skin: warm and dry and surgical dressing in place, with drain   Peripheral vascular: Pulses: Normal upper and lower extremity pulses; Edema: no    DATA    Recent Labs     08/23/22  0528 08/24/22  0542 08/25/22  0542   WBC 5.1 15.0* 11.3*   RBC 4.64 3.64* 3.61*   HGB 13.8 10.9* 10.8*   HCT 42.6 34.3* 33.8*   MCV 91.8 94.2 93.6   MCH 29.7 29.9 29.9   MCHC 32.4 31.8* 32.0*    214 203   MPV 9.1* 9.6 9.6     Recent Labs     08/23/22  0528 08/24/22  0542 08/25/22  0542    139 139   K 4.2 4.5 4.4    108 106   CO2 28 23 26   BUN 7 12 11   CREATININE 0.6 0.6 0.5   GLUCOSE 92 127* 100   CALCIUM 8.8 8.3* 8.2*     No results for input(s): POCGLU in the last 72 hours. XR LUMBAR SPINE (2-3 VIEWS) [[id number]] Resulted: 08/23/22 1603       Order Status: Completed Updated: 08/23/22 1605     Narrative:       PROCEDURE: XR LUMBAR SPINE (2-3 VIEWS)     CLINICAL INFORMATION: Intraoperative assessment. Extended effusion. COMPARISON: Lumbar spine radiographs 5/6/2022.      TECHNIQUE: 4 fluoroscopic images from lumbar fusion. FINDINGS:   Posterior fusion hardware spans L3-S1. Please see surgical dictation for additional assessment and procedural findings. MRI THORACIC SPINE WO CONTRAST [[id number]] Collected: 08/17/22 2031       Order Status: Completed Updated: 08/17/22 2131     Narrative:       Exam: MRI of the thoracic spine without contrast     Comparison: None     Clinical history: Possible spinal osteomyelitis     Findings:   Alignment of the thoracic spine is anatomic. No thoracic spine fractures are identified. Vertebral body height is normal.   Intervertebral disc height is maintained. No evidence for osteomyelitis/discitis   Prominent central canal/minimal syrinx seen at the distal spinal cord seen   at T11. Otherwise no abnormal cord signal.     At T1-2, no disc bulge. No canal or foraminal stenosis. At T2-3, no disc bulge. No canal or foraminal stenosis. At T3-4, no disc bulge. No canal or foraminal stenosis. At T4-5, no disc bulge. No canal or foraminal stenosis. At T5-6, no disc bulge. No canal or foraminal stenosis. At T6-7, no disc bulge. No canal or foraminal stenosis. At T7-8, no disc bulge. No canal or foraminal stenosis. At T8-9 minimal disc bulge. No significant canal stenosis. No foraminal   narrowing. At T9-10, mild disc bulge with very mild canal narrowing. No foraminal   narrowing. At T10-11, no disc bulge. No canal or foraminal stenosis. At T11-12, no disc bulge. No canal or foraminal stenosis. At T12-L1, no disc bulge. No canal or foraminal stenosis     At T12-L1, no disc bulge. No canal or foraminal stenosis      Impression:       Impression:   1. No thoracic spine fracture seen. No evidence for osteomyelitis/discitis   2. Minimal degenerative disease as above. 3. Prominent central canal/minimal syrinx seen in the distal spinal cord   at T11.              MRI PELVIS WO CONTRAST [[id number]] Collected: 08/17/22 2028       Order Status: Completed Updated: 08/17/22 2129     Narrative:       Exam: MRI of the pelvis without contrast     Comparison: 8/17/2022, 10/18/2021     Clinical history: Osteomyelitis     Findings:   Please see results from today's CT and MRI of the lumbar spine as well as   CT pelvis. MRI pelvis field of view includes the sacrum and SI joints but not the   more anterior pelvis, including hips. Findings at L5-S1 are redemonstrated with posterior fusion, grade 2   subluxation, loss of intervertebral disc height, endplate osteophytosis,   abnormal bone marrow edema within the L5 and S1 vertebrae. Today's CT had   demonstrated probable fracture of the anterior superior right endplate of   the S1 vertebra versus osteomyelitis. Abnormal bone marrow edema seen in   the L5 and S1 vertebrae on today's MR is nonspecific and may be related to   postoperative changes, fracture, degenerative disease or infection. Postoperative fluid collection seen along the left L5 lamina is unchanged   when compared to MR L-spine. L5-S1 mild canal narrowing with moderate to   severe bilateral foraminal narrowing is unchanged since recent MR L-spine. Mild SI joint degenerative arthritis. No SI joint effusions or erosions. No evidence for inflammatory or septic arthritis at the SI joints. Impression:       Impression:   1. Findings at L5-S1 were seen on MRI of the lumbar spine performed   earlier today and are redemonstrated without significant interval change. Abnormal bone marrow edema seen in the L5 and S1 vertebrae is nonspecific   and may be related to postoperative changes, fracture, degenerative   disease or infection/osteomyelitis. Today's pelvic CT had demonstrated a   probable fracture of the anterior superior right endplate of S1 versus   osteomyelitis. 2.  L5-S1 mild canal narrowing with moderate to severe bilateral foraminal   stenosis is stable when compared to earlier MR             [facility] [[id number]] Collected: 08/17/22 2017       Order Status: Completed Updated: 08/17/22 2117     Narrative:       Exam: MRI of the cervical spine without contrast     Comparison: 10/8/2021     Clinical history: Possible spinal osteomyelitis     Findings:   Straightening to the cervical spine with loss of normal cervical lordosis. Alignment is anatomic. Vertebral height is normal. Endplate osteophytosis   with loss of intervertebral disc height at C5-6 and C6-7. Congenital   narrowing of the spinal canal. No abnormal cord signal identified. No   evidence for discitis osteomyelitis. At C2-3, no disc bulge. No canal or foraminal stenosis. At C3-4, mild disc and endplate bulging. Mild canal narrowing with AP   dimension of thecal sac at 9 mm. Mild left foraminal narrowing. No right   foraminal narrowing. At C4-5, diffuse disc and endplate bulging. Moderate canal narrowing with   AP dimension of thecal sac at 7 mm. Mild bilateral foraminal narrowing. At C5-6 left paracentral disc protrusion with endplate osteophytosis. Moderate to severe canal stenosis with AP dimension of thecal sac at 6 mm. Mild right and moderate left foraminal narrowing. At C6-C7, left paracentral disc bulge. Moderate to severe canal stenosis   with AP dimension of thecal sac at 6 mm. Mild right and moderate left   foraminal narrowing. At C7-T1, no disc bulge. No canal or foraminal stenosis      Impression:       Impression:   1. No evidence for discitis or osteomyelitis.    2. Multilevel degenerative disease of the cervical spine most prominent at   C5-6 and C6-7 where there is moderate to severe canal stenosis             CT Lumbar Spine WO Contrast [[id number]] Collected: 08/17/22 1938       Order Status: Completed Updated: 08/17/22 2038     Narrative:       Exam: CT of lumbar spine without contrast     Comparison: MRI 8/17/2022, [place] [id number]     Clinical history: Osteomyelitis     Findings:   Grade 2 anterior subluxation of L5 relative to S1 is new when compared to   prior CT of 10/8/2021. Degree of subluxation appears stable when compared   to MRI of 8/17/2022. Loss of intervertebral disc height at L5-S1 stable   since MRI but advanced since prior CT. Posterior fusion at L5-S1 with   bilateral pedicle screws is new since the CT of 2021. Graft material seen   around the posterior aspect of the L5 screws. Left L5 pedicle screw is outside of the osseous pedicle. Right L5 pedicle screw is within the pedicle and does not demonstrate   surrounding lucency. Left S1 pedicle screw is within the osseous pedicle. No surrounding   lucency. Right S1 screw is within the pedicle. Fracture of the right anterior   superior endplate of S1 seen on axial images 60-69. Lucency around the   right S1 screw is is likely from the fracture however osteomyelitis is not   excluded. L1-L2, L2-3, L3-4 and L4-5 levels have a normal appearance. Impression:       Impression:   1. Grade 2 anterior subluxation of L5 relative to S1 is new when compared   to prior CT of 10/8/2021. Degree of subluxation appears stable when   compared to MRI of 8/17/2022. Loss of intervertebral disc height at L5-S1   stable since MRI but advanced since prior CT. 2. Posterior fusion at L5-S1 with bilateral pedicle screws is new since   the CT of 2021. Right S1 screw is within the pedicle. Fracture of the   right anterior superior endplate of S1. Lucency around the right S1 screw   is nonspecific is likely from the fracture however osteomyelitis is not   excluded. Please correlate with clinical presentation. Left L5 pedicle   screw is outside the osseous pedicle.         ASSESSMENT  Acute pain  Post surgical pain  S/P hardware removal and extension of fusion L4-S2 8/23/2022  Chronic pain syndrome  Lumbar stenosis  ETOH abuse  Drug Abuse    PLAN  Discussed and educated on pain management alternatives, such as; repositioning, distraction, meditation, ice, heat  Continue Percocet 5/325, 1-2 tabs, every 4 hours as needed. 1 tab for moderate pain (4-6/10), 2 tabs for severe pain (7-10/10). Hold for over sedation, confusion, SBP <100  Continue Lyrica 75 mg twice daily for neuropathic pain  Continue lidocaine patches, 12 hours on, 12 hours off. Do not use with ice, heat or over open areas. Can be utilized around surgical dressing  Continue flexeril TID  Apply ice to affected areas  Bowel regimen- colace, senna, add movantik  Continue working with therapies   Pain management will continue to follow while admitted, but will be unavailable to see again until Monday. If discharged prior to being seen again, recommendation would be to continue Lyrica 75 mg BID, flexeril 10 TID prn, Lidocaine patches, and decrease Percocet to 5/325, 1-2 tabs every 6 hours as needed for 5 days.  She is to follow up with [facility] for post op care    Spent 35 minutes evaluating and examining patient and completing documentation      RAMÓN Castañeda - CNP, 8/25/2022, 2:28 PM

## 2022-08-26 ENCOUNTER — TELEPHONE (OUTPATIENT)
Dept: FAMILY MEDICINE CLINIC | Age: 42
End: 2022-08-26

## 2022-08-26 NOTE — CARE COORDINATION
8/26/22, 9:03 AM EDT    Patient was discharged last evening (IMM not updated as CM had anticipated patient as discharge for Friday). [facility] for drain management and PT/OT. Patient goals/plan/ treatment preferences discussed by  and . Patient goals/plan/ treatment preferences reviewed with patient/ family. Patient/ family verbalize understanding of discharge plan and are in agreement with goal/plan/treatment preferences. Understanding was demonstrated using the teach back method. AVS provided by RN at time of discharge, which includes all necessary medical information pertaining to the patients current course of illness, treatment, post-discharge goals of care, and treatment preferences.      Services At/After Discharge: Home Health       IMM Letter  IMM Letter given to Patient/Family/Significant other/Guardian/POA/by[de-identified] staff  IMM Letter date given[de-identified] 08/17/22  IMM Letter time given[de-identified] 4911

## 2022-08-26 NOTE — TELEPHONE ENCOUNTER
S[facility] Transitions Initial Follow Up Call    Call within 2 business days of discharge: Yes     Patient: Chapito Cook Patient : 1980 MRN: [id number]    [unfilled]    RARS: Readmission Risk Score: 16.5       Spoke with: patient and Piotr Bobo      Discharge department/facility: [facility]    Non-face-to-face services provided:  Scheduled appointment with PCP-22. Pt stated that she was possibly going to a nursing home for rehab. She stated that she did not want to do so. She does follow and live at [facility]. I spoke with her helper at [facility] [phone] and she states that she will talk to her about her rehab options. Follow Up  Future Appointments   Date Time Provider Mark Armstrong   2022 11:00 AM RAMÓN Layne - [address], [address] [facility])  [phone]- Nena Concepcion at [facility].

## 2022-08-29 NOTE — ED NOTES
Pt on phone trying to find somewhere to stay tonight. Social work contacted.       Tommie Bermudez RN  07/16/20 2363 Repair Type: Complex Repair

## 2022-08-29 NOTE — DISCHARGE SUMMARY
Hospital Medicine Discharge Summary      Patient Identification:   Odalys Haynes   : 1980  MRN: [id number]   Account: [de-identified]      Patient's PCP: RAMÓN Puckett CNP    Admit Date: 2022     Discharge Date: 2022      Admitting Physician: Malia Juárez PA-C     Discharge Physician: Aden Maribel,      Discharge Diagnoses: Active Hospital Problems    Diagnosis Date Noted    Acute pain [R52] 2022     Priority: Medium    Post-op pain [G89.18] 2022     Priority: Medium    Chronic pain syndrome [G89.4] 2022     Priority: Medium    S/P lumbar fusion [Z98.1] 2022     Priority: Medium    Lumbar radiculopathy [M54.16] 2022     Priority: Medium    Bilateral low back pain [M54.50] 2022     Priority: Medium    Hypothyroidism [E03.9] 2022     Priority: Medium    Osteomyelitis of lumbar spine ([address] [M46.26] 2022     Priority: Medium    Closed type I fracture of sacrum with nonunion [S32.14XK] 2022     Priority: Medium    Alcohol abuse [F10.10] 2022     Priority: Medium    Neurogenic claudication ([address] [G95.19] 2022     Priority: Medium    Osteomyelitis ([address] [M86.9] 2022     Priority: Medium    Depression [F32. A]      Priority: Medium    Moderate episode of recurrent major depressive disorder ([address] [F33.1] 10/01/2018       The patient was seen and examined on day of discharge and this discharge summary is in conjunction with any daily progress note from day of discharge. Hospital Course:   Odalys Haynes is a 43 y.o. female admitted to [facility] on 2022 for lower back pain. 43-year-old lady with past medical history of alcohol abuse disorder, hypothyroidism, chronic back pain issues came to the hospital complaining of worsening of lower back pain associated with saddle anesthesia and 2 times involuntary loss of urine.   Of note patient underwent L5-S1 decompression and fusion with Vitals:  Vitals:    08/25/22 0830 08/25/22 1127 08/25/22 1524 08/25/22 1739   BP: 118/72 113/77 120/82    Pulse: 97 (!) 105 95    Resp: 18 18 18 18   Temp: 98.2 °F (36.8 °C) 98.1 °F (36.7 °C) 98 °F (36.7 °C)    TempSrc: Oral Oral Oral    SpO2:  92% 98%    Weight:       Height:         Weight: Weight: 197 lb 15.6 oz (89.8 kg)     24 hour intake/output:No intake or output data in the 24 hours ending 08/29/22 1631      General appearance:  No apparent distress, appears stated age and cooperative. HEENT:  Normal cephalic, atraumatic without obvious deformity. Pupils equal, round, and reactive to light. Extra ocular muscles intact. Conjunctivae/corneas clear. Neck: Supple, with full range of motion. No jugular venous distention. Trachea midline. Respiratory:  Normal respiratory effort. Clear to auscultation, bilaterally without Rales/Wheezes/Rhonchi. Cardiovascular:  Regular rate and rhythm with normal S1/S2 without murmurs, rubs or gallops. Abdomen: Soft, non-tender, non-distended with normal bowel sounds. Musculoskeletal:  No clubbing, cyanosis or edema bilaterally. Full range of motion without deformity. Skin: Skin color, texture, turgor normal.  No rashes or lesions. Neurologic:  Neurovascularly intact without any focal sensory/motor deficits. Cranial nerves: II-XII intact, grossly non-focal.  Psychiatric:  Alert and oriented, thought content appropriate, normal insight  Capillary Refill: Brisk,< 3 seconds   Peripheral Pulses: +2 palpable, equal bilaterally       Labs:  For convenience and continuity at follow-up the following most recent labs are provided:      CBC:    Lab Results   Component Value Date/Time    WBC 11.3 08/25/2022 05:42 AM    HGB 10.8 08/25/2022 05:42 AM    HCT 33.8 08/25/2022 05:42 AM     08/25/2022 05:42 AM       Renal:    Lab Results   Component Value Date/Time     08/25/2022 05:42 AM    K 4.4 08/25/2022 05:42 AM     08/25/2022 05:42 AM    CO2 26 08/25/2022 05:42 AM    BUN 11 08/25/2022 05:42 AM    CREATININE 0.5 08/25/2022 05:42 AM    CALCIUM 8.2 08/25/2022 05:42 AM         Significant Diagnostic Studies    Radiology:   XR LUMBAR SPINE (2-3 VIEWS)   Final Result      FL[facility] FOR SURGICAL PROCEDURES   Final Result      MRI THORACIC SPINE WO CONTRAST   Final Result   Impression:   1. No thoracic spine fracture seen. No evidence for osteomyelitis/discitis   2. Minimal degenerative disease as above. 3. Prominent central canal/minimal syrinx seen in the distal spinal cord    at T11. This document has been electronically signed by: Ale Story MD on    08/17/2022 09:31 PM      MRI CERVICAL SPINE WO CONTRAST   Final Result   Impression:   1. No evidence for discitis or osteomyelitis. 2. Multilevel degenerative disease of the cervical spine most prominent at    C5-6 and C6-7 where there is moderate to severe canal stenosis      This document has been electronically signed by: Ale Story MD on    08/17/2022 09:17 PM      MRI PELVIS WO CONTRAST   Final Result   Impression:   1. Findings at L5-S1 were seen on MRI of the lumbar spine performed    earlier today and are redemonstrated without significant interval change. Abnormal bone marrow edema seen in the L5 and S1 vertebrae is nonspecific    and may be related to postoperative changes, fracture, degenerative    disease or infection/osteomyelitis. Today's pelvic CT had demonstrated a    probable fracture of the anterior superior right endplate of S1 versus    osteomyelitis. 2. L5-S1 mild canal narrowing with moderate to severe bilateral foraminal    stenosis is stable when compared to earlier MR      This document has been electronically signed by: Ale Story MD on    08/17/2022 09:28 PM      CT Lumbar Spine WO Contrast   Final Result   Impression:   1. Grade 2 anterior subluxation of L5 relative to S1 is new when compared    to prior CT of 10/8/2021.  Degree of subluxation appears stable when    compared to MRI of 8/17/2022. Loss of intervertebral disc height at L5-S1    stable since MRI but advanced since prior CT. 2. Posterior fusion at L5-S1 with bilateral pedicle screws is new since    the CT of 2021. Right S1 screw is within the pedicle. Fracture of the    right anterior superior endplate of S1. Lucency around the right S1 screw    is nonspecific is likely from the fracture however osteomyelitis is not    excluded. Please correlate with clinical presentation. Left L5 pedicle    screw is outside the osseous pedicle. This document has been electronically signed by: Rich Mccrary MD on    08/17/2022 08:38 PM      All CTs at this facility use dose modulation techniques and iterative    reconstructions, and/or weight-based dosing   when appropriate to reduce radiation to a low as reasonably achievable. CT PELVIS WO CONTRAST Additional Contrast? None   Final Result   Impression:   1. Grade 2 anterior subluxation of L5 relative to S1 is new when compared    to prior CT of 10/8/2021. Degree of subluxation appears stable when    compared to MRI of 8/17/2022. Loss of intervertebral disc height at L5-S1    stable since MRI but advanced since prior CT. 2. Posterior fusion at L5-S1 with bilateral pedicle screws is new since    the CT of 2021. Right S1 screw is within the pedicle. Fracture of the    right anterior superior endplate of S1. Lucency around the right S1 screw    is nonspecific is likely from the fracture however osteomyelitis would be    possible. Please correlate with clinical presentation. Left L5 pedicle    screw is outside the osseous pedicle. This document has been electronically signed by: Rich Mccrary MD on    08/17/2022 08:36 PM      All CTs at this facility use dose modulation techniques and iterative    reconstructions, and/or weight-based dosing   when appropriate to reduce radiation to a low as reasonably achievable.       MRI LUMBAR SPINE W WO CONTRAST   Final Result 1. The patient has undergone interval surgery with placement of bilateral pedicular screws at L5 and S1 and laminectomy defect at L5.   2. There is 10 mm of anterolisthesis of L5 relative to S1. This results in moderate to severe bilateral foraminal stenosis and mild canal stenosis. 3. There is abnormal signal intensity and enhancement in the S1 vertebral body suspicious for vertebral body osteomyelitis. Please correlate clinically. 4. There is a tiny syrinx in the distal thoracic spinal cord and conus. 5. There is mild canal and mild-to-moderate bilateral foraminal stenosis at L3-4 and L4-5.   6. Otherwise negative MRI scan of the lumbar spine with and without intravenous contrast.               **This report has been created using voice recognition software. It may contain minor errors which are inherent in voice recognition technology. **      Final report electronically signed by DR Byron Horvath on 8/17/2022 5:19 PM             Consults:     IP CONSULT TO ORTHOPEDIC SURGERY  IP CONSULT TO INFECTIOUS DISEASES  IP CONSULT TO SOCIAL WORK  IP CONSULT TO PSYCHIATRY  IP CONSULT TO CASE MANAGEMENT  IP CONSULT TO PAIN MANAGEMENT  IP CONSULT TO HOME CARE NEEDS  IP CONSULT TO HOME CARE NEEDS    Disposition:    [x] Home       [] TCU       [] Rehab       [] Psych       [] SNF       [] [place]       [] Other-    Condition at Discharge: Stable    Code Status:  Prior     Patient Instructions:    Discharge lab work: Activity: activity as tolerated  Diet: No diet orders on file      Follow-up visits:   Chelsea Key MD  [address]  [phone]    Call on 8/31/2022  Lumbar post-op recheck, appointment time 2pm    RAMÓN Hayes - CNP  [address] [facility]  [phone]    Follow up on 8/31/2022  appointment time 11am    CM [facility]/[address] [address] [phone]             Discharge Medications: Medication List        START taking these medications      cyclobenzaprine 10 MG tablet  Commonly known as: FLEXERIL  Take 1 tablet by mouth 3 times daily as needed for Muscle spasms     oxyCODONE-acetaminophen 5-325 MG per tablet  Commonly known as: PERCOCET  Take 1 tablet by mouth every 6 hours as needed for Pain for up to 7 days. pregabalin 75 MG capsule  Commonly known as: LYRICA  Take 1 capsule by mouth 2 times daily for 7 days. CONTINUE taking these medications      Abilify Maintena 400 MG Prsy  Generic drug: ARIPiprazole ER     hydrOXYzine pamoate 50 MG capsule  Commonly known as: VISTARIL     traZODone 50 MG tablet  Commonly known as: DESYREL  Take 1 tablet by mouth nightly as needed for Sleep     venlafaxine 75 MG extended release capsule  Commonly known as: Effexor XR  Take 1 capsule by mouth daily               Where to Get Your Medications        These medications were sent to [address] [place] [address] , [address], [address] [place] [address] [address] [address]      Phone: [phone]   cyclobenzaprine 10 MG tablet       You can get these medications from any pharmacy    Bring a paper prescription for each of these medications  oxyCODONE-acetaminophen 5-325 MG per tablet  pregabalin 75 MG capsule         Time Spent on discharge is more than 45 minutes in the examination, evaluation, counseling and review of medications and discharge plan. Signed: Thank you RAMÓN Bonilla - ARON for the opportunity to be involved in this patient's care.     Electronically signed by Dominique Wesley DO on 8/29/2022 at 4:31 PM

## 2022-09-07 DIAGNOSIS — E03.8 SUBCLINICAL HYPOTHYROIDISM: ICD-10-CM

## 2022-09-07 NOTE — TELEPHONE ENCOUNTER
Patient's last appointment was : 4/27/2022  Patient's next appointment is : No future appointments.   Last refilled:    No results found for: LABA1C  Lab Results   Component Value Date    CHOL 112 10/02/2018    TRIG 52 10/02/2018    HDL 68 10/02/2018    LDLCALC 34 10/02/2018     Lab Results   Component Value Date     08/25/2022    K 4.4 08/25/2022     08/25/2022    CO2 26 08/25/2022    BUN 11 08/25/2022    CREATININE 0.5 08/25/2022    GLUCOSE 100 08/25/2022    CALCIUM 8.2 (L) 08/25/2022    PROT 6.4 08/17/2022    LABALBU 3.6 08/17/2022    BILITOT 0.4 08/17/2022    ALKPHOS 95 08/17/2022    AST 18 08/17/2022    ALT 12 08/17/2022    LABGLOM >90 08/25/2022     Lab Results   Component Value Date    TSH 0.990 08/18/2022    T4FREE 0.81 (L) 08/10/2011     Lab Results   Component Value Date    WBC 11.3 (H) 08/25/2022    HGB 10.8 (L) 08/25/2022    HCT 33.8 (L) 08/25/2022    MCV 93.6 08/25/2022     08/25/2022

## 2022-09-08 RX ORDER — LEVOTHYROXINE SODIUM 0.05 MG/1
50 TABLET ORAL DAILY
Qty: 28 TABLET | Refills: 3 | OUTPATIENT
Start: 2022-09-08

## 2022-09-08 NOTE — TELEPHONE ENCOUNTER
Levothyroxine prescription discontinued on 8/2/22 due to pt stating she is not taking the prescription. Pt was to have labs completed and a follow up appointment. Pt to call the office if needing refill and appointment.

## 2022-10-03 RX ORDER — LEVOTHYROXINE SODIUM 0.05 MG/1
TABLET ORAL
Qty: 90 TABLET | Refills: 0 | Status: SHIPPED | OUTPATIENT
Start: 2022-10-03

## 2023-01-04 RX ORDER — LEVOTHYROXINE SODIUM 0.05 MG/1
TABLET ORAL
Qty: 90 TABLET | Refills: 1 | Status: SHIPPED | OUTPATIENT
Start: 2023-01-04

## 2023-01-18 ENCOUNTER — HOSPITAL ENCOUNTER (EMERGENCY)
Age: 43
Discharge: HOME | End: 2023-01-18
Attending: EMERGENCY MEDICINE
Payer: MEDICARE

## 2023-01-18 VITALS
TEMPERATURE: 98.2 F | RESPIRATION RATE: 16 BRPM | BODY MASS INDEX: 31.01 KG/M2 | HEART RATE: 100 BPM | DIASTOLIC BLOOD PRESSURE: 93 MMHG | WEIGHT: 175 LBS | SYSTOLIC BLOOD PRESSURE: 136 MMHG | HEIGHT: 63 IN | OXYGEN SATURATION: 96 %

## 2023-01-18 DIAGNOSIS — M54.50 ACUTE EXACERBATION OF CHRONIC LOW BACK PAIN: Primary | ICD-10-CM

## 2023-01-18 DIAGNOSIS — G89.29 ACUTE EXACERBATION OF CHRONIC LOW BACK PAIN: Primary | ICD-10-CM

## 2023-01-18 LAB
BACTERIA: ABNORMAL /HPF
BILIRUBIN URINE: NEGATIVE
BLOOD, URINE: ABNORMAL
CASTS UA: ABNORMAL /LPF
CHARACTER, URINE: CLEAR
COLOR: YELLOW
CRYSTALS, UA: ABNORMAL
EPITHELIAL CELLS, UA: ABNORMAL /HPF
GLUCOSE URINE: NEGATIVE MG/DL
KETONES, URINE: NEGATIVE
LEUKOCYTE ESTERASE, URINE: ABNORMAL
NITRITE, URINE: NEGATIVE
PH UA: 6 (ref 5–9)
PROTEIN UA: NEGATIVE
RBC URINE: > 100 /HPF
SPECIFIC GRAVITY, URINE: 1.01 (ref 1–1.03)
UROBILINOGEN, URINE: 0.2 EU/DL (ref 0–1)
WBC UA: ABNORMAL /HPF

## 2023-01-18 PROCEDURE — 99284 EMERGENCY DEPT VISIT MOD MDM: CPT

## 2023-01-18 PROCEDURE — 6360000002 HC RX W HCPCS: Performed by: EMERGENCY MEDICINE

## 2023-01-18 PROCEDURE — 81001 URINALYSIS AUTO W/SCOPE: CPT

## 2023-01-18 PROCEDURE — 6370000000 HC RX 637 (ALT 250 FOR IP): Performed by: EMERGENCY MEDICINE

## 2023-01-18 PROCEDURE — 96372 THER/PROPH/DIAG INJ SC/IM: CPT

## 2023-01-18 RX ORDER — GABAPENTIN 300 MG/1
300 CAPSULE ORAL 3 TIMES DAILY
Qty: 90 CAPSULE | Refills: 0 | Status: SHIPPED | OUTPATIENT
Start: 2023-01-18 | End: 2023-02-17

## 2023-01-18 RX ORDER — KETOROLAC TROMETHAMINE 30 MG/ML
30 INJECTION, SOLUTION INTRAMUSCULAR; INTRAVENOUS ONCE
Status: COMPLETED | OUTPATIENT
Start: 2023-01-18 | End: 2023-01-18

## 2023-01-18 RX ORDER — PREDNISONE 20 MG/1
40 TABLET ORAL ONCE
Status: COMPLETED | OUTPATIENT
Start: 2023-01-18 | End: 2023-01-18

## 2023-01-18 RX ORDER — OXYCODONE HYDROCHLORIDE AND ACETAMINOPHEN 5; 325 MG/1; MG/1
1 TABLET ORAL ONCE
Status: COMPLETED | OUTPATIENT
Start: 2023-01-18 | End: 2023-01-18

## 2023-01-18 RX ADMIN — OXYCODONE AND ACETAMINOPHEN 1 TABLET: 5; 325 TABLET ORAL at 16:08

## 2023-01-18 RX ADMIN — KETOROLAC TROMETHAMINE 30 MG: 30 INJECTION, SOLUTION INTRAMUSCULAR; INTRAVENOUS at 15:34

## 2023-01-18 RX ADMIN — PREDNISONE 40 MG: 20 TABLET ORAL at 15:34

## 2023-01-18 ASSESSMENT — PAIN DESCRIPTION - DESCRIPTORS: DESCRIPTORS: SHOOTING

## 2023-01-18 ASSESSMENT — PAIN - FUNCTIONAL ASSESSMENT: PAIN_FUNCTIONAL_ASSESSMENT: 0-10

## 2023-01-18 ASSESSMENT — PAIN DESCRIPTION - ORIENTATION: ORIENTATION: LOWER

## 2023-01-18 ASSESSMENT — PAIN SCALES - GENERAL: PAINLEVEL_OUTOF10: 9

## 2023-01-18 ASSESSMENT — PAIN DESCRIPTION - FREQUENCY: FREQUENCY: CONTINUOUS

## 2023-01-18 ASSESSMENT — PAIN DESCRIPTION - LOCATION: LOCATION: BACK

## 2023-01-18 ASSESSMENT — PAIN DESCRIPTION - PAIN TYPE: TYPE: ACUTE PAIN

## 2023-01-18 NOTE — ED TRIAGE NOTES
Patient presents via [facility] to ER with complaints of lower back pain that has been ongoing for a month. Patient also reports right hand injury after punching a window.

## 2023-01-18 NOTE — ED PROVIDER NOTES
[facility] [address] [address] EMERGENCY DEPT      EMERGENCY MEDICINE     Room # 34/034A    Pt Name: Td Ferraro  MRN: [id number]  [place] 1980  Date of evaluation: 1/18/2023  Provider: Lux Naranjo MD    CHIEF COMPLAINT       Chief Complaint   Patient presents with    Back Pain     lower    Hand Injury     HISTORY OF PRESENT ILLNESS   Td Ferraro is a pleasant 43 y.o. female who presents to the emergency department from from home, as a walk in to the ED lobby for evaluation of back pain. The patient had a chronic back pain and been having this for the past 10 years. Patient being followed by the spine specialist Dr. Jordan Mendez who did surgery twice the last time was 8/22. Patient however relates that she continues to have back pain that goes to both legs's the left more than the right in the anteroposterior lateral aspect of the thigh. Patient was maintained on Neurontin 300 mg 3 times a day however she ran out 2 weeks ago. Patient also admits that she had some dysuria however no fever no chills no nausea vomiting no hematuria. States that it hurts to lie down.     PASTMEDICAL HISTORY     Past Medical History:   Diagnosis Date    Anxiety     Back pain     Colitis     Depression     Fibromyalgia     HPV (human papilloma virus) anogenital infection     Hypertension     OCD (obsessive compulsive disorder)     Osteoarthritis (arthritis due to wear and tear of joints)     PONV (postoperative nausea and vomiting)     Seizure ([address]     \" in [facility] in 2021 was checked out by EMS but not taken to hospital\"    Thyroid disease        Patient Active Problem List   Diagnosis Code    Coker's esophagus without dysplasia K22.70    Moderate episode of recurrent major depressive disorder ([address] F33.1    Bipolar I disorder, current or most recent episode depressed, with psychotic features ([address] F31.5    Schizoaffective disorder, bipolar type ([address] F25.0    Severe mixed bipolar 1 disorder without psychosis ([address] F31.63 Alcohol use disorder, severe, dependence ([facility]) F10.20    Amphetamine substance use disorder, severe, in sustained remission ([facility] F15.21    Bipolar I disorder with mixed features ([facility] F31.9    Class 1 obesity in adult E66.9    Subclinical hypothyroidism E03.8    Anxiety F41.9    Lumbar stenosis without neurogenic claudication M48.061    Lumbar stenosis with neurogenic claudication M48.062    Unspecified orthopedic aftercare Z47.89    Depression F32. A    Osteomyelitis ([facility]) M86.9    Bilateral low back pain M54.50    Hypothyroidism E03.9    Osteomyelitis of lumbar spine ([facility]) M46.26    Closed type I fracture of sacrum with nonunion S32. 14XK    Alcohol abuse F10.10    Neurogenic claudication ([facility]) G95.19    Acute pain R52    Post-op pain G89.18    Chronic pain syndrome G89.4    S/P lumbar fusion Z98.1    Lumbar radiculopathy M54.16     SURGICAL HISTORY       Past Surgical History:   Procedure Laterality Date    CHOLECYSTECTOMY      INNER EAR SURGERY      LUMBAR FUSION N/A 5/6/2022    L5=S1 Decompression L5-S1 Posterior Fusion & TLIF performed by Oly Luna MD at [email or web address] 8/23/2022    HARDWARE REMOVAL L5-S1, LUMBAR REVISION FUSION L5-21, FUSION EXTENSION TO L4 and S2 performed by Oly Luna MD at [facility]       Previous Medications    ABILIFY MAINTENA 400 MG PRSY    every 30 days    HYDROXYZINE PAMOATE (VISTARIL) 50 MG CAPSULE    Take 50 mg by mouth 3 times daily as needed for Anxiety    LEVOTHYROXINE (SYNTHROID) 50 MCG TABLET    TAKE 1 TABLET BY MOUTH DAILY    TRAZODONE (DESYREL) 50 MG TABLET    Take 1 tablet by mouth nightly as needed for Sleep    VENLAFAXINE (EFFEXOR XR) 75 MG EXTENDED RELEASE CAPSULE    Take 1 capsule by mouth daily       ALLERGIES     is allergic to dairycare [lactase-lactobacillus]. FAMILY HISTORY     She indicated that her mother is alive. She indicated that her father is alive.  She indicated that the status of her paternal aunt is unknown. SOCIAL HISTORY       Social History     Tobacco Use    Smoking status: Every Day     Packs/day: 0.50     Types: Cigarettes    Smokeless tobacco: Never   Vaping Use    Vaping Use: Former   Substance Use Topics    Alcohol use: Yes     Comment: occasionally    Drug use: Yes     Types: Marijuana (Weed)     Comment: every now and than       PHYSICAL EXAM       ED Triage Vitals [01/18/23 1517]   BP Temp Temp Source Heart Rate Resp SpO2 Height Weight   (!) 136/93 98.2 °F (36.8 °C) Oral 100 16 96 % 5' 3\" (1.6 m) 175 lb (79.4 kg)       Additional Vital Signs:  BP (!) 136/93   Pulse 100   Temp 98.2 °F (36.8 °C) (Oral)   Resp 16   Ht 5' 3\" (1.6 m)   Wt 175 lb (79.4 kg)   SpO2 96%   BMI 31.00 kg/m² Estimated body mass index is 31 kg/m² as calculated from the following:    Height as of this encounter: 5' 3\" (1.6 m). Weight as of this encounter: 175 lb (79.4 kg). Physical Exam  Vitals reviewed. Constitutional:       Appearance: She is well-developed. HENT:      Head: Normocephalic and atraumatic. Right Ear: External ear normal.      Left Ear: External ear normal.      Nose: Nose normal.   Eyes:      General: No scleral icterus. Conjunctiva/sclera: Conjunctivae normal.      Pupils: Pupils are equal, round, and reactive to light. Neck:      Thyroid: No thyromegaly. Vascular: No JVD. Cardiovascular:      Rate and Rhythm: Normal rate and regular rhythm. Heart sounds: No murmur heard. No friction rub. Pulmonary:      Effort: Pulmonary effort is normal.      Breath sounds: Normal breath sounds. No wheezing or rales. Chest:      Chest wall: No tenderness. Abdominal:      General: Bowel sounds are normal.      Palpations: Abdomen is soft. There is no mass. Tenderness: There is no abdominal tenderness. Musculoskeletal:         General: Tenderness (Mid lumbar spine from the middle lumbar to the lower lumbar spine and paraspinal muscles) present.       Cervical back: Normal range of motion and neck supple. Lymphadenopathy:      Cervical: No cervical adenopathy. Skin:     Findings: No rash. Neurological:      Mental Status: She is alert and oriented to person, place, and time. Psychiatric:         Behavior: Behavior is cooperative. FORMAL DIAGNOSTIC RESULTS     RADIOLOGY: Interpretation per the Radiologist below, if available at the time of this note (none if blank): No orders to display       LABS: (none if blank)  Labs Reviewed   URINE WITH REFLEXED MICRO - Abnormal; Notable for the following components:       Result Value    Blood, Urine LARGE (*)     Leukocyte Esterase, Urine TRACE (*)     All other components within normal limits       (Any cultures that may have been sent were not resulted at the time of this patient visit)    [address] / ED COURSE:     1) Number and Complexity of Problems            Problem List This Visit:         Chief Complaint   Patient presents with    Back Pain     lower    Hand Injury            Differential Diagnosis includes (but not limited to):  Exacerbation of chronic back pain, spinal stenosis, UTI pyelonephritis, degenerative disc disease        Diagnoses Considered but I have low suspicion of:   Septic arthritis             Pertinent Comorbid Conditions:        2)  Data Reviewed (none if left blank)          My Independent interpretations:       Imaging: None    Labs:      Urine showed blood from menstruation                 Decision Rules/Clinical Scores utilized: None            External Documentation Reviewed:         Previous patient encounter documents & history available on EMR was reviewed              See Formal Diagnostic Results above for the lab and radiology tests and orders.     3)  Treatment and Disposition           ED Medications administered this visit:  (None if blank)               Medications   ketorolac (TORADOL) injection 30 mg (30 mg IntraMUSCular Given 1/18/23 1944)   predniSONE (DELTASONE) tablet 40 mg (40 mg Oral Given 1/18/23 1534)   oxyCODONE-acetaminophen (PERCOCET) 5-325 MG per tablet 1 tablet (1 tablet Oral Given 1/18/23 1608)            ED Reassessment: Patient was given Toradol prednisone and Percocet and when reevaluated, the patient's pain is much better he is standing in the doorway and wants to go home         Case discussed with consulting clinician:  None         Shared Decision-Making was performed and disposition discussed with the        Patient/Family and questions answered          Social determinants of health impacting treatment or disposition:  NA         Code Status: Full code      Summary of Patient Presentation:      WILLIAM  /   Master Bradley Reviewed:    Vitals:    01/18/23 1517   BP: (!) 136/93   Pulse: 100   Resp: 16   Temp: 98.2 °F (36.8 °C)   TempSrc: Oral   SpO2: 96%   Weight: 175 lb (79.4 kg)   Height: 5' 3\" (1.6 m)       The patient was seen and examined. Appropriate diagnostic testing was performed and results reviewed with the patient. The results of pertinent diagnostic studies and exam findings were discussed. The patients provisional diagnosis and plan of care were discussed with the patient and present family who expressed understanding. Any medications were reviewed and indications and risks of medications were discussed with the patient /family present. Strict verbal and written return precautions, instructions and appropriate follow-up provided to  the patient . PROCEDURES: none      CRITICAL CARE:  None      FINAL IMPRESSION      1. Acute exacerbation of chronic low back pain          DISPOSITION/PLAN   DISPOSITION Decision To Discharge 01/18/2023 05:26:03 PM      PATIENT REFERRED TO:  RAMÓN Kennedy - CNP  [address]  [facility] [address]  [phone]    Schedule an appointment as soon as possible for a visit in 1 week      [address], [address]    In 5 days      [address] [place] [address] [address] [address] EMERGENCY DEPT  [facility]  [phone]    As needed  DISCHARGE MEDICATIONS:  New Prescriptions    GABAPENTIN (NEURONTIN) 300 MG CAPSULE    Take 1 capsule by mouth 3 times daily for 30 days. (Please note that portions of this note were completed with a voice recognition program and electronically transcribed. Efforts were made to edit the dictations but occasionally words are mis-transcribed . The transcription may contain errors not detected in proofreading.   This transcription was electronically signed.)     01/18/23 5:31 PM      Gaviota Austin MD      Emergency room physician             Gaviota Austin MD  01/18/23 [id number]

## 2023-03-03 NOTE — OP NOTE
Operative Note      PATIENT NAME: Zackary Vinson  YOB: 1980  MRN: [id number]                      ACCOUNT NO: [de-identified]                               ROOM: 012  ADMISSION DATE: 5/6/2022    PROVIDER:  Raphael Park M.D.     DATE OF PROCEDURE: 5/6/2022     PREOPERATIVE DIAGNOSES:  1. L5-S1 spondylolisthesis, grade 1  2. L5-S1 lumbar stenosis with neurogenic claudication  3. Obesity, BMI 33.18     POSTOPERATIVE DIAGNOSES:  1. L5-S1 spondylolisthesis, grade 1  2. L5-S1 lumbar stenosis with neurogenic claudication  3. Obesity, BMI 33.18     OPERATION PERFORMED:  1. L5-S1 bilateral laminectomy, partial medial facetectomies and foraminotomies of L5 and S1 nerve roots along with total facetectomies at L5-S1 bilaterally for complete decompression of the bilateral L5 nerve roots  2. L5-S1 posterior spinal fusion. 3. L5-S1 posterior spinal instrumentation, Streamline, SurgAlign instrumentation. 4.  Use of local autograft bone      SURGEON: Oly Luna MD    ASSISTANT:  MALU Trujillo PA-C, assisted throughout the procedure with positioning, draping, retraction, wound closure, and dressing application. ANESTHESIA:  General.     INDICATIONS:  This is a 39 y.o. female with refractory back and bilateral leg pain with tingling from degenerative spondylolisthesis and lumbar stenosis from L5-S1. Patient has failed full conservative therapy including medication management, physical therapy, and epidural steroid injections. Due to the persistence of symptoms and reduction in the ADLs, patient elected surgical treatment. Patient, therefore, understood indications for the surgery as well as its risks, benefits, and alternatives. These risks include but are not limited to paralysis, infection, hematoma, dural tear, nerve root injury, nonunion, DVT/PE, stroke, MI, death etc.  All questions were answered. Informed consent was obtained.      OPERATIVE PROCEDURE:  The patient was taken to the operating room by the Anesthesiology Service and had satisfactory general anesthesia. A first-generation cephalosporin was given within 1 hour of surgical incision. 2 gm of cefazolin was given IV. Venous thromboembolic prophylaxis was performed with sequential devices. The patient was then positioned prone on a standard OSI frame with the abdomen hanging free and all bony prominences well padded. The low back was then prepped and draped in its entirety in the usual sterile fashion. Before incision, a formal timeout was taken per protocol. We next took a midline longitudinal approach and performed subperiosteal dissection out to the tips of the transverse processes of L5- S1. Intraoperative localization of level was confirmed using anatomic landmarks. We then began the laminectomy as well as decompression by removing the spinous process of L5. We entered the spinal canal, resecting the ligamentum flavum in its entirety over this region. Patient had significant stenosis in the lateral recess and neural foramina. Partial medial facetectomy was then performed with an osteotome to get lateral to the facet overhang, but just medial to the pedicles and nerve roots. Kerrison's were then used to perform foraminotomies of the L5 and S1 nerve roots bilaterally. In order to get further lateral to the bilateral L5 nerve root, total facetectomy was performed at L5-S1. In this way, the bilateral L5 nerve roots were completely visualized and foraminized. In this way, we completed decompression at L5-S1 with foraminotomies of the L5 and S1 nerve roots bilaterally. Satisfied with this, we then turned our attention to perform spinal instrumentation and posterolateral fusion. Using anatomic landmarks and guided by direct visualization of the pedicles from within the canal, pedicle screws were placed bilaterally at L5 and S1.   All the screws were completely interosseous as determined by bony palpation except for the tip of the S1 screw which remained bicortical by design. Before the screws were inserted, the transverse processes were decorticated with a high-speed bur at L5 and S1. Local autograft bone was placed over the decorticated elements bilaterally from L5-S1. The screws were then inserted which were under tapped by 1 mm. Two rods were placed from L5-S1, set screws engaged and tightened down to their final torque. Everything was tightened down. A very rigid construct was achieved. Final x-ray was taken, demonstrated good position of the spine and all of the implants. Satisfied with this, we then achieved hemostasis. We then copiously irrigated the wound. We then inserted a Hemovac drain through a separate stab incision. The wound was then closed in layer with interrupted 1 and 2-0 Vicryl sutures and dusted with vancomycin powder. A 3-0 Monocryl was used for the skin. The skin edges were sealed with Dermabond. A dry sterile dressing was applied. The patient was then returned to the hospital bed, extubated, and taken to the recovery room in stable condition. Spinal cord monitoring remained stable throughout the operation. Specimens:   * No specimens in log *    Implants:  Implant Name Type Inv.  Item Serial No.  Lot No. LRB No. Used Action   SET SCR SPNL TI STREAMLINE - [id number]  SET SCR SPNL TI STREAMLINE  [employer]  N/A 4 Implanted   SCREW SPNL L40MM DIA6.5MM THORLUM TI ALLY POLYAX STREAMLINE - [id number]  SCREW SPNL L40MM DIA6.5MM THORLUM TI ALLY POLYAX STREAMLINE  [employer]  N/A 4 Implanted   BELLE SPNL L40MM OD5.5MM QNT - [id number]  BELLE SPNL L40MM OD5.5MM QNT  [employer]  N/A 1 Implanted   BELLE SPNL L35MM [id number] PREBENT FOR STREAMLINE TL SPNL FIX - [id number]  BELLE SPNL L35MM [id number] PREBENT FOR STREAMLINE TL SPNL FIX  [employer]  N/A 1 Implanted         COMPLICATIONS: None.     SPECIMENS:  None. ESTIMATED BLOOD LOSS:  250 mL. POSTOPERATIVE CARE:  The patient will be recovered in PACU and then a regular nursing floor. Once the drainage is low and pain is under control, patient will be discharged home per clinical indication. Patient will follow up in the office in 6 weeks. At that time, AP and lateral x-rays of the lumbar spine will be obtained to assess instrumentation and fusion. MODIFIER 22:  Due to the patient's BMI greater than 30, modifier 22 will be applied due to the patient's case taking 50% longer due to poor visualization and orientation.       Electronically signed by Emigdio Armando MD on 5/6/2022 at 10:43 AM [Initial Visit] : an initial visit for [Symptom and Test Evaluation] : symptom and test evaluation [FreeTextEntry2] : stroke, HTN, PFO, seizures

## 2023-04-06 PROBLEM — F31.4 BIPOLAR I DISORDER, MOST RECENT EPISODE DEPRESSED, SEVERE WITHOUT PSYCHOTIC FEATURES (HCC): Chronic | Status: ACTIVE | Noted: 2023-04-06

## 2023-04-06 PROBLEM — F14.20 COCAINE USE DISORDER, SEVERE, DEPENDENCE (HCC): Chronic | Status: ACTIVE | Noted: 2023-04-06

## 2023-05-04 NOTE — TELEPHONE ENCOUNTER
Pt informed and verbalized understanding. You can place lidocaine patch over the area of pain, 12 hours on and 12 hours off. No more than 3 patches at a time. The Flexeril is a muscle relaxant, it makes you drowsy and I do not recommend taking in combination with the Benadryl that you take at night. You can take this up to 3 times a day or every 8 hours as needed for muscular spasm/pain. You can take Flexeril and combination with Tylenol and ibuprofen as directed over-the-counter and as needed for pain. Call the phone number provided to establish care with a primary care provider. Flexeril- WARNING:  May cause drowsiness. May impair ability to operate vehicles or machinery. Do not use in combination with alcohol. Use an ice pack or bag filled with ice and apply to the injured area 3 - 4 times a day for 15 - 20 minutes each time. If the injury is older than 3 days, then use a heating pad to help relax the muscles in your neck. For pain use acetaminophen (Tylenol) or ibuprofen (Motrin / Advil), unless prescribed medications that have acetaminophen or ibuprofen (or similar medications) in it. You can take over the counter acetaminophen tablets (1 - 2 tablets of the 500-mg strength every 6 hours) or ibuprofen tablets (2 tablets every 4 hours).     PLEASE RETURN TO THE EMERGENCY DEPARTMENT IMMEDIATELY for worsening symptoms, inability to move your legs, tingling or loss of sensation, or if you develop any concerning symptoms such as: high fever not relieved by acetaminophen (Tylenol) and/or ibuprofen (Motrin / Advil), chills, shortness of breath, chest pain, feeling of your heart fluttering or racing, persistent nausea and/or vomiting, vomiting up blood, blood in your stool, loss of consciousness, numbness, weakness or tingling in the arms or legs or change in color of the extremities, changes in mental status, persistent headache, blurry vision, loss of bladder / bowel control, unable to follow up with your physician, or other any other care

## 2023-06-12 RX ORDER — LEVOTHYROXINE SODIUM 0.05 MG/1
TABLET ORAL
Qty: 30 TABLET | Refills: 1 | Status: SHIPPED | OUTPATIENT
Start: 2023-06-12 | End: 2023-08-08

## 2023-06-12 NOTE — TELEPHONE ENCOUNTER
Request sent from [facility] for refill of levothyroxine 50 mcg qd. Last seen 8/2/22, no future appt scheduled. TFTs last done 8/18/22. Med verified. Order pended.

## 2023-08-07 NOTE — TELEPHONE ENCOUNTER
Request sent from [facility] for refill of levothyroxine 50 mcg qd. Last seen 8/2/22, next appt 8/22/23. TSH last done 8/18/22. Med verified. Order pended for #30/0 to get through until appt.

## 2023-08-08 RX ORDER — LEVOTHYROXINE SODIUM 0.05 MG/1
TABLET ORAL
Qty: 30 TABLET | Refills: 0 | Status: SHIPPED | OUTPATIENT
Start: 2023-08-08

## 2023-09-07 NOTE — TELEPHONE ENCOUNTER
This medication refill is regarding a electronic request. Refill requested by  [facility] . Requested Prescriptions     Pending Prescriptions Disp Refills    levothyroxine (SYNTHROID) 50 MCG tablet [Pharmacy Med Name: Levothyroxine Sodium 50MCG TABS] 28 tablet      Sig: TAKE 1 TABLET BY MOUTH DAILY       Date of last visit: 8/2/2022   Date of next visit: Visit date not found  Date of last refill: 8/8/23  Pharmacy Name: [facility]    Last Lipid Panel:    Lab Results   Component Value Date/Time    CHOL 112 10/02/2018 08:20 AM    TRIG 52 10/02/2018 08:20 AM    HDL 68 10/02/2018 08:20 AM    LDLCALC 34 10/02/2018 08:20 AM     Last CMP:   Lab Results   Component Value Date     04/05/2023    K 3.5 04/05/2023     04/05/2023    CO2 23 04/05/2023    BUN 9 04/05/2023    CREATININE 0.7 04/05/2023    GLUCOSE 100 04/05/2023    CALCIUM 8.8 04/05/2023    PROT 6.6 04/05/2023    LABALBU 3.5 04/05/2023    BILITOT 0.5 04/05/2023    ALKPHOS 101 04/05/2023    AST 22 04/05/2023    ALT 22 04/05/2023    LABGLOM >60 04/05/2023       Last Thyroid:    Lab Results   Component Value Date    TSH 0.990 08/18/2022    T4FREE 0.81 (L) 08/10/2011     Last Hemoglobin A1C:  No results found for: LABA1C, AVGG    Rx verified, ordered and set to EP.

## 2023-09-08 RX ORDER — LEVOTHYROXINE SODIUM 0.05 MG/1
TABLET ORAL
Qty: 28 TABLET | Refills: 0 | Status: SHIPPED | OUTPATIENT
Start: 2023-09-08

## 2023-09-08 NOTE — TELEPHONE ENCOUNTER
OK for 30 day refill. Pt is overdue for labs and appt. Please call pt to schedule this.   Thanks -WS

## 2023-10-02 ENCOUNTER — OFFICE VISIT (OUTPATIENT)
Dept: FAMILY MEDICINE CLINIC | Age: 43
End: 2023-10-02

## 2023-10-02 VITALS
SYSTOLIC BLOOD PRESSURE: 104 MMHG | HEART RATE: 76 BPM | BODY MASS INDEX: 27.28 KG/M2 | DIASTOLIC BLOOD PRESSURE: 64 MMHG | WEIGHT: 154 LBS

## 2023-10-02 DIAGNOSIS — M48.062 LUMBAR STENOSIS WITH NEUROGENIC CLAUDICATION: ICD-10-CM

## 2023-10-02 DIAGNOSIS — N76.0 BV (BACTERIAL VAGINOSIS): ICD-10-CM

## 2023-10-02 DIAGNOSIS — N89.8 VAGINAL DISCHARGE: ICD-10-CM

## 2023-10-02 DIAGNOSIS — B96.89 BV (BACTERIAL VAGINOSIS): ICD-10-CM

## 2023-10-02 DIAGNOSIS — F41.9 ANXIETY: ICD-10-CM

## 2023-10-02 DIAGNOSIS — Z12.4 CERVICAL CANCER SCREENING: ICD-10-CM

## 2023-10-02 DIAGNOSIS — Z98.1 S/P LUMBAR FUSION: ICD-10-CM

## 2023-10-02 DIAGNOSIS — M79.642 LEFT HAND PAIN: ICD-10-CM

## 2023-10-02 DIAGNOSIS — F25.0 SCHIZOAFFECTIVE DISORDER, BIPOLAR TYPE (HCC): ICD-10-CM

## 2023-10-02 DIAGNOSIS — Z13.220 LIPID SCREENING: ICD-10-CM

## 2023-10-02 DIAGNOSIS — E03.8 SUBCLINICAL HYPOTHYROIDISM: Primary | ICD-10-CM

## 2023-10-02 DIAGNOSIS — F31.63 SEVERE MIXED BIPOLAR 1 DISORDER WITHOUT PSYCHOSIS (HCC): ICD-10-CM

## 2023-10-02 LAB
BILIRUBIN URINE: NEGATIVE
BLOOD URINE, POC: NEGATIVE
CHARACTER, URINE: CLEAR
COLOR, URINE: YELLOW
GLUCOSE URINE: NEGATIVE MG/DL
KETONES, URINE: NEGATIVE
LEUKOCYTE CLUMPS, URINE: ABNORMAL
NITRITE, URINE: NEGATIVE
PH, URINE: 7 (ref 5–9)
PROTEIN, URINE: NEGATIVE MG/DL
SPECIFIC GRAVITY, URINE: 1.01 (ref 1–1.03)
UROBILINOGEN, URINE: 4 EU/DL (ref 0–1)

## 2023-10-02 RX ORDER — METRONIDAZOLE 500 MG/1
500 TABLET ORAL 2 TIMES DAILY
Qty: 14 TABLET | Refills: 0 | Status: SHIPPED | OUTPATIENT
Start: 2023-10-02 | End: 2023-10-09

## 2023-10-02 RX ORDER — LEVOTHYROXINE SODIUM 0.05 MG/1
50 TABLET ORAL DAILY
Qty: 28 TABLET | Refills: 0 | Status: SHIPPED | OUTPATIENT
Start: 2023-10-02

## 2023-10-02 NOTE — PROGRESS NOTES
T4, Free; Future  -     CBC with Auto Differential; Future  -     Comprehensive Metabolic Panel; Future    Severe mixed bipolar 1 disorder without psychosis ([address])    Anxiety  -     CBC with Auto Differential; Future  -     Comprehensive Metabolic Panel; Future    Lipid screening  -     Lipid Panel; Future    Lumbar stenosis with neurogenic claudication    S/P lumbar fusion    Vaginal discharge  -     POCT Urinalysis No Micro (Auto)  -     Trichomonas Vaginalis by MAE  -     C.trachomatis N.gonorrhoeae DNA  -     Culture, Genital    Schizoaffective disorder, bipolar type (HCC)    Left hand pain  -     XR HAND LEFT (2 VIEWS); Future    BV (bacterial vaginosis)  -     metroNIDAZOLE (FLAGYL) 500 MG tablet; Take 1 tablet by mouth 2 times daily for 7 days    Cervical cancer screening  -     PAP SMEAR    Other orders  -     POCT Urinalysis No Micro (Auto)    - U/A in office today normal. Exam and discharge consistent with BV. Will treat with flagyl and send urine for further testing. Pt agreeable to plan. Avoid sexual activity for next 14 days.   - Hand x-ray. Rest, ice and elevated hand for pain. No swelling noted today. Tylenol as needed for pain. - Labs after 12 hours fasting. Due now. - Continue follow up with [facility] for psych.   - Call office with any questions or concerns, or if symptoms are getting worse or changing      Return in about 4 weeks (around 10/30/2023), or if symptoms worsen or fail to improve, for Wellness/Physical, Routine follow up, Medication check. Patient given educational materials - see patient instructions. Discussed use, benefit, and side effects of prescribed medications. All patient questions answered. Pt voiced understanding.         Electronically signed by RAMÓN Livingston CNP on 10/3/2023 at 7:45 AM

## 2023-10-03 ASSESSMENT — ENCOUNTER SYMPTOMS
VOMITING: 0
DIARRHEA: 0
SORE THROAT: 0
BACK PAIN: 0
ABDOMINAL PAIN: 0
CONSTIPATION: 0
NAUSEA: 0

## 2023-10-05 ENCOUNTER — TELEPHONE (OUTPATIENT)
Dept: FAMILY MEDICINE CLINIC | Age: 43
End: 2023-10-05

## 2023-10-05 LAB
BACTERIA GENITAL AEROBE CULT: NORMAL
C TRACH RRNA SPEC QL NAA+PROBE: NEGATIVE
GRAM STN GENITAL: NORMAL
N GONORRHOEA RRNA SPEC QL NAA+PROBE: NEGATIVE
SPEC CONTAINER SPEC: ABNORMAL
SPECIMEN SOURCE: ABNORMAL
SPECIMEN SOURCE: NORMAL
T VAGINALIS RRNA SPEC QL NAA+PROBE: POSITIVE

## 2023-10-05 NOTE — TELEPHONE ENCOUNTER
Left message on answering machine requesting pt to call back at earliest convenience.               Yanna Muhammad from lab confirmed remaining labs were sent out 10/4/23 and ETA is 10/8/23

## 2023-10-05 NOTE — TELEPHONE ENCOUNTER
----- Message from RAMÓN Lin CNP sent at 10/5/2023  7:31 AM EDT -----  Please call pt and let her know that her genital culture showed BV and she is currently being treated for this. Can we also call the lab and see if they have results from chlamydia, gonorrhea and trich that were sent over on 10/2/23? They show them as pending, but no results yet.   Thanks -WS

## 2023-10-09 ENCOUNTER — TELEPHONE (OUTPATIENT)
Dept: FAMILY MEDICINE CLINIC | Age: 43
End: 2023-10-09

## 2023-10-09 LAB — CYTOLOGY THIN PREP PAP: NORMAL

## 2023-10-09 NOTE — TELEPHONE ENCOUNTER
----- Message from Mallory HermosilloRAMÓN - CNP sent at 10/6/2023  7:42 AM EDT -----  Please let pt know that her trichomonas was positive. She was treated for BV with flagyl, this is also the treatment for Trichomonas. Please make sure she took all the Flagyl as directed. She also needs to tell her sexual partners so they can be treated also. Avoid sexual contact for 7 days after finishing the Flagyl.   Thanks -WS

## 2023-10-11 NOTE — TELEPHONE ENCOUNTER
Eve Bustillo from [facility] called back and I notified her that we are trying to reach the pt with some test results but have not been able to speak to her. Eve Bustillo states the pt got a new phone number recently and we can call her at [phone]. She will let the pt know we are trying to reach her and have her call the office.

## 2023-10-11 NOTE — TELEPHONE ENCOUNTER
Left message with Wong De León the preferred number in pt's chart asking her to have pt give us a call back. Wong De León is not on pt's HIPPA form. Attempted number for Kathy Fuentes who is pt's listed  and left a message asking her to have pt call us back.  is not listed on Pt's HIPPA form either.

## 2023-10-11 NOTE — TELEPHONE ENCOUNTER
Timothy Norman from [facility] called back and I notified her that we are trying to reach the pt with some test results but have not been able to speak to her. Timothy Norman states the pt got a new phone number recently and we can call her at [phone]. She will let the pt know we are trying to reach her and have her call the office.

## 2023-10-11 NOTE — TELEPHONE ENCOUNTER
Spoke with pt regarding all of her results and pt verbalized understanding and that she had completed the prescription of Flagyl and she does not currently have any sexual partners at this time.

## 2023-11-01 DIAGNOSIS — E03.8 SUBCLINICAL HYPOTHYROIDISM: ICD-10-CM

## 2023-11-01 RX ORDER — LEVOTHYROXINE SODIUM 0.05 MG/1
50 TABLET ORAL DAILY
Qty: 30 TABLET | Refills: 0 | Status: SHIPPED | OUTPATIENT
Start: 2023-11-01

## 2023-11-01 NOTE — TELEPHONE ENCOUNTER
This medication refill is regarding a electronic request. Refill requested by  [facility] . Requested Prescriptions     Pending Prescriptions Disp Refills    levothyroxine (SYNTHROID) 50 MCG tablet [Pharmacy Med Name: Levothyroxine Sodium 50MCG TABS] 30 tablet 0     Sig: TAKE 1 TABLET BY MOUTH DAILY     Date of last visit: 10/2/2023   Date of next visit: 11/6/2023  Date of last refill: 10/2/23 #28/0    Last Thyroid:    Lab Results   Component Value Date    TSH 0.990 08/18/2022    T4FREE 0.81 (L) 08/10/2011     Rx verified, ordered and set to EP.

## 2023-11-06 ENCOUNTER — OFFICE VISIT (OUTPATIENT)
Dept: FAMILY MEDICINE CLINIC | Age: 43
End: 2023-11-06

## 2023-11-06 VITALS
BODY MASS INDEX: 28.48 KG/M2 | SYSTOLIC BLOOD PRESSURE: 114 MMHG | DIASTOLIC BLOOD PRESSURE: 68 MMHG | RESPIRATION RATE: 16 BRPM | WEIGHT: 160.8 LBS | HEART RATE: 108 BPM

## 2023-11-06 DIAGNOSIS — Z00.00 ENCOUNTER FOR WELL ADULT EXAM WITHOUT ABNORMAL FINDINGS: Primary | ICD-10-CM

## 2023-11-06 DIAGNOSIS — F25.0 SCHIZOAFFECTIVE DISORDER, BIPOLAR TYPE (HCC): ICD-10-CM

## 2023-11-06 DIAGNOSIS — M48.061 LUMBAR STENOSIS WITHOUT NEUROGENIC CLAUDICATION: ICD-10-CM

## 2023-11-06 DIAGNOSIS — E03.8 SUBCLINICAL HYPOTHYROIDISM: ICD-10-CM

## 2023-11-06 DIAGNOSIS — Z98.1 S/P LUMBAR FUSION: ICD-10-CM

## 2023-11-06 RX ORDER — BUPROPION HYDROCHLORIDE 150 MG/1
TABLET ORAL
COMMUNITY
Start: 2023-10-16

## 2023-11-06 ASSESSMENT — ENCOUNTER SYMPTOMS
TROUBLE SWALLOWING: 0
COLOR CHANGE: 0
SORE THROAT: 0
ABDOMINAL PAIN: 0
DIARRHEA: 0
FACIAL SWELLING: 0
NAUSEA: 0
WHEEZING: 0
EYE PAIN: 0
VOMITING: 0
BACK PAIN: 1
SINUS PAIN: 0
SHORTNESS OF BREATH: 0
COUGH: 0

## 2023-11-06 NOTE — PROGRESS NOTES
[address] [facility] [address]  Dept: [phone]  Dept Fax: [phone]: [phone]     2023     Lyndia Mortimer (:  1980) is a 37 y.o. female, here for evaluation of the following medical concerns:    Chief Complaint   Patient presents with    1 Month Follow-Up    Other     Nerve pain hands and feet, worse at night, discuss Lyrica, has used in the past        HPI  Pt presents to the office today for annual exam.  She did not have labs completed before this appt. Will reprint and send with pt. Doing OK otherwise. Still dealing with a lot of pain. She has been trying gabapentin and lyrica with minimal relief. HX of lumbar back surgery in the past.  No recent OIO follow up. Treatment Adherence:   Medication compliance:  compliant all of the time  Diet compliance:  compliant most of the time  Weight trend: stable    Hypertension:  Home blood pressure monitoring: No. Patient denies chest pain, blurred vision, peripheral edema, and palpitations. Antihypertensive medication side effects: no medication side effects noted. Use of agents associated with hypertension: none. Sodium (meq/L)   Date Value   2023 135    BUN (mg/dL)   Date Value   2023 9    Glucose   Date Value   2023 100 mg/dL   2011 NEGATIVE mg/dl      Potassium (meq/L)   Date Value   2023 3.5     Potassium reflex Magnesium (meq/L)   Date Value   2022 4.4    Creatinine (mg/dL)   Date Value   2023 0.7           Lab Results   Component Value Date    CHOL 112 10/02/2018    TRIG 52 10/02/2018    HDL 68 10/02/2018    LDLCALC 34 10/02/2018     Lab Results   Component Value Date    ALT 22 2023    AST 22 2023        Hypothyroidism: Recent symptoms: anxiety. She denies weight gain, weight loss, cold intolerance, and heat intolerance.  Patient is  taking her medication

## 2023-11-09 DIAGNOSIS — E03.8 SUBCLINICAL HYPOTHYROIDISM: ICD-10-CM

## 2023-11-09 DIAGNOSIS — Z13.220 LIPID SCREENING: ICD-10-CM

## 2023-11-09 DIAGNOSIS — F41.9 ANXIETY: ICD-10-CM

## 2023-11-09 LAB
ALBUMIN SERPL BCG-MCNC: 3.8 G/DL (ref 3.5–5.1)
ALP SERPL-CCNC: 76 U/L (ref 38–126)
ALT SERPL W/O P-5'-P-CCNC: 14 U/L (ref 11–66)
ANION GAP SERPL CALC-SCNC: 11 MEQ/L (ref 8–16)
AST SERPL-CCNC: 18 U/L (ref 5–40)
BASOPHILS ABSOLUTE: 0 THOU/MM3 (ref 0–0.1)
BASOPHILS NFR BLD AUTO: 0.6 %
BILIRUB SERPL-MCNC: 0.4 MG/DL (ref 0.3–1.2)
BUN SERPL-MCNC: 9 MG/DL (ref 7–22)
CALCIUM SERPL-MCNC: 9.1 MG/DL (ref 8.5–10.5)
CHLORIDE SERPL-SCNC: 106 MEQ/L (ref 98–111)
CHOLEST SERPL-MCNC: 162 MG/DL (ref 100–199)
CO2 SERPL-SCNC: 22 MEQ/L (ref 23–33)
CREAT SERPL-MCNC: 0.6 MG/DL (ref 0.4–1.2)
DEPRECATED RDW RBC AUTO: 51.6 FL (ref 35–45)
EOSINOPHIL NFR BLD AUTO: 2.8 %
EOSINOPHILS ABSOLUTE: 0.2 THOU/MM3 (ref 0–0.4)
ERYTHROCYTE [DISTWIDTH] IN BLOOD BY AUTOMATED COUNT: 14.3 % (ref 11.5–14.5)
GFR SERPL CREATININE-BSD FRML MDRD: > 60 ML/MIN/1.73M2
GLUCOSE SERPL-MCNC: 115 MG/DL (ref 70–108)
HCT VFR BLD AUTO: 42.3 % (ref 37–47)
HDLC SERPL-MCNC: 68 MG/DL
HGB BLD-MCNC: 13.3 GM/DL (ref 12–16)
IMM GRANULOCYTES # BLD AUTO: 0.02 THOU/MM3 (ref 0–0.07)
IMM GRANULOCYTES NFR BLD AUTO: 0.3 %
LDLC SERPL CALC-MCNC: 84 MG/DL
LYMPHOCYTES ABSOLUTE: 2.6 THOU/MM3 (ref 1–4.8)
LYMPHOCYTES NFR BLD AUTO: 38.1 %
MCH RBC QN AUTO: 30.7 PG (ref 26–33)
MCHC RBC AUTO-ENTMCNC: 31.4 GM/DL (ref 32.2–35.5)
MCV RBC AUTO: 97.7 FL (ref 81–99)
MONOCYTES ABSOLUTE: 0.7 THOU/MM3 (ref 0.4–1.3)
MONOCYTES NFR BLD AUTO: 9.7 %
NEUTROPHILS NFR BLD AUTO: 48.5 %
NRBC BLD AUTO-RTO: 0 /100 WBC
PLATELET # BLD AUTO: 240 THOU/MM3 (ref 130–400)
PMV BLD AUTO: 9.3 FL (ref 9.4–12.4)
POTASSIUM SERPL-SCNC: 4.1 MEQ/L (ref 3.5–5.2)
PROT SERPL-MCNC: 6.4 G/DL (ref 6.1–8)
RBC # BLD AUTO: 4.33 MILL/MM3 (ref 4.2–5.4)
SEGMENTED NEUTROPHILS ABSOLUTE COUNT: 3.3 THOU/MM3 (ref 1.8–7.7)
SODIUM SERPL-SCNC: 139 MEQ/L (ref 135–145)
T4 FREE SERPL-MCNC: 1.03 NG/DL (ref 0.93–1.76)
TRIGL SERPL-MCNC: 50 MG/DL (ref 0–199)
TSH SERPL DL<=0.005 MIU/L-ACNC: 0.48 UIU/ML (ref 0.4–4.2)
WBC # BLD AUTO: 6.9 THOU/MM3 (ref 4.8–10.8)

## 2023-11-10 ENCOUNTER — TELEPHONE (OUTPATIENT)
Dept: FAMILY MEDICINE CLINIC | Age: 43
End: 2023-11-10

## 2023-11-10 DIAGNOSIS — R73.09 ELEVATED GLUCOSE: Primary | ICD-10-CM

## 2023-11-10 LAB
DEPRECATED MEAN GLUCOSE BLD GHB EST-ACNC: 90 MG/DL (ref 70–126)
HBA1C MFR BLD HPLC: 5 % (ref 4.4–6.4)

## 2023-11-10 NOTE — TELEPHONE ENCOUNTER
Called and spoke with Marge Persaud at the lab she states that she can add this on. Order placed.  Will await to call pt until labs are final.

## 2023-11-10 NOTE — TELEPHONE ENCOUNTER
----- Message from RAMÓN Rangel CNP sent at 11/10/2023  7:54 AM EST -----  Blood sugar is elevated. Can we call labs and see if they can add on an A1C?  Thanks [employer]

## 2023-11-13 ENCOUNTER — TELEPHONE (OUTPATIENT)
Dept: FAMILY MEDICINE CLINIC | Age: 43
End: 2023-11-13

## 2023-11-13 NOTE — TELEPHONE ENCOUNTER
----- Message from RAMÓN Lua CNP sent at 11/13/2023  7:33 AM EST -----  Please let pt know that her labs are normal.  Follow up in our office as planned.  -SVITLANA

## 2023-12-01 DIAGNOSIS — E03.8 SUBCLINICAL HYPOTHYROIDISM: ICD-10-CM

## 2023-12-01 RX ORDER — LEVOTHYROXINE SODIUM 0.05 MG/1
50 TABLET ORAL DAILY
Qty: 90 TABLET | Refills: 0 | Status: SHIPPED | OUTPATIENT
Start: 2023-12-01

## 2023-12-01 NOTE — TELEPHONE ENCOUNTER
This medication refill is regarding a electronic request. Refill requested by patient. Requested Prescriptions     Pending Prescriptions Disp Refills    levothyroxine (SYNTHROID) 50 MCG tablet [Pharmacy Med Name: Levothyroxine Sodium 50MCG TABS] 28 tablet      Sig: TAKE 1 TABLET BY MOUTH DAILY       Date of last visit: 11/6/2023   Date of next visit: Visit date not found  Date of last refill: 11-1-23        Last Thyroid:    Lab Results   Component Value Date    TSH 0.481 11/09/2023    T4FREE 1.03 11/09/2023     Last Hemoglobin A1C:    Lab Results   Component Value Date/Time    LABA1C 5.0 11/09/2023 11:37 AM    AVGG 90 11/09/2023 11:37 AM       Rx verified, ordered and set to EP.

## 2024-08-02 ENCOUNTER — APPOINTMENT (OUTPATIENT)
Dept: GENERAL RADIOLOGY | Age: 44
End: 2024-08-02
Payer: MEDICARE

## 2024-08-02 ENCOUNTER — HOSPITAL ENCOUNTER (EMERGENCY)
Age: 44
Discharge: HOME | End: 2024-08-02
Payer: MEDICARE

## 2024-08-02 VITALS
OXYGEN SATURATION: 98 % | RESPIRATION RATE: 18 BRPM | HEIGHT: 61 IN | WEIGHT: 165 LBS | TEMPERATURE: 98.2 F | HEART RATE: 103 BPM | DIASTOLIC BLOOD PRESSURE: 71 MMHG | SYSTOLIC BLOOD PRESSURE: 134 MMHG | BODY MASS INDEX: 31.15 KG/M2

## 2024-08-02 DIAGNOSIS — M94.0 COSTOCHONDRITIS: Primary | ICD-10-CM

## 2024-08-02 PROCEDURE — 99213 OFFICE O/P EST LOW 20 MIN: CPT | Performed by: NURSE PRACTITIONER

## 2024-08-02 PROCEDURE — 99213 OFFICE O/P EST LOW 20 MIN: CPT

## 2024-08-02 PROCEDURE — 71101 X-RAY EXAM UNILAT RIBS/CHEST: CPT

## 2024-08-02 RX ORDER — TIZANIDINE 4 MG/1
4 TABLET ORAL 4 TIMES DAILY PRN
Qty: 20 TABLET | Refills: 0 | Status: SHIPPED | OUTPATIENT
Start: 2024-08-02

## 2024-08-02 RX ORDER — KETOROLAC TROMETHAMINE 10 MG/1
10 TABLET, FILM COATED ORAL EVERY 6 HOURS PRN
Qty: 20 TABLET | Refills: 0 | Status: SHIPPED | OUTPATIENT
Start: 2024-08-02

## 2024-08-02 RX ORDER — PREDNISONE 20 MG/1
40 TABLET ORAL DAILY
Qty: 10 TABLET | Refills: 0 | Status: SHIPPED | OUTPATIENT
Start: 2024-08-02 | End: 2024-08-07

## 2024-08-02 ASSESSMENT — ENCOUNTER SYMPTOMS
SORE THROAT: 0
RHINORRHEA: 0
COUGH: 0
VOMITING: 0
SHORTNESS OF BREATH: 0
NAUSEA: 0
DIARRHEA: 0
CHEST TIGHTNESS: 0

## 2024-08-02 ASSESSMENT — PAIN SCALES - GENERAL: PAINLEVEL_OUTOF10: 9

## 2024-08-02 ASSESSMENT — PAIN - FUNCTIONAL ASSESSMENT
PAIN_FUNCTIONAL_ASSESSMENT: PREVENTS OR INTERFERES WITH MANY ACTIVE NOT PASSIVE ACTIVITIES
PAIN_FUNCTIONAL_ASSESSMENT: 0-10

## 2024-08-02 ASSESSMENT — PAIN DESCRIPTION - DESCRIPTORS: DESCRIPTORS: DISCOMFORT

## 2024-08-02 ASSESSMENT — PAIN DESCRIPTION - LOCATION: LOCATION: RIB CAGE

## 2024-08-02 ASSESSMENT — PAIN DESCRIPTION - ORIENTATION: ORIENTATION: LEFT

## 2024-08-02 ASSESSMENT — PAIN DESCRIPTION - PAIN TYPE: TYPE: ACUTE PAIN

## 2024-08-02 ASSESSMENT — PAIN DESCRIPTION - ONSET: ONSET: SUDDEN

## 2024-08-02 ASSESSMENT — PAIN DESCRIPTION - FREQUENCY: FREQUENCY: CONTINUOUS

## 2024-08-02 NOTE — ED TRIAGE NOTES
Pt to urgent care due to left lower rib pain with no known injury. Pt states she woke up roughly 4 days ago and she was in pain. She states she would have come earlier, but she did not have transportation. She ended up riding her bike which she states caused her a great deal of pain. The only thing she knows she did differently was swim the day before her pain started.

## 2024-08-02 NOTE — ED NOTES
Discharge instructions and prescriptions reviewed with pt. Pt verbalized understanding. Pt ambulated out in stable condition.  Assessment unchanged upon discharge.     Sherley Gomez RN  08/02/24 [id number]

## 2024-08-02 NOTE — ED PROVIDER NOTES
[facility]  Urgent Care Encounter       CHIEF COMPLAINT       Chief Complaint   Patient presents with    Rib Pain (injury)     Left lower rib       Nurses Notes reviewed and I agree except as noted in the HPI.  HISTORY OF PRESENT ILLNESS   Lesly Guerra is a 44 y.o. female who presents to the [facility] for evaluation of left rib pain.  Reports she woke up with this pain roughly 4 days ago.  She denies injury or strenuous activity.  Does report aggravating factors of taking deep breaths and coughing.  Reports no alleviating factors.  Denies dyspnea.    The history is provided by the patient. No  was used.       REVIEW OF SYSTEMS     Review of Systems   Constitutional:  Negative for activity change, appetite change, chills, fatigue and fever.   HENT:  Negative for ear discharge, ear pain, rhinorrhea and sore throat.    Respiratory:  Negative for cough, chest tightness and shortness of breath.    Cardiovascular:  Negative for chest pain.   Gastrointestinal:  Negative for diarrhea, nausea and vomiting.   Genitourinary:  Negative for dysuria.   Musculoskeletal:  Positive for arthralgias (Left rib pain).   Skin:  Negative for rash.   Allergic/Immunologic: Negative for environmental allergies and food allergies.   Neurological:  Negative for dizziness and headaches.       PAST MEDICAL HISTORY         Diagnosis Date    Anxiety     Back pain     Colitis     Depression     Fibromyalgia     HPV (human papilloma virus) anogenital infection     Hypertension     OCD (obsessive compulsive disorder)     Osteoarthritis (arthritis due to wear and tear of joints)     PONV (postoperative nausea and vomiting)     Seizure (HCC)     \" in [facility] in 2021 was checked out by EMS but not taken to hospital\"    Thyroid disease        SURGICALHISTORY     Patient  has a past surgical history that includes Cholecystectomy; Inner ear surgery; lumbar fusion (N/A, 5/6/2022); and  mis-transcribed.)           Rd Mcclure, APRN - CNP  08/02/24 [id number]

## 2024-09-12 ENCOUNTER — TELEPHONE (OUTPATIENT)
Dept: FAMILY MEDICINE CLINIC | Age: 44
End: 2024-09-12

## 2024-09-12 DIAGNOSIS — E03.8 SUBCLINICAL HYPOTHYROIDISM: Primary | ICD-10-CM

## 2024-09-13 ENCOUNTER — HOSPITAL ENCOUNTER (OUTPATIENT)
Age: 44
Discharge: HOME | End: 2024-09-13
Payer: MEDICARE

## 2024-09-13 DIAGNOSIS — E03.8 SUBCLINICAL HYPOTHYROIDISM: ICD-10-CM

## 2024-09-13 LAB
T4 FREE SERPL-MCNC: 0.95 NG/DL (ref 0.93–1.68)
TSH SERPL DL<=0.005 MIU/L-ACNC: 1.17 UIU/ML (ref 0.4–4.2)

## 2024-09-13 PROCEDURE — 36415 COLL VENOUS BLD VENIPUNCTURE: CPT

## 2024-09-13 PROCEDURE — 84443 ASSAY THYROID STIM HORMONE: CPT

## 2024-09-13 PROCEDURE — 84439 ASSAY OF FREE THYROXINE: CPT

## 2025-01-30 NOTE — Clinical Note
Discharge Plan[de-identified] Other/[facility])   Telemetry/Cardiac Monitoring Required?: Yes OB/GYN Postpartum Note  Service Date: 2025    S: Pain well controlled with current pain meds. Lochia minimal.  Eating and drinking without nausea/vomiting. Ambulating without lightheadedness or dizziness.  Voiding without difficulty. Breast feeding.      O:  Patient Vitals for the past 12 hrs:   BP Temp Temp src Pulse Resp SpO2   25 0614 94/60 97.6  F (36.4  C) Oral 54 16 95 %   25 0150 97/45 98.1  F (36.7  C) Oral 73 16 95 %   25 2207 99/45 97.6  F (36.4  C) Oral 68 16 95 %     Gen:  NAD  CV: Well perfused  Resp: Normal respiratory efforts  Abd: soft, nondistended, nontender, fundus firm at 2cm below umbilicus  Incision: pfannenstiel skin incision is clean, dry, intact   Ext: non-tender, trace edema, no erythema    Hemoglobin   Date Value Ref Range Status   2025 10.5 (L) 11.7 - 15.7 g/dL Final   2025 11.3 (L) 11.7 - 15.7 g/dL Final   2025 11.3 (L) 11.7 - 15.7 g/dL Final     A/P: Olga Lidia Bustillo, 27 year old  POD#1 s/p PLTCS.    Routine postpartum care: meeting all postpartum goals    ?cHTN  -2021 151/117 (going through a miscarriage), 6/3/2022 140/78, 2022 (142/88, 128/92 near the time of delivery of her first boy)  -Admission preE labs nl. BPs are actually low during this admission.   -Plan to educate about preE symptoms and discharge home with BP cuff    Disposition: discharge to home today after 24 hours if baby is doing well    Vilma Esparza MD  Obstetrics and Gynecology  2025 7:22 AM

## 2025-02-20 ENCOUNTER — OFFICE VISIT (OUTPATIENT)
Dept: FAMILY MEDICINE CLINIC | Age: 45
End: 2025-02-20
Payer: MEDICARE

## 2025-02-20 VITALS
RESPIRATION RATE: 18 BRPM | BODY MASS INDEX: 36.06 KG/M2 | HEART RATE: 71 BPM | SYSTOLIC BLOOD PRESSURE: 142 MMHG | WEIGHT: 191 LBS | OXYGEN SATURATION: 95 % | HEIGHT: 61 IN | DIASTOLIC BLOOD PRESSURE: 80 MMHG

## 2025-02-20 DIAGNOSIS — R73.09 ELEVATED GLUCOSE: ICD-10-CM

## 2025-02-20 DIAGNOSIS — E03.9 ACQUIRED HYPOTHYROIDISM: Primary | ICD-10-CM

## 2025-02-20 DIAGNOSIS — Z13.220 SCREENING FOR HYPERLIPIDEMIA: ICD-10-CM

## 2025-02-20 DIAGNOSIS — L72.9 INFECTED CYST OF SKIN: ICD-10-CM

## 2025-02-20 DIAGNOSIS — E66.09 CLASS 2 OBESITY DUE TO EXCESS CALORIES WITHOUT SERIOUS COMORBIDITY WITH BODY MASS INDEX (BMI) OF 36.0 TO 36.9 IN ADULT: ICD-10-CM

## 2025-02-20 DIAGNOSIS — E66.812 CLASS 2 OBESITY DUE TO EXCESS CALORIES WITHOUT SERIOUS COMORBIDITY WITH BODY MASS INDEX (BMI) OF 36.0 TO 36.9 IN ADULT: ICD-10-CM

## 2025-02-20 DIAGNOSIS — L08.9 INFECTED CYST OF SKIN: ICD-10-CM

## 2025-02-20 DIAGNOSIS — M54.50 CHRONIC MIDLINE LOW BACK PAIN WITHOUT SCIATICA: ICD-10-CM

## 2025-02-20 DIAGNOSIS — G89.29 CHRONIC MIDLINE LOW BACK PAIN WITHOUT SCIATICA: ICD-10-CM

## 2025-02-20 DIAGNOSIS — Z13.1 DIABETES MELLITUS SCREENING: ICD-10-CM

## 2025-02-20 DIAGNOSIS — N92.0 MENORRHAGIA WITH REGULAR CYCLE: ICD-10-CM

## 2025-02-20 DIAGNOSIS — L67.9 HAIR CHANGES: ICD-10-CM

## 2025-02-20 PROBLEM — R52 ACUTE PAIN: Status: RESOLVED | Noted: 2022-08-24 | Resolved: 2025-02-20

## 2025-02-20 PROBLEM — G89.18 POST-OP PAIN: Status: RESOLVED | Noted: 2022-08-24 | Resolved: 2025-02-20

## 2025-02-20 PROBLEM — F10.10 ALCOHOL ABUSE: Status: RESOLVED | Noted: 2022-08-18 | Resolved: 2025-02-20

## 2025-02-20 PROBLEM — Z47.89 UNSPECIFIED ORTHOPEDIC AFTERCARE: Status: RESOLVED | Noted: 2022-05-11 | Resolved: 2025-02-20

## 2025-02-20 PROBLEM — E66.811 CLASS 1 OBESITY IN ADULT: Status: RESOLVED | Noted: 2021-05-25 | Resolved: 2025-02-20

## 2025-02-20 PROCEDURE — 99214 OFFICE O/P EST MOD 30 MIN: CPT | Performed by: FAMILY MEDICINE

## 2025-02-20 RX ORDER — VENLAFAXINE HYDROCHLORIDE 150 MG/1
150 CAPSULE, EXTENDED RELEASE ORAL DAILY
Status: SHIPPED | COMMUNITY
Start: 2025-02-20

## 2025-02-20 RX ORDER — SULFAMETHOXAZOLE AND TRIMETHOPRIM 800; 160 MG/1; MG/1
1 TABLET ORAL 2 TIMES DAILY
Qty: 20 TABLET | Refills: 0 | Status: SHIPPED | OUTPATIENT
Start: 2025-02-20 | End: 2025-03-02

## 2025-02-20 RX ORDER — VENLAFAXINE HYDROCHLORIDE 37.5 MG/1
37.5 CAPSULE, EXTENDED RELEASE ORAL DAILY
COMMUNITY
End: 2025-02-20 | Stop reason: DRUGHIGH

## 2025-02-20 SDOH — ECONOMIC STABILITY: FOOD INSECURITY: WITHIN THE PAST 12 MONTHS, YOU WORRIED THAT YOUR FOOD WOULD RUN OUT BEFORE YOU GOT MONEY TO BUY MORE.: NEVER TRUE

## 2025-02-20 SDOH — ECONOMIC STABILITY: FOOD INSECURITY: WITHIN THE PAST 12 MONTHS, THE FOOD YOU BOUGHT JUST DIDN'T LAST AND YOU DIDN'T HAVE MONEY TO GET MORE.: NEVER TRUE

## 2025-02-20 SDOH — HEALTH STABILITY: PHYSICAL HEALTH: ON AVERAGE, HOW MANY DAYS PER WEEK DO YOU ENGAGE IN MODERATE TO STRENUOUS EXERCISE (LIKE A BRISK WALK)?: 2 DAYS

## 2025-02-20 ASSESSMENT — PATIENT HEALTH QUESTIONNAIRE - PHQ9
5. POOR APPETITE OR OVEREATING: NEARLY EVERY DAY
8. MOVING OR SPEAKING SO SLOWLY THAT OTHER PEOPLE COULD HAVE NOTICED. OR THE OPPOSITE, BEING SO FIGETY OR RESTLESS THAT YOU HAVE BEEN MOVING AROUND A LOT MORE THAN USUAL: NEARLY EVERY DAY
4. FEELING TIRED OR HAVING LITTLE ENERGY: NEARLY EVERY DAY
1. LITTLE INTEREST OR PLEASURE IN DOING THINGS: SEVERAL DAYS
3. TROUBLE FALLING OR STAYING ASLEEP: NEARLY EVERY DAY
9. THOUGHTS THAT YOU WOULD BE BETTER OFF DEAD, OR OF HURTING YOURSELF: NOT AT ALL
6. FEELING BAD ABOUT YOURSELF - OR THAT YOU ARE A FAILURE OR HAVE LET YOURSELF OR YOUR FAMILY DOWN: SEVERAL DAYS
2. FEELING DOWN, DEPRESSED OR HOPELESS: SEVERAL DAYS
SUM OF ALL RESPONSES TO PHQ QUESTIONS 1-9: 16
SUM OF ALL RESPONSES TO PHQ QUESTIONS 1-9: 16
10. IF YOU CHECKED OFF ANY PROBLEMS, HOW DIFFICULT HAVE THESE PROBLEMS MADE IT FOR YOU TO DO YOUR WORK, TAKE CARE OF THINGS AT HOME, OR GET ALONG WITH OTHER PEOPLE: NOT DIFFICULT AT ALL
7. TROUBLE CONCENTRATING ON THINGS, SUCH AS READING THE NEWSPAPER OR WATCHING TELEVISION: SEVERAL DAYS
SUM OF ALL RESPONSES TO PHQ QUESTIONS 1-9: 16
SUM OF ALL RESPONSES TO PHQ9 QUESTIONS 1 & 2: 2
SUM OF ALL RESPONSES TO PHQ QUESTIONS 1-9: 16

## 2025-02-20 ASSESSMENT — ENCOUNTER SYMPTOMS: BACK PAIN: 1

## 2025-02-20 NOTE — PROGRESS NOTES
[facility] PROFESSIONAL [facility]  [address]  [place] [address]  Dept: [phone]  Dept Fax: [phone]  Loc: [phone]  PROGRESS NOTE    New patient    Visit Date: 2/20/2025    Lesly Guerra is a 44 y.o. female who presents today for:  Chief Complaint   Patient presents with    Establish Care     Concerned about weight gain since June, hair loss, and fatigue.        Chief complaint:  hair changes    Subjective:  HPI      Hair and nail changes. Difficulty focus.  Synthroid 50 mcg was stopped in may  30 lb wt gain since june    Normal thyroid labs in sept 2024.     Last seen by Concepcion Grigsby in nov 2023    Some night sweats.  Heavy periods.  regular    Lump of posterior head.     Back pain.  States back is sticking out. She called [facility]. Hx of 2 back surgeries.     Smokes.  Wants to quit    Not working currently.    Follows with psychiatry monthly. shari Braden is walking 10 minutes daily with her dogs.     Review of Systems   Constitutional:  Positive for unexpected weight change. Negative for chills and fever.   HENT:  Negative for ear discharge and ear pain.    Musculoskeletal:  Positive for back pain.     Patient Active Problem List   Diagnosis    Coker's esophagus without dysplasia    Moderate episode of recurrent major depressive disorder (HCC)    Bipolar I disorder, current or most recent episode depressed, with psychotic features (HCC)    Schizoaffective disorder, bipolar type (HCC)    Severe mixed bipolar 1 disorder without psychosis (HCC)    Alcohol use disorder, severe, dependence (HCC)    Amphetamine substance use disorder, severe, in sustained remission (HCC)    Bipolar I disorder with mixed features ([facility])    Class 1 obesity in adult    Subclinical hypothyroidism    Anxiety    Lumbar stenosis without neurogenic claudication    Lumbar stenosis with neurogenic claudication    Unspecified orthopedic aftercare    Depression

## 2025-02-21 ENCOUNTER — HOSPITAL ENCOUNTER (OUTPATIENT)
Age: 45
Discharge: HOME | End: 2025-02-21
Payer: MEDICARE

## 2025-02-21 DIAGNOSIS — Z13.1 DIABETES MELLITUS SCREENING: ICD-10-CM

## 2025-02-21 DIAGNOSIS — Z13.220 SCREENING FOR HYPERLIPIDEMIA: ICD-10-CM

## 2025-02-21 DIAGNOSIS — E03.9 ACQUIRED HYPOTHYROIDISM: ICD-10-CM

## 2025-02-21 DIAGNOSIS — R73.09 ELEVATED GLUCOSE: ICD-10-CM

## 2025-02-21 DIAGNOSIS — M54.50 CHRONIC MIDLINE LOW BACK PAIN WITHOUT SCIATICA: ICD-10-CM

## 2025-02-21 DIAGNOSIS — G89.29 CHRONIC MIDLINE LOW BACK PAIN WITHOUT SCIATICA: ICD-10-CM

## 2025-02-21 DIAGNOSIS — E66.812 CLASS 2 OBESITY DUE TO EXCESS CALORIES WITHOUT SERIOUS COMORBIDITY WITH BODY MASS INDEX (BMI) OF 36.0 TO 36.9 IN ADULT: ICD-10-CM

## 2025-02-21 DIAGNOSIS — E66.09 CLASS 2 OBESITY DUE TO EXCESS CALORIES WITHOUT SERIOUS COMORBIDITY WITH BODY MASS INDEX (BMI) OF 36.0 TO 36.9 IN ADULT: ICD-10-CM

## 2025-02-21 LAB
ALBUMIN SERPL BCG-MCNC: 3.9 G/DL (ref 3.5–5.1)
ALP SERPL-CCNC: 106 U/L (ref 38–126)
ALT SERPL W/O P-5'-P-CCNC: 14 U/L (ref 11–66)
ANION GAP SERPL CALC-SCNC: 12 MEQ/L (ref 8–16)
AST SERPL-CCNC: 23 U/L (ref 5–40)
BASOPHILS ABSOLUTE: 0.1 THOU/MM3 (ref 0–0.1)
BASOPHILS NFR BLD AUTO: 0.8 %
BILIRUB SERPL-MCNC: 1.2 MG/DL (ref 0.3–1.2)
BUN SERPL-MCNC: 9 MG/DL (ref 7–22)
CALCIUM SERPL-MCNC: 8.6 MG/DL (ref 8.2–9.6)
CHLORIDE SERPL-SCNC: 104 MEQ/L (ref 98–111)
CHOLEST SERPL-MCNC: 154 MG/DL (ref 100–199)
CO2 SERPL-SCNC: 23 MEQ/L (ref 23–33)
CREAT SERPL-MCNC: 0.6 MG/DL (ref 0.4–1.2)
DEPRECATED MEAN GLUCOSE BLD GHB EST-ACNC: 93 MG/DL (ref 70–126)
DEPRECATED RDW RBC AUTO: 42.7 FL (ref 35–45)
EOSINOPHIL NFR BLD AUTO: 5 %
EOSINOPHILS ABSOLUTE: 0.4 THOU/MM3 (ref 0–0.4)
ERYTHROCYTE [DISTWIDTH] IN BLOOD BY AUTOMATED COUNT: 12.3 % (ref 11.5–14.5)
GFR SERPL CREATININE-BSD FRML MDRD: > 90 ML/MIN/1.73M2
GLUCOSE SERPL-MCNC: 89 MG/DL (ref 70–108)
HBA1C MFR BLD HPLC: 5.1 % (ref 4.4–6.4)
HCT VFR BLD AUTO: 46 % (ref 37–47)
HDLC SERPL-MCNC: 49 MG/DL
HGB BLD-MCNC: 15.9 GM/DL (ref 12–16)
IMM GRANULOCYTES # BLD AUTO: 0.02 THOU/MM3 (ref 0–0.07)
IMM GRANULOCYTES NFR BLD AUTO: 0.3 %
LDLC SERPL CALC-MCNC: 90 MG/DL
LYMPHOCYTES ABSOLUTE: 2.3 THOU/MM3 (ref 1–4.8)
LYMPHOCYTES NFR BLD AUTO: 31.4 %
MCH RBC QN AUTO: 32.6 PG (ref 26–33)
MCHC RBC AUTO-ENTMCNC: 34.6 GM/DL (ref 32.2–35.5)
MCV RBC AUTO: 94.5 FL (ref 81–99)
MONOCYTES ABSOLUTE: 0.5 THOU/MM3 (ref 0.4–1.3)
MONOCYTES NFR BLD AUTO: 6.9 %
NEUTROPHILS ABSOLUTE: 4.1 THOU/MM3 (ref 1.8–7.7)
NEUTROPHILS NFR BLD AUTO: 55.6 %
NRBC BLD AUTO-RTO: 0 /100 WBC
PLATELET # BLD AUTO: 273 THOU/MM3 (ref 130–400)
PMV BLD AUTO: 9.7 FL (ref 9.4–12.4)
POTASSIUM SERPL-SCNC: 3.6 MEQ/L (ref 3.5–5.2)
PROT SERPL-MCNC: 6.7 G/DL (ref 6.1–8)
RBC # BLD AUTO: 4.87 MILL/MM3 (ref 4.2–5.4)
SODIUM SERPL-SCNC: 139 MEQ/L (ref 135–145)
T4 FREE SERPL-MCNC: 1.01 NG/DL (ref 0.92–1.68)
TRIGL SERPL-MCNC: 76 MG/DL (ref 0–199)
TSH SERPL DL<=0.005 MIU/L-ACNC: 1.43 UIU/ML (ref 0.4–4.2)
WBC # BLD AUTO: 7.4 THOU/MM3 (ref 4.8–10.8)

## 2025-02-21 PROCEDURE — 84439 ASSAY OF FREE THYROXINE: CPT

## 2025-02-21 PROCEDURE — 80061 LIPID PANEL: CPT

## 2025-02-21 PROCEDURE — 83036 HEMOGLOBIN GLYCOSYLATED A1C: CPT

## 2025-02-21 PROCEDURE — 36415 COLL VENOUS BLD VENIPUNCTURE: CPT

## 2025-02-21 PROCEDURE — 84443 ASSAY THYROID STIM HORMONE: CPT

## 2025-02-21 PROCEDURE — 85025 COMPLETE CBC W/AUTO DIFF WBC: CPT

## 2025-02-21 PROCEDURE — 80053 COMPREHEN METABOLIC PANEL: CPT

## 2025-03-24 ENCOUNTER — OFFICE VISIT (OUTPATIENT)
Dept: FAMILY MEDICINE CLINIC | Age: 45
End: 2025-03-24
Payer: MEDICARE

## 2025-03-24 VITALS
BODY MASS INDEX: 33.72 KG/M2 | HEIGHT: 61 IN | SYSTOLIC BLOOD PRESSURE: 130 MMHG | WEIGHT: 178.6 LBS | DIASTOLIC BLOOD PRESSURE: 86 MMHG | TEMPERATURE: 99 F | RESPIRATION RATE: 18 BRPM | OXYGEN SATURATION: 99 % | HEART RATE: 81 BPM

## 2025-03-24 DIAGNOSIS — E66.812 CLASS 2 OBESITY DUE TO EXCESS CALORIES WITHOUT SERIOUS COMORBIDITY WITH BODY MASS INDEX (BMI) OF 36.0 TO 36.9 IN ADULT: ICD-10-CM

## 2025-03-24 DIAGNOSIS — E66.09 CLASS 2 OBESITY DUE TO EXCESS CALORIES WITHOUT SERIOUS COMORBIDITY WITH BODY MASS INDEX (BMI) OF 36.0 TO 36.9 IN ADULT: ICD-10-CM

## 2025-03-24 DIAGNOSIS — M54.50 CHRONIC MIDLINE LOW BACK PAIN WITHOUT SCIATICA: ICD-10-CM

## 2025-03-24 DIAGNOSIS — Z12.31 BREAST CANCER SCREENING BY MAMMOGRAM: ICD-10-CM

## 2025-03-24 DIAGNOSIS — Z00.00 WELCOME TO MEDICARE PREVENTIVE VISIT: Primary | ICD-10-CM

## 2025-03-24 DIAGNOSIS — G89.29 CHRONIC MIDLINE LOW BACK PAIN WITHOUT SCIATICA: ICD-10-CM

## 2025-03-24 PROCEDURE — G0402 INITIAL PREVENTIVE EXAM: HCPCS | Performed by: FAMILY MEDICINE

## 2025-03-24 RX ORDER — LEVOTHYROXINE SODIUM 25 UG/1
TABLET ORAL
COMMUNITY
End: 2025-03-24

## 2025-03-24 RX ORDER — PREGABALIN 150 MG/1
150 CAPSULE ORAL 3 TIMES DAILY
COMMUNITY
End: 2025-03-24

## 2025-03-24 RX ORDER — DULOXETIN HYDROCHLORIDE 20 MG/1
CAPSULE, DELAYED RELEASE ORAL
COMMUNITY
End: 2025-03-24

## 2025-03-24 ASSESSMENT — PATIENT HEALTH QUESTIONNAIRE - PHQ9
SUM OF ALL RESPONSES TO PHQ QUESTIONS 1-9: 22
6. FEELING BAD ABOUT YOURSELF - OR THAT YOU ARE A FAILURE OR HAVE LET YOURSELF OR YOUR FAMILY DOWN: NEARLY EVERY DAY
3. TROUBLE FALLING OR STAYING ASLEEP: SEVERAL DAYS
9. THOUGHTS THAT YOU WOULD BE BETTER OFF DEAD, OR OF HURTING YOURSELF: NOT AT ALL
7. TROUBLE CONCENTRATING ON THINGS, SUCH AS READING THE NEWSPAPER OR WATCHING TELEVISION: NEARLY EVERY DAY
4. FEELING TIRED OR HAVING LITTLE ENERGY: NEARLY EVERY DAY
5. POOR APPETITE OR OVEREATING: NEARLY EVERY DAY
SUM OF ALL RESPONSES TO PHQ QUESTIONS 1-9: 22
1. LITTLE INTEREST OR PLEASURE IN DOING THINGS: NEARLY EVERY DAY
2. FEELING DOWN, DEPRESSED OR HOPELESS: NEARLY EVERY DAY
10. IF YOU CHECKED OFF ANY PROBLEMS, HOW DIFFICULT HAVE THESE PROBLEMS MADE IT FOR YOU TO DO YOUR WORK, TAKE CARE OF THINGS AT HOME, OR GET ALONG WITH OTHER PEOPLE: VERY DIFFICULT
8. MOVING OR SPEAKING SO SLOWLY THAT OTHER PEOPLE COULD HAVE NOTICED. OR THE OPPOSITE, BEING SO FIGETY OR RESTLESS THAT YOU HAVE BEEN MOVING AROUND A LOT MORE THAN USUAL: NEARLY EVERY DAY

## 2025-03-24 ASSESSMENT — LIFESTYLE VARIABLES
HOW MANY STANDARD DRINKS CONTAINING ALCOHOL DO YOU HAVE ON A TYPICAL DAY: 1 OR 2
HOW OFTEN DO YOU HAVE A DRINK CONTAINING ALCOHOL: 2-3 TIMES A WEEK

## 2025-03-24 ASSESSMENT — COLUMBIA-SUICIDE SEVERITY RATING SCALE - C-SSRS
6. HAVE YOU EVER DONE ANYTHING, STARTED TO DO ANYTHING, OR PREPARED TO DO ANYTHING TO END YOUR LIFE?: NO
2. HAVE YOU ACTUALLY HAD ANY THOUGHTS OF KILLING YOURSELF?: NO
1. WITHIN THE PAST MONTH, HAVE YOU WISHED YOU WERE DEAD OR WISHED YOU COULD GO TO SLEEP AND NOT WAKE UP?: NO

## 2025-03-24 NOTE — PATIENT INSTRUCTIONS
drink when you know it will harm you, you may want to end the friendship.  Where can you learn more?  Go to https://www.[employer].net/patientEd and enter G092 to learn more about \"Learning About Emotional Support.\"  Current as of: July 31, 2024  Content Version: 14.4  © 2692-5546 [employer].   Care instructions adapted under license by [employer]. If you have questions about a medical condition or this instruction, always ask your healthcare professional. [employer] disclaims any warranty or liability for your use of this information.         Learning About Stress  What is stress?     Stress is your body's response to a hard situation. Your body can have a physical, emotional, or mental response. Stress is a fact of life for most people, and it affects everyone differently. What causes stress for you may not be stressful for someone else.  A lot of things can cause stress. You may feel stress when you go on a job interview, take a test, or run a race. This kind of short-term stress is normal and even useful. It can help you if you need to work hard or react quickly. For example, stress can help you finish an important job on time.  Long-term stress is caused by ongoing stressful situations or events. Examples of long-term stress include long-term health problems, ongoing problems at work, or conflicts in your family. Long-term stress can harm your health.  How does stress affect your health?  When you are stressed, your body responds as though you are in danger. It makes hormones that speed up your heart, make you breathe faster, and give you a burst of energy. This is called the fight-or-flight stress response. If the stress is over quickly, your body goes back to normal and no harm is done.  But if stress happens too often or lasts too long, it can have bad effects. Long-term stress can make you more likely to get sick, and it can make symptoms of some diseases worse. If you tense up

## 2025-03-24 NOTE — PROGRESS NOTES
Medicare Annual Wellness Visit    Lesly Guerra is here for Medicare AWV (Would like to talk about weight issues. Its causing no energy and depression. Has tried everything with no help. Has had 2 back surgeries in the past. )    Assessment & Plan   Welcome to Medicare preventive visit  Chronic midline low back pain without sciatica  -     [facility] - Omar Wing MD, Orthopedic Surgery, [place]  Class 2 obesity due to excess calories without serious comorbidity with body mass index (BMI) of 36.0 to 36.9 in adult  -     tirzepatide-weight management (ZEPBOUND) 2.5 MG/0.5ML SOAJ subCUTAneous auto-injector pen; Inject 2.5 mg into the skin every 7 days, Disp-2 mL, R-0Normal  Breast cancer screening by mammogram  -     [facility] DIGITAL SCREEN W OR WO CAD BILATERAL; Future      Chronic low back pain with history of surgery.  Referral to orthospine    Obesity: Chronic.  Uncontrolled.  Discussed GLP-1 medications.  Prescription for Zepbound.  Side effects discussed.  Titration schedule discussed.  She will need to check with insurance if not covered    Bipolar: Currently depressed. Chronic.  Uncontrolled.  Counseling handout resources provided.  Recommend she contact victim services which she is aware of and knows how to do.  She declines prescription for Effexor       Return in about 3 months (around 6/24/2025) for obesity.     Subjective       Depression/bipolar.  Stopped her meds.  Not going to [facility] anymore    Patient's complete Health Risk Assessment and screening values have been reviewed and are found in Flowsheets. The following problems were reviewed today and where indicated follow up appointments were made and/or referrals ordered.    Positive Risk Factor Screenings with Interventions:     Cognitive:   Clock Drawing Test (CDT): (!) Abnormal  Words recalled: 3 Words Recalled  Total Score: 3  Total Score Interpretation: Normal Mini-Cog    Interventions:  Patient declines any further evaluation or

## 2025-03-25 ENCOUNTER — TELEPHONE (OUTPATIENT)
Dept: FAMILY MEDICINE CLINIC | Age: 45
End: 2025-03-25

## 2025-03-25 NOTE — TELEPHONE ENCOUNTER
Patient called about he Zepbound. Stated it needs approved with insurance.     I informed her that her secondary does not cover any weight loss medication. Also informed her a chance that medicare will not cover it either but I would submit with her insurance.     PA submitted in Epic.

## 2025-03-25 NOTE — TELEPHONE ENCOUNTER
Why was coverage for this drug denied?  We denied coverage for this drug because Medicare law does not include drugs for weight loss  under Medicare Part D coverage. This is one of those drugs. This is not a medical necessity  decision. It is a benefit issue only. We based this decision on Chapter 6 section 20.1 of the  Medicare Prescription Drug Benefit Manual. For more information, talk to your prescriber or call  1-800-MEDICARE.  Share

## 2025-04-28 ENCOUNTER — HOSPITAL ENCOUNTER (OUTPATIENT)
Dept: MAMMOGRAPHY | Age: 45
Discharge: HOME | End: 2025-04-28
Payer: MEDICARE

## 2025-04-28 VITALS — BODY MASS INDEX: 33.61 KG/M2 | HEIGHT: 61 IN | WEIGHT: 178 LBS

## 2025-04-28 DIAGNOSIS — Z12.31 BREAST CANCER SCREENING BY MAMMOGRAM: ICD-10-CM

## 2025-04-28 PROCEDURE — 77063 BREAST TOMOSYNTHESIS BI: CPT

## 2025-04-30 ENCOUNTER — RESULTS FOLLOW-UP (OUTPATIENT)
Dept: FAMILY MEDICINE CLINIC | Age: 45
End: 2025-04-30

## 2025-05-05 ENCOUNTER — TELEPHONE (OUTPATIENT)
Dept: FAMILY MEDICINE CLINIC | Age: 45
End: 2025-05-05

## 2025-05-05 DIAGNOSIS — E66.812 CLASS 2 OBESITY DUE TO EXCESS CALORIES WITHOUT SERIOUS COMORBIDITY WITH BODY MASS INDEX (BMI) OF 36.0 TO 36.9 IN ADULT: ICD-10-CM

## 2025-05-05 DIAGNOSIS — E66.09 CLASS 2 OBESITY DUE TO EXCESS CALORIES WITHOUT SERIOUS COMORBIDITY WITH BODY MASS INDEX (BMI) OF 36.0 TO 36.9 IN ADULT: ICD-10-CM

## 2025-05-05 NOTE — TELEPHONE ENCOUNTER
Patient stopped in and has new insurance. She is requesting that we resubmit her zepbound. She will need a new script sent to the pharmacy before I can submit a PA through her new insurance. RX pended.

## 2025-05-12 ENCOUNTER — OFFICE VISIT (OUTPATIENT)
Dept: FAMILY MEDICINE CLINIC | Age: 45
End: 2025-05-12
Payer: MEDICARE

## 2025-05-12 VITALS
OXYGEN SATURATION: 99 % | SYSTOLIC BLOOD PRESSURE: 136 MMHG | HEART RATE: 97 BPM | WEIGHT: 201.6 LBS | DIASTOLIC BLOOD PRESSURE: 84 MMHG | HEIGHT: 63 IN | BODY MASS INDEX: 35.72 KG/M2

## 2025-05-12 DIAGNOSIS — M54.41 CHRONIC BILATERAL LOW BACK PAIN WITH BILATERAL SCIATICA: ICD-10-CM

## 2025-05-12 DIAGNOSIS — F17.200 CURRENT SMOKER: ICD-10-CM

## 2025-05-12 DIAGNOSIS — M54.42 CHRONIC BILATERAL LOW BACK PAIN WITH BILATERAL SCIATICA: ICD-10-CM

## 2025-05-12 DIAGNOSIS — G89.29 CHRONIC BILATERAL LOW BACK PAIN WITH BILATERAL SCIATICA: ICD-10-CM

## 2025-05-12 DIAGNOSIS — E66.09 CLASS 2 OBESITY DUE TO EXCESS CALORIES WITHOUT SERIOUS COMORBIDITY WITH BODY MASS INDEX (BMI) OF 35.0 TO 35.9 IN ADULT: Primary | ICD-10-CM

## 2025-05-12 DIAGNOSIS — E66.812 CLASS 2 OBESITY DUE TO EXCESS CALORIES WITHOUT SERIOUS COMORBIDITY WITH BODY MASS INDEX (BMI) OF 35.0 TO 35.9 IN ADULT: Primary | ICD-10-CM

## 2025-05-12 PROCEDURE — 99213 OFFICE O/P EST LOW 20 MIN: CPT | Performed by: FAMILY MEDICINE

## 2025-05-12 PROCEDURE — G8417 CALC BMI ABV UP PARAM F/U: HCPCS | Performed by: FAMILY MEDICINE

## 2025-05-12 PROCEDURE — 4004F PT TOBACCO SCREEN RCVD TLK: CPT | Performed by: FAMILY MEDICINE

## 2025-05-12 PROCEDURE — G8427 DOCREV CUR MEDS BY ELIG CLIN: HCPCS | Performed by: FAMILY MEDICINE

## 2025-05-12 NOTE — PROGRESS NOTES
800 mg daily.    She has received her mammogram results, which were read as benign, and is considering another test due to dense breast tissue.         Review of Systems    Medications  Current Outpatient Medications:   tirzepatide-weight management (ZEPBOUND) 2.5 MG/0.5ML SOAJ subCUTAneous auto-injector pen, Inject 2.5 mg into the skin every 7 days (Patient not taking: Reported on 5/12/2025), Disp: 2 mL, Rfl: 0    The patient is allergic to dairycare [bacid].    Past Medical History  Lesly MORRIS  has a past medical history of Alcohol abuse, Anxiety, Back pain, Colitis, Depression, Fibromyalgia, HPV (human papilloma virus) anogenital infection, Hypertension, OCD (obsessive compulsive disorder), Osteoarthritis (arthritis due to wear and tear of joints), PONV (postoperative nausea and vomiting), Seizure (HCC), and Thyroid disease.    Past Surgical History  The patient  has a past surgical history that includes Cholecystectomy; Inner ear surgery; lumbar fusion (N/A, 5/6/2022); and lumbar fusion (N/A, 8/23/2022).    Family History  This patient's family history includes Cancer in her paternal aunt; Diabetes in her father and mother; Heart Disease in her father and mother.    Social History  Lesly MORRIS  reports that she has been smoking cigarettes. She has a 10 pack-year smoking history. She has never used smokeless tobacco. She reports that she does not currently use alcohol after a past usage of about 2.0 standard drinks of alcohol per week. She reports that she does not currently use drugs after having used the following drugs: Marijuana (Weed) and Cocaine.    Health Maintenance:    Health maintenance reviewed.   Health Maintenance   Topic Date Due    HIV screen  Never done    Hepatitis B vaccine (1 of 3 - 19+ 3-dose series) Never done    DTaP/Tdap/Td vaccine (1 - Tdap) Never done    Pneumococcal 0-49 years Vaccine (1 of 2 - PCV) Never done    COVID-19 Vaccine (3 - 2024-25 season) 09/01/2024    Flu vaccine (Season

## 2025-05-22 ENCOUNTER — TELEPHONE (OUTPATIENT)
Dept: FAMILY MEDICINE CLINIC | Age: 45
End: 2025-05-22

## 2025-05-22 NOTE — TELEPHONE ENCOUNTER
PA not going through on Epic I have submitted a PA through the phone.     Phone number for Prior auth and check.  [phone].     Prior auth. Number-  [id number]
